# Patient Record
Sex: FEMALE | Race: WHITE | NOT HISPANIC OR LATINO | Employment: OTHER | URBAN - METROPOLITAN AREA
[De-identification: names, ages, dates, MRNs, and addresses within clinical notes are randomized per-mention and may not be internally consistent; named-entity substitution may affect disease eponyms.]

---

## 2017-01-08 ENCOUNTER — COMMUNICATION - HEALTHEAST (OUTPATIENT)
Dept: CARDIOLOGY | Facility: CLINIC | Age: 82
End: 2017-01-08

## 2017-01-08 DIAGNOSIS — I48.20 CHRONIC ATRIAL FIBRILLATION (H): ICD-10-CM

## 2017-01-25 ENCOUNTER — AMBULATORY - HEALTHEAST (OUTPATIENT)
Dept: LAB | Facility: CLINIC | Age: 82
End: 2017-01-25

## 2017-01-25 ENCOUNTER — COMMUNICATION - HEALTHEAST (OUTPATIENT)
Dept: INTERNAL MEDICINE | Facility: CLINIC | Age: 82
End: 2017-01-25

## 2017-01-25 DIAGNOSIS — I48.91 A-FIB (H): ICD-10-CM

## 2017-01-30 ENCOUNTER — COMMUNICATION - HEALTHEAST (OUTPATIENT)
Dept: CARDIOLOGY | Facility: CLINIC | Age: 82
End: 2017-01-30

## 2017-01-30 DIAGNOSIS — R60.9 EDEMA: ICD-10-CM

## 2017-03-03 ENCOUNTER — COMMUNICATION - HEALTHEAST (OUTPATIENT)
Dept: INTERNAL MEDICINE | Facility: CLINIC | Age: 82
End: 2017-03-03

## 2017-03-03 DIAGNOSIS — I48.91 ATRIAL FIBRILLATION (H): ICD-10-CM

## 2017-03-10 ENCOUNTER — AMBULATORY - HEALTHEAST (OUTPATIENT)
Dept: LAB | Facility: CLINIC | Age: 82
End: 2017-03-10

## 2017-03-10 ENCOUNTER — COMMUNICATION - HEALTHEAST (OUTPATIENT)
Dept: NURSING | Facility: CLINIC | Age: 82
End: 2017-03-10

## 2017-03-10 DIAGNOSIS — I48.91 ATRIAL FIBRILLATION (H): ICD-10-CM

## 2017-04-10 ENCOUNTER — AMBULATORY - HEALTHEAST (OUTPATIENT)
Dept: LAB | Facility: CLINIC | Age: 82
End: 2017-04-10

## 2017-04-10 ENCOUNTER — COMMUNICATION - HEALTHEAST (OUTPATIENT)
Dept: INTERNAL MEDICINE | Facility: CLINIC | Age: 82
End: 2017-04-10

## 2017-04-10 DIAGNOSIS — I48.91 ATRIAL FIBRILLATION (H): ICD-10-CM

## 2017-05-01 ENCOUNTER — AMBULATORY - HEALTHEAST (OUTPATIENT)
Dept: LAB | Facility: CLINIC | Age: 82
End: 2017-05-01

## 2017-05-01 ENCOUNTER — COMMUNICATION - HEALTHEAST (OUTPATIENT)
Dept: INTERNAL MEDICINE | Facility: CLINIC | Age: 82
End: 2017-05-01

## 2017-05-01 DIAGNOSIS — I48.91 ATRIAL FIBRILLATION (H): ICD-10-CM

## 2017-05-01 DIAGNOSIS — I48.91 A-FIB (H): ICD-10-CM

## 2017-05-03 ENCOUNTER — COMMUNICATION - HEALTHEAST (OUTPATIENT)
Dept: INTERNAL MEDICINE | Facility: CLINIC | Age: 82
End: 2017-05-03

## 2017-05-04 ENCOUNTER — COMMUNICATION - HEALTHEAST (OUTPATIENT)
Dept: INTERNAL MEDICINE | Facility: CLINIC | Age: 82
End: 2017-05-04

## 2017-05-08 ENCOUNTER — COMMUNICATION - HEALTHEAST (OUTPATIENT)
Dept: CARDIOLOGY | Facility: CLINIC | Age: 82
End: 2017-05-08

## 2017-05-09 ENCOUNTER — COMMUNICATION - HEALTHEAST (OUTPATIENT)
Dept: INTERNAL MEDICINE | Facility: CLINIC | Age: 82
End: 2017-05-09

## 2017-05-15 ENCOUNTER — AMBULATORY - HEALTHEAST (OUTPATIENT)
Dept: LAB | Facility: CLINIC | Age: 82
End: 2017-05-15

## 2017-05-15 ENCOUNTER — COMMUNICATION - HEALTHEAST (OUTPATIENT)
Dept: INTERNAL MEDICINE | Facility: CLINIC | Age: 82
End: 2017-05-15

## 2017-05-15 DIAGNOSIS — I48.91 ATRIAL FIBRILLATION (H): ICD-10-CM

## 2017-06-01 ENCOUNTER — COMMUNICATION - HEALTHEAST (OUTPATIENT)
Dept: INTERNAL MEDICINE | Facility: CLINIC | Age: 82
End: 2017-06-01

## 2017-06-01 ENCOUNTER — AMBULATORY - HEALTHEAST (OUTPATIENT)
Dept: LAB | Facility: CLINIC | Age: 82
End: 2017-06-01

## 2017-06-01 DIAGNOSIS — I48.91 ATRIAL FIBRILLATION (H): ICD-10-CM

## 2017-06-23 ENCOUNTER — COMMUNICATION - HEALTHEAST (OUTPATIENT)
Dept: INTERNAL MEDICINE | Facility: CLINIC | Age: 82
End: 2017-06-23

## 2017-06-23 ENCOUNTER — AMBULATORY - HEALTHEAST (OUTPATIENT)
Dept: LAB | Facility: CLINIC | Age: 82
End: 2017-06-23

## 2017-06-23 DIAGNOSIS — I48.91 ATRIAL FIBRILLATION (H): ICD-10-CM

## 2017-07-13 ENCOUNTER — COMMUNICATION - HEALTHEAST (OUTPATIENT)
Dept: INTERNAL MEDICINE | Facility: CLINIC | Age: 82
End: 2017-07-13

## 2017-07-13 ENCOUNTER — AMBULATORY - HEALTHEAST (OUTPATIENT)
Dept: LAB | Facility: CLINIC | Age: 82
End: 2017-07-13

## 2017-07-13 DIAGNOSIS — I48.91 ATRIAL FIBRILLATION (H): ICD-10-CM

## 2017-07-24 ENCOUNTER — COMMUNICATION - HEALTHEAST (OUTPATIENT)
Dept: INTERNAL MEDICINE | Facility: CLINIC | Age: 82
End: 2017-07-24

## 2017-07-27 ENCOUNTER — COMMUNICATION - HEALTHEAST (OUTPATIENT)
Dept: INTERNAL MEDICINE | Facility: CLINIC | Age: 82
End: 2017-07-27

## 2017-07-27 ENCOUNTER — AMBULATORY - HEALTHEAST (OUTPATIENT)
Dept: LAB | Facility: CLINIC | Age: 82
End: 2017-07-27

## 2017-07-27 DIAGNOSIS — E78.5 HYPERLIPIDEMIA, UNSPECIFIED HYPERLIPIDEMIA TYPE: ICD-10-CM

## 2017-07-27 DIAGNOSIS — I48.91 ATRIAL FIBRILLATION (H): ICD-10-CM

## 2017-08-18 ENCOUNTER — COMMUNICATION - HEALTHEAST (OUTPATIENT)
Dept: CARDIOLOGY | Facility: CLINIC | Age: 82
End: 2017-08-18

## 2017-08-18 DIAGNOSIS — I10 ESSENTIAL HYPERTENSION: ICD-10-CM

## 2017-08-23 ENCOUNTER — COMMUNICATION - HEALTHEAST (OUTPATIENT)
Dept: NURSING | Facility: CLINIC | Age: 82
End: 2017-08-23

## 2017-08-23 ENCOUNTER — AMBULATORY - HEALTHEAST (OUTPATIENT)
Dept: LAB | Facility: CLINIC | Age: 82
End: 2017-08-23

## 2017-08-23 DIAGNOSIS — I48.91 ATRIAL FIBRILLATION (H): ICD-10-CM

## 2017-09-18 ENCOUNTER — AMBULATORY - HEALTHEAST (OUTPATIENT)
Dept: LAB | Facility: CLINIC | Age: 82
End: 2017-09-18

## 2017-09-18 ENCOUNTER — COMMUNICATION - HEALTHEAST (OUTPATIENT)
Dept: INTERNAL MEDICINE | Facility: CLINIC | Age: 82
End: 2017-09-18

## 2017-09-18 DIAGNOSIS — I48.91 ATRIAL FIBRILLATION (H): ICD-10-CM

## 2017-09-27 ENCOUNTER — COMMUNICATION - HEALTHEAST (OUTPATIENT)
Dept: INTERNAL MEDICINE | Facility: CLINIC | Age: 82
End: 2017-09-27

## 2017-09-27 DIAGNOSIS — I48.91 A-FIB (H): ICD-10-CM

## 2017-09-28 ENCOUNTER — COMMUNICATION - HEALTHEAST (OUTPATIENT)
Dept: INTERNAL MEDICINE | Facility: CLINIC | Age: 82
End: 2017-09-28

## 2017-10-27 ENCOUNTER — COMMUNICATION - HEALTHEAST (OUTPATIENT)
Dept: INTERNAL MEDICINE | Facility: CLINIC | Age: 82
End: 2017-10-27

## 2017-10-27 DIAGNOSIS — E78.5 HYPERLIPIDEMIA, UNSPECIFIED HYPERLIPIDEMIA TYPE: ICD-10-CM

## 2017-10-30 ENCOUNTER — COMMUNICATION - HEALTHEAST (OUTPATIENT)
Dept: INTERNAL MEDICINE | Facility: CLINIC | Age: 82
End: 2017-10-30

## 2017-11-06 ENCOUNTER — COMMUNICATION - HEALTHEAST (OUTPATIENT)
Dept: INTERNAL MEDICINE | Facility: CLINIC | Age: 82
End: 2017-11-06

## 2017-11-09 ENCOUNTER — AMBULATORY - HEALTHEAST (OUTPATIENT)
Dept: LAB | Facility: CLINIC | Age: 82
End: 2017-11-09

## 2017-11-09 ENCOUNTER — COMMUNICATION - HEALTHEAST (OUTPATIENT)
Dept: INTERNAL MEDICINE | Facility: CLINIC | Age: 82
End: 2017-11-09

## 2017-11-09 DIAGNOSIS — I48.91 ATRIAL FIBRILLATION (H): ICD-10-CM

## 2017-11-28 ENCOUNTER — COMMUNICATION - HEALTHEAST (OUTPATIENT)
Dept: INTERNAL MEDICINE | Facility: CLINIC | Age: 82
End: 2017-11-28

## 2017-11-29 ENCOUNTER — COMMUNICATION - HEALTHEAST (OUTPATIENT)
Dept: INTERNAL MEDICINE | Facility: CLINIC | Age: 82
End: 2017-11-29

## 2017-11-29 ENCOUNTER — AMBULATORY - HEALTHEAST (OUTPATIENT)
Dept: LAB | Facility: CLINIC | Age: 82
End: 2017-11-29

## 2017-11-29 DIAGNOSIS — I48.91 ATRIAL FIBRILLATION (H): ICD-10-CM

## 2017-12-10 ENCOUNTER — COMMUNICATION - HEALTHEAST (OUTPATIENT)
Dept: INTERNAL MEDICINE | Facility: CLINIC | Age: 82
End: 2017-12-10

## 2017-12-27 ENCOUNTER — COMMUNICATION - HEALTHEAST (OUTPATIENT)
Dept: INTERNAL MEDICINE | Facility: CLINIC | Age: 82
End: 2017-12-27

## 2017-12-29 ENCOUNTER — COMMUNICATION - HEALTHEAST (OUTPATIENT)
Dept: INTERNAL MEDICINE | Facility: CLINIC | Age: 82
End: 2017-12-29

## 2017-12-29 ENCOUNTER — AMBULATORY - HEALTHEAST (OUTPATIENT)
Dept: LAB | Facility: CLINIC | Age: 82
End: 2017-12-29

## 2017-12-29 DIAGNOSIS — I48.91 ATRIAL FIBRILLATION (H): ICD-10-CM

## 2018-01-11 ENCOUNTER — COMMUNICATION - HEALTHEAST (OUTPATIENT)
Dept: INTERNAL MEDICINE | Facility: CLINIC | Age: 83
End: 2018-01-11

## 2018-01-16 ENCOUNTER — COMMUNICATION - HEALTHEAST (OUTPATIENT)
Dept: INTERNAL MEDICINE | Facility: CLINIC | Age: 83
End: 2018-01-16

## 2018-01-16 ENCOUNTER — AMBULATORY - HEALTHEAST (OUTPATIENT)
Dept: LAB | Facility: CLINIC | Age: 83
End: 2018-01-16

## 2018-01-16 DIAGNOSIS — I48.91 ATRIAL FIBRILLATION (H): ICD-10-CM

## 2018-01-16 LAB — INR PPP: 3.2 (ref 0.9–1.1)

## 2018-01-20 ENCOUNTER — COMMUNICATION - HEALTHEAST (OUTPATIENT)
Dept: CARDIOLOGY | Facility: CLINIC | Age: 83
End: 2018-01-20

## 2018-01-20 DIAGNOSIS — R60.9 EDEMA: ICD-10-CM

## 2018-01-20 DIAGNOSIS — I48.91 A-FIB (H): ICD-10-CM

## 2018-02-09 ENCOUNTER — COMMUNICATION - HEALTHEAST (OUTPATIENT)
Dept: INTERNAL MEDICINE | Facility: CLINIC | Age: 83
End: 2018-02-09

## 2018-02-09 ENCOUNTER — AMBULATORY - HEALTHEAST (OUTPATIENT)
Dept: LAB | Facility: CLINIC | Age: 83
End: 2018-02-09

## 2018-02-09 DIAGNOSIS — I48.91 ATRIAL FIBRILLATION (H): ICD-10-CM

## 2018-02-09 LAB — INR PPP: 2.2 (ref 0.9–1.1)

## 2018-02-13 ENCOUNTER — OFFICE VISIT - HEALTHEAST (OUTPATIENT)
Dept: CARDIOLOGY | Facility: CLINIC | Age: 83
End: 2018-02-13

## 2018-02-13 DIAGNOSIS — E78.5 HYPERLIPIDEMIA, UNSPECIFIED HYPERLIPIDEMIA TYPE: ICD-10-CM

## 2018-02-13 DIAGNOSIS — E78.00 PURE HYPERCHOLESTEROLEMIA: ICD-10-CM

## 2018-02-13 ASSESSMENT — MIFFLIN-ST. JEOR: SCORE: 1109.06

## 2018-02-15 ENCOUNTER — COMMUNICATION - HEALTHEAST (OUTPATIENT)
Dept: CARDIOLOGY | Facility: CLINIC | Age: 83
End: 2018-02-15

## 2018-02-15 ENCOUNTER — COMMUNICATION - HEALTHEAST (OUTPATIENT)
Dept: INTERNAL MEDICINE | Facility: CLINIC | Age: 83
End: 2018-02-15

## 2018-02-15 DIAGNOSIS — I10 ESSENTIAL HYPERTENSION: ICD-10-CM

## 2018-02-15 DIAGNOSIS — E78.5 HYPERLIPIDEMIA, UNSPECIFIED HYPERLIPIDEMIA TYPE: ICD-10-CM

## 2018-02-15 DIAGNOSIS — I48.91 ATRIAL FIBRILLATION (H): ICD-10-CM

## 2018-03-22 ENCOUNTER — AMBULATORY - HEALTHEAST (OUTPATIENT)
Dept: LAB | Facility: CLINIC | Age: 83
End: 2018-03-22

## 2018-03-22 ENCOUNTER — COMMUNICATION - HEALTHEAST (OUTPATIENT)
Dept: INTERNAL MEDICINE | Facility: CLINIC | Age: 83
End: 2018-03-22

## 2018-03-22 DIAGNOSIS — I48.91 ATRIAL FIBRILLATION (H): ICD-10-CM

## 2018-03-22 LAB — INR PPP: 2.8 (ref 0.9–1.1)

## 2018-03-23 ENCOUNTER — COMMUNICATION - HEALTHEAST (OUTPATIENT)
Dept: INTERNAL MEDICINE | Facility: CLINIC | Age: 83
End: 2018-03-23

## 2018-03-23 DIAGNOSIS — F41.1 ANXIETY STATE: ICD-10-CM

## 2018-03-26 ENCOUNTER — COMMUNICATION - HEALTHEAST (OUTPATIENT)
Dept: INTERNAL MEDICINE | Facility: CLINIC | Age: 83
End: 2018-03-26

## 2018-04-05 ENCOUNTER — COMMUNICATION - HEALTHEAST (OUTPATIENT)
Dept: INTERNAL MEDICINE | Facility: CLINIC | Age: 83
End: 2018-04-05

## 2018-04-05 DIAGNOSIS — I48.91 A-FIB (H): ICD-10-CM

## 2018-04-19 ENCOUNTER — COMMUNICATION - HEALTHEAST (OUTPATIENT)
Dept: CARDIOLOGY | Facility: CLINIC | Age: 83
End: 2018-04-19

## 2018-04-19 ENCOUNTER — COMMUNICATION - HEALTHEAST (OUTPATIENT)
Dept: INTERNAL MEDICINE | Facility: CLINIC | Age: 83
End: 2018-04-19

## 2018-04-19 DIAGNOSIS — R60.9 EDEMA: ICD-10-CM

## 2018-04-19 DIAGNOSIS — I48.91 A-FIB (H): ICD-10-CM

## 2018-05-11 ENCOUNTER — COMMUNICATION - HEALTHEAST (OUTPATIENT)
Dept: ANTICOAGULATION | Facility: CLINIC | Age: 83
End: 2018-05-11

## 2018-05-11 ENCOUNTER — AMBULATORY - HEALTHEAST (OUTPATIENT)
Dept: LAB | Facility: CLINIC | Age: 83
End: 2018-05-11

## 2018-05-11 DIAGNOSIS — I48.91 ATRIAL FIBRILLATION (H): ICD-10-CM

## 2018-05-11 DIAGNOSIS — I48.91 A-FIB (H): ICD-10-CM

## 2018-05-11 LAB — INR PPP: 3.5 (ref 0.9–1.1)

## 2018-05-17 ENCOUNTER — OFFICE VISIT - HEALTHEAST (OUTPATIENT)
Dept: INTERNAL MEDICINE | Facility: CLINIC | Age: 83
End: 2018-05-17

## 2018-05-17 DIAGNOSIS — F41.1 ANXIETY STATE: ICD-10-CM

## 2018-05-17 DIAGNOSIS — I10 ESSENTIAL HYPERTENSION: ICD-10-CM

## 2018-05-17 DIAGNOSIS — Z00.00 HEALTH CARE MAINTENANCE: ICD-10-CM

## 2018-05-17 DIAGNOSIS — M94.9 DISORDER OF BONE AND CARTILAGE: ICD-10-CM

## 2018-05-17 DIAGNOSIS — Z00.00 ROUTINE GENERAL MEDICAL EXAMINATION AT A HEALTH CARE FACILITY: ICD-10-CM

## 2018-05-17 DIAGNOSIS — M89.9 DISORDER OF BONE AND CARTILAGE: ICD-10-CM

## 2018-05-17 DIAGNOSIS — L98.9 SKIN DISORDER: ICD-10-CM

## 2018-05-17 DIAGNOSIS — E78.00 PURE HYPERCHOLESTEROLEMIA: ICD-10-CM

## 2018-05-17 DIAGNOSIS — R73.09 OTHER ABNORMAL GLUCOSE: ICD-10-CM

## 2018-05-17 DIAGNOSIS — E78.5 HYPERLIPIDEMIA, UNSPECIFIED HYPERLIPIDEMIA TYPE: ICD-10-CM

## 2018-05-17 DIAGNOSIS — I48.21 PERMANENT ATRIAL FIBRILLATION (H): ICD-10-CM

## 2018-05-17 LAB
ALBUMIN SERPL-MCNC: 3.4 G/DL (ref 3.5–5)
ALBUMIN UR-MCNC: NEGATIVE MG/DL
ALP SERPL-CCNC: 101 U/L (ref 45–120)
ALT SERPL W P-5'-P-CCNC: 10 U/L (ref 0–45)
ANION GAP SERPL CALCULATED.3IONS-SCNC: 12 MMOL/L (ref 5–18)
APPEARANCE UR: CLEAR
AST SERPL W P-5'-P-CCNC: 10 U/L (ref 0–40)
BACTERIA #/AREA URNS HPF: ABNORMAL HPF
BILIRUB SERPL-MCNC: 0.4 MG/DL (ref 0–1)
BILIRUB UR QL STRIP: NEGATIVE
BUN SERPL-MCNC: 33 MG/DL (ref 8–28)
CALCIUM SERPL-MCNC: 9.3 MG/DL (ref 8.5–10.5)
CHLORIDE BLD-SCNC: 108 MMOL/L (ref 98–107)
CO2 SERPL-SCNC: 23 MMOL/L (ref 22–31)
COLOR UR AUTO: YELLOW
CREAT SERPL-MCNC: 1.4 MG/DL (ref 0.6–1.1)
ERYTHROCYTE [DISTWIDTH] IN BLOOD BY AUTOMATED COUNT: 13.2 % (ref 11–14.5)
GFR SERPL CREATININE-BSD FRML MDRD: 36 ML/MIN/1.73M2
GLUCOSE BLD-MCNC: 108 MG/DL (ref 70–125)
GLUCOSE UR STRIP-MCNC: NEGATIVE MG/DL
HBA1C MFR BLD: 5.7 % (ref 3.5–6)
HCT VFR BLD AUTO: 39.5 % (ref 35–47)
HGB BLD-MCNC: 12.9 G/DL (ref 12–16)
HGB UR QL STRIP: NEGATIVE
KETONES UR STRIP-MCNC: NEGATIVE MG/DL
LDLC SERPL CALC-MCNC: 86 MG/DL
LEUKOCYTE ESTERASE UR QL STRIP: ABNORMAL
MCH RBC QN AUTO: 28.8 PG (ref 27–34)
MCHC RBC AUTO-ENTMCNC: 32.7 G/DL (ref 32–36)
MCV RBC AUTO: 88 FL (ref 80–100)
NITRATE UR QL: NEGATIVE
PH UR STRIP: 5 [PH] (ref 5–8)
PLATELET # BLD AUTO: 287 THOU/UL (ref 140–440)
PMV BLD AUTO: 8.5 FL (ref 7–10)
POTASSIUM BLD-SCNC: 4.3 MMOL/L (ref 3.5–5)
PROT SERPL-MCNC: 6.2 G/DL (ref 6–8)
RBC # BLD AUTO: 4.48 MILL/UL (ref 3.8–5.4)
RBC #/AREA URNS AUTO: ABNORMAL HPF
SODIUM SERPL-SCNC: 143 MMOL/L (ref 136–145)
SP GR UR STRIP: 1.01 (ref 1–1.03)
SQUAMOUS #/AREA URNS AUTO: ABNORMAL LPF
UROBILINOGEN UR STRIP-ACNC: ABNORMAL
WBC #/AREA URNS AUTO: ABNORMAL HPF
WBC: 8.5 THOU/UL (ref 4–11)

## 2018-05-17 ASSESSMENT — MIFFLIN-ST. JEOR: SCORE: 1102.71

## 2018-05-18 ENCOUNTER — COMMUNICATION - HEALTHEAST (OUTPATIENT)
Dept: INTERNAL MEDICINE | Facility: CLINIC | Age: 83
End: 2018-05-18

## 2018-05-18 LAB — 25(OH)D3 SERPL-MCNC: 40.4 NG/ML (ref 30–80)

## 2018-05-25 ENCOUNTER — AMBULATORY - HEALTHEAST (OUTPATIENT)
Dept: LAB | Facility: CLINIC | Age: 83
End: 2018-05-25

## 2018-05-25 ENCOUNTER — COMMUNICATION - HEALTHEAST (OUTPATIENT)
Dept: ANTICOAGULATION | Facility: CLINIC | Age: 83
End: 2018-05-25

## 2018-05-25 DIAGNOSIS — I48.91 ATRIAL FIBRILLATION (H): ICD-10-CM

## 2018-05-25 LAB — INR PPP: 2.1 (ref 0.9–1.1)

## 2018-06-20 ENCOUNTER — RECORDS - HEALTHEAST (OUTPATIENT)
Dept: GENERAL RADIOLOGY | Facility: CLINIC | Age: 83
End: 2018-06-20

## 2018-06-20 ENCOUNTER — OFFICE VISIT - HEALTHEAST (OUTPATIENT)
Dept: RHEUMATOLOGY | Facility: CLINIC | Age: 83
End: 2018-06-20

## 2018-06-20 ENCOUNTER — COMMUNICATION - HEALTHEAST (OUTPATIENT)
Dept: INTERNAL MEDICINE | Facility: CLINIC | Age: 83
End: 2018-06-20

## 2018-06-20 DIAGNOSIS — M89.9 DISORDER OF BONE AND CARTILAGE: ICD-10-CM

## 2018-06-20 DIAGNOSIS — M25.50 PAIN IN UNSPECIFIED JOINT: ICD-10-CM

## 2018-06-20 DIAGNOSIS — M19.90 UNSPECIFIED OSTEOARTHRITIS, UNSPECIFIED SITE: ICD-10-CM

## 2018-06-20 DIAGNOSIS — M94.9 DISORDER OF BONE AND CARTILAGE: ICD-10-CM

## 2018-06-20 DIAGNOSIS — M79.642 PAIN IN LEFT HAND: ICD-10-CM

## 2018-06-20 DIAGNOSIS — M15.9 POLYOSTEOARTHRITIS, UNSPECIFIED: ICD-10-CM

## 2018-06-20 DIAGNOSIS — I48.91 ATRIAL FIBRILLATION (H): ICD-10-CM

## 2018-06-20 DIAGNOSIS — M19.90 INFLAMMATORY ARTHRITIS: ICD-10-CM

## 2018-06-20 DIAGNOSIS — M79.641 PAIN IN BOTH HANDS: ICD-10-CM

## 2018-06-20 DIAGNOSIS — M15.9 OSTEOARTHRITIS OF MULTIPLE JOINTS, UNSPECIFIED OSTEOARTHRITIS TYPE: ICD-10-CM

## 2018-06-20 DIAGNOSIS — N28.9 RENAL INSUFFICIENCY: ICD-10-CM

## 2018-06-20 DIAGNOSIS — M25.50 MULTIPLE JOINT PAIN: ICD-10-CM

## 2018-06-20 DIAGNOSIS — M79.641 PAIN IN RIGHT HAND: ICD-10-CM

## 2018-06-20 DIAGNOSIS — M79.642 PAIN IN BOTH HANDS: ICD-10-CM

## 2018-06-20 LAB
C REACTIVE PROTEIN LHE: 0.3 MG/DL (ref 0–0.8)
ERYTHROCYTE [SEDIMENTATION RATE] IN BLOOD BY WESTERGREN METHOD: 14 MM/HR (ref 0–20)
INR PPP: 1.9 (ref 0.9–1.1)
RHEUMATOID FACT SERPL-ACNC: <15 IU/ML (ref 0–30)
URATE SERPL-MCNC: 6.6 MG/DL (ref 2–7.5)

## 2018-06-20 ASSESSMENT — MIFFLIN-ST. JEOR: SCORE: 1102.25

## 2018-06-21 ENCOUNTER — COMMUNICATION - HEALTHEAST (OUTPATIENT)
Dept: ADMINISTRATIVE | Facility: CLINIC | Age: 83
End: 2018-06-21

## 2018-06-21 ENCOUNTER — COMMUNICATION - HEALTHEAST (OUTPATIENT)
Dept: INTERNAL MEDICINE | Facility: CLINIC | Age: 83
End: 2018-06-21

## 2018-06-21 LAB
ANA SER QL: 0.7 U
CCP AB SER IA-ACNC: <0.5 U/ML
HBV SURFACE AG SERPL QL IA: NEGATIVE
HCV AB SERPL QL IA: NEGATIVE

## 2018-06-22 ENCOUNTER — COMMUNICATION - HEALTHEAST (OUTPATIENT)
Dept: INTERNAL MEDICINE | Facility: CLINIC | Age: 83
End: 2018-06-22

## 2018-06-22 LAB
QTF INTERPRETATION: NORMAL
QTF MITOGEN - NIL: 3.85 IU/ML
QTF NIL: 0.07 IU/ML
QTF RESULT: NEGATIVE
QTF TB ANTIGEN - NIL: -0.04 IU/ML

## 2018-06-25 ENCOUNTER — AMBULATORY - HEALTHEAST (OUTPATIENT)
Dept: LAB | Facility: CLINIC | Age: 83
End: 2018-06-25

## 2018-06-25 ENCOUNTER — COMMUNICATION - HEALTHEAST (OUTPATIENT)
Dept: RHEUMATOLOGY | Facility: CLINIC | Age: 83
End: 2018-06-25

## 2018-06-25 ENCOUNTER — COMMUNICATION - HEALTHEAST (OUTPATIENT)
Dept: INTERNAL MEDICINE | Facility: CLINIC | Age: 83
End: 2018-06-25

## 2018-06-25 DIAGNOSIS — I48.91 ATRIAL FIBRILLATION (H): ICD-10-CM

## 2018-06-25 LAB — INR PPP: 2.1 (ref 0.9–1.1)

## 2018-06-26 ENCOUNTER — COMMUNICATION - HEALTHEAST (OUTPATIENT)
Dept: INTERNAL MEDICINE | Facility: CLINIC | Age: 83
End: 2018-06-26

## 2018-06-26 ENCOUNTER — COMMUNICATION - HEALTHEAST (OUTPATIENT)
Dept: RHEUMATOLOGY | Facility: CLINIC | Age: 83
End: 2018-06-26

## 2018-06-29 ENCOUNTER — COMMUNICATION - HEALTHEAST (OUTPATIENT)
Dept: ADMINISTRATIVE | Facility: CLINIC | Age: 83
End: 2018-06-29

## 2018-07-02 ENCOUNTER — AMBULATORY - HEALTHEAST (OUTPATIENT)
Dept: LAB | Facility: CLINIC | Age: 83
End: 2018-07-02

## 2018-07-02 ENCOUNTER — COMMUNICATION - HEALTHEAST (OUTPATIENT)
Dept: ANTICOAGULATION | Facility: CLINIC | Age: 83
End: 2018-07-02

## 2018-07-02 DIAGNOSIS — I48.91 ATRIAL FIBRILLATION (H): ICD-10-CM

## 2018-07-02 LAB — INR PPP: 2.6 (ref 0.9–1.1)

## 2018-07-13 ENCOUNTER — AMBULATORY - HEALTHEAST (OUTPATIENT)
Dept: LAB | Facility: CLINIC | Age: 83
End: 2018-07-13

## 2018-07-13 ENCOUNTER — COMMUNICATION - HEALTHEAST (OUTPATIENT)
Dept: INTERNAL MEDICINE | Facility: CLINIC | Age: 83
End: 2018-07-13

## 2018-07-13 DIAGNOSIS — I48.91 ATRIAL FIBRILLATION (H): ICD-10-CM

## 2018-07-13 LAB — INR PPP: 2.4 (ref 0.9–1.1)

## 2018-08-10 ENCOUNTER — COMMUNICATION - HEALTHEAST (OUTPATIENT)
Dept: ANTICOAGULATION | Facility: CLINIC | Age: 83
End: 2018-08-10

## 2018-08-10 ENCOUNTER — AMBULATORY - HEALTHEAST (OUTPATIENT)
Dept: LAB | Facility: CLINIC | Age: 83
End: 2018-08-10

## 2018-08-10 DIAGNOSIS — I48.91 ATRIAL FIBRILLATION (H): ICD-10-CM

## 2018-08-10 LAB — INR PPP: 2.2 (ref 0.9–1.1)

## 2018-09-19 ENCOUNTER — AMBULATORY - HEALTHEAST (OUTPATIENT)
Dept: LAB | Facility: CLINIC | Age: 83
End: 2018-09-19

## 2018-09-19 ENCOUNTER — COMMUNICATION - HEALTHEAST (OUTPATIENT)
Dept: ANTICOAGULATION | Facility: CLINIC | Age: 83
End: 2018-09-19

## 2018-09-19 DIAGNOSIS — I48.91 ATRIAL FIBRILLATION (H): ICD-10-CM

## 2018-09-19 LAB — INR PPP: 1.9 (ref 0.9–1.1)

## 2018-09-28 ENCOUNTER — COMMUNICATION - HEALTHEAST (OUTPATIENT)
Dept: INTERNAL MEDICINE | Facility: CLINIC | Age: 83
End: 2018-09-28

## 2018-10-08 ENCOUNTER — COMMUNICATION - HEALTHEAST (OUTPATIENT)
Dept: ANTICOAGULATION | Facility: CLINIC | Age: 83
End: 2018-10-08

## 2018-10-08 ENCOUNTER — AMBULATORY - HEALTHEAST (OUTPATIENT)
Dept: LAB | Facility: CLINIC | Age: 83
End: 2018-10-08

## 2018-10-08 DIAGNOSIS — I48.91 ATRIAL FIBRILLATION (H): ICD-10-CM

## 2018-10-08 LAB — INR PPP: 1.8 (ref 0.9–1.1)

## 2018-10-16 ENCOUNTER — COMMUNICATION - HEALTHEAST (OUTPATIENT)
Dept: CARDIOLOGY | Facility: CLINIC | Age: 83
End: 2018-10-16

## 2018-10-16 DIAGNOSIS — I48.91 A-FIB (H): ICD-10-CM

## 2018-10-23 ENCOUNTER — COMMUNICATION - HEALTHEAST (OUTPATIENT)
Dept: CARDIOLOGY | Facility: CLINIC | Age: 83
End: 2018-10-23

## 2018-10-23 DIAGNOSIS — R60.9 EDEMA: ICD-10-CM

## 2018-10-31 ENCOUNTER — COMMUNICATION - HEALTHEAST (OUTPATIENT)
Dept: ANTICOAGULATION | Facility: CLINIC | Age: 83
End: 2018-10-31

## 2018-10-31 ENCOUNTER — AMBULATORY - HEALTHEAST (OUTPATIENT)
Dept: LAB | Facility: CLINIC | Age: 83
End: 2018-10-31

## 2018-10-31 DIAGNOSIS — I48.91 ATRIAL FIBRILLATION (H): ICD-10-CM

## 2018-10-31 LAB — INR PPP: 2.3 (ref 0.9–1.1)

## 2018-11-14 ENCOUNTER — COMMUNICATION - HEALTHEAST (OUTPATIENT)
Dept: ANTICOAGULATION | Facility: CLINIC | Age: 83
End: 2018-11-14

## 2018-11-14 ENCOUNTER — AMBULATORY - HEALTHEAST (OUTPATIENT)
Dept: LAB | Facility: CLINIC | Age: 83
End: 2018-11-14

## 2018-11-14 DIAGNOSIS — I48.91 ATRIAL FIBRILLATION (H): ICD-10-CM

## 2018-11-14 LAB — INR PPP: 2.9 (ref 0.9–1.1)

## 2018-11-16 ENCOUNTER — COMMUNICATION - HEALTHEAST (OUTPATIENT)
Dept: CARDIOLOGY | Facility: CLINIC | Age: 83
End: 2018-11-16

## 2018-11-16 DIAGNOSIS — I10 ESSENTIAL HYPERTENSION: ICD-10-CM

## 2018-11-26 ENCOUNTER — COMMUNICATION - HEALTHEAST (OUTPATIENT)
Dept: ADMINISTRATIVE | Facility: CLINIC | Age: 83
End: 2018-11-26

## 2018-12-14 ENCOUNTER — COMMUNICATION - HEALTHEAST (OUTPATIENT)
Dept: ANTICOAGULATION | Facility: CLINIC | Age: 83
End: 2018-12-14

## 2018-12-14 ENCOUNTER — AMBULATORY - HEALTHEAST (OUTPATIENT)
Dept: LAB | Facility: CLINIC | Age: 83
End: 2018-12-14

## 2018-12-14 DIAGNOSIS — I48.91 ATRIAL FIBRILLATION (H): ICD-10-CM

## 2018-12-14 LAB — INR PPP: 2.5 (ref 0.9–1.1)

## 2019-01-10 ENCOUNTER — COMMUNICATION - HEALTHEAST (OUTPATIENT)
Dept: ANTICOAGULATION | Facility: CLINIC | Age: 84
End: 2019-01-10

## 2019-01-10 DIAGNOSIS — I48.91 ATRIAL FIBRILLATION (H): ICD-10-CM

## 2019-01-18 ENCOUNTER — COMMUNICATION - HEALTHEAST (OUTPATIENT)
Dept: ANTICOAGULATION | Facility: CLINIC | Age: 84
End: 2019-01-18

## 2019-01-21 ENCOUNTER — AMBULATORY - HEALTHEAST (OUTPATIENT)
Dept: LAB | Facility: CLINIC | Age: 84
End: 2019-01-21

## 2019-01-21 ENCOUNTER — COMMUNICATION - HEALTHEAST (OUTPATIENT)
Dept: ANTICOAGULATION | Facility: CLINIC | Age: 84
End: 2019-01-21

## 2019-01-21 DIAGNOSIS — I48.91 ATRIAL FIBRILLATION (H): ICD-10-CM

## 2019-01-21 LAB — INR PPP: 2.2 (ref 0.9–1.1)

## 2019-02-21 ENCOUNTER — COMMUNICATION - HEALTHEAST (OUTPATIENT)
Dept: CARDIOLOGY | Facility: CLINIC | Age: 84
End: 2019-02-21

## 2019-02-21 DIAGNOSIS — R60.9 EDEMA: ICD-10-CM

## 2019-03-06 ENCOUNTER — COMMUNICATION - HEALTHEAST (OUTPATIENT)
Dept: ANTICOAGULATION | Facility: CLINIC | Age: 84
End: 2019-03-06

## 2019-03-06 ENCOUNTER — AMBULATORY - HEALTHEAST (OUTPATIENT)
Dept: LAB | Facility: CLINIC | Age: 84
End: 2019-03-06

## 2019-03-06 DIAGNOSIS — I48.91 ATRIAL FIBRILLATION (H): ICD-10-CM

## 2019-03-06 LAB — INR PPP: 2.6 (ref 0.9–1.1)

## 2019-04-10 ENCOUNTER — OFFICE VISIT - HEALTHEAST (OUTPATIENT)
Dept: CARDIOLOGY | Facility: CLINIC | Age: 84
End: 2019-04-10

## 2019-04-10 DIAGNOSIS — E78.00 PURE HYPERCHOLESTEROLEMIA: ICD-10-CM

## 2019-04-10 DIAGNOSIS — I48.21 PERMANENT ATRIAL FIBRILLATION (H): ICD-10-CM

## 2019-04-10 ASSESSMENT — MIFFLIN-ST. JEOR: SCORE: 1088.65

## 2019-04-14 ENCOUNTER — COMMUNICATION - HEALTHEAST (OUTPATIENT)
Dept: CARDIOLOGY | Facility: CLINIC | Age: 84
End: 2019-04-14

## 2019-04-14 DIAGNOSIS — I48.91 A-FIB (H): ICD-10-CM

## 2019-04-24 ENCOUNTER — COMMUNICATION - HEALTHEAST (OUTPATIENT)
Dept: ANTICOAGULATION | Facility: CLINIC | Age: 84
End: 2019-04-24

## 2019-04-26 ENCOUNTER — COMMUNICATION - HEALTHEAST (OUTPATIENT)
Dept: INTERNAL MEDICINE | Facility: CLINIC | Age: 84
End: 2019-04-26

## 2019-04-26 DIAGNOSIS — I48.91 A-FIB (H): ICD-10-CM

## 2019-04-29 ENCOUNTER — COMMUNICATION - HEALTHEAST (OUTPATIENT)
Dept: ANTICOAGULATION | Facility: CLINIC | Age: 84
End: 2019-04-29

## 2019-04-29 ENCOUNTER — AMBULATORY - HEALTHEAST (OUTPATIENT)
Dept: LAB | Facility: CLINIC | Age: 84
End: 2019-04-29

## 2019-04-29 DIAGNOSIS — I48.91 ATRIAL FIBRILLATION (H): ICD-10-CM

## 2019-04-29 LAB — INR PPP: 2.2 (ref 0.9–1.1)

## 2019-05-24 ENCOUNTER — COMMUNICATION - HEALTHEAST (OUTPATIENT)
Dept: INTERNAL MEDICINE | Facility: CLINIC | Age: 84
End: 2019-05-24

## 2019-05-24 DIAGNOSIS — F41.1 ANXIETY STATE: ICD-10-CM

## 2019-06-10 ENCOUNTER — COMMUNICATION - HEALTHEAST (OUTPATIENT)
Dept: ANTICOAGULATION | Facility: CLINIC | Age: 84
End: 2019-06-10

## 2019-06-10 ENCOUNTER — AMBULATORY - HEALTHEAST (OUTPATIENT)
Dept: LAB | Facility: CLINIC | Age: 84
End: 2019-06-10

## 2019-06-10 DIAGNOSIS — I48.91 ATRIAL FIBRILLATION (H): ICD-10-CM

## 2019-06-10 LAB — INR PPP: 2.6 (ref 0.9–1.1)

## 2019-06-26 ENCOUNTER — COMMUNICATION - HEALTHEAST (OUTPATIENT)
Dept: CARDIOLOGY | Facility: CLINIC | Age: 84
End: 2019-06-26

## 2019-06-26 DIAGNOSIS — R60.9 EDEMA: ICD-10-CM

## 2019-07-24 ENCOUNTER — COMMUNICATION - HEALTHEAST (OUTPATIENT)
Dept: ADMINISTRATIVE | Facility: CLINIC | Age: 84
End: 2019-07-24

## 2019-07-29 ENCOUNTER — COMMUNICATION - HEALTHEAST (OUTPATIENT)
Dept: CARDIOLOGY | Facility: CLINIC | Age: 84
End: 2019-07-29

## 2019-07-29 DIAGNOSIS — I10 ESSENTIAL HYPERTENSION: ICD-10-CM

## 2019-07-30 ENCOUNTER — COMMUNICATION - HEALTHEAST (OUTPATIENT)
Dept: INTERNAL MEDICINE | Facility: CLINIC | Age: 84
End: 2019-07-30

## 2019-07-30 DIAGNOSIS — I48.91 A-FIB (H): ICD-10-CM

## 2019-08-02 ENCOUNTER — COMMUNICATION - HEALTHEAST (OUTPATIENT)
Dept: INTERNAL MEDICINE | Facility: CLINIC | Age: 84
End: 2019-08-02

## 2019-08-02 ENCOUNTER — AMBULATORY - HEALTHEAST (OUTPATIENT)
Dept: LAB | Facility: CLINIC | Age: 84
End: 2019-08-02

## 2019-08-02 DIAGNOSIS — I48.91 ATRIAL FIBRILLATION (H): ICD-10-CM

## 2019-08-02 LAB — INR PPP: 3.4 (ref 0.9–1.1)

## 2019-08-19 ENCOUNTER — COMMUNICATION - HEALTHEAST (OUTPATIENT)
Dept: PEDIATRICS | Facility: CLINIC | Age: 84
End: 2019-08-19

## 2019-08-23 ENCOUNTER — COMMUNICATION - HEALTHEAST (OUTPATIENT)
Dept: ANTICOAGULATION | Facility: CLINIC | Age: 84
End: 2019-08-23

## 2019-08-23 ENCOUNTER — AMBULATORY - HEALTHEAST (OUTPATIENT)
Dept: LAB | Facility: CLINIC | Age: 84
End: 2019-08-23

## 2019-08-23 DIAGNOSIS — I48.91 ATRIAL FIBRILLATION (H): ICD-10-CM

## 2019-08-23 LAB — INR PPP: 2.3 (ref 0.9–1.1)

## 2019-09-20 ENCOUNTER — COMMUNICATION - HEALTHEAST (OUTPATIENT)
Dept: ANTICOAGULATION | Facility: CLINIC | Age: 84
End: 2019-09-20

## 2019-09-23 ENCOUNTER — COMMUNICATION - HEALTHEAST (OUTPATIENT)
Dept: CARDIOLOGY | Facility: CLINIC | Age: 84
End: 2019-09-23

## 2019-09-23 DIAGNOSIS — I48.91 A-FIB (H): ICD-10-CM

## 2019-09-25 ENCOUNTER — RECORDS - HEALTHEAST (OUTPATIENT)
Dept: ANTICOAGULATION | Facility: CLINIC | Age: 84
End: 2019-09-25

## 2019-09-25 ENCOUNTER — COMMUNICATION - HEALTHEAST (OUTPATIENT)
Dept: ANTICOAGULATION | Facility: CLINIC | Age: 84
End: 2019-09-25

## 2019-09-25 ENCOUNTER — AMBULATORY - HEALTHEAST (OUTPATIENT)
Dept: LAB | Facility: CLINIC | Age: 84
End: 2019-09-25

## 2019-09-25 DIAGNOSIS — I48.91 ATRIAL FIBRILLATION (H): ICD-10-CM

## 2019-09-25 LAB — INR PPP: 2.3 (ref 0.9–1.1)

## 2019-10-30 ENCOUNTER — OFFICE VISIT - HEALTHEAST (OUTPATIENT)
Dept: CARDIOLOGY | Facility: CLINIC | Age: 84
End: 2019-10-30

## 2019-10-30 DIAGNOSIS — I48.21 PERMANENT ATRIAL FIBRILLATION (H): ICD-10-CM

## 2019-10-30 DIAGNOSIS — E78.00 PURE HYPERCHOLESTEROLEMIA: ICD-10-CM

## 2019-10-30 LAB
CHOLEST SERPL-MCNC: 158 MG/DL
FASTING STATUS PATIENT QL REPORTED: NO
HDLC SERPL-MCNC: 67 MG/DL
LDLC SERPL CALC-MCNC: 75 MG/DL
TRIGL SERPL-MCNC: 79 MG/DL

## 2019-10-30 ASSESSMENT — MIFFLIN-ST. JEOR: SCORE: 1084.11

## 2019-10-31 ENCOUNTER — COMMUNICATION - HEALTHEAST (OUTPATIENT)
Dept: CARDIOLOGY | Facility: CLINIC | Age: 84
End: 2019-10-31

## 2019-11-01 ENCOUNTER — COMMUNICATION - HEALTHEAST (OUTPATIENT)
Dept: ANTICOAGULATION | Facility: CLINIC | Age: 84
End: 2019-11-01

## 2019-11-06 ENCOUNTER — COMMUNICATION - HEALTHEAST (OUTPATIENT)
Dept: ANTICOAGULATION | Facility: CLINIC | Age: 84
End: 2019-11-06

## 2019-11-06 ENCOUNTER — AMBULATORY - HEALTHEAST (OUTPATIENT)
Dept: LAB | Facility: CLINIC | Age: 84
End: 2019-11-06

## 2019-11-06 DIAGNOSIS — I48.91 ATRIAL FIBRILLATION (H): ICD-10-CM

## 2019-11-06 LAB — INR PPP: 2.9 (ref 0.9–1.1)

## 2019-11-29 ENCOUNTER — COMMUNICATION - HEALTHEAST (OUTPATIENT)
Dept: CARDIOLOGY | Facility: CLINIC | Age: 84
End: 2019-11-29

## 2019-11-29 DIAGNOSIS — I48.91 A-FIB (H): ICD-10-CM

## 2019-12-05 ENCOUNTER — COMMUNICATION - HEALTHEAST (OUTPATIENT)
Dept: ANTICOAGULATION | Facility: CLINIC | Age: 84
End: 2019-12-05

## 2019-12-05 ENCOUNTER — AMBULATORY - HEALTHEAST (OUTPATIENT)
Dept: LAB | Facility: CLINIC | Age: 84
End: 2019-12-05

## 2019-12-05 DIAGNOSIS — I48.91 ATRIAL FIBRILLATION (H): ICD-10-CM

## 2019-12-05 LAB — INR PPP: 3.2 (ref 0.9–1.1)

## 2019-12-20 ENCOUNTER — AMBULATORY - HEALTHEAST (OUTPATIENT)
Dept: LAB | Facility: CLINIC | Age: 84
End: 2019-12-20

## 2019-12-20 ENCOUNTER — COMMUNICATION - HEALTHEAST (OUTPATIENT)
Dept: ANTICOAGULATION | Facility: CLINIC | Age: 84
End: 2019-12-20

## 2019-12-20 DIAGNOSIS — I48.91 ATRIAL FIBRILLATION (H): ICD-10-CM

## 2019-12-20 LAB — INR PPP: 2.2 (ref 0.9–1.1)

## 2019-12-24 ENCOUNTER — COMMUNICATION - HEALTHEAST (OUTPATIENT)
Dept: INTERNAL MEDICINE | Facility: CLINIC | Age: 84
End: 2019-12-24

## 2019-12-24 DIAGNOSIS — I48.91 A-FIB (H): ICD-10-CM

## 2020-01-06 ENCOUNTER — COMMUNICATION - HEALTHEAST (OUTPATIENT)
Dept: ANTICOAGULATION | Facility: CLINIC | Age: 85
End: 2020-01-06

## 2020-01-06 DIAGNOSIS — I48.91 ATRIAL FIBRILLATION (H): ICD-10-CM

## 2020-01-17 ENCOUNTER — COMMUNICATION - HEALTHEAST (OUTPATIENT)
Dept: ANTICOAGULATION | Facility: CLINIC | Age: 85
End: 2020-01-17

## 2020-01-17 ENCOUNTER — AMBULATORY - HEALTHEAST (OUTPATIENT)
Dept: LAB | Facility: CLINIC | Age: 85
End: 2020-01-17

## 2020-01-17 DIAGNOSIS — I48.91 ATRIAL FIBRILLATION (H): ICD-10-CM

## 2020-01-17 LAB — INR PPP: 2.6 (ref 0.9–1.1)

## 2020-02-21 ENCOUNTER — COMMUNICATION - HEALTHEAST (OUTPATIENT)
Dept: ANTICOAGULATION | Facility: CLINIC | Age: 85
End: 2020-02-21

## 2020-02-21 ENCOUNTER — AMBULATORY - HEALTHEAST (OUTPATIENT)
Dept: LAB | Facility: CLINIC | Age: 85
End: 2020-02-21

## 2020-02-21 DIAGNOSIS — I48.91 ATRIAL FIBRILLATION (H): ICD-10-CM

## 2020-02-21 LAB — INR PPP: 3 (ref 0.9–1.1)

## 2020-03-26 ENCOUNTER — COMMUNICATION - HEALTHEAST (OUTPATIENT)
Dept: CARDIOLOGY | Facility: CLINIC | Age: 85
End: 2020-03-26

## 2020-03-26 DIAGNOSIS — R60.9 EDEMA: ICD-10-CM

## 2020-03-26 DIAGNOSIS — I48.91 A-FIB (H): ICD-10-CM

## 2020-04-06 ENCOUNTER — COMMUNICATION - HEALTHEAST (OUTPATIENT)
Dept: ANTICOAGULATION | Facility: CLINIC | Age: 85
End: 2020-04-06

## 2020-04-09 ENCOUNTER — COMMUNICATION - HEALTHEAST (OUTPATIENT)
Dept: ANTICOAGULATION | Facility: CLINIC | Age: 85
End: 2020-04-09

## 2020-04-09 ENCOUNTER — AMBULATORY - HEALTHEAST (OUTPATIENT)
Dept: LAB | Facility: CLINIC | Age: 85
End: 2020-04-09

## 2020-04-09 DIAGNOSIS — I48.91 ATRIAL FIBRILLATION (H): ICD-10-CM

## 2020-04-09 LAB — INR PPP: 2.7 (ref 0.9–1.1)

## 2020-05-07 ENCOUNTER — OFFICE VISIT - HEALTHEAST (OUTPATIENT)
Dept: CARDIOLOGY | Facility: CLINIC | Age: 85
End: 2020-05-07

## 2020-05-07 DIAGNOSIS — I48.21 PERMANENT ATRIAL FIBRILLATION (H): ICD-10-CM

## 2020-05-08 ENCOUNTER — COMMUNICATION - HEALTHEAST (OUTPATIENT)
Dept: CARDIOLOGY | Facility: CLINIC | Age: 85
End: 2020-05-08

## 2020-05-08 DIAGNOSIS — I10 ESSENTIAL HYPERTENSION: ICD-10-CM

## 2020-05-11 ENCOUNTER — COMMUNICATION - HEALTHEAST (OUTPATIENT)
Dept: CARDIOLOGY | Facility: CLINIC | Age: 85
End: 2020-05-11

## 2020-05-11 DIAGNOSIS — E78.00 PURE HYPERCHOLESTEROLEMIA: ICD-10-CM

## 2020-06-03 ENCOUNTER — COMMUNICATION - HEALTHEAST (OUTPATIENT)
Dept: ANTICOAGULATION | Facility: CLINIC | Age: 85
End: 2020-06-03

## 2020-06-16 ENCOUNTER — AMBULATORY - HEALTHEAST (OUTPATIENT)
Dept: LAB | Facility: CLINIC | Age: 85
End: 2020-06-16

## 2020-06-16 ENCOUNTER — COMMUNICATION - HEALTHEAST (OUTPATIENT)
Dept: ANTICOAGULATION | Facility: CLINIC | Age: 85
End: 2020-06-16

## 2020-06-16 DIAGNOSIS — I48.91 ATRIAL FIBRILLATION (H): ICD-10-CM

## 2020-06-16 LAB — INR PPP: 3.2 (ref 0.9–1.1)

## 2020-07-07 ENCOUNTER — AMBULATORY - HEALTHEAST (OUTPATIENT)
Dept: LAB | Facility: CLINIC | Age: 85
End: 2020-07-07

## 2020-07-07 ENCOUNTER — COMMUNICATION - HEALTHEAST (OUTPATIENT)
Dept: ANTICOAGULATION | Facility: CLINIC | Age: 85
End: 2020-07-07

## 2020-07-07 DIAGNOSIS — I48.91 ATRIAL FIBRILLATION (H): ICD-10-CM

## 2020-07-07 LAB — INR PPP: 2.1 (ref 0.9–1.1)

## 2020-07-14 ENCOUNTER — COMMUNICATION - HEALTHEAST (OUTPATIENT)
Dept: CARDIOLOGY | Facility: CLINIC | Age: 85
End: 2020-07-14

## 2020-07-14 ENCOUNTER — COMMUNICATION - HEALTHEAST (OUTPATIENT)
Dept: INTERNAL MEDICINE | Facility: CLINIC | Age: 85
End: 2020-07-14

## 2020-07-14 DIAGNOSIS — R60.9 EDEMA: ICD-10-CM

## 2020-07-14 DIAGNOSIS — I48.91 A-FIB (H): ICD-10-CM

## 2020-08-04 ENCOUNTER — COMMUNICATION - HEALTHEAST (OUTPATIENT)
Dept: ANTICOAGULATION | Facility: CLINIC | Age: 85
End: 2020-08-04

## 2020-08-04 ENCOUNTER — AMBULATORY - HEALTHEAST (OUTPATIENT)
Dept: LAB | Facility: CLINIC | Age: 85
End: 2020-08-04

## 2020-08-04 DIAGNOSIS — I48.91 ATRIAL FIBRILLATION (H): ICD-10-CM

## 2020-08-04 LAB — INR PPP: 2.5 (ref 0.9–1.1)

## 2020-08-25 ENCOUNTER — COMMUNICATION - HEALTHEAST (OUTPATIENT)
Dept: INTERNAL MEDICINE | Facility: CLINIC | Age: 85
End: 2020-08-25

## 2020-08-25 DIAGNOSIS — F41.1 ANXIETY STATE: ICD-10-CM

## 2020-09-03 ENCOUNTER — AMBULATORY - HEALTHEAST (OUTPATIENT)
Dept: LAB | Facility: CLINIC | Age: 85
End: 2020-09-03

## 2020-09-03 ENCOUNTER — COMMUNICATION - HEALTHEAST (OUTPATIENT)
Dept: ANTICOAGULATION | Facility: CLINIC | Age: 85
End: 2020-09-03

## 2020-09-03 DIAGNOSIS — I48.91 ATRIAL FIBRILLATION (H): ICD-10-CM

## 2020-09-03 LAB — INR PPP: 2.8 (ref 0.9–1.1)

## 2020-09-29 ENCOUNTER — OFFICE VISIT - HEALTHEAST (OUTPATIENT)
Dept: INTERNAL MEDICINE | Facility: CLINIC | Age: 85
End: 2020-09-29

## 2020-09-29 DIAGNOSIS — I10 ESSENTIAL HYPERTENSION: ICD-10-CM

## 2020-09-29 DIAGNOSIS — I48.21 PERMANENT ATRIAL FIBRILLATION (H): ICD-10-CM

## 2020-09-29 DIAGNOSIS — F41.1 ANXIETY STATE: ICD-10-CM

## 2020-09-30 ENCOUNTER — COMMUNICATION - HEALTHEAST (OUTPATIENT)
Dept: INTERNAL MEDICINE | Facility: CLINIC | Age: 85
End: 2020-09-30

## 2020-10-08 ENCOUNTER — COMMUNICATION - HEALTHEAST (OUTPATIENT)
Dept: ANTICOAGULATION | Facility: CLINIC | Age: 85
End: 2020-10-08

## 2020-10-14 ENCOUNTER — AMBULATORY - HEALTHEAST (OUTPATIENT)
Dept: LAB | Facility: CLINIC | Age: 85
End: 2020-10-14

## 2020-10-14 ENCOUNTER — COMMUNICATION - HEALTHEAST (OUTPATIENT)
Dept: ANTICOAGULATION | Facility: CLINIC | Age: 85
End: 2020-10-14

## 2020-10-14 DIAGNOSIS — I48.91 ATRIAL FIBRILLATION (H): ICD-10-CM

## 2020-10-14 LAB — INR PPP: 2.8 (ref 0.9–1.1)

## 2020-11-04 ENCOUNTER — OFFICE VISIT - HEALTHEAST (OUTPATIENT)
Dept: CARDIOLOGY | Facility: CLINIC | Age: 85
End: 2020-11-04

## 2020-11-04 DIAGNOSIS — I48.21 PERMANENT ATRIAL FIBRILLATION (H): ICD-10-CM

## 2020-11-13 ENCOUNTER — COMMUNICATION - HEALTHEAST (OUTPATIENT)
Dept: CARDIOLOGY | Facility: CLINIC | Age: 85
End: 2020-11-13

## 2020-11-13 DIAGNOSIS — I10 ESSENTIAL HYPERTENSION: ICD-10-CM

## 2020-11-27 ENCOUNTER — AMBULATORY - HEALTHEAST (OUTPATIENT)
Dept: ANTICOAGULATION | Facility: CLINIC | Age: 85
End: 2020-11-27

## 2020-11-27 DIAGNOSIS — I48.91 ATRIAL FIBRILLATION (H): ICD-10-CM

## 2020-12-01 ENCOUNTER — AMBULATORY - HEALTHEAST (OUTPATIENT)
Dept: LAB | Facility: CLINIC | Age: 85
End: 2020-12-01

## 2020-12-01 ENCOUNTER — COMMUNICATION - HEALTHEAST (OUTPATIENT)
Dept: ANTICOAGULATION | Facility: CLINIC | Age: 85
End: 2020-12-01

## 2020-12-01 DIAGNOSIS — I48.91 ATRIAL FIBRILLATION (H): ICD-10-CM

## 2020-12-01 LAB — INR PPP: 2.8 (ref 0.9–1.1)

## 2021-01-17 ENCOUNTER — COMMUNICATION - HEALTHEAST (OUTPATIENT)
Dept: INTERNAL MEDICINE | Facility: CLINIC | Age: 86
End: 2021-01-17

## 2021-01-17 DIAGNOSIS — I48.91 A-FIB (H): ICD-10-CM

## 2021-01-18 RX ORDER — WARFARIN SODIUM 2.5 MG/1
TABLET ORAL
Qty: 90 TABLET | Refills: 1 | Status: SHIPPED | OUTPATIENT
Start: 2021-01-18 | End: 2021-08-30

## 2021-01-20 ENCOUNTER — COMMUNICATION - HEALTHEAST (OUTPATIENT)
Dept: ANTICOAGULATION | Facility: CLINIC | Age: 86
End: 2021-01-20

## 2021-01-22 ENCOUNTER — COMMUNICATION - HEALTHEAST (OUTPATIENT)
Dept: ANTICOAGULATION | Facility: CLINIC | Age: 86
End: 2021-01-22

## 2021-01-22 ENCOUNTER — AMBULATORY - HEALTHEAST (OUTPATIENT)
Dept: LAB | Facility: CLINIC | Age: 86
End: 2021-01-22

## 2021-01-22 ENCOUNTER — COMMUNICATION - HEALTHEAST (OUTPATIENT)
Dept: CARDIOLOGY | Facility: CLINIC | Age: 86
End: 2021-01-22

## 2021-01-22 DIAGNOSIS — I48.91 ATRIAL FIBRILLATION (H): ICD-10-CM

## 2021-01-22 DIAGNOSIS — R60.9 EDEMA: ICD-10-CM

## 2021-01-22 LAB — INR PPP: 3.2 (ref 0.9–1.1)

## 2021-01-22 RX ORDER — FUROSEMIDE 20 MG
20 TABLET ORAL DAILY
Qty: 90 TABLET | Refills: 1 | Status: SHIPPED | OUTPATIENT
Start: 2021-01-22 | End: 2021-08-04

## 2021-01-29 ENCOUNTER — COMMUNICATION - HEALTHEAST (OUTPATIENT)
Dept: CARDIOLOGY | Facility: CLINIC | Age: 86
End: 2021-01-29

## 2021-01-29 DIAGNOSIS — E78.00 PURE HYPERCHOLESTEROLEMIA: ICD-10-CM

## 2021-02-01 ENCOUNTER — COMMUNICATION - HEALTHEAST (OUTPATIENT)
Dept: CARDIOLOGY | Facility: CLINIC | Age: 86
End: 2021-02-01

## 2021-02-01 DIAGNOSIS — I48.91 A-FIB (H): ICD-10-CM

## 2021-02-01 RX ORDER — ATORVASTATIN CALCIUM 20 MG/1
TABLET, FILM COATED ORAL
Qty: 90 TABLET | Refills: 2 | Status: SHIPPED | OUTPATIENT
Start: 2021-02-01 | End: 2021-08-25

## 2021-02-01 RX ORDER — DILTIAZEM HYDROCHLORIDE 240 MG/1
240 CAPSULE, COATED, EXTENDED RELEASE ORAL DAILY
Qty: 90 CAPSULE | Refills: 1 | Status: SHIPPED | OUTPATIENT
Start: 2021-02-01 | End: 2021-08-04

## 2021-02-08 ENCOUNTER — AMBULATORY - HEALTHEAST (OUTPATIENT)
Dept: LAB | Facility: CLINIC | Age: 86
End: 2021-02-08

## 2021-02-08 ENCOUNTER — COMMUNICATION - HEALTHEAST (OUTPATIENT)
Dept: ANTICOAGULATION | Facility: CLINIC | Age: 86
End: 2021-02-08

## 2021-02-08 DIAGNOSIS — I48.91 ATRIAL FIBRILLATION (H): ICD-10-CM

## 2021-02-08 LAB — INR PPP: 3 (ref 0.9–1.1)

## 2021-02-18 ENCOUNTER — COMMUNICATION - HEALTHEAST (OUTPATIENT)
Dept: INTERNAL MEDICINE | Facility: CLINIC | Age: 86
End: 2021-02-18

## 2021-02-18 DIAGNOSIS — F41.1 ANXIETY STATE: ICD-10-CM

## 2021-02-18 RX ORDER — SERTRALINE HYDROCHLORIDE 100 MG/1
TABLET, FILM COATED ORAL
Qty: 90 TABLET | Refills: 1 | Status: SHIPPED | OUTPATIENT
Start: 2021-02-18 | End: 2021-12-15

## 2021-03-01 ENCOUNTER — COMMUNICATION - HEALTHEAST (OUTPATIENT)
Dept: ANTICOAGULATION | Facility: CLINIC | Age: 86
End: 2021-03-01

## 2021-03-01 ENCOUNTER — AMBULATORY - HEALTHEAST (OUTPATIENT)
Dept: LAB | Facility: CLINIC | Age: 86
End: 2021-03-01

## 2021-03-01 DIAGNOSIS — I48.91 ATRIAL FIBRILLATION (H): ICD-10-CM

## 2021-03-01 LAB — INR PPP: 2.7 (ref 0.9–1.1)

## 2021-04-05 ENCOUNTER — COMMUNICATION - HEALTHEAST (OUTPATIENT)
Dept: ANTICOAGULATION | Facility: CLINIC | Age: 86
End: 2021-04-05

## 2021-04-06 ENCOUNTER — COMMUNICATION - HEALTHEAST (OUTPATIENT)
Dept: ANTICOAGULATION | Facility: CLINIC | Age: 86
End: 2021-04-06

## 2021-04-06 ENCOUNTER — AMBULATORY - HEALTHEAST (OUTPATIENT)
Dept: LAB | Facility: CLINIC | Age: 86
End: 2021-04-06

## 2021-04-06 DIAGNOSIS — I48.91 ATRIAL FIBRILLATION (H): ICD-10-CM

## 2021-04-06 LAB — INR PPP: 1.8 (ref 0.9–1.1)

## 2021-04-22 ENCOUNTER — AMBULATORY - HEALTHEAST (OUTPATIENT)
Dept: LAB | Facility: CLINIC | Age: 86
End: 2021-04-22

## 2021-04-22 ENCOUNTER — COMMUNICATION - HEALTHEAST (OUTPATIENT)
Dept: ANTICOAGULATION | Facility: CLINIC | Age: 86
End: 2021-04-22

## 2021-04-22 DIAGNOSIS — I48.91 ATRIAL FIBRILLATION (H): ICD-10-CM

## 2021-04-22 LAB — INR PPP: 2.7 (ref 0.9–1.1)

## 2021-05-20 ENCOUNTER — RECORDS - HEALTHEAST (OUTPATIENT)
Dept: ADMINISTRATIVE | Facility: OTHER | Age: 86
End: 2021-05-20

## 2021-05-20 ENCOUNTER — COMMUNICATION - HEALTHEAST (OUTPATIENT)
Dept: ANTICOAGULATION | Facility: CLINIC | Age: 86
End: 2021-05-20

## 2021-05-20 DIAGNOSIS — I10 ESSENTIAL HYPERTENSION: ICD-10-CM

## 2021-05-20 RX ORDER — LISINOPRIL 5 MG/1
TABLET ORAL
Qty: 90 TABLET | Refills: 1 | Status: SHIPPED | OUTPATIENT
Start: 2021-05-20 | End: 2021-08-17

## 2021-05-25 ENCOUNTER — AMBULATORY - HEALTHEAST (OUTPATIENT)
Dept: LAB | Facility: CLINIC | Age: 86
End: 2021-05-25

## 2021-05-25 ENCOUNTER — COMMUNICATION - HEALTHEAST (OUTPATIENT)
Dept: ANTICOAGULATION | Facility: CLINIC | Age: 86
End: 2021-05-25

## 2021-05-25 DIAGNOSIS — I48.91 ATRIAL FIBRILLATION (H): ICD-10-CM

## 2021-05-25 LAB — INR PPP: 2 (ref 0.9–1.1)

## 2021-05-27 ENCOUNTER — RECORDS - HEALTHEAST (OUTPATIENT)
Dept: ADMINISTRATIVE | Facility: CLINIC | Age: 86
End: 2021-05-27

## 2021-05-27 NOTE — PATIENT INSTRUCTIONS - HE
Marva Gianes,    It was a pleasure to see you today at the [facility].     My recommendations after this visit include:    Switch lovastatin to atorvastatin    JEN Ricci MD, FACC, ASHLI

## 2021-05-27 NOTE — PROGRESS NOTES
"Cardiology Progress Note    Assessment:  Permanent atrial fibrillation, good control of ventricular response rate, on warfarin  PVCs, asymptomatic  Hypertension, good control  Hypercholesterolemia on lovastatin  Depression/ anxiety    Plan:  Change lovastatin to atorvastatin 20 mg a day  Continue current rate control medications    I discussed with her alternatives to warfarin.  She would like to stay on warfarin for now.    Follow-up in 6 months    Subjective:   This is 83 y.o. female who comes in today for follow-up visit.  She reports no new cardiac symptoms.  She has not had syncope or near syncope.  She tolerates medication well.  Her weight is stable.  She has not had excessive bruising or bleeding.  Her pharmacist was questioning safety of lovastatin and combinations with warfarin and other medications.    Review of Systems:   General: Night Sweats  Eyes: WNL  Ears/Nose/Throat: Hearing Loss  Lungs: WNL  Heart: WNL  Stomach: WNL  Bladder: WNL  Muscle/Joints: Joint Pain, Muscle Pain  Skin: WNL  Nervous System: WNL  Mental Health: Anxiety     Blood: WNL    Objective:   /76 (Patient Site: Left Arm, Patient Position: Sitting, Cuff Size: Adult Regular)   Pulse 84   Resp 16   Ht 5' 2\" (1.575 m)   Wt 150 lb (68 kg)   BMI 27.44 kg/m    Physical Exam:  GENERAL: no distress  NECK: No JVD  LUNGS: Clear to auscultation.  CARDIAC: irregular rhythm, S1 & S2 normal.  No heaves, thrills, gallops or murmurs.  ABDOMEN: flat, negative hepatosplenomegaly, soft and non-tender.  EXTREMITIES: No evidence of cyanosis, clubbing or edema.    Current Outpatient Medications   Medication Sig Dispense Refill     diltiazem (CARDIZEM CD) 240 MG 24 hr capsule TAKE ONE CAPSULE BY MOUTH EVERY DAY 90 capsule 1     furosemide (LASIX) 20 MG tablet TAKE 1 TABLET BY MOUTH DAILY 90 tablet 0     lisinopril (PRINIVIL,ZESTRIL) 5 MG tablet TAKE 1 TABLET (5 MG TOTAL) BY MOUTH DAILY. 90 tablet 2     sertraline (ZOLOFT) 100 MG tablet Take 1 " tablet (100 mg total) by mouth at bedtime. 90 tablet 3     warfarin (COUMADIN) 2.5 MG tablet Take 1/2 tablet (1.25 mg) on Mondays and 1 tablet (2.5 mg) all other days. Adjust dose per INR results. 90 tablet 0     atorvastatin (LIPITOR) 20 MG tablet Take 1 tablet (20 mg total) by mouth at bedtime. 90 tablet 3     cholecalciferol, vitamin D3, (VITAMIN D3) 2,000 unit Tab Take 1 tablet by mouth daily.       omeprazole (PRILOSEC) 20 MG capsule Take 1 capsule (20 mg total) by mouth daily before breakfast. 30 capsule 0     predniSONE (DELTASONE) 5 MG tablet Take 3 tab p.o. qd for 7 days then 2 tab qd for 7 days then 1 tab qd for 7 days then 1/2 tab qd for 7 days to off. 46 tablet 0     No current facility-administered medications for this visit.        Cardiographics:    Holter: April 2015   Persistent atrial fibrillation with overall fairly well controlled  ventricular response. There was some tendency toward rapid ventricular response  with activity in the early afternoon hours. A moderate number of ventricular  premature beats noted. 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     Echocardiogram: 2013   Normal LV systolic function, no significant valvular abnormalities     Stress Test: 2013   Mixed anterior perfusion defect     CT coronary angio: 2013   Normal coronaries, calcium score 2        Lab Results:       Lab Results   Component Value Date    CHOL 190 08/10/2016    CHOL 204 (H) 05/06/2014    CHOL 187 06/06/2013     Lab Results   Component Value Date    HDL 67 08/10/2016    HDL 66 05/06/2014    HDL 52 06/06/2013     Lab Results   Component Value Date    LDLCALC 103 08/10/2016    LDLCALC 119 05/06/2014    LDLCALC 111 06/06/2013     Lab Results   Component Value Date    TRIG 101 08/10/2016    TRIG 92 05/06/2014    TRIG 120 06/06/2013     BNP   Date Value Ref Range Status   07/20/2013 379 (H) <138 pg/mL Final       Kai (Melvin)  MD Radhames

## 2021-05-28 NOTE — TELEPHONE ENCOUNTER
ANTICOAGULATION  MANAGEMENT PROGRAM    Marva Gaines is overdue for INR check.  Reminder call made.    Spoke with Kiah and scheduled INR appointment on 4/29/19 .    Mary Morel RN

## 2021-05-28 NOTE — TELEPHONE ENCOUNTER
Lab Results   Component Value Date    INR 2.60 (H) 03/06/2019    INR 2.20 (H) 01/21/2019    INR 2.50 (H) 12/14/2018         Next INR advised: 4/17. Appointment is currently scheduled for 4/29.     Current warfarin dose per anticoagulation flowsheet:   Outpatient Anticoagulation Plan Latest Ref Rng & Units 3/6/2019   ANTICOAG FULL INSTRUCTIONS  1.25 mg every Fri; 2.5 mg all other days       Last health maintenance OV/physical exam: 5/17/18    Patient is up to date.  Warfarin refilled x 90 days per protocol.

## 2021-05-28 NOTE — TELEPHONE ENCOUNTER
ANTICOAGULATION  MANAGEMENT    Assessment     Today's INR result of 2.2 is Therapeutic (goal INR of 2.0-3.0)        Warfarin taken as previously instructed    No new diet changes affecting INR    No new medication/supplements affecting INR    Continues to tolerate warfarin with no reported s/s of bleeding or thromboembolism     Previous INR was Therapeutic    Plan:     Left a detailed message for Marva regarding INR result and instructed:     Warfarin Dosing Instructions:  Continue current warfarin dose 1.25 mg daily on Fri; and 2.5 mg daily rest of week      Instructed patient to follow up no later than: 6 weeks    Education provided: importance of consistent vitamin K intake, target INR goal and significance of current INR result, importance of notifying clinic for changes in medications, importance of notifying clinic for diarrhea, nausea/vomiting, reduced intake and/or illness and importance of notifying clinic of upcoming surgeries and procedures 2 weeks in advance      Instructed to call the [facility] for any changes, questions or concerns. (#[phone])   ?   Mercedes Boyd RN    Subjective/Objective:      Marva Gaines, a 83 y.o. female is on warfarin.     Marva reports:     Home warfarin dose: as updated on anticoagulation calendar per template     Missed doses: No     Medication changes:  No     S/S of bleeding or thromboembolism:  No     New Injury or illness:  No     Changes in diet or alcohol consumption:  No     Upcoming surgery, procedure or cardioversion:  No    Anticoagulation Episode Summary     Current INR goal:   2.0-3.0   TTR:   70.8 % (4.4 y)   Next INR check:   6/10/2019   INR from last check:   2.20 (4/29/2019)   Weekly max warfarin dose:      Target end date:      INR check location:      Preferred lab:      Send INR reminders to:   ANTICOAGULATION POOL A (WBY,WBE,MID,RSC)    Indications    A-fib (H) (Resolved) [I48.91]           Comments:            Anticoagulation Care  Providers     Provider Role Specialty Phone number    Susanna Todd MD Referring Internal Medicine [phone]

## 2021-05-29 ENCOUNTER — RECORDS - HEALTHEAST (OUTPATIENT)
Dept: ADMINISTRATIVE | Facility: CLINIC | Age: 86
End: 2021-05-29

## 2021-05-29 NOTE — TELEPHONE ENCOUNTER
RN cannot approve Refill Request    RN can NOT refill this medication PCP messaged that patient is overdue for Office Visit.       Radha Morris, [facility] Triage/Med Refill 5/24/2019    Requested Prescriptions   Pending Prescriptions Disp Refills     sertraline (ZOLOFT) 100 MG tablet 90 tablet 3     Sig: Take 1 tablet (100 mg total) by mouth at bedtime.       SSRI Refill Protocol  Failed - 5/24/2019  1:57 PM        Failed - PCP or prescribing provider visit in last year     Last office visit with prescriber/PCP: 8/10/2016 Sabas Austin MD OR same dept: Visit date not found OR same specialty: 8/10/2016 Sabas Austin MD  Last physical: 5/17/2018 Last MTM visit: Visit date not found   Next visit within 3 mo: Visit date not found  Next physical within 3 mo: Visit date not found  Prescriber OR PCP: Sabas Austin MD  Last diagnosis associated with med order: 1. Anxiety  - sertraline (ZOLOFT) 100 MG tablet; Take 1 tablet (100 mg total) by mouth at bedtime.  Dispense: 90 tablet; Refill: 3    If protocol passes may refill for 12 months if within 3 months of last provider visit (or a total of 15 months).

## 2021-05-29 NOTE — TELEPHONE ENCOUNTER
ANTICOAGULATION  MANAGEMENT    Assessment     Today's INR result of 2.6 is Therapeutic (goal INR of 2.0-3.0)        Warfarin taken as previously instructed    No new diet changes affecting INR    No new medication/supplements affecting INR    Continues to tolerate warfarin with no reported s/s of bleeding or thromboembolism     Previous INR was Therapeutic    Plan:     Spoke with Marva regarding INR result and instructed:     Warfarin Dosing Instructions:  Continue current warfarin dose 1.25 mg daily on Fridays; and 2.5 mg daily rest of week  (0 % change)    Instructed patient to follow up no later than: 6-8 weeks.    Education provided: importance of therapeutic range, importance of following up for INR monitoring at instructed interval and importance of taking warfarin as instructed    Marva verbalizes understanding and agrees to warfarin dosing plan.    Instructed to call the [facility] for any changes, questions or concerns. (#[phone])   ?   Mary Morel RN    Subjective/Objective:      Marva Gaines, a 83 y.o. female is on warfarin.     Marva reports:     Home warfarin dose: verbally confirmed home dose with Marva and updated on anticoagulation calendar     Missed doses: No     Medication changes:  No     S/S of bleeding or thromboembolism:  No     New Injury or illness:  No     Changes in diet or alcohol consumption:  No     Upcoming surgery, procedure or cardioversion:  No    Anticoagulation Episode Summary     Current INR goal:   2.0-3.0   TTR:   71.5 % (4.5 y)   Next INR check:   8/5/2019   INR from last check:   2.60 (6/10/2019)   Weekly max warfarin dose:      Target end date:      INR check location:      Preferred lab:      Send INR reminders to:   [facility]    Indications    A-fib (H) (Resolved) [I48.91]           Comments:            Anticoagulation Care Providers     Provider Role Specialty Phone number    Susanna Todd MD Referring Internal Medicine [phone]     Sabas Austin MD [facility] Internal Medicine [phone]

## 2021-05-30 ENCOUNTER — RECORDS - HEALTHEAST (OUTPATIENT)
Dept: ADMINISTRATIVE | Facility: CLINIC | Age: 86
End: 2021-05-30

## 2021-05-31 NOTE — TELEPHONE ENCOUNTER
ANTICOAGULATION  MANAGEMENT    Assessment     Today's INR result of 2.3 is Therapeutic (goal INR of 2.0-3.0)        Warfarin taken as previously instructed    No new diet changes affecting INR    No new medication/supplements affecting INR    Continues to tolerate warfarin with no reported s/s of bleeding or thromboembolism     Previous INR was Supratherapeutic    Plan:     Spoke with Marva regarding INR result and instructed:     Warfarin Dosing Instructions:  Continue current warfarin dose 1.25 mg daily on Fridays; and 2.5 mg daily rest of week  (0 % change)    Instructed patient to follow up no later than: 2-3 weeks.    Education provided: importance of therapeutic range, importance of following up for INR monitoring at instructed interval and importance of taking warfarin as instructed    Kiah verbalizes understanding and agrees to warfarin dosing plan.    Instructed to call the [facility] for any changes, questions or concerns. (#[phone])   ?   Mary Morel RN    Subjective/Objective:      Marva Gaines, a 83 y.o. female is on warfarin.     Marva reports:     Home warfarin dose: template incorrect; verbally confirmed home dose with Kiah and updated on anticoagulation calendar     Missed doses: No     Medication changes:  No     S/S of bleeding or thromboembolism:  No     New Injury or illness:  No     Changes in diet or alcohol consumption:  No     Upcoming surgery, procedure or cardioversion:  No    Anticoagulation Episode Summary     Current INR goal:   2.0-3.0   TTR:   70.8 % (4.7 y)   Next INR check:   9/13/2019   INR from last check:   2.30 (8/23/2019)   Weekly max warfarin dose:      Target end date:      INR check location:      Preferred lab:      Send INR reminders to:   [facility]    Indications    A-fib (H) (Resolved) [I48.91]           Comments:            Anticoagulation Care Providers     Provider Role Specialty Phone number    Susanna Todd MD Referring Internal  Medicine [phone]    Sabas Austin MD [facility] Internal Medicine [phone]

## 2021-06-01 VITALS — BODY MASS INDEX: 28.16 KG/M2 | WEIGHT: 153 LBS | HEIGHT: 62 IN

## 2021-06-01 VITALS — HEIGHT: 62 IN | WEIGHT: 153.1 LBS | BODY MASS INDEX: 28.17 KG/M2

## 2021-06-01 VITALS — HEIGHT: 63 IN | WEIGHT: 151 LBS | BODY MASS INDEX: 26.75 KG/M2

## 2021-06-01 NOTE — TELEPHONE ENCOUNTER
ANTICOAGULATION  MANAGEMENT    Assessment     Today's INR result of 2.3 is Therapeutic (goal INR of 2.0-3.0)        Warfarin taken as previously instructed    No new diet changes affecting INR    No new medication/supplements affecting INR    Continues to tolerate warfarin with no reported s/s of bleeding or thromboembolism     Previous INR was Therapeutic    Plan:     Spoke with Marva regarding INR result and instructed:     Warfarin Dosing Instructions:  Continue current warfarin dose 1.25 mg daily on Fridays; and 2.5 mg daily rest of week  (0 % change)    Instructed patient to follow up no later than: one month.    Education provided: importance of therapeutic range, importance of following up for INR monitoring at instructed interval and importance of taking warfarin as instructed    Kiah verbalizes understanding and agrees to warfarin dosing plan.    Instructed to call the [facility] for any changes, questions or concerns. (#[phone])   ?   Mary Morel RN    Subjective/Objective:      Marva Gaines, a 84 y.o. female is on warfarin.     Marva reports:     Home warfarin dose: verbally confirmed home dose with Kiah and updated on anticoagulation calendar     Missed doses: No     Medication changes:  No     S/S of bleeding or thromboembolism:  No     New Injury or illness:  No     Changes in diet or alcohol consumption:  No     Upcoming surgery, procedure or cardioversion:  No    Anticoagulation Episode Summary     Current INR goal:   2.0-3.0   TTR:   84.5 %   Next INR check:   10/23/2019   INR from last check:   2.30 (9/25/2019)   Weekly max warfarin dose:      Target end date:      INR check location:      Preferred lab:      Send INR reminders to:   [facility]    Indications    A-fib (H) (Resolved) [I48.91]           Comments:            Anticoagulation Care Providers     Provider Role Specialty Phone number    Susanna Todd MD Referring Internal Medicine [phone]     Sabas Austin MD [facility] Internal Medicine [phone]

## 2021-06-01 NOTE — TELEPHONE ENCOUNTER
ANTICOAGULATION  MANAGEMENT PROGRAM    Marva Gaines is overdue for INR check.  Reminder call made.    Spoke with Kiah and scheduled INR appointment on 9/25/19 .    Mary Morel RN

## 2021-06-02 NOTE — PATIENT INSTRUCTIONS - HE
Marva Gaines,    It was a pleasure to see you today at the [facility].     My recommendations after this visit include:    TAKE LISINOPRIL AT NIGHT   BLOOD WORK    JEN Ricci MD, FACC, ASHLI

## 2021-06-02 NOTE — PROGRESS NOTES
"Cardiology Progress Note    Assessment:    Permanent atrial fibrillation, good control of ventricular response rate, on warfarin  PVCs, asymptomatic  Hypertension, good control  Hypercholesterolemia on atorvastatin, uncertain control  Depression/ anxiety    Plan:  Lipid profile today to recent change from lovastatin to atorvastatin    I advised her to take lisinopril at night and continue with diltiazem in the morning.  That should smooth out blood pressure throughout 24 hours.    Follow-up in 6 months    Subjective:   This is 84 y.o. female who comes in today for follow-up visit.  She has done well.  She denies chest pain or shortness of breath.  She has not had heart palpitations or syncope.  She tolerates atorvastatin well.  She has not had excessive bleeding or bruising.  She has read about morning spikes of the blood pressure.  She is wondering if we need to make hypertensive medication adjustments.    Review of Systems:   General: WNL  Eyes: WNL  Ears/Nose/Throat: WNL  Lungs: WNL  Heart: WNL  Stomach: WNL  Bladder: WNL  Muscle/Joints: WNL  Skin: WNL  Nervous System: WNL  Mental Health: WNL     Blood: WNL    Objective:   /60 (Patient Site: Right Arm, Patient Position: Sitting, Cuff Size: Adult Regular)   Pulse 80   Resp 16   Ht 5' 2\" (1.575 m)   Wt 149 lb (67.6 kg)   BMI 27.25 kg/m    Physical Exam:  GENERAL: no distress  NECK: No JVD  LUNGS: Clear to auscultation.  CARDIAC: irregular rhythm, S1 & S2 normal.  No heaves, thrills, gallops or murmurs.  ABDOMEN: flat, negative hepatosplenomegaly, soft and non-tender.  EXTREMITIES: No evidence of cyanosis, clubbing or edema.    Current Outpatient Medications   Medication Sig Dispense Refill     atorvastatin (LIPITOR) 20 MG tablet Take 1 tablet (20 mg total) by mouth at bedtime. 90 tablet 3     diltiazem (CARDIZEM CD) 240 MG 24 hr capsule TAKE ONE CAPSULE BY MOUTH EVERY DAY 90 capsule 0     furosemide (LASIX) 20 MG tablet TAKE 1 TABLET BY MOUTH DAILY 90 " tablet 2     lisinopril (PRINIVIL,ZESTRIL) 5 MG tablet TAKE 1 TABLET (5 MG TOTAL) BY MOUTH DAILY. 90 tablet 2     sertraline (ZOLOFT) 100 MG tablet Take 1 tablet (100 mg total) by mouth at bedtime. 90 tablet 3     warfarin (COUMADIN/JANTOVEN) 2.5 MG tablet Take 1.25 to 2.5mg (1/2 or 1 tabs) by mouth daily as directed.  Adjust dose based on INR. 90 tablet 0     cholecalciferol, vitamin D3, (VITAMIN D3) 2,000 unit Tab Take 1 tablet by mouth daily.       omeprazole (PRILOSEC) 20 MG capsule Take 1 capsule (20 mg total) by mouth daily before breakfast. 30 capsule 0     predniSONE (DELTASONE) 5 MG tablet Take 3 tab p.o. qd for 7 days then 2 tab qd for 7 days then 1 tab qd for 7 days then 1/2 tab qd for 7 days to off. 46 tablet 0     No current facility-administered medications for this visit.        Cardiographics:    Holter: April 2015   Persistent atrial fibrillation with overall fairly well controlled  ventricular response. There was some tendency toward rapid ventricular response  with activity in the early afternoon hours. A moderate number of ventricular  premature beats noted. 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     Echocardiogram: 2013   Normal LV systolic function, no significant valvular abnormalities     Stress Test: 2013   Mixed anterior perfusion defect     CT coronary angio: 2013   Normal coronaries, calcium score 2    Lab Results:       Lab Results   Component Value Date    CHOL 190 08/10/2016    CHOL 204 (H) 05/06/2014    CHOL 187 06/06/2013     Lab Results   Component Value Date    HDL 67 08/10/2016    HDL 66 05/06/2014    HDL 52 06/06/2013     Lab Results   Component Value Date    LDLCALC 103 08/10/2016    LDLCALC 119 05/06/2014    LDLCALC 111 06/06/2013     Lab Results   Component Value Date    TRIG 101 08/10/2016    TRIG 92 05/06/2014    TRIG 120 06/06/2013     BNP   Date Value Ref Range Status   07/20/2013 379 (H) <138 pg/mL Final       Kai (Isamar Ricci MD

## 2021-06-02 NOTE — TELEPHONE ENCOUNTER
ANTICOAGULATION  MANAGEMENT PROGRAM    Marva Samsedwin is overdue for INR check.  Reminder call made.    Left message for Marva. If returning call, please schedule INR check as soon as possible.    Mary Morel RN

## 2021-06-03 VITALS — BODY MASS INDEX: 27.6 KG/M2 | HEIGHT: 62 IN | WEIGHT: 150 LBS

## 2021-06-03 VITALS
BODY MASS INDEX: 27.42 KG/M2 | WEIGHT: 149 LBS | DIASTOLIC BLOOD PRESSURE: 60 MMHG | SYSTOLIC BLOOD PRESSURE: 116 MMHG | HEART RATE: 80 BPM | RESPIRATION RATE: 16 BRPM | HEIGHT: 62 IN

## 2021-06-03 NOTE — TELEPHONE ENCOUNTER
ANTICOAGULATION  MANAGEMENT    Assessment     Today's INR result of 2.9 is Therapeutic (goal INR of 2.0-3.0)        Warfarin taken as previously instructed    No new diet changes affecting INR    No new medication/supplements affecting INR    Continues to tolerate warfarin with no reported s/s of bleeding or thromboembolism     Previous INR was Therapeutic    Plan:     Spoke with Marva regarding INR result and instructed:     Warfarin Dosing Instructions:  Continue current warfarin dose 1.25 mg daily on Fridays; and 2.5 mg daily rest of week  (0 % change)    Instructed patient to follow up no later than: one month.    Education provided: importance of therapeutic range, importance of following up for INR monitoring at instructed interval and importance of taking warfarin as instructed    Kiah verbalizes understanding and agrees to warfarin dosing plan.    Instructed to call the [facility] for any changes, questions or concerns. (#[phone])   ?   Mary Morel RN    Subjective/Objective:      Marva Gaines, a 84 y.o. female is on warfarin.     Marva reports:     Home warfarin dose: verbally confirmed home dose with Kiah and updated on anticoagulation calendar     Missed doses: No     Medication changes:  No     S/S of bleeding or thromboembolism:  No     New Injury or illness:  No     Changes in diet or alcohol consumption:  No     Upcoming surgery, procedure or cardioversion:  No    Anticoagulation Episode Summary     Current INR goal:   2.0-3.0   TTR:   90.7 %   Next INR check:   12/4/2019   INR from last check:   2.90 (11/6/2019)   Weekly max warfarin dose:      Target end date:      INR check location:      Preferred lab:      Send INR reminders to:   [facility]    Indications    A-fib (H) (Resolved) [I48.91]           Comments:            Anticoagulation Care Providers     Provider Role Specialty Phone number    Susanna Todd MD Referring Internal Medicine [phone]    Norman  MD Sabas [facility] Internal Medicine [phone]

## 2021-06-04 VITALS
HEART RATE: 80 BPM | DIASTOLIC BLOOD PRESSURE: 76 MMHG | SYSTOLIC BLOOD PRESSURE: 143 MMHG | WEIGHT: 148 LBS | BODY MASS INDEX: 27.07 KG/M2

## 2021-06-04 NOTE — TELEPHONE ENCOUNTER
RN cannot approve Refill Request    RN can NOT refill this medication Protocol failed and NO refill given.       Radha Morris, [facility] Triage/Med Refill 12/25/2019    Requested Prescriptions   Pending Prescriptions Disp Refills     warfarin ANTICOAGULANT (COUMADIN/JANTOVEN) 2.5 MG tablet [Pharmacy Med Name: WARFARIN SODIUM 2.5 MG TABLET] 90 tablet 0     Sig: TAKE 1.25 TO 2.5MG (1/2 OR 1 TABS) BY MOUTH DAILY AS DIRECTED. ADJUST DOSE BASED ON INR.       Warfarin Refill Protocol  Failed - 12/24/2019  2:19 AM        Failed - Provider visit in last year     Last office visit with prescriber/PCP: 8/10/2016 Sabas Austin MD OR same dept: Visit date not found OR same specialty: 8/10/2016 Sabas Austin MD  Last physical: 5/17/2018 Last MTM visit: Visit date not found    Next appt within 3 mo: Visit date not found Next physical within 3 mo: Visit date not found  Prescriber OR PCP: Sabas Austin MD  Last diagnosis associated with med order: 1. A-fib (H)  - warfarin ANTICOAGULANT (COUMADIN/JANTOVEN) 2.5 MG tablet [Pharmacy Med Name: WARFARIN SODIUM 2.5 MG TABLET]; Take 1.25 to 2.5mg (1/2 or 1 tabs) by mouth daily as directed.  Adjust dose based on INR.  Dispense: 90 tablet; Refill: 0    If protocol passes may refill for 6 months if within 3 months of last provider visit (or a total of 9 months).          Failed -  Route to appropriate pool/provider     Last Anticoagulation Summary:   Anticoagulation Episode Summary     Current INR goal:   2.0-3.0   TTR:   84.3 % (1 y)   Next INR check:   1/10/2020   INR from last check:   2.20 (12/20/2019)   Weekly max warfarin dose:      Target end date:      INR check location:      Preferred lab:      Send INR reminders to:   [facility]    Indications    A-fib (H) (Resolved) [I48.91]           Comments:            Anticoagulation Care Providers     Provider Role Specialty Phone number    Susanna Todd MD Referring Internal Medicine [phone]    Norman  MD Sabas [facility] Internal Medicine [phone]

## 2021-06-04 NOTE — TELEPHONE ENCOUNTER
ANTICOAGULATION  MANAGEMENT    Assessment     Today's INR result of 3.2 is Supratherapeutic (goal INR of 2.0-3.0)        Warfarin taken as previously instructed    No new diet changes affecting INR    No new medication/supplements affecting INR    Continues to tolerate warfarin with no reported s/s of bleeding or thromboembolism     Previous INR was Therapeutic    Plan:     Left a detailed message for Marva regarding INR result and instructed:     Warfarin Dosing Instructions:  Continue current warfarin dose 1.25 mg daily on Fridays; and 2.5 mg daily rest of week  (0 % change)    Instructed patient to follow up no later than: 1-2 weeks.    Education provided:     Instructed to call the [facility] for any changes, questions or concerns. (#[phone])   ?   Mary Morel RN    Subjective/Objective:      Marva Gaines, a 84 y.o. female is on warfarin.     Marva reports:     Home warfarin dose: as updated on anticoagulation calendar per template     Missed doses: No     Medication changes:  No     S/S of bleeding or thromboembolism:  No     New Injury or illness:  No     Changes in diet or alcohol consumption:  No     Upcoming surgery, procedure or cardioversion:  No    Anticoagulation Episode Summary     Current INR goal:   2.0-3.0   TTR:   85.4 % (1 y)   Next INR check:   12/19/2019   INR from last check:   3.20! (12/5/2019)   Weekly max warfarin dose:      Target end date:      INR check location:      Preferred lab:      Send INR reminders to:   LUI BEYER    Indications    A-fib (H) (Resolved) [I48.91]           Comments:            Anticoagulation Care Providers     Provider Role Specialty Phone number    Susanna Todd MD Referring Internal Medicine [phone]    Sabas Austin MD Responsible Internal Medicine [phone]

## 2021-06-04 NOTE — TELEPHONE ENCOUNTER
ANTICOAGULATION  MANAGEMENT    Assessment     Today's INR result of 2.2 is Therapeutic (goal INR of 2.0-3.0)        Warfarin taken as previously instructed    No new diet changes affecting INR    No new medication/supplements affecting INR    Continues to tolerate warfarin with no reported s/s of bleeding or thromboembolism     Previous INR was Supratherapeutic    Plan:     Spoke with Marva regarding INR result and instructed:     Warfarin Dosing Instructions:  Continue current warfarin dose 1.25 mg daily on Fridays; and 2.5 mg daily rest of week  (0 % change)    Instructed patient to follow up no later than: 2-3 weeks.    Education provided: importance of therapeutic range, importance of following up for INR monitoring at instructed interval and importance of taking warfarin as instructed    Kiah verbalizes understanding and agrees to warfarin dosing plan.    Instructed to call the [facility] for any changes, questions or concerns. (#[phone])   ?   Mary Morel RN    Subjective/Objective:      Marva Gaines, a 84 y.o. female is on warfarin.     Marva reports:     Home warfarin dose: verbally confirmed home dose with Kiah and updated on anticoagulation calendar     Missed doses: No     Medication changes:  No     S/S of bleeding or thromboembolism:  No     New Injury or illness:  No     Changes in diet or alcohol consumption:  No     Upcoming surgery, procedure or cardioversion:  No    Anticoagulation Episode Summary     Current INR goal:   2.0-3.0   TTR:   84.6 % (1 y)   Next INR check:   1/10/2020   INR from last check:   2.20 (12/20/2019)   Weekly max warfarin dose:      Target end date:      INR check location:      Preferred lab:      Send INR reminders to:   LUI BEYER    Indications    A-fib (H) (Resolved) [I48.91]           Comments:            Anticoagulation Care Providers     Provider Role Specialty Phone number    Susanna Todd MD Referring Internal Medicine [phone]     Sabas Austin MD [facility] Internal Medicine [phone]

## 2021-06-05 NOTE — TELEPHONE ENCOUNTER
"Anticoagulation Annual Referral Renewal Review    Marva Gaines's chart reviewed for annual renewal of referral to anticoagulation monitoring.        Criteria for anticoagulation nurse and/or pharmacist renewal met   Warfarin indication: Atrial Fibrillation Yes, per indication   Current with INR monitoring/compliant Yes Yes   Date of last office visit 5/17/19 No, last office visit more than a year ago   Time in Therapeutic Range (TTR) 84.6 % Yes, TTR > 60%       Marva Gaines did NOT meet all criteria for anticoagulation management program initiated renewal and requires provider review. Using dot phrase, \".acmrenewalprovider\", please advise if Marva's anticoagulation management referral should be renewed or if patient should be seen in office to review anticoagulation therapy      Mary Morel RN  1:05 PM      "

## 2021-06-05 NOTE — TELEPHONE ENCOUNTER
ANTICOAGULATION  MANAGEMENT    Assessment     Today's INR result of 2.6 is Therapeutic (goal INR of 2.0-3.0)        Warfarin taken as previously instructed    No new diet changes affecting INR    No new medication/supplements affecting INR    Continues to tolerate warfarin with no reported s/s of bleeding or thromboembolism     Previous INR was Therapeutic    Plan:     Left a detailed message for Marva regarding INR result and instructed:     Warfarin Dosing Instructions:  Continue current warfarin dose 1.25 mg daily on Fridays; and 2.5 mg daily rest of week  (0 % change)    Instructed patient to follow up no later than: one month.    Education provided: importance of therapeutic range, importance of following up for INR monitoring at instructed interval and importance of taking warfarin as instructed    Kiah verbalizes understanding and agrees to warfarin dosing plan.    Instructed to call the [facility] for any changes, questions or concerns. (#[phone])   ?   Mary Morel RN    Subjective/Objective:      Marva Gaines, a 84 y.o. female is on warfarin.     Marva reports:     Home warfarin dose: verbally confirmed home dose with Kiah and updated on anticoagulation calendar     Missed doses: No     Medication changes:  No     S/S of bleeding or thromboembolism:  No     New Injury or illness:  No     Changes in diet or alcohol consumption:  No     Upcoming surgery, procedure or cardioversion:  No    Anticoagulation Episode Summary     Current INR goal:   2.0-3.0   TTR:   84.6 % (1 y)   Next INR check:   2/14/2020   INR from last check:   2.60 (1/17/2020)   Weekly max warfarin dose:      Target end date:      INR check location:      Preferred lab:      Send INR reminders to:   LUI BEYER    Indications    A-fib (H) (Resolved) [I48.91]           Comments:            Anticoagulation Care Providers     Provider Role Specialty Phone number    Susanna Todd MD Referring Internal Medicine  [phone]    Sabas Austin MD [facility] Internal Medicine [phone]

## 2021-06-05 NOTE — TELEPHONE ENCOUNTER
Provider Review: Anticoagulation Annual Referral Renewal    ACM Renewal Decision:  Renew ACM warfarin management      INR Range:   Continue management at current INR goal   Anticipated Duration of Therapy (from today):  Long-term anticoagulation      Sabas Austin MD  12:15 PM

## 2021-06-06 NOTE — TELEPHONE ENCOUNTER
ANTICOAGULATION  MANAGEMENT    Assessment     Today's INR result of 3.0 is Therapeutic (goal INR of 2.0-3.0)        Warfarin taken as previously instructed    No new diet changes affecting INR    No new medication/supplements affecting INR    Continues to tolerate warfarin with no reported s/s of bleeding or thromboembolism     Previous INR was Therapeutic    Plan:     Left a detailed message for Marva regarding INR result and instructed:     Warfarin Dosing Instructions:  Continue current warfarin dose 1.25 mg daily on Fridays; and 2.5 mg daily rest of week  (0 % change)    Instructed patient to follow up no later than: one month.    Education provided:     Instructed to call the [facility] for any changes, questions or concerns. (#[phone])   ?   Mary Morel RN    Subjective/Objective:      Marva Gaines, a 84 y.o. female is on warfarin.     Marva reports:     Home warfarin dose: as updated on anticoagulation calendar per template     Missed doses: No     Medication changes:  No     S/S of bleeding or thromboembolism:  No     New Injury or illness:  No     Changes in diet or alcohol consumption:  No     Upcoming surgery, procedure or cardioversion:  No    Anticoagulation Episode Summary     Current INR goal:   2.0-3.0   TTR:   84.6 % (1 y)   Next INR check:   3/20/2020   INR from last check:   3.00 (2/21/2020)   Weekly max warfarin dose:      Target end date:      INR check location:      Preferred lab:      Send INR reminders to:   LUI BEYER    Indications    A-fib (H) (Resolved) [I48.91]           Comments:            Anticoagulation Care Providers     Provider Role Specialty Phone number    Sabas Austin MD [facility] Internal Medicine [phone]

## 2021-06-07 NOTE — TELEPHONE ENCOUNTER
ANTICOAGULATION  MANAGEMENT PROGRAM    Marva Gaines is overdue for INR check.     Spoke with Kiah who declined to schedule INR at this time. If calling back please schedule as soon as possible.      Mary Morel RN

## 2021-06-07 NOTE — TELEPHONE ENCOUNTER
ANTICOAGULATION  MANAGEMENT    Assessment     Today's INR result of 2.7 is Therapeutic (goal INR of 2.0-3.0)        Warfarin taken as previously instructed    No new diet changes affecting INR    No new medication/supplements affecting INR    Continues to tolerate warfarin with no reported s/s of bleeding or thromboembolism     Previous INR was Therapeutic    Plan:     Spoke with Marva regarding INR result and instructed:     Warfarin Dosing Instructions:  Continue current warfarin dose 1.25 mg daily on Fridays; and 2.5 mg daily rest of week  (0 % change)    Instructed patient to follow up no later than: 6 weeks    Education provided: importance of therapeutic range, importance of following up for INR monitoring at instructed interval and importance of taking warfarin as instructed    Marva verbalizes understanding and agrees to warfarin dosing plan.    Instructed to call the [facility] for any changes, questions or concerns. (#[phone])   ?   Mary Morel RN    Subjective/Objective:      Marva Gaines, a 84 y.o. female is on warfarin.     Marva reports:     Home warfarin dose: verbally confirmed home dose with Marva and updated on anticoagulation calendar     Missed doses: No     Medication changes:  No     S/S of bleeding or thromboembolism:  No     New Injury or illness:  No     Changes in diet or alcohol consumption:  No     Upcoming surgery, procedure or cardioversion:  No    Anticoagulation Episode Summary     Current INR goal:   2.0-3.0   TTR:   84.6 % (1 y)   Next INR check:   5/21/2020   INR from last check:   2.70 (4/9/2020)   Weekly max warfarin dose:      Target end date:      INR check location:      Preferred lab:      Send INR reminders to:   LUI BEYER    Indications    A-fib (H) (Resolved) [I48.91]           Comments:            Anticoagulation Care Providers     Provider Role Specialty Phone number    Sabas Austin MD [facility] Internal Medicine [phone]

## 2021-06-08 NOTE — TELEPHONE ENCOUNTER
ANTICOAGULATION  MANAGEMENT    Assessment     Today's INR result of 3.2 is Supratherapeutic (goal INR of 2.0-3.0)        Warfarin taken as previously instructed    No new diet changes affecting INR    No new medication/supplements affecting INR    Continues to tolerate warfarin with no reported s/s of bleeding or thromboembolism     Previous INR was Therapeutic    Plan:     Spoke with Marva regarding INR result and instructed:     Warfarin Dosing Instructions:  Take 1.25 mg today then continue current warfarin dose 1.25 mg daily on Fridays; and 2.5 mg daily rest of week  (0 % change) She preferred to take a little less today. She cant eat any more greens.    Instructed patient to follow up no later than: two weeks.     Education provided: importance of therapeutic range, importance of following up for INR monitoring at instructed interval and importance of taking warfarin as instructed    Kiah verbalizes understanding and agrees to warfarin dosing plan.    Instructed to call the [facility] for any changes, questions or concerns. (#[phone])   ?   Mary Morel RN    Subjective/Objective:      Marva KHALIL Eder, a 84 y.o. female is on warfarin.     Marva reports:     Home warfarin dose: verbally confirmed home dose with Kiah and updated on anticoagulation calendar     Missed doses: No     Medication changes:  No     S/S of bleeding or thromboembolism:  No     New Injury or illness:  No     Changes in diet or alcohol consumption:  No     Upcoming surgery, procedure or cardioversion:  No    Anticoagulation Episode Summary     Current INR goal:   2.0-3.0   TTR:   77.1 % (1 y)   Next INR check:   6/30/2020   INR from last check:   3.20! (6/16/2020)   Weekly max warfarin dose:      Target end date:      INR check location:      Preferred lab:      Send INR reminders to:   LUI BEYER    Indications    A-fib (H) (Resolved) [I48.91]           Comments:            Anticoagulation Care Providers      Provider Role Specialty Phone number    Sabas Austin MD [facility] Internal Medicine [phone]

## 2021-06-08 NOTE — TELEPHONE ENCOUNTER
ANTICOAGULATION  MANAGEMENT PROGRAM    Marva REMI Gaines is overdue for INR check.     Spoke with Kiah and scheduled INR appointment on 6/9.      Mary Morel RN

## 2021-06-08 NOTE — TELEPHONE ENCOUNTER
ANTICOAGULATION  MANAGEMENT PROGRAM    Marva Gaines is overdue for INR check.     Left message to call and schedule INR appointment as soon as possible.      Mary Morel RN

## 2021-06-08 NOTE — PATIENT INSTRUCTIONS - HE
Marva Gaines,    It was a pleasure to see you today at the [facility].     My recommendations after this visit include:    Continue current cardiac medications    JEN Ricci MD, FACC, ASHLI

## 2021-06-09 NOTE — TELEPHONE ENCOUNTER
ANTICOAGULATION  MANAGEMENT    Assessment     Today's INR result of 2.1 is Therapeutic (goal INR of 2.0-3.0)        Warfarin taken as previously instructed    No new diet changes affecting INR    No new medication/supplements affecting INR    Continues to tolerate warfarin with no reported s/s of bleeding or thromboembolism     Previous INR was Supratherapeutic    Plan:     Spoke with Marva regarding INR result and instructed:     Warfarin Dosing Instructions:  Continue current warfarin dose 1.25 mg daily on Fridays; and 2.5 mg daily rest of week  (0 % change)    Instructed patient to follow up no later than: 2-3 weeks. She said she will come back in one month.    Education provided: importance of therapeutic range, importance of following up for INR monitoring at instructed interval and importance of taking warfarin as instructed    Kiah verbalizes understanding and agrees to warfarin dosing plan.    Instructed to call the [facility] for any changes, questions or concerns. (#[phone])   ?   Mary Morel RN    Subjective/Objective:      Marva Gaines, a 84 y.o. female is on warfarin.     Marva reports:     Home warfarin dose: verbally confirmed home dose with Kiah and updated on anticoagulation calendar     Missed doses: No     Medication changes:  No     S/S of bleeding or thromboembolism:  No     New Injury or illness:  No     Changes in diet or alcohol consumption:  No     Upcoming surgery, procedure or cardioversion:  No    Anticoagulation Episode Summary     Current INR goal:   2.0-3.0   TTR:   76.3 % (1 y)   Next INR check:   7/28/2020   INR from last check:   2.10 (7/7/2020)   Weekly max warfarin dose:      Target end date:      INR check location:      Preferred lab:      Send INR reminders to:   LUI BEYER    Indications    A-fib (H) (Resolved) [I48.91]           Comments:            Anticoagulation Care Providers     Provider Role Specialty Phone number    Sabas Austin MD  [facility] Internal Medicine [phone]

## 2021-06-09 NOTE — TELEPHONE ENCOUNTER
RN cannot approve Refill Request    RN can NOT refill this medication PCP messaged that patient is overdue for Office Visit. Last office visit: 8/10/2016 Sabas Austin MD Last Physical: 5/17/2018 Last MTM visit: Visit date not found Last visit same specialty: 8/10/2016 Sabas Austin MD.  Next visit within 3 mo: Visit date not found  Next physical within 3 mo: Visit date not found      Camryn Rebolledo, [facility] Triage/Med Refill 7/14/2020    Requested Prescriptions   Pending Prescriptions Disp Refills     warfarin ANTICOAGULANT (COUMADIN/JANTOVEN) 2.5 MG tablet [Pharmacy Med Name: WARFARIN SODIUM 2.5 MG TABLET] 90 tablet 1     Sig: TAKE 1/2-1 TABLET (1.25-2.5 MG) BY MOUTH DAILY AS DIRECTED. ADJUST DOSE BASED ON INR.       Warfarin Refill Protocol  Failed - 7/14/2020 12:36 PM        Failed - Provider visit in last year     Last office visit with prescriber/PCP: 8/10/2016 Sabas Austin MD OR same dept: Visit date not found OR same specialty: 8/10/2016 Sabas Austin MD  Last physical: 5/17/2018 Last MTM visit: Visit date not found    Next appt within 3 mo: Visit date not found Next physical within 3 mo: Visit date not found  Prescriber OR PCP: Sabas Austin MD  Last diagnosis associated with med order: 1. A-fib (H)  - warfarin ANTICOAGULANT (COUMADIN/JANTOVEN) 2.5 MG tablet [Pharmacy Med Name: WARFARIN SODIUM 2.5 MG TABLET]; Take 1/2-1 tablet (1.25-2.5 mg) by mouth daily as directed.  Adjust dose based on INR.  Dispense: 90 tablet; Refill: 1    If protocol passes may refill for 6 months if within 3 months of last provider visit (or a total of 9 months).          Failed -  Route to appropriate pool/provider     Last Anticoagulation Summary:   Anticoagulation Episode Summary     Current INR goal:   2.0-3.0   TTR:   77.8 % (11.9 mo)   Next INR check:   7/28/2020   INR from last check:   2.10 (7/7/2020)   Weekly max warfarin dose:      Target end date:      INR check location:      Preferred lab:       Send INR reminders to:   LUI BEYER    Indications    A-fib (H) (Resolved) [I48.91]           Comments:            Anticoagulation Care Providers     Provider Role Specialty Phone number    Sabas Austin MD [facility] Internal Medicine [phone]

## 2021-06-10 NOTE — TELEPHONE ENCOUNTER
RN cannot approve Refill Request    RN can NOT refill this medication Protocol failed and NO refill given. Last office visit: 8/10/2016 Sabas Austin MD Last Physical: 5/17/2018 Last MTM visit: Visit date not found Last visit same specialty: 8/10/2016 Sabas Austin MD.  Next visit within 3 mo: Visit date not found  Next physical within 3 mo: Visit date not found      Radha Morris, [facility] Triage/Med Refill 8/28/2020    Requested Prescriptions   Pending Prescriptions Disp Refills     sertraline (ZOLOFT) 100 MG tablet [Pharmacy Med Name: SERTRALINE  MG TABLET] 90 tablet 3     Sig: TAKE 1 TABLET BY MOUTH EVERYDAY AT BEDTIME       [facility] Refill Protocol  Failed - 8/25/2020  1:59 PM        Failed - PCP or prescribing provider visit in last year     Last office visit with prescriber/PCP: 8/10/2016 Sabas Austin MD OR same dept: Visit date not found OR same specialty: 8/10/2016 Sabas Austin MD  Last physical: 5/17/2018 Last MTM visit: Visit date not found   Next visit within 3 mo: Visit date not found  Next physical within 3 mo: Visit date not found  Prescriber OR PCP: Sabas Austin MD  Last diagnosis associated with med order: 1. Anxiety  - sertraline (ZOLOFT) 100 MG tablet [Pharmacy Med Name: SERTRALINE  MG TABLET]; TAKE 1 TABLET BY MOUTH EVERYDAY AT BEDTIME  Dispense: 90 tablet; Refill: 3    If protocol passes may refill for 12 months if within 3 months of last provider visit (or a total of 15 months).

## 2021-06-10 NOTE — TELEPHONE ENCOUNTER
ANTICOAGULATION  MANAGEMENT    Assessment     Today's INR result of 2.5 is Therapeutic (goal INR of 2.0-3.0)        Warfarin taken as previously instructed    No new diet changes affecting INR    No new medication/supplements affecting INR    Continues to tolerate warfarin with no reported s/s of bleeding or thromboembolism     Previous INR was Therapeutic    Plan:     Spoke with Marva regarding INR result and instructed:     Warfarin Dosing Instructions:  Continue current warfarin dose 1.25 mg daily on Fridays; and 2.5 mg daily rest of week  (0 % change)    Instructed patient to follow up no later than: one month    Education provided: importance of therapeutic range, importance of following up for INR monitoring at instructed interval and importance of taking warfarin as instructed    Kiah verbalizes understanding and agrees to warfarin dosing plan.    Instructed to call the [facility] for any changes, questions or concerns. (#[phone])   ?   Mary Morel RN    Subjective/Objective:      Marva Gaines, a 84 y.o. female is on warfarin.     Marva reports:     Home warfarin dose: verbally confirmed home dose with Kiah and updated on anticoagulation calendar     Missed doses: No     Medication changes:  No     S/S of bleeding or thromboembolism:  No     New Injury or illness:  No     Changes in diet or alcohol consumption:  No     Upcoming surgery, procedure or cardioversion:  No    Anticoagulation Episode Summary     Current INR goal:   2.0-3.0   TTR:   84.0 % (1 y)   Next INR check:   9/1/2020   INR from last check:   2.50 (8/4/2020)   Weekly max warfarin dose:      Target end date:      INR check location:      Preferred lab:      Send INR reminders to:   LUI BEYER    Indications    A-fib (H) (Resolved) [I48.91]           Comments:            Anticoagulation Care Providers     Provider Role Specialty Phone number    Sabas Austin MD [facility] Internal Medicine [phone]

## 2021-06-11 NOTE — TELEPHONE ENCOUNTER
Left voicemail for patient to return call to clinic. When patient returns call, please give them below message.    Please help schedule.  Mary Ann Lane CMA ............... 1:37 PM, 08/31/20

## 2021-06-11 NOTE — TELEPHONE ENCOUNTER
ANTICOAGULATION  MANAGEMENT    Assessment     Today's INR result of 2.8 is Therapeutic (goal INR of 2.0-3.0)        Warfarin taken as previously instructed    No new diet changes affecting INR    No new medication/supplements affecting INR    Continues to tolerate warfarin with no reported s/s of bleeding or thromboembolism     Previous INR was Therapeutic    Plan:     Spoke on phone with Marva regarding INR result and instructed:     Warfarin Dosing Instructions:  Continue current warfarin dose 1.25 mg daily on Fridays; and 2.5 mg daily rest of week  (0 % change)    Instructed patient to follow up no later than: 4 weeks    Education provided: importance of therapeutic range, importance of following up for INR monitoring at instructed interval and importance of taking warfarin as instructed    Kiah verbalizes understanding and agrees to warfarin dosing plan.    Instructed to call the [facility] for any changes, questions or concerns. (#[phone])   ?   Mary Morel RN    Subjective/Objective:      Marva Gaines, a 85 y.o. female is on warfarin.     Marva reports:     Home warfarin dose: verbally confirmed home dose with Kiah and updated on anticoagulation calendar     Missed doses: No     Medication changes:  No     S/S of bleeding or thromboembolism:  No     New Injury or illness:  No     Changes in diet or alcohol consumption:  No     Upcoming surgery, procedure or cardioversion:  No    Anticoagulation Episode Summary     Current INR goal:   2.0-3.0   TTR:   85.4 % (1 y)   Next INR check:   10/1/2020   INR from last check:   2.80 (9/3/2020)   Weekly max warfarin dose:      Target end date:      INR check location:      Preferred lab:      Send INR reminders to:   LUI BEYER    Indications    A-fib (H) (Resolved) [I48.91]           Comments:            Anticoagulation Care Providers     Provider Role Specialty Phone number    Sabas Austin MD [facility] Internal Medicine [phone]

## 2021-06-11 NOTE — PROGRESS NOTES
"Marva Gaines is a 85 y.o. female who is being evaluated via a billable telephone visit.      The patient has been notified of following:     \"This telephone visit will be conducted via a call between you and your physician/provider. We have found that certain health care needs can be provided without the need for a physical exam.  This service lets us provide the care you need with a short phone conversation.  If a prescription is necessary we can send it directly to your pharmacy.  If lab work is needed we can place an order for that and you can then stop by our lab to have the test done at a later time.    Telephone visits are billed at different rates depending on your insurance coverage. During this emergency period, for some insurers they may be billed the same as an in-person visit.  Please reach out to your insurance provider with any questions.    If during the course of the call the physician/provider feels a telephone visit is not appropriate, you will not be charged for this service.\"    Patient has given verbal consent to a Telephone visit? Yes    What phone number would you like to be contacted at? home    Patient would like to receive their AVS by AVS Preference: Mail a copy.        Assessment/Plan:  1. Anxiety  She stopped sertraline 2 years ago. Feeling more sad and crying a lot. Not suicidal ideas.  - sertraline (ZOLOFT) 100 MG tablet; Take 1 tablet (100 mg total) by mouth at bedtime.  Dispense: 90 tablet; Refill: 1    2. Permanent atrial fibrillation (H)  Had virtual visit in May with Dr Ricci:  Permanent atrial fibrillation, good control of ventricular response rate, on warfarin  PVCs, asymptomatic  Hypertension, good control  Hypercholesterolemia on atorvastatin, good control  Depression/ anxiety     Plan:  I reassured her that her blood pressures adequately controlled.  She does not need to be very anxious about it.  I will make no medication changes today        Follow Up Plan: Follow up " in 6 months   I have reviewed the note as documented.  This accurately captures the substance of my conversation with the patient.    3. Hypertension  Stable in diltiazem, lisinopril, furosemide.     128/67 today at home.    4. Hyperlipidemia  On statin.    F/u in 6 months.    Phone call duration:  12 minutes    Maria Luisa Elder MA

## 2021-06-11 NOTE — TELEPHONE ENCOUNTER
"Patient is just terrified to come in to clinic and leave her house. She says \"Dr. Austin knows me, Im scared of everything\". I did get patient scheduled for a telephone visit the next available 9/29/2020. Patient would like a short term refill if approved. 30 day supply pended.  Mary Ann Lane CMA ............... 3:25 PM, 09/03/20        "

## 2021-06-11 NOTE — TELEPHONE ENCOUNTER
Question following Office Visit  When did you see your provider: yesterday  What is your question: I do not remember how I was told to take the sertraline.  It was different for the first 2 weeks but I do not remember how.   Okay to leave a detailed message: Yes

## 2021-06-12 NOTE — TELEPHONE ENCOUNTER
ANTICOAGULATION  MANAGEMENT    Assessment     Today's INR result of 2.8 is Therapeutic (goal INR of 2.0-3.0)        Warfarin taken as previously instructed    No new diet changes affecting INR    No new medication/supplements affecting INR    Continues to tolerate warfarin with no reported s/s of bleeding or thromboembolism     Previous INR was Therapeutic    Plan:     Spoke on phone with Marva regarding INR result and instructed:     Warfarin Dosing Instructions:  Continue current warfarin dose 1.25 mg daily on Fridays; and 2.5 mg daily rest of week  (0 % change)    Instructed patient to follow up no later than: 6 weeks    Education provided: importance of therapeutic range, importance of following up for INR monitoring at instructed interval and importance of taking warfarin as instructed    Kiah verbalizes understanding and agrees to warfarin dosing plan.    Instructed to call the [facility] for any changes, questions or concerns. (#[phone])   ?   Mary Morel RN    Subjective/Objective:      Marva Gaines, a 85 y.o. female is on warfarin.     Marva reports:     Home warfarin dose: verbally confirmed home dose with Kiah and updated on anticoagulation calendar     Missed doses: No     Medication changes:  No     S/S of bleeding or thromboembolism:  No     New Injury or illness:  No     Changes in diet or alcohol consumption:  No     Upcoming surgery, procedure or cardioversion:  No    Anticoagulation Episode Summary     Current INR goal:  2.0-3.0   TTR:  85.4 % (1 y)   Next INR check:  11/25/2020   INR from last check:  2.80 (10/14/2020)   Weekly max warfarin dose:     Target end date:     INR check location:     Preferred lab:     Send INR reminders to:  LUI BEYER    Indications    A-fib (H) (Resolved) [I48.91]           Comments:           Anticoagulation Care Providers     Provider Role Specialty Phone number    Sabas Austin MD [facility] Internal Medicine [phone]

## 2021-06-12 NOTE — TELEPHONE ENCOUNTER
ANTICOAGULATION  MANAGEMENT PROGRAM    Marva REMI Gaines is overdue for INR check.     Spoke with Kiah and scheduled INR appointment on 10/12.      Mary Morel RN

## 2021-06-12 NOTE — PROGRESS NOTES
Vitals - Patient Reported  Systolic (Patient Reported): 129  Diastolic (Patient Reported): 71  Weight (Patient Reported): 148 lb (67.1 kg)  Pulse (Patient Reported): 85    Review of Systems - History obtained from the patient  Psychological ROS: positive for - anxiety   Musculoskeletal ROS: positive for - joint pain and muscle pain    Ruchi Davila CMA

## 2021-06-13 NOTE — TELEPHONE ENCOUNTER
ANTICOAGULATION  MANAGEMENT    Assessment     Today's INR result of 2.8 is Therapeutic (goal INR of 2.0-3.0)        Warfarin taken as previously instructed    No new diet changes affecting INR    No new medication/supplements affecting INR    Continues to tolerate warfarin with no reported s/s of bleeding or thromboembolism     Previous INR was Therapeutic    Plan:     Spoke on phone with Marva regarding INR result and instructed:     Warfarin Dosing Instructions:  Continue current warfarin dose 1.25 mg daily on Fridays; and 2.5 mg daily rest of week  (0 % change)    Instructed patient to follow up no later than: 6 weeks.    Education provided: importance of therapeutic range, importance of following up for INR monitoring at instructed interval and importance of taking warfarin as instructed    Marva verbalizes understanding and agrees to warfarin dosing plan.    Instructed to call the [facility] for any changes, questions or concerns. (#[phone])   ?   Mary Morel RN    Subjective/Objective:      Marva Gaines, a 85 y.o. female is on warfarin.     Marva reports:     Home warfarin dose: verbally confirmed home dose with Marva and updated on anticoagulation calendar     Missed doses: No     Medication changes:  No     S/S of bleeding or thromboembolism:  No     New Injury or illness:  No     Changes in diet or alcohol consumption:  No     Upcoming surgery, procedure or cardioversion:  No    Anticoagulation Episode Summary     Current INR goal:  2.0-3.0   TTR:  89.7 % (1 y)   Next INR check:  1/12/2021   INR from last check:  2.80 (12/1/2020)   Weekly max warfarin dose:     Target end date:     INR check location:     Preferred lab:     Send INR reminders to:  LUI BEYER    Indications    A-fib (H) (Resolved) [I48.91]           Comments:           Anticoagulation Care Providers     Provider Role Specialty Phone number    Sabas Austin MD [facility] Internal Medicine [phone]

## 2021-06-13 NOTE — PROGRESS NOTES
Anticoagulation Annual Referral Renewal Review    Marva REMI Gaines's chart reviewed for annual renewal of referral to anticoagulation monitoring.        Criteria for anticoagulation nurse and/or pharmacist renewal met   Warfarin indication: Atrial Fibrillation Yes, per indication   Current with INR monitoring/compliant Yes Yes   Date of last office visit 9/29/20 Yes, had office visit within last year   Time in Therapeutic Range (TTR) 85.4 % Yes, TTR > 60%       Marva Gaines met all criteria for anticoagulation management program initiated renewal.  New INR standing orders and anticoagulation referral renewal placed.      Mary Morel RN  3:42 PM

## 2021-06-14 NOTE — TELEPHONE ENCOUNTER
Anticoagulation Management    Unable to reach Marva today.    Left message for patient to return call to [facility] and schedule INR appointment.    ACN to follow up    Liz Oh RN

## 2021-06-14 NOTE — TELEPHONE ENCOUNTER
RN cannot approve Refill Request    RN can NOT refill this medication med is not covered by policy/route to provider. Last office visit: Visit date not found Last Physical: 5/17/2018 Last MTM visit: Visit date not found Last visit same specialty: Visit date not found.  Next visit within 3 mo: Visit date not found  Next physical within 3 mo: Visit date not found      Minerva Shanks, [facility] Triage/Med Refill 1/17/2021    Requested Prescriptions   Pending Prescriptions Disp Refills     warfarin ANTICOAGULANT (COUMADIN/JANTOVEN) 2.5 MG tablet [Pharmacy Med Name: WARFARIN SODIUM 2.5 MG TABLET] 90 tablet 1     Sig: TAKE 1/2-1 TABLET (1.25-2.5 MG) BY MOUTH DAILY AS DIRECTED. ADJUST DOSE BASED ON INR.       Warfarin Refill Protocol  Failed - 1/17/2021 11:07 AM        Failed -  Route to appropriate pool/provider     Last Anticoagulation Summary:   Anticoagulation Episode Summary     Current INR goal:  2.0-3.0   TTR:  90.3 % (10.6 mo)   Next INR check:  1/12/2021   INR from last check:  2.80 (12/1/2020)   Weekly max warfarin dose:     Target end date:     INR check location:     Preferred lab:     Send INR reminders to:  LUI BEYER    Indications    A-fib (H) (Resolved) [I48.91]           Comments:           Anticoagulation Care Providers     Provider Role Specialty Phone number    Sabas Austin MD [facility] Internal Medicine [phone]                Passed - Provider visit in last year     Last office visit with prescriber/PCP: Visit date not found OR same dept: Visit date not found OR same specialty: Visit date not found  Last physical: 5/17/2018 Last MTM visit: Visit date not found    Next appt within 3 mo: Visit date not found Next physical within 3 mo: Visit date not found  Prescriber OR PCP: Sabas Austin MD  Last diagnosis associated with med order: 1. A-fib (H)  - warfarin ANTICOAGULANT (COUMADIN/JANTOVEN) 2.5 MG tablet [Pharmacy Med Name: WARFARIN SODIUM 2.5 MG TABLET]; TAKE 1/2-1 TABLET  (1.25-2.5 MG) BY MOUTH DAILY AS DIRECTED. ADJUST DOSE BASED ON INR.  Dispense: 90 tablet; Refill: 1    If protocol passes may refill for 6 months if within 3 months of last provider visit (or a total of 9 months).

## 2021-06-14 NOTE — TELEPHONE ENCOUNTER
Patient is scheduled for INR check on 1/22/2021 at 130 pm.  Unable to update reminder list since it is postponed.    Liz Ro, RN, BSN  Anticoagulation Clinic

## 2021-06-15 NOTE — TELEPHONE ENCOUNTER
ANTICOAGULATION  MANAGEMENT    Assessment     Today's INR result of 3.0 is Therapeutic (goal INR of 2.0-3.0)        Warfarin taken as previously instructed    No new diet changes affecting INR    No new medication/supplements affecting INR    Continues to tolerate warfarin with no reported s/s of bleeding or thromboembolism     Previous INR was Supratherapeutic    Plan:     Spoke on phone with Kiah regarding INR result and instructed:      Warfarin Dosing Instructions:  Continue current warfarin dose 1.25 mg daily on Fridays; and 2.5 mg daily rest of week  (0 % change)    Instructed patient to follow up no later than: 2-3 weeks    Education provided: importance of therapeutic range, importance of following up for INR monitoring at instructed interval and importance of taking warfarin as instructed    Kiah verbalizes understanding and agrees to warfarin dosing plan.    Instructed to call the [facility] for any changes, questions or concerns. (#[phone])   ?   Mary Morel RN    Subjective/Objective:      Marva Gaines, a 85 y.o. female is on warfarin. Kiah      Kiah reports:     Home warfarin dose: verbally confirmed home dose with Kiah and updated on anticoagulation calendar     Missed doses: No     Medication changes:  No     S/S of bleeding or thromboembolism:  No     New Injury or illness:  No     Changes in diet or alcohol consumption:  No     Upcoming surgery, procedure or cardioversion:  No    Anticoagulation Episode Summary     Current INR goal:  2.0-3.0   TTR:  79.8 % (1 y)   Next INR check:  3/1/2021   INR from last check:  3.00 (2/8/2021)   Weekly max warfarin dose:     Target end date:     INR check location:     Preferred lab:     Send INR reminders to:  LUI BEYER    Indications    A-fib (H) (Resolved) [I48.91]           Comments:           Anticoagulation Care Providers     Provider Role Specialty Phone number    Sabas Austin MD [facility] Internal Medicine [phone]

## 2021-06-15 NOTE — TELEPHONE ENCOUNTER
Refill Approved    Rx renewed per Medication Renewal Policy. Medication was last renewed on 9/29/20, last OV 9/29/20.    Minerva Shanks, [facility] Triage/Med Refill 2/18/2021     Requested Prescriptions   Pending Prescriptions Disp Refills     sertraline (ZOLOFT) 100 MG tablet [Pharmacy Med Name: SERTRALINE  MG TABLET] 90 tablet 1     Sig: TAKE 1 TABLET BY MOUTH EVERYDAY AT BEDTIME       SS[facility] Refill Protocol  Passed - 2/18/2021 12:05 PM        Passed - PCP or prescribing provider visit in last year     Last office visit with prescriber/PCP: Visit date not found OR same dept: Visit date not found OR same specialty: Visit date not found  Last physical: 5/17/2018 Last MTM visit: Visit date not found   Next visit within 3 mo: Visit date not found  Next physical within 3 mo: Visit date not found  Prescriber OR PCP: Sabas Austin MD  Last diagnosis associated with med order: 1. Anxiety  - sertraline (ZOLOFT) 100 MG tablet [Pharmacy Med Name: SERTRALINE  MG TABLET]; TAKE 1 TABLET BY MOUTH EVERYDAY AT BEDTIME  Dispense: 90 tablet; Refill: 1    If protocol passes may refill for 12 months if within 3 months of last provider visit (or a total of 15 months).

## 2021-06-15 NOTE — TELEPHONE ENCOUNTER
ANTICOAGULATION  MANAGEMENT    Assessment     Today's INR result of 2.7 is Therapeutic (goal INR of 2.0-3.0)        Warfarin taken as previously instructed    No new diet changes affecting INR    No new medication/supplements affecting INR    Continues to tolerate warfarin with no reported s/s of bleeding or thromboembolism     Previous INR was Therapeutic    Plan:     Spoke on phone with Kiah regarding INR result and instructed:      Warfarin Dosing Instructions:  Continue current warfarin dose 1.25 mg daily on Fridays; and 2.5 mg daily rest of week  (0 % change)    Instructed patient to follow up no later than: 4 weeks.    Education provided: importance of therapeutic range, importance of following up for INR monitoring at instructed interval and importance of taking warfarin as instructed    Kiah verbalizes understanding and agrees to warfarin dosing plan.    Instructed to call the [facility] for any changes, questions or concerns. (#[phone])   ?   Mary Morel RN    Subjective/Objective:      Marva Gaines, a 85 y.o. female is on warfarin. Kiah Dupont reports:     Home warfarin dose: verbally confirmed home dose with Kiah and updated on anticoagulation calendar     Missed doses: No     Medication changes:  No     S/S of bleeding or thromboembolism:  No     New Injury or illness:  No     Changes in diet or alcohol consumption:  No     Upcoming surgery, procedure or cardioversion:  No    Anticoagulation Episode Summary     Current INR goal:  2.0-3.0   TTR:  79.8 % (1 y)   Next INR check:  3/29/2021   INR from last check:  2.70 (3/1/2021)   Weekly max warfarin dose:     Target end date:     INR check location:     Preferred lab:     Send INR reminders to:  LUI BEYER    Indications    A-fib (H) (Resolved) [I48.91]           Comments:           Anticoagulation Care Providers     Provider Role Specialty Phone number    Sabas Austin MD [facility] Internal Medicine [phone]

## 2021-06-16 NOTE — PROGRESS NOTES
"Cardiology Progress Note    Assessment:  Permanent atrial fibrillation, good control of ventricular response rate, on warfarin  PVCs, asymptomatic  Hypertension, good control  Hypercholesterolemia on statin  Depression/ anxiety      Plan:  She appears to be well compensated from cardiac standpoint.  I do not think we need to change her medications.  Follow-up in 1 year    Subjective:   This is 82 y.o. female who comes in today for follow-up visit.  She reports no heart palpitations or syncope.  She is mildly short of breath when she exerts herself.  Her weight has been stable.  She has no PND or  orthopnea.  She has not had any chest pains.    Review of Systems:   General: WNL  Eyes: WNL  Ears/Nose/Throat: WNL  Lungs: WNL  Heart: WNL  Stomach: WNL  Bladder: WNL  Muscle/Joints: WNL  Skin: WNL  Nervous System: WNL  Mental Health: WNL     Blood: WNL    Objective:   /66 (Patient Site: Right Arm, Patient Position: Sitting, Cuff Size: Adult Regular)  Pulse 72  Resp 16  Ht 5' 3\" (1.6 m)  Wt 151 lb (68.5 kg)  BMI 26.75 kg/m2  Physical Exam:  GENERAL: no distress  NECK: No JVD  LUNGS: Clear to auscultation.  CARDIAC: irregular rhythm, S1 & S2 normal.  No heaves, thrills, gallops or murmurs.  ABDOMEN: flat, negative hepatosplenomegaly, soft and non-tender.  EXTREMITIES: No evidence of cyanosis, clubbing or edema.    Current Outpatient Prescriptions   Medication Sig Dispense Refill     cholecalciferol, vitamin D3, (VITAMIN D3) 2,000 unit Tab Take 1 tablet by mouth daily.       DILT- mg 24 hr capsule daily       DILT- mg 24 hr capsule TAKE 1 CAPSULE BY MOUTH EVERY DAY 90 capsule 2     furosemide (LASIX) 20 MG tablet TAKE 1 TABLET BY MOUTH DAILY 90 tablet 0     lisinopril (PRINIVIL,ZESTRIL) 5 MG tablet Take 1 tablet (5 mg total) by mouth daily. 90 tablet 2     lovastatin (MEVACOR) 20 MG tablet TAKE 1 TABLET BY MOUTH ONCE DAILY 90 tablet 3     sertraline (ZOLOFT) 100 MG tablet Take 1 tablet (100 mg total) " by mouth at bedtime. 90 tablet 0     warfarin (COUMADIN) 2.5 MG tablet Take 1/2-1 tablet (1.25-2.5 mg) by mouth daily as directed.  Adjust dose per INR results. 90 tablet 1     diltiazem (CARDIZEM CD) 240 MG 24 hr capsule TAKE ONE CAPSULE BY MOUTH EVERY DAY 90 capsule 0     fluticasone (FLONASE) 50 mcg/actuation nasal spray 1 spray into each nostril daily. 16 g 12     No current facility-administered medications for this visit.        Cardiographics:    Holter: April 2015   Persistent atrial fibrillation with overall fairly well controlled  ventricular response. There was some tendency toward rapid ventricular response  with activity in the early afternoon hours. A moderate number of ventricular  premature beats noted. 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     Echocardiogram: 2013   Normal LV systolic function, no significant valvular abnormalities    Stress Test: 2013   Mixed anterior perfusion defect    CT coronary angio: 2013   Normal coronaries, calcium score 2    Lab Results:       Lab Results   Component Value Date    CHOL 190 08/10/2016    CHOL 204 (H) 05/06/2014    CHOL 187 06/06/2013     Lab Results   Component Value Date    HDL 67 08/10/2016    HDL 66 05/06/2014    HDL 52 06/06/2013     Lab Results   Component Value Date    LDLCALC 103 08/10/2016    LDLCALC 119 05/06/2014    LDLCALC 111 06/06/2013     Lab Results   Component Value Date    TRIG 101 08/10/2016    TRIG 92 05/06/2014    TRIG 120 06/06/2013     No components found for: CHOLHDL  BNP   Date Value Ref Range Status   07/20/2013 379 (H) <138 pg/mL Final       Kai (Melvin)  MD Radhames

## 2021-06-16 NOTE — TELEPHONE ENCOUNTER
ANTICOAGULATION  MANAGEMENT PROGRAM    Marva Gaines is overdue for INR check.     Spoke with Kiah and scheduled INR appointment on 4/6.      Mary Morel RN

## 2021-06-16 NOTE — TELEPHONE ENCOUNTER
ANTICOAGULATION  MANAGEMENT    Assessment     Today's INR result of 2.7 is Therapeutic (goal INR of 2.0-3.0)        Warfarin taken as previously instructed    No new diet changes affecting INR    No new medication/supplements affecting INR    Continues to tolerate warfarin with no reported s/s of bleeding or thromboembolism     Previous INR was Subtherapeutic    Plan:     Spoke on phone with Kiah regarding INR result and instructed:      Warfarin Dosing Instructions:  Continue current warfarin dose 1.25 mg daily on Fridays; and 2.5 mg daily rest of week  (0 % change)    Instructed patient to follow up no later than: 2-3 weeks.    Education provided: importance of therapeutic range and target INR goal and significance of current INR result    Kiah verbalizes understanding and agrees to warfarin dosing plan.    Instructed to call the [facility] for any changes, questions or concerns. (#[phone])   ?   Mary Morel RN    Subjective/Objective:      Marva Gaines, a 85 y.o. female is on warfarin. Kiah Wongy reports:     Home warfarin dose: template incorrect; verbally confirmed home dose with Kiah and updated on anticoagulation calendar     Missed doses: No     Medication changes:  No     S/S of bleeding or thromboembolism:  No     New Injury or illness:  No     Changes in diet or alcohol consumption:  No     Upcoming surgery, procedure or cardioversion:  No    Anticoagulation Episode Summary     Current INR goal:  2.0-3.0   TTR:  76.6 % (1 y)   Next INR check:  5/13/2021   INR from last check:  2.70 (4/22/2021)   Weekly max warfarin dose:     Target end date:     INR check location:     Preferred lab:     Send INR reminders to:  LUI BEYER    Indications    A-fib (H) (Resolved) [I48.91]           Comments:           Anticoagulation Care Providers     Provider Role Specialty Phone number    Sabas Austin MD [facility] Internal Medicine [phone]

## 2021-06-17 NOTE — TELEPHONE ENCOUNTER
Telephone Encounter by Mary Morel RN at 1/22/2021  2:26 PM     Author: Mary Morel RN Service: -- Author Type: Registered Nurse    Filed: 1/22/2021  2:55 PM Encounter Date: 1/22/2021 Status: Signed    : Mary Morel RN (Registered Nurse)       ANTICOAGULATION  MANAGEMENT    Assessment     Today's INR result of 3.2 is Supratherapeutic (goal INR of 2.0-3.0)        Warfarin taken as previously instructed    No new diet changes affecting INR    No new medication/supplements affecting INR    Continues to tolerate warfarin with no reported s/s of bleeding or thromboembolism     Previous INR was Therapeutic    Plan:     Spoke on phone with Marva regarding INR result and instructed:     Warfarin Dosing Instructions:  She already took her dose today. Take 1.25 mg tomorrow then continue current warfarin dose 1.25 mg daily on Fridays; and 2.5 mg daily rest of week  (0 % change)    Instructed patient to follow up no later than: two weeks.    Education provided: importance of therapeutic range, importance of following up for INR monitoring at instructed interval and importance of taking warfarin as instructed    Kiah verbalizes understanding and agrees to warfarin dosing plan.    Instructed to call the [facility] for any changes, questions or concerns. (#[phone])   ?   Mary Morel RN    Subjective/Objective:      Marva Gaines, a 85 y.o. female is on warfarin.     Marva reports:     Home warfarin dose: verbally confirmed home dose with Kiah and updated on anticoagulation calendar     Missed doses: No     Medication changes:  No-she did get the covid vaccine last week.    S/S of bleeding or thromboembolism:  No     New Injury or illness:  No     Changes in diet or alcohol consumption:  No     Upcoming surgery, procedure or cardioversion:  No    Anticoagulation Episode Summary     Current INR goal:  2.0-3.0   TTR:  84.4 % (1 y)   Next INR check:  2/5/2021   INR from last  check:  3.20 (1/22/2021)   Weekly max warfarin dose:     Target end date:     INR check location:     Preferred lab:     Send INR reminders to:  LUI BEYER    Indications    A-fib (H) (Resolved) [I48.91]           Comments:           Anticoagulation Care Providers     Provider Role Specialty Phone number    Sabas Austin MD [facility] [phone]

## 2021-06-17 NOTE — TELEPHONE ENCOUNTER
ANTICOAGULATION  MANAGEMENT PROGRAM    Marva REMI Samsedwin is overdue for INR check.     Spoke with Kiah and scheduled INR appointment on 5/26/21.      Mary Morel RN

## 2021-06-17 NOTE — TELEPHONE ENCOUNTER
Telephone Encounter by Mary Morel RN at 4/6/2021  3:04 PM     Author: Mary Morel RN Service: -- Author Type: Registered Nurse    Filed: 4/6/2021  3:49 PM Encounter Date: 4/6/2021 Status: Signed    : Mary Morel RN (Registered Nurse)       ANTICOAGULATION  MANAGEMENT    Assessment     Today's INR result of 1.8 is Subtherapeutic (goal INR of 2.0-3.0)        Warfarin taken as previously instructed    Increased greens/vitamin K intake may be affecting INR    No new medication/supplements affecting INR    Continues to tolerate warfarin with no reported s/s of bleeding or thromboembolism     Previous INR was Therapeutic    Plan:     Spoke on phone with Kiah regarding INR result and instructed:      Warfarin Dosing Instructions:  Take 3.75 mg today only then continue current warfarin dose 1.25 mg daily on Fridays; and 2.5 mg daily rest of week  (0 % change)    Instructed patient to follow up no later than: two weeks.    Education provided: importance of consistent vitamin K intake, impact of vitamin K foods on INR, importance of therapeutic range, target INR goal and significance of current INR result and importance of following up for INR monitoring at instructed interval    Kiah verbalizes understanding and agrees to warfarin dosing plan.    Instructed to call the [facility] for any changes, questions or concerns. (#[phone])   ?   Mary Morel RN    Subjective/Objective:      Marva Gaines, a 85 y.o. female is on warfarin. Kiah Dupont reports:     Home warfarin dose: verbally confirmed home dose with Kiah and updated on anticoagulation calendar     Missed doses: No     Medication changes:  No     S/S of bleeding or thromboembolism:  No     New Injury or illness:  No     Changes in diet or alcohol consumption:  Yes: she ate more greens.      Upcoming surgery, procedure or cardioversion:  No    Anticoagulation Episode Summary     Current INR goal:  2.0-3.0    TTR:  77.6 % (1 y)   Next INR check:  4/20/2021   INR from last check:  1.80 (4/6/2021)   Weekly max warfarin dose:     Target end date:     INR check location:     Preferred lab:     Send INR reminders to:  LUI BEYER    Indications    A-fib (H) (Resolved) [I48.91]           Comments:           Anticoagulation Care Providers     Provider Role Specialty Phone number    Sabas Austin MD [facility] [phone]

## 2021-06-18 NOTE — PROGRESS NOTES
Marva Gaines who presents today with a chief complaint of consult, joint pains    Joint Pains:  Yes  Location: hands, shoulder b/l, knees  Onset: chronic (> 5 yrs)  Intensity: 9 /10  AM Stiffness: not much  Alleviating/Aggravating Factors: tylenol 500 mg takes the edge.  Tolerating Meds: yes  Other:      ROS:  Patient has some dry eyes, no: dry mouth, oral ulcers, alopecia, rashes, photosensitivity, no: history of psoriasis (+ fhx, daghter), no: chest pain, shortness of breath, cough, dysuria, history of kidney stones, abdominal pain, +some diarrhea (diet related), no: hematochezia, dysphagia, history of peptic ulcer disease, history of HIV, tuberculosis, hepatitis B or C, Lyme disease, seizure history, raynaud's, fevers, recent infections, some difficulty sleeping due to pain, no: involuntary weight loss, No;  fatigue, depression, +anxiety, no: loss of appetite,  temporal headaches,  double vision, loss of vision or painful vision.      Problem List:  Patient Active Problem List   Diagnosis     Osteopenia     Hyperglycemia     Hypertension     Anxiety     Atrial Fibrillation     Excessive Sweating     Hypercholesterolemia        PMH:   No past medical history on file.    Surgical History:  No past surgical history on file.    Family History:  No family history on file.    Social History:   reports that she has quit smoking. She has never used smokeless tobacco.    Allergies:  Allergies   Allergen Reactions     Hydrocodone-Acetaminophen         Current Medications:  Current Outpatient Prescriptions   Medication Sig Dispense Refill     cholecalciferol, vitamin D3, (VITAMIN D3) 2,000 unit Tab Take 1 tablet by mouth daily.       diltiazem (CARDIZEM CD) 240 MG 24 hr capsule TAKE ONE CAPSULE BY MOUTH EVERY DAY 90 capsule 1     furosemide (LASIX) 20 MG tablet TAKE 1 TABLET BY MOUTH DAILY 90 tablet 1     lisinopril (PRINIVIL,ZESTRIL) 5 MG tablet Take 1 tablet (5 mg total) by mouth daily. 90 tablet 2     lovastatin  "(MEVACOR) 20 MG tablet TAKE 1 TABLET BY MOUTH ONCE DAILY 90 tablet 3     sertraline (ZOLOFT) 100 MG tablet Take 1 tablet (100 mg total) by mouth at bedtime. 90 tablet 3     warfarin (COUMADIN) 2.5 MG tablet Take 1/2 tablet (1.25 mg) on Mondays and 1 tablet (2.5 mg) all other days. Adjust dose per INR results. 90 tablet 0     No current facility-administered medications for this visit.            Physical Exam:  /80  Pulse 88  Resp 8  Ht 5' 2\" (1.575 m)  Wt 153 lb (69.4 kg)  SpO2 98%  Breastfeeding? No  BMI 27.98 kg/m2  General: A & O x 3 in NAD  HEENT: EOMI, Non injected/non icteric sclera, no oral lesions noted  Neck: Supple, no cervical LAD or thyromegaly noted  Derm: No malar rash, psoriatic lesions or nail pitting appreciated  CV: s1s2 with irregular rhythm, regular rate, no rubs appreciated   Lungs: CTA B/L, no wheezing , rales or rhonci appreciated  GI: Soft, NT/ND, no rebound, no guarding noted, no hepatomegally appreciated  MS: Hypertrophic changes noted involving hand joints with some signs of [place] nodes.  Has some fullness with tenderness involving bilateral second MCP joints.  Some discomfort involving left shoulder on passive external rotation with some limitation noted.  Some fullness with warmth noted involving right knee which was tender to passive flexion/extension, some limitation noted.  Passive flexion/extension of left knee did not reproduce any pains.  Otherwise patient demonstrated good passive/active ROM over other joints with no warmth, erythema, tenderness or synovitis noted over these joints.  Back: negative straight leg raising  Neuro: 5/5 strength in upper and lower extremities b/l, good sensation b/l,  2+ bicep and patella reflexes b/l          Summary/Assessment:    82-year-old female presents with complaints of multiple joint pains particular involving her hands (second digits).    Other joints involved include shoulders, left greater than right and knees, right " greater than left.     has been expensing joint pains for greater than 5 years, worsening lately.   typically does not like seeing physicians and her daughter who is with her today convinced her to make the appointment.    Patient has some signs of degenerative joint disease which is likely contributing to some of her arthralgias however also noted to have some fullness involving bilateral second MCP joints consistent with having an inflammatory arthritis.  Given MCP involvement along with symmetry may be related to RA.    Takes Tylenol 1000 mg 3 times a day which provides some benefit.    Is aware to avoid NSAIDs given history of renal insufficiency, which has been stable.    Patient is on Coumadin for A. Fib.    Please see below for management plan.      Pertinent rheumatology/past medical history (please refer to above for more detailed history):      Inflammatory arthritis    Multiple joint pains (hands, shoulders l>r, knees r>l)    Osteoarthritis    Osteopenia    Renal insufficiency    A. Fib    Anxiety    Family history of psoriasis(daughter)      Rheumatology medications provided/suggested:    Prednisone taper  Prilosec while on prednisone  Plaquenil      Pertinent medication from other providers or from otc (please refer to above for more detailed med list):      Tylenol  Zoloft  Vitamin D  Coumadin  Lovastatin      Pertinent medications already tried:           Pertinent lab history:    Elevated creatinine      Pertinent imaging/test history:          Other:    Denies having any history of macular degeneration or glaucoma.    Denies regular alcohol beverage intake or tobacco use.    Lives alone in an assisted living facility.  Has family in the area.    Plan:      For inflammatory arthritis, will provide prednisone taper starting at 15 mg daily decreasing to off over the course of 1 month.    We will add Plaquenil 200 mg twice a day.  Made aware that while on Plaquenil she see ophthalmology  every 6 months to 1 year.    Given Coumadin use while on prednisone will have her take Prilosec for GI prophylaxis.      Can continue taking Tylenol 1000 mg 3 times daily as needed.    We will x-ray her hands, shoulders and knees.    Made aware that if symptoms persist a consideration is injecting affected joints with cortisone (however patient has had some pain with prior shoulder injection and insufficient relief with the injections) other option for knee injections are viscous fluid supplements which we discussed as well.    Follow-up in 2 months.      Procedure note:       Spent 60 minutes with greater than half of this time spent with the patient going over differential diagnosis, prognosis, treatment plan, medication side effects and  answering questions.    Major side effect profile of medications provided/suggested were discussed with the patient.    Patient accompanied by her daughter to this visit.      This note was transcribed using Dragon voice recognition software as a result unintentional grammatical errors or word substitutions may have occurred. Please contact our Rheumatology department if you need any clarification or if you have any related inquiries.    Thank you for referring this patient to our clinic.      Jaxson Bates ....................  6/20/2018   1:23 PM

## 2021-06-18 NOTE — PROGRESS NOTES
Assessment and Plan:       1. Health care maintenance    - HM2(CBC w/o Differential)  - Urinalysis  - LDL Cholesterol, Direct  - Vitamin D, Total (25-Hydroxy)  - Comprehensive Metabolic Panel  - Glycosylated Hemoglobin A1c    2. Hypertension, well managed    - HM2(CBC w/o Differential)  - Urinalysis  - LDL Cholesterol, Direct  - Vitamin D, Total (25-Hydroxy)  - Comprehensive Metabolic Panel  - Glycosylated Hemoglobin A1c    3. Hypercholesterolemia, on lovastatin    - HM2(CBC w/o Differential)  - Urinalysis  - LDL Cholesterol, Direct  - Vitamin D, Total (25-Hydroxy)  - Comprehensive Metabolic Panel  - Glycosylated Hemoglobin A1c    4. Hyperglycemia    - HM2(CBC w/o Differential)  - Urinalysis  - LDL Cholesterol, Direct  - Vitamin D, Total (25-Hydroxy)  - Comprehensive Metabolic Panel  - Glycosylated Hemoglobin A1c    5. Anxiety, on sertraline, NALDO=8    - HM2(CBC w/o Differential)  - Urinalysis  - LDL Cholesterol, Direct  - Vitamin D, Total (25-Hydroxy)  - Comprehensive Metabolic Panel  - Glycosylated Hemoglobin A1c  - sertraline (ZOLOFT) 100 MG tablet; Take 1 tablet (100 mg total) by mouth at bedtime.  Dispense: 90 tablet; Refill: 3    6. Permanent atrial fibrillation, seeing Cardiologist yearly, on diltiazem and warfarin.    - HM2(CBC w/o Differential)  - Urinalysis  - LDL Cholesterol, Direct  - Vitamin D, Total (25-Hydroxy)  - Comprehensive Metabolic Panel  - Glycosylated Hemoglobin A1c    7. Osteopenia, refusing DXA scans.    - HM2(CBC w/o Differential)  - Urinalysis  - LDL Cholesterol, Direct  - Vitamin D, Total (25-Hydroxy)  - Comprehensive Metabolic Panel  - Glycosylated Hemoglobin A1c    8. Skin disorder  She has multiple moles and I recommended to see dermatologist.  - Ambulatory referral to Dermatology  - 2(CBC w/o Differential)  - Urinalysis  - LDL Cholesterol, Direct  - Vitamin D, Total (25-Hydroxy)  - Comprehensive Metabolic Panel  - Glycosylated Hemoglobin A1c    9. Routine general medical  examination at a health care facility  She refused to take off her clothes today and full exam, she refused any preventive exams in the future like a mammogram, DEXA and any immunization.    10. Hyperlipidemia, unspecified hyperlipidemia type    - lovastatin (MEVACOR) 20 MG tablet; TAKE 1 TABLET BY MOUTH ONCE DAILY  Dispense: 90 tablet; Refill: 3     The patient's current medical problems were reviewed.    I have had an Advance Directives discussion with the patient.  The following health maintenance schedule was reviewed with the patient and provided in printed form in the after visit summary:   Health Maintenance   Topic Date Due     ZOSTER VACCINE  08/31/1997     DXA SCAN  08/31/2002     PNEUMOCOCCAL CONJUGATE VACCINE FOR ADULTS (PCV13 OR PREVNAR)  08/31/2002     FALL RISK ASSESSMENT  08/31/2002     TD 18+ HE  03/18/2018     ADVANCE DIRECTIVES DISCUSSED WITH PATIENT  06/06/2018     INFLUENZA VACCINE RULE BASED (Season Ended) 08/01/2018     PNEUMOCOCCAL POLYSACCHARIDE VACCINE AGE 65 AND OVER  Completed        Subjective:   Chief Complaint: Marva Gaines is an 82 y.o. female here for an Annual Wellness visit.   HPI:  Chronic medical problems reviewed and discussed. No acute issues.    Review of Systems:    Please see above.  The rest of the review of systems are negative for all systems.    Patient Care Team:  Sabas Austin MD as PCP - General (Internal Medicine)     Patient Active Problem List   Diagnosis     Osteopenia     Hyperglycemia     Hypertension     Anxiety     Atrial Fibrillation     Excessive Sweating     Hypercholesterolemia     No past medical history on file.   No past surgical history on file.   No family history on file.   Social History     Social History     Marital status: Single     Spouse name: N/A     Number of children: N/A     Years of education: N/A     Occupational History     Not on file.     Social History Main Topics     Smoking status: Former Smoker     Smokeless tobacco: Never  "Used     Alcohol use Not on file     Drug use: Not on file     Sexual activity: Not on file     Other Topics Concern     Not on file     Social History Narrative      Current Outpatient Prescriptions   Medication Sig Dispense Refill     diltiazem (CARDIZEM CD) 240 MG 24 hr capsule TAKE ONE CAPSULE BY MOUTH EVERY DAY 90 capsule 1     furosemide (LASIX) 20 MG tablet TAKE 1 TABLET BY MOUTH DAILY 90 tablet 1     lisinopril (PRINIVIL,ZESTRIL) 5 MG tablet Take 1 tablet (5 mg total) by mouth daily. 90 tablet 2     lovastatin (MEVACOR) 20 MG tablet TAKE 1 TABLET BY MOUTH ONCE DAILY 90 tablet 3     sertraline (ZOLOFT) 100 MG tablet TAKE 1 TABLET BY MOUTH AT BEDTIME 90 tablet 0     warfarin (COUMADIN) 2.5 MG tablet Take 1/2 tablet (1.25 mg) on Mondays and 1 tablet (2.5 mg) all other days. Adjust dose per INR results. 90 tablet 0     cholecalciferol, vitamin D3, (VITAMIN D3) 2,000 unit Tab Take 1 tablet by mouth daily.       No current facility-administered medications for this visit.       Objective:   Vital Signs:   Visit Vitals     /64     Pulse (!) 56     Ht 5' 2\" (1.575 m)     Wt 153 lb 1.6 oz (69.4 kg)     BMI 28 kg/m2        VisionScreening:  No exam data present     PHYSICAL EXAM  General Appearance: Alert, cooperative, no distress, appears stated age.  Head: Normocephalic, without obvious abnormality, atraumatic  Eyes: PERRL, conjunctiva/corneas clear, EOM's intact  Ears: Normal TM's and external ear canals, both ears  Nose: Nares normal, septum midline,mucosa normal, no drainage  Throat: Lips, mucosa, and tongue normal; teeth and gums normal  Neck: Supple, symmetrical, trachea midline, no adenopathy;  thyroid: not enlarged, symmetric, no tenderness/mass/nodules; no carotid bruit or JVD  Back: Symmetric, no curvature, ROM normal, no CVA tenderness.  Lungs: Clear to auscultation bilaterally, respirations unlabored.  Breasts: declined.  Heart: Regular rate and rhythm, S1 and S2 normal, no murmur, rub, or " gallop.  Abdomen: Soft, non-tender, bowel sounds active all four quadrants,  no masses, no organomegaly.  Pelvic:declined  Extremities: Extremities normal, atraumatic, no cyanosis or edema.  Skin: Skin color, texture, turgor normal, no rashes or lesions.  Lymph nodes: Cervical, supraclavicular, and axillary nodes normal.  Neurologic: No focal neurological findings.      Assessment Results 5/17/2018   Activities of Daily Living No help needed   Instrumental Activities of Daily Living No help needed   Get Up and Go Score Less than 12 seconds   Mini Cog Total Score 5   Some recent data might be hidden     A Mini-Cog score of 0-2 suggests the possibility of dementia, score of 3-5 suggests no dementia    Identified Health Risks:     She is at risk for lack of exercise and has been provided with information to increase physical activity for the benefit of her well-being.  The patient was counseled and encouraged to consider modifying their diet and eating habits. She was provided with information on recommended healthy diet options.  The patient was provided with written information regarding signs of hearing loss.  Patient's advanced directive was discussed and I am comfortable with the patient's wishes.

## 2021-06-19 NOTE — LETTER
Letter by Sabas Austin MD at      Author: Sabas Austin MD Service: -- Author Type: --    Filed:  Encounter Date: 8/19/2019 Status: (Other)         Marva Gaines  [address] [place] [address] [address]  [facility] [address]             August 19, 2019         Dear Ms. Gaines,    According to our records, it is the time of year for your annual exam.  Pleas use my chart or call the clinic at [phone] and schedule an appointment with your provider.    Please call with questions or contact us using [employer].    Sincerely,        Electronically signed by Sabas Austin MD

## 2021-06-19 NOTE — LETTER
Letter by Sunitha Sutton RN at      Author: Sunitha Sutton RN Service: -- Author Type: --    Filed:  Encounter Date: 10/31/2019 Status: Signed         Marva Gaines  [address]  [facility] [address]             October 31, 2019         Dear Ms. Gaines,    Below are the results from your recent visit:    Resulted Orders   Lipid Profile   Result Value Ref Range    Triglycerides 79 <=149 mg/dL    Cholesterol 158 <=199 mg/dL    LDL Calculated 75 <=129 mg/dL    HDL Cholesterol 67 >=50 mg/dL    Patient Fasting > 8hrs? No      From: Kai Ricci MD (Ted)  Sent: 10/31/2019   8:04 AM CDT  Good results    Please call with questions or contact us using [employer].    Sincerely,        Electronically signed by Sunitha ADAMS RN

## 2021-06-19 NOTE — LETTER
Letter by Kai Ricci MD (Ted) at      Author: Kai Ricci MD (Ted) Service: -- Author Type: --    Filed:  Encounter Date: 7/24/2019 Status: (Other)         Marva REMI Eder  [address] [place] [address] [address]  [facility] [address]      July 24, 2019      Dear Marva,    This letter is to remind you that you will be due for your follow up appointment with Dr. Melvin Ricci . To help ensure you are in the best health possible, a regular follow-up with your cardiologist is essential.     Please call our Patient Scheduling Line at [phone] to schedule your appointment at your earliest convenience.  If you have recently scheduled an appointment, please disregard this letter.    We look forward to seeing you again. As always, we are available at the number  above for any questions or concerns you may have.      Sincerely,     The Physicians and Staff of [facility]

## 2021-06-23 NOTE — TELEPHONE ENCOUNTER
ANTICOAGULATION  MANAGEMENT PROGRAM    Marva REMI Eder is overdue for INR check.  Reminder call made.    Spoke with Marva and scheduled INR appointment on 1/21/19 .    Mary Morel RN

## 2021-06-23 NOTE — TELEPHONE ENCOUNTER
Anticoagulation Annual Referral Renewal Review    Marva REMI Gaines's chart reviewed for annual renewal of referral to anticoagulation monitoring.        Criteria for anticoagulation nurse and/or pharmacist renewal met   Warfarin indication: Atrial Fibrillation Yes, per indication   Current with INR monitoring/compliant Yes Yes   Date of last office visit 5/17/18 Yes, had office visit within last year   Time in Therapeutic Range (TTR) 74.87 % Yes, TTR > 60%       Marva Gaines met all criteria for anticoagulation management program initiated renewal.  New INR standing orders and anticoagulation referral renewal placed.      Mary Morel RN  1:14 PM

## 2021-06-23 NOTE — TELEPHONE ENCOUNTER
ANTICOAGULATION  MANAGEMENT    Assessment     Today's INR result of 2.2 is Therapeutic (goal INR of 2.0-3.0)        Warfarin taken as previously instructed    No new diet changes affecting INR    No new medication/supplements affecting INR    Continues to tolerate warfarin with no reported s/s of bleeding or thromboembolism     Previous INR was Therapeutic    Plan:     Left a detailed message for Marva regarding INR result and instructed:     Warfarin Dosing Instructions:  Continue current warfarin dose 1.25 mg daily on Fridays; and 2.5 mg daily rest of week  (0 % change)    Instructed patient to follow up no later than: 4-6 weeks.    Education provided:   Instructed to call the [facility] for any changes, questions or concerns. (#[phone])   ?   Mary Morel RN    Subjective/Objective:      Marva Gaines, a 83 y.o. female is on warfarin.     Marva reports:     Home warfarin dose: as updated on anticoagulation calendar per template     Missed doses: No     Medication changes:  No     S/S of bleeding or thromboembolism:  No     New Injury or illness:  No     Changes in diet or alcohol consumption:  No     Upcoming surgery, procedure or cardioversion:  No    Anticoagulation Episode Summary     Current INR goal:   2.0-3.0   TTR:   68.9 % (4.1 y)   Next INR check:   3/4/2019   INR from last check:   2.20 (1/21/2019)   Weekly max warfarin dose:      Target end date:      INR check location:      Preferred lab:      Send INR reminders to:   ANTICOAGULATION POOL A (WBY,WBE,MID,RSC)    Indications    A-fib (H) (Resolved) [I48.91]           Comments:            Anticoagulation Care Providers     Provider Role Specialty Phone number    Susanna Todd MD Referring Internal Medicine [phone]

## 2021-06-24 NOTE — TELEPHONE ENCOUNTER
ANTICOAGULATION  MANAGEMENT    Assessment     Today's INR result of 2.6 is Therapeutic (goal INR of 2.0-3.0)        Warfarin taken as previously instructed    No new diet changes affecting INR    No new medication/supplements affecting INR    Continues to tolerate warfarin with no reported s/s of bleeding or thromboembolism     Previous INR was Therapeutic    Plan:     Spoke with Marva regarding INR result and instructed:     Warfarin Dosing Instructions:  Continue current warfarin dose 1.25 mg daily on Fridays; and 2.5 mg daily rest of week  (0 % change)    Instructed patient to follow up no later than: 4-6 weeks.    Education provided:     Instructed to call the [facility] for any changes, questions or concerns. (#[phone])   ?   Mary Morel RN    Subjective/Objective:      Marva Gaines, a 83 y.o. female is on warfarin.     Marva reports:     Home warfarin dose: as updated on anticoagulation calendar per template     Missed doses: No     Medication changes:  No     S/S of bleeding or thromboembolism:  No     New Injury or illness:  No     Changes in diet or alcohol consumption:  No     Upcoming surgery, procedure or cardioversion:  No    Anticoagulation Episode Summary     Current INR goal:   2.0-3.0   TTR:   69.8 % (4.2 y)   Next INR check:   4/17/2019   INR from last check:   2.60 (3/6/2019)   Weekly max warfarin dose:      Target end date:      INR check location:      Preferred lab:      Send INR reminders to:   ANTICOAGULATION POOL A (WBY,WBE,MID,RSC)    Indications    A-fib (H) (Resolved) [I48.91]           Comments:            Anticoagulation Care Providers     Provider Role Specialty Phone number    Susanna Todd MD Referring Internal Medicine [phone]

## 2021-06-25 NOTE — TELEPHONE ENCOUNTER
ANTICOAGULATION  MANAGEMENT    Assessment     Today's INR result of 2.0 is Therapeutic (goal INR of 2.0-3.0)        Warfarin taken as previously instructed    Increased greens/vitamin K intake may be affecting INR    No new medication/supplements affecting INR    Continues to tolerate warfarin with no reported s/s of bleeding or thromboembolism     Previous INR was Therapeutic    Plan:     Spoke on phone with Kiah regarding INR result and instructed:      Warfarin Dosing Instructions:  Continue current warfarin dose 1.25 mg daily on Fridays; and 2.5 mg daily rest of week  (0 % change)    Instructed patient to follow up no later than: 4 weeks.    Education provided: importance of therapeutic range and target INR goal and significance of current INR result    Kiah verbalizes understanding and agrees to warfarin dosing plan.    Instructed to call the [facility] for any changes, questions or concerns. (#[phone])   ?   Mary Morel RN    Subjective/Objective:      Marva Gaines, a 85 y.o. female is on warfarin. Kiah Dupont reports:     Home warfarin dose: verbally confirmed home dose with Kiah and updated on anticoagulation calendar     Missed doses: No     Medication changes:  No     S/S of bleeding or thromboembolism:  No     New Injury or illness:  No     Changes in diet or alcohol consumption:  Yes-she did eat more greens. Will not eat more then she has.      Upcoming surgery, procedure or cardioversion:  No    Anticoagulation Episode Summary     Current INR goal:  2.0-3.0   TTR:  77.9 % (1 y)   Next INR check:  6/22/2021   INR from last check:  2.00 (5/25/2021)   Weekly max warfarin dose:     Target end date:     INR check location:     Preferred lab:     Send INR reminders to:  [facility]    Indications    A-fib (H) (Resolved) [I48.91]           Comments:           Anticoagulation Care Providers     Provider Role Specialty Phone number    Sabas Austin MD Responsible Internal  Medicine [phone]

## 2021-06-28 ENCOUNTER — AMBULATORY - HEALTHEAST (OUTPATIENT)
Dept: LAB | Facility: CLINIC | Age: 86
End: 2021-06-28

## 2021-06-28 ENCOUNTER — COMMUNICATION - HEALTHEAST (OUTPATIENT)
Dept: ANTICOAGULATION | Facility: CLINIC | Age: 86
End: 2021-06-28

## 2021-06-28 DIAGNOSIS — I48.91 ATRIAL FIBRILLATION (H): ICD-10-CM

## 2021-06-28 LAB — INR PPP: 1.9 (ref 0.9–1.1)

## 2021-06-29 NOTE — PROGRESS NOTES
"Progress Notes by Kai Ricci MD (Ted) at 11/4/2020  4:30 PM     Author: Kai Ricci MD (Ted) Service: -- Author Type: Physician    Filed: 11/4/2020  4:40 PM Encounter Date: 11/4/2020 Status: Signed    : Kai Ricci MD (Ted) (Physician)           The patient has been notified of following:     \"This telephone visit will be conducted via a call between you and your physician/provider. We have found that certain health care needs can be provided without the need for a physical exam.  This service lets us provide the care you need with a phone conversation.  If a prescription is necessary we can send it directly to your pharmacy.  If lab work is needed we can place an order for that and you can then stop by our lab to have the test done at a later time. If during the course of the call the physician/provider feels a telephone visit is not appropriate, you will not be charged for this service.\" Verbal consent has been obtained for this service by care team member:         HEART CARE PHONE ENCOUNTER        The patient has chosen to have the visit conducted as a telephone visit, to reduce risk of exposure given the current status of Coronavirus in our community. This telephone visit is being conducted via a call between the patient and physician/provider. Health care needs are being provided without a physical exam.     Assessment/Recommendations   Assessment:    Permanent atrial fibrillation, good control of ventricular response rate, on warfarin  PVCs, asymptomatic  Hypertension, good control  Hypercholesterolemia on atorvastatin, good control  Depression/ anxiety    Plan:  She appears to remain well compensated from the cardiac standpoint.  We will continue current cardiac medications without any changes      Follow Up Plan: Follow up in 6 months  I have reviewed the note as documented.  This accurately captures the substance of my conversation with the " patient.    Total time of call between patient and provider was 13 minutes   Start Time: 4:20 PM  Stop Time: 4:33 PM       History of Present Illness/Subjective    Marva Gaines is a 85 y.o. female who is being evaluated via a billable telephone visit.    She reports no new episodes of chest pain or shortness of breath.  She checks blood pressure at home.  Systolic blood pressures less than 140.  Heart rate stays in 70s and 80s.  She has not had syncope.    I have reviewed and updated the patient's Past Medical History, Social History, Family History and Medication List.     Physical Examination not performed given phone encounter Review of Systems                                                Medical History  Surgical History Family History Social History   A. Fib  Hypertension  No sudden cardiac death Social History     Socioeconomic History   ? Marital status: Single     Spouse name: Not on file   ? Number of children: Not on file   ? Years of education: Not on file   ? Highest education level: Not on file   Occupational History   ? Not on file   Social Needs   ? Financial resource strain: Not on file   ? Food insecurity     Worry: Not on file     Inability: Not on file   ? Transportation needs     Medical: Not on file     Non-medical: Not on file   Tobacco Use   ? Smoking status: Former Smoker   ? Smokeless tobacco: Never Used   Substance and Sexual Activity   ? Alcohol use: Not on file   ? Drug use: Not on file   ? Sexual activity: Not on file   Lifestyle   ? Physical activity     Days per week: Not on file     Minutes per session: Not on file   ? Stress: Not on file   Relationships   ? Social connections     Talks on phone: Not on file     Gets together: Not on file     Attends Yazdanism service: Not on file     Active member of club or organization: Not on file     Attends meetings of clubs or organizations: Not on file     Relationship status: Not on file   ? Intimate partner violence     Fear of current  or ex partner: Not on file     Emotionally abused: Not on file     Physically abused: Not on file     Forced sexual activity: Not on file   Other Topics Concern   ? Not on file   Social History Narrative   ? Not on file          Medications  Allergies   Current Outpatient Medications   Medication Sig Dispense Refill   ? atorvastatin (LIPITOR) 20 MG tablet Take 1 tablet (20 mg total) by mouth at bedtime. 90 tablet 2   ? cholecalciferol, vitamin D3, (VITAMIN D3) 2,000 unit Tab Take 1 tablet by mouth daily.     ? diltiazem (CARDIZEM CD) 240 MG 24 hr capsule TAKE 1 CAPSULE BY MOUTH EVERY DAY 90 capsule 1   ? furosemide (LASIX) 20 MG tablet TAKE 1 TABLET BY MOUTH EVERY DAY 90 tablet 1   ? lisinopriL (PRINIVIL,ZESTRIL) 5 MG tablet TAKE 1 TABLET (5 MG TOTAL) BY MOUTH DAILY. 90 tablet 1   ? omeprazole (PRILOSEC) 20 MG capsule Take 1 capsule (20 mg total) by mouth daily before breakfast. 30 capsule 0   ? sertraline (ZOLOFT) 100 MG tablet Take 1 tablet (100 mg total) by mouth at bedtime. 90 tablet 1   ? warfarin ANTICOAGULANT (COUMADIN/JANTOVEN) 2.5 MG tablet TAKE 1/2-1 TABLET (1.25-2.5 MG) BY MOUTH DAILY AS DIRECTED. ADJUST DOSE BASED ON INR. 90 tablet 1     No current facility-administered medications for this visit.     Allergies   Allergen Reactions   ? Hydrocodone-Acetaminophen          Lab Results    Chemistry/lipid CBC Cardiac Enzymes/BNP/TSH/INR   Lab Results   Component Value Date    CHOL 158 10/30/2019    HDL 67 10/30/2019    LDLCALC 75 10/30/2019    TRIG 79 10/30/2019    CREATININE 1.40 (H) 05/17/2018    BUN 33 (H) 05/17/2018    K 4.3 05/17/2018     05/17/2018     (H) 05/17/2018    CO2 23 05/17/2018    Lab Results   Component Value Date    WBC 8.5 05/17/2018    HGB 12.9 05/17/2018    HCT 39.5 05/17/2018    MCV 88 05/17/2018     05/17/2018    Lab Results   Component Value Date    TROPONINI 0.04 07/21/2013     (H) 07/20/2013    TSH 2.88 08/10/2016    INR 2.80 (H) 10/14/2020        Kai  Ricci (Ted)

## 2021-06-29 NOTE — PROGRESS NOTES
"Progress Notes by Kai Ricci MD (Ted) at 5/7/2020  2:10 PM     Author: Kai Ricci MD (Ted) Service: -- Author Type: Physician    Filed: 5/7/2020  2:26 PM Encounter Date: 5/7/2020 Status: Signed    : Kai Ricci MD (Ted) (Physician)           The patient has been notified of following:     \"This telephone visit will be conducted via a call between you and your physician/provider. We have found that certain health care needs can be provided without the need for a physical exam.  This service lets us provide the care you need with a phone conversation.  If a prescription is necessary we can send it directly to your pharmacy.  If lab work is needed we can place an order for that and you can then stop by our lab to have the test done at a later time. If during the course of the call the physician/provider feels a telephone visit is not appropriate, you will not be charged for this service.\" Verbal consent has been obtained for this service by care team member:         HEART CARE PHONE ENCOUNTER        The patient has chosen to have the visit conducted as a telephone visit, to reduce risk of exposure given the current status of Coronavirus in our community. This telephone visit is being conducted via a call between the patient and physician/provider. Health care needs are being provided without a physical exam.     Assessment/Recommendations   Assessment:    Permanent atrial fibrillation, good control of ventricular response rate, on warfarin  PVCs, asymptomatic  Hypertension, good control  Hypercholesterolemia on atorvastatin, good control  Depression/ anxiety    Plan:  I reassured her that her blood pressures adequately controlled.  She does not need to be very anxious about it.  I will make no medication changes today      Follow Up Plan: Follow up in 6 months   I have reviewed the note as documented.  This accurately captures the substance of my conversation with " the patient.    Total time of call between patient and provider was 15 minutes   Start Time: 2:05 PM  Stop Time: 2:20 PM       History of Present Illness/Subjective    Marva Gaines is a 84 y.o. female who is being evaluated via a billable telephone visit.    She reports no new chest symptoms.  Specifically she has not had heart palpitations or syncope.  She denies chest pain or shortness of breath.  She is quite inactive because of coronavirus quarantine.  She is also quite anxious about disease.  She tends to worry about her blood pressure.  She reports no higher readings than 140 systolic.    I have reviewed and updated the patient's Past Medical History, Social History, Family History and Medication List.     Physical Examination not performed given phone encounter Review of Systems                                                Medical History  Surgical History Family History Social History   Hypertension  Atrial fibrillation  No scd Social History     Socioeconomic History   ? Marital status: Single     Spouse name: Not on file   ? Number of children: Not on file   ? Years of education: Not on file   ? Highest education level: Not on file   Occupational History   ? Not on file   Social Needs   ? Financial resource strain: Not on file   ? Food insecurity     Worry: Not on file     Inability: Not on file   ? Transportation needs     Medical: Not on file     Non-medical: Not on file   Tobacco Use   ? Smoking status: Former Smoker   ? Smokeless tobacco: Never Used   Substance and Sexual Activity   ? Alcohol use: Not on file   ? Drug use: Not on file   ? Sexual activity: Not on file   Lifestyle   ? Physical activity     Days per week: Not on file     Minutes per session: Not on file   ? Stress: Not on file   Relationships   ? Social connections     Talks on phone: Not on file     Gets together: Not on file     Attends Yazidism service: Not on file     Active member of club or organization: Not on file      Attends meetings of clubs or organizations: Not on file     Relationship status: Not on file   ? Intimate partner violence     Fear of current or ex partner: Not on file     Emotionally abused: Not on file     Physically abused: Not on file     Forced sexual activity: Not on file   Other Topics Concern   ? Not on file   Social History Narrative   ? Not on file          Medications  Allergies   Current Outpatient Medications   Medication Sig Dispense Refill   ? atorvastatin (LIPITOR) 20 MG tablet Take 1 tablet (20 mg total) by mouth at bedtime. 90 tablet 3   ? cholecalciferol, vitamin D3, (VITAMIN D3) 2,000 unit Tab Take 1 tablet by mouth daily.     ? diltiazem (CARDIZEM CD) 240 MG 24 hr capsule TAKE ONE CAPSULE BY MOUTH EVERY DAY 90 capsule 0   ? furosemide (LASIX) 20 MG tablet TAKE 1 TABLET BY MOUTH DAILY 90 tablet 0   ? lisinopril (PRINIVIL,ZESTRIL) 5 MG tablet TAKE 1 TABLET (5 MG TOTAL) BY MOUTH DAILY. 90 tablet 2   ? omeprazole (PRILOSEC) 20 MG capsule Take 1 capsule (20 mg total) by mouth daily before breakfast. 30 capsule 0   ? predniSONE (DELTASONE) 5 MG tablet Take 3 tab p.o. qd for 7 days then 2 tab qd for 7 days then 1 tab qd for 7 days then 1/2 tab qd for 7 days to off. 46 tablet 0   ? sertraline (ZOLOFT) 100 MG tablet Take 1 tablet (100 mg total) by mouth at bedtime. 90 tablet 3   ? warfarin ANTICOAGULANT (COUMADIN/JANTOVEN) 2.5 MG tablet Take 1/2-1 tablet (1.25-2.5 mg) by mouth daily as directed.  Adjust dose based on INR. 90 tablet 1     No current facility-administered medications for this visit.     Allergies   Allergen Reactions   ? Hydrocodone-Acetaminophen          Lab Results    Chemistry/lipid CBC Cardiac Enzymes/BNP/TSH/INR   Lab Results   Component Value Date    CHOL 158 10/30/2019    HDL 67 10/30/2019    LDLCALC 75 10/30/2019    TRIG 79 10/30/2019    CREATININE 1.40 (H) 05/17/2018    BUN 33 (H) 05/17/2018    K 4.3 05/17/2018     05/17/2018     (H) 05/17/2018    CO2 23 05/17/2018     Lab Results   Component Value Date    WBC 8.5 05/17/2018    HGB 12.9 05/17/2018    HCT 39.5 05/17/2018    MCV 88 05/17/2018     05/17/2018    Lab Results   Component Value Date    TROPONINI 0.04 07/21/2013     (H) 07/20/2013    TSH 2.88 08/10/2016    INR 2.70 (H) 04/09/2020        Kai Ricci (Ted)

## 2021-07-07 NOTE — TELEPHONE ENCOUNTER
Telephone Encounter by Mary Morel, RN at 6/28/2021  4:54 PM     Author: Mary Morel RN Service: -- Author Type: Registered Nurse    Filed: 6/28/2021  5:00 PM Encounter Date: 6/28/2021 Status: Signed    : Mary Morel RN (Registered Nurse)       ANTICOAGULATION MANAGEMENT     Marva Gaines 85 y.o., female is on warfarin with Subtherapeutic INR result (goal range 2.0-3.0)    Recent labs: (last 7 days)     06/28/21  1342   INR 1.90*       ASSESSMENT     Source: Patient/Caregiver Call      Warfarin dosing taken: Warfarin taken as instructed    Diet: No new diet changes affecting INR    Illness, Injury or hospitalization: No    Medication changes: None    Signs or symptoms of bleeding or clotting: No    Previous INR: therapeutic last 2(+) visits    Additional findings: None     PLAN     Recommended plan for no diet, medication or health factor changes affecting INR:     Dosing instructions: Increase your warfarin dose to 2.5 mg daily (7.7% change)    Follow up no later than: 2 weeks     Telephone call with Kiah who verbalizes understanding and agrees to plan    Patient offered & declined to schedule next visit    Education provided: importance of consistent vitamin K intake, impact of vitamin K foods on INR, importance of therapeutic range and target INR goal and significance of current INR result    Plan made per [facility] anticoagulation protocol    Mary Morel  Anticoagulation Clinic   [phone]    Anticoagulation Episode Summary     Current INR goal:  2.0-3.0   TTR:  75.8 % (1 y)   Next INR check:  7/12/2021   INR from last check:  1.90 (6/28/2021)   Weekly max warfarin dose:     Target end date:     INR check location:     Preferred lab:     Send INR reminders to:  [facility]    Indications    A-fib (H) (Resolved) [I48.91]           Comments:           Anticoagulation Care Providers     Provider Role Specialty Phone number    Sabas Austin MD Responsible  Internal Medicine [phone]

## 2021-07-13 ENCOUNTER — RECORDS - HEALTHEAST (OUTPATIENT)
Dept: ADMINISTRATIVE | Facility: CLINIC | Age: 86
End: 2021-07-13

## 2021-07-15 DIAGNOSIS — I48.91 ATRIAL FIBRILLATION (H): Primary | ICD-10-CM

## 2021-07-19 ENCOUNTER — LAB (OUTPATIENT)
Dept: LAB | Facility: CLINIC | Age: 86
End: 2021-07-19
Payer: COMMERCIAL

## 2021-07-19 ENCOUNTER — ANTICOAGULATION THERAPY VISIT (OUTPATIENT)
Dept: ANTICOAGULATION | Facility: CLINIC | Age: 86
End: 2021-07-19

## 2021-07-19 DIAGNOSIS — I48.91 ATRIAL FIBRILLATION (H): ICD-10-CM

## 2021-07-19 LAB — INR BLD: 2.5 (ref 0.9–1.1)

## 2021-07-19 PROCEDURE — 85610 PROTHROMBIN TIME: CPT

## 2021-07-19 PROCEDURE — 36415 COLL VENOUS BLD VENIPUNCTURE: CPT

## 2021-07-19 NOTE — PROGRESS NOTES
ANTICOAGULATION MANAGEMENT     Marva Gaines 85 year old female is on warfarin with therapeutic INR result. (Goal INR 2.0-3.0)    Recent labs: (last 7 days)     07/19/21  1421   INR 2.5*       ASSESSMENT     Source(s): Patient/Caregiver Call and Template       Warfarin doses taken: Warfarin taken as instructed    Diet: No new diet changes identified    New illness, injury, or hospitalization: No    Medication/supplement changes: None noted    Signs or symptoms of bleeding or clotting: No    Previous INR: Subtherapeutic    Additional findings: None     PLAN     Recommended plan for no diet, medication or health factor changes affecting INR     Dosing Instructions: Continue your current warfarin dose with next INR in 4 weeks       Summary  As of 7/19/2021    Full warfarin instructions:  2.5 mg every day   Next INR check:  8/16/21             Telephone call with Marva who verbalizes understanding and agrees to plan    Patient offered & declined to schedule next visit    Education provided: Target INR goal and significance of current INR result    Plan made per [facility] anticoagulation protocol    Cornelia Sullivan RN  Anticoagulation Clinic  7/19/2021    _______________________________________________________________________     Anticoagulation Episode Summary     Current INR goal:  2.0-3.0   TTR:  74.8 % (1 y)   Target end date:     Send INR reminders to:  [facility]       Comments:           Anticoagulation Care Providers     Provider Role Specialty Phone number    Sabas Austin MD [facility] Internal Medicine [phone]

## 2021-07-21 ENCOUNTER — RECORDS - HEALTHEAST (OUTPATIENT)
Dept: ADMINISTRATIVE | Facility: CLINIC | Age: 86
End: 2021-07-21

## 2021-08-04 DIAGNOSIS — R60.9 EDEMA: ICD-10-CM

## 2021-08-04 DIAGNOSIS — I48.91 A-FIB (H): ICD-10-CM

## 2021-08-04 RX ORDER — DILTIAZEM HYDROCHLORIDE 240 MG/1
240 CAPSULE, COATED, EXTENDED RELEASE ORAL DAILY
Qty: 90 CAPSULE | Refills: 0 | Status: SHIPPED | OUTPATIENT
Start: 2021-08-04 | End: 2021-10-27

## 2021-08-04 RX ORDER — FUROSEMIDE 20 MG
20 TABLET ORAL DAILY
Qty: 90 TABLET | Refills: 0 | Status: SHIPPED | OUTPATIENT
Start: 2021-08-04 | End: 2021-10-27

## 2021-08-17 ENCOUNTER — OFFICE VISIT (OUTPATIENT)
Dept: CARDIOLOGY | Facility: CLINIC | Age: 86
End: 2021-08-17
Payer: COMMERCIAL

## 2021-08-17 VITALS
OXYGEN SATURATION: 99 % | SYSTOLIC BLOOD PRESSURE: 142 MMHG | BODY MASS INDEX: 26.94 KG/M2 | WEIGHT: 147.3 LBS | HEART RATE: 136 BPM | DIASTOLIC BLOOD PRESSURE: 68 MMHG

## 2021-08-17 DIAGNOSIS — I10 ESSENTIAL HYPERTENSION: Primary | ICD-10-CM

## 2021-08-17 DIAGNOSIS — I48.21 PERMANENT ATRIAL FIBRILLATION (H): ICD-10-CM

## 2021-08-17 LAB
ANION GAP SERPL CALCULATED.3IONS-SCNC: 13 MMOL/L (ref 5–18)
BUN SERPL-MCNC: 23 MG/DL (ref 8–28)
CALCIUM SERPL-MCNC: 9.8 MG/DL (ref 8.5–10.5)
CHLORIDE BLD-SCNC: 106 MMOL/L (ref 98–107)
CO2 SERPL-SCNC: 22 MMOL/L (ref 22–31)
CREAT SERPL-MCNC: 1.15 MG/DL (ref 0.6–1.1)
ERYTHROCYTE [DISTWIDTH] IN BLOOD BY AUTOMATED COUNT: 14.6 % (ref 10–15)
GFR SERPL CREATININE-BSD FRML MDRD: 44 ML/MIN/1.73M2
GLUCOSE BLD-MCNC: 142 MG/DL (ref 70–125)
HCT VFR BLD AUTO: 39.7 % (ref 35–47)
HGB BLD-MCNC: 12.2 G/DL (ref 11.7–15.7)
MCH RBC QN AUTO: 27.6 PG (ref 26.5–33)
MCHC RBC AUTO-ENTMCNC: 30.7 G/DL (ref 31.5–36.5)
MCV RBC AUTO: 90 FL (ref 78–100)
PLATELET # BLD AUTO: 262 10E3/UL (ref 150–450)
POTASSIUM BLD-SCNC: 4 MMOL/L (ref 3.5–5)
RBC # BLD AUTO: 4.42 10E6/UL (ref 3.8–5.2)
SODIUM SERPL-SCNC: 141 MMOL/L (ref 136–145)
WBC # BLD AUTO: 7.3 10E3/UL (ref 4–11)

## 2021-08-17 PROCEDURE — 80048 BASIC METABOLIC PNL TOTAL CA: CPT | Performed by: INTERNAL MEDICINE

## 2021-08-17 PROCEDURE — 99214 OFFICE O/P EST MOD 30 MIN: CPT | Performed by: INTERNAL MEDICINE

## 2021-08-17 PROCEDURE — 85027 COMPLETE CBC AUTOMATED: CPT | Performed by: INTERNAL MEDICINE

## 2021-08-17 PROCEDURE — 36415 COLL VENOUS BLD VENIPUNCTURE: CPT | Performed by: INTERNAL MEDICINE

## 2021-08-17 RX ORDER — LOSARTAN POTASSIUM 25 MG/1
25 TABLET ORAL DAILY
Qty: 30 TABLET | Refills: 11 | Status: SHIPPED | OUTPATIENT
Start: 2021-08-17 | End: 2021-09-13

## 2021-08-17 NOTE — LETTER
8/17/2021    Sabas Austin MD  [address]  [facility] [address]    RE: Marva REMI Gaines       Dear Colleague,    I had the pleasure of seeing Marva Gaines in the [facility].        Cardiology Progress Note     Assessment:  Permanent atrial fibrillation, probably good control of ventricular response rate/mildly elevated today but she appears to be very upset and emotional, on warfarin  PVCs, asymptomatic  Hypertension, good control  Hypercholesterolemia on atorvastatin, good control  Depression/ anxiety  Cough suspect postnasal drip, rule out ACE inhibitor induced    Plan:  Change lisinopril to losartan 25 mg a day to potentially help with ACE inhibitor induced cough    She reports that her night has been variable.  I think she would benefit from more predictable anticoagulation with Eliquis.  We will check CBC and basic metabolic panel today and then send her prescription for appropriate dose.  If Eliquis is affordable she will stop taking warfarin and start taking Eliquis 3 days later    Routine follow-up in 1 year    Subjective:   This is 85 year old female who comes in today for follow-up visit.  She is very emotional today because of stress related to Covid .  She is sad because of her grandson is moving out to college .  She denies chest pains or increasing shortness of breath.  She complains of cough and runny nose.  She is wondering whether cough is related to ACE inhibitor    Review of Systems:   Negative other than history of present illness    Objective:   BP (!) 142/68 (BP Location: Left arm, Patient Position: Sitting, Cuff Size: Adult Large)   Pulse (!) 136   Wt 66.8 kg (147 lb 4.8 oz)   SpO2 99%   BMI 26.94 kg/m    Physical Exam:  GENERAL: no distress  NECK: No JVD  LUNGS: Clear to auscultation.  CARDIAC:irregular rhythm, S1 & S2 normal.  No heaves, thrills, gallops or murmurs.  ABDOMEN: flat, negative hepatosplenomegaly, soft and  non-tender.  EXTREMITIES: No evidence of cyanosis, clubbing or edema.    Current Outpatient Medications   Medication Sig Dispense Refill     atorvastatin (LIPITOR) 20 MG tablet [ATORVASTATIN (LIPITOR) 20 MG TABLET] TAKE 1 TABLET BY MOUTH EVERYDAY AT BEDTIME 90 tablet 2     cholecalciferol, vitamin D3, (VITAMIN D3) 2,000 unit Tab [CHOLECALCIFEROL, VITAMIN D3, (VITAMIN D3) 2,000 UNIT TAB] Take 1 tablet by mouth daily.       diltiazem ER COATED BEADS (CARDIZEM CD/CARTIA XT) 240 MG 24 hr capsule Take 1 capsule (240 mg) by mouth daily 90 capsule 0     furosemide (LASIX) 20 MG tablet Take 1 tablet (20 mg) by mouth daily 90 tablet 0     losartan (COZAAR) 25 MG tablet Take 1 tablet (25 mg) by mouth daily 30 tablet 11     omeprazole (PRILOSEC) 20 MG capsule [OMEPRAZOLE (PRILOSEC) 20 MG CAPSULE] Take 1 capsule (20 mg total) by mouth daily before breakfast. 30 capsule 0     sertraline (ZOLOFT) 100 MG tablet [SERTRALINE (ZOLOFT) 100 MG TABLET] TAKE 1 TABLET BY MOUTH EVERYDAY AT BEDTIME 90 tablet 1     warfarin ANTICOAGULANT (COUMADIN/JANTOVEN) 2.5 MG tablet [WARFARIN ANTICOAGULANT (COUMADIN/JANTOVEN) 2.5 MG TABLET] Take 1/2-1 tablet (1.25-2.5 mg) by mouth daily as directed.  Adjust dose based on INR. 90 tablet 1       Cardiographics:      Holter: April 2015   Persistent atrial fibrillation with overall fairly well controlled  ventricular response. There was some tendency toward rapid ventricular response  with activity in the early afternoon hours. A moderate number of ventricular  premature beats noted. 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     Echocardiogram: 2013   Normal LV systolic function, no significant valvular abnormalities     Stress Test: 2013   Mixed anterior perfusion defect     CT coronary angio: 2013   Normal coronaries, calcium score 2     Lab Results    Chemistry/lipid CBC Cardiac Enzymes/BNP/TSH/INR   Recent Labs   Lab Test 10/30/19  1632   CHOL 158   HDL 67   LDL 75   TRIG 79     Recent Labs   Lab Test 10/30/19  1632  05/17/18  1550 08/10/16  1147   LDL 75 86 103     Recent Labs   Lab Test 05/17/18  1550      POTASSIUM 4.3   CHLORIDE 108*   CO2 23      BUN 33*   CR 1.40*   GFRESTIMATED 36*   FAROOQ 9.3     Recent Labs   Lab Test 05/17/18  1550   CR 1.40*     Recent Labs   Lab Test 05/17/18  1550 05/06/14  1135 08/08/13  1348   A1C 5.7 5.7 5.9          Recent Labs   Lab Test 05/17/18  1550   WBC 8.5   HGB 12.9   HCT 39.5   MCV 88        Recent Labs   Lab Test 05/17/18  1550   HGB 12.9    No results for input(s): TROPONINI in the last 94860 hours.  No results for input(s): BNP, NTBNPI, NTBNP in the last 94070 hours.  No results for input(s): TSH in the last 02623 hours.  Recent Labs   Lab Test 07/19/21  1421 06/28/21  1342 05/25/21  1340   INR 2.5* 1.90* 2.00*                        Thank you for allowing me to participate in the care of your patient.      Sincerely,     Melvin Ricci MD     [facility]  cc:   Sabas Austin MD  [address] [place] [address]

## 2021-08-17 NOTE — PROGRESS NOTES
Cardiology Progress Note     Assessment:  Permanent atrial fibrillation, probably good control of ventricular response rate/mildly elevated today but she appears to be very upset and emotional, on warfarin  PVCs, asymptomatic  Hypertension, good control  Hypercholesterolemia on atorvastatin, good control  Depression/ anxiety  Cough suspect postnasal drip, rule out ACE inhibitor induced    Plan:  Change lisinopril to losartan 25 mg a day to potentially help with ACE inhibitor induced cough    She reports that her night has been variable.  I think she would benefit from more predictable anticoagulation with Eliquis.  We will check CBC and basic metabolic panel today and then send her prescription for appropriate dose.  If Eliquis is affordable she will stop taking warfarin and start taking Eliquis 3 days later    Routine follow-up in 1 year    Subjective:   This is 85 year old female who comes in today for follow-up visit.  She is very emotional today because of stress related to Covid .  She is sad because of her grandson is moving out to college .  She denies chest pains or increasing shortness of breath.  She complains of cough and runny nose.  She is wondering whether cough is related to ACE inhibitor    Review of Systems:   Negative other than history of present illness    Objective:   BP (!) 142/68 (BP Location: Left arm, Patient Position: Sitting, Cuff Size: Adult Large)   Pulse (!) 136   Wt 66.8 kg (147 lb 4.8 oz)   SpO2 99%   BMI 26.94 kg/m    Physical Exam:  GENERAL: no distress  NECK: No JVD  LUNGS: Clear to auscultation.  CARDIAC:irregular rhythm, S1 & S2 normal.  No heaves, thrills, gallops or murmurs.  ABDOMEN: flat, negative hepatosplenomegaly, soft and non-tender.  EXTREMITIES: No evidence of cyanosis, clubbing or edema.    Current Outpatient Medications   Medication Sig Dispense Refill     atorvastatin (LIPITOR) 20 MG tablet [ATORVASTATIN (LIPITOR) 20 MG TABLET] TAKE 1 TABLET BY MOUTH EVERYDAY  AT BEDTIME 90 tablet 2     cholecalciferol, vitamin D3, (VITAMIN D3) 2,000 unit Tab [CHOLECALCIFEROL, VITAMIN D3, (VITAMIN D3) 2,000 UNIT TAB] Take 1 tablet by mouth daily.       diltiazem ER COATED BEADS (CARDIZEM CD/CARTIA XT) 240 MG 24 hr capsule Take 1 capsule (240 mg) by mouth daily 90 capsule 0     furosemide (LASIX) 20 MG tablet Take 1 tablet (20 mg) by mouth daily 90 tablet 0     losartan (COZAAR) 25 MG tablet Take 1 tablet (25 mg) by mouth daily 30 tablet 11     omeprazole (PRILOSEC) 20 MG capsule [OMEPRAZOLE (PRILOSEC) 20 MG CAPSULE] Take 1 capsule (20 mg total) by mouth daily before breakfast. 30 capsule 0     sertraline (ZOLOFT) 100 MG tablet [SERTRALINE (ZOLOFT) 100 MG TABLET] TAKE 1 TABLET BY MOUTH EVERYDAY AT BEDTIME 90 tablet 1     warfarin ANTICOAGULANT (COUMADIN/JANTOVEN) 2.5 MG tablet [WARFARIN ANTICOAGULANT (COUMADIN/JANTOVEN) 2.5 MG TABLET] Take 1/2-1 tablet (1.25-2.5 mg) by mouth daily as directed.  Adjust dose based on INR. 90 tablet 1       Cardiographics:      Holter: April 2015   Persistent atrial fibrillation with overall fairly well controlled  ventricular response. There was some tendency toward rapid ventricular response  with activity in the early afternoon hours. A moderate number of ventricular  premature beats noted. 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     Echocardiogram: 2013   Normal LV systolic function, no significant valvular abnormalities     Stress Test: 2013   Mixed anterior perfusion defect     CT coronary angio: 2013   Normal coronaries, calcium score 2     Lab Results    Chemistry/lipid CBC Cardiac Enzymes/BNP/TSH/INR   Recent Labs   Lab Test 10/30/19  1632   CHOL 158   HDL 67   LDL 75   TRIG 79     Recent Labs   Lab Test 10/30/19  1632 05/17/18  1550 08/10/16  1147   LDL 75 86 103     Recent Labs   Lab Test 05/17/18  1550      POTASSIUM 4.3   CHLORIDE 108*   CO2 23      BUN 33*   CR 1.40*   GFRESTIMATED 36*   FAROOQ 9.3     Recent Labs   Lab Test 05/17/18  1550   CR  1.40*     Recent Labs   Lab Test 05/17/18  1550 05/06/14  1135 08/08/13  1348   A1C 5.7 5.7 5.9          Recent Labs   Lab Test 05/17/18  1550   WBC 8.5   HGB 12.9   HCT 39.5   MCV 88        Recent Labs   Lab Test 05/17/18  1550   HGB 12.9    No results for input(s): TROPONINI in the last 94515 hours.  No results for input(s): BNP, NTBNPI, NTBNP in the last 23380 hours.  No results for input(s): TSH in the last 54407 hours.  Recent Labs   Lab Test 07/19/21  1421 06/28/21  1342 05/25/21  1340   INR 2.5* 1.90* 2.00*

## 2021-08-23 ENCOUNTER — TELEPHONE (OUTPATIENT)
Dept: CARDIOLOGY | Facility: CLINIC | Age: 86
End: 2021-08-23

## 2021-08-23 DIAGNOSIS — E78.00 PURE HYPERCHOLESTEROLEMIA: ICD-10-CM

## 2021-08-23 DIAGNOSIS — I48.21 PERMANENT ATRIAL FIBRILLATION (H): Primary | ICD-10-CM

## 2021-08-23 NOTE — TELEPHONE ENCOUNTER
----- Message from Melvin Ricci MD sent at 8/18/2021  9:16 AM CDT -----  Stable  She can try eliquis 5 mg bid in place of warfarin. Should start taking eliquis 3 days after stopping warfarin        Spoke with patient. She is agreeable to trying out Eliquis if it is affordable. Will submit and check with her pharmacy to determine cost. Rx was submitted and then removed from list- cost is 335 dollars for 30 day supply. LM with patient to further discuss. -[facility]

## 2021-08-23 NOTE — TELEPHONE ENCOUNTER
----- Message from Piper Torres sent at 8/23/2021 11:25 AM CDT -----  Regarding: SEB pt  General phone call:    Caller: Kiah  Primary cardiologist: EB  Detailed reason for call: Patient is calling with questions regarding her dosage for her Losartan    Best phone number: [phone]  Best time to contact: anytime  Ok to leave a detailed message? yes  Device? no    Additional Info:       LM for patient to call back to discuss this and test results. -[facility]       Received a call back from patient. She apologizes for not answering previous calls- she assumed I was a spam call. She wanted to see if she should take Losartan at night or in the morning. Reassured her that it had no diuretic effect and she could take it at the same time she was taking lisinopril. She will do so. -[facility]

## 2021-08-25 ENCOUNTER — LAB REQUISITION (OUTPATIENT)
Dept: LAB | Facility: CLINIC | Age: 86
End: 2021-08-25
Payer: COMMERCIAL

## 2021-08-25 DIAGNOSIS — Z20.822 CONTACT WITH AND (SUSPECTED) EXPOSURE TO COVID-19: ICD-10-CM

## 2021-08-25 RX ORDER — ATORVASTATIN CALCIUM 20 MG/1
TABLET, FILM COATED ORAL
Qty: 90 TABLET | Refills: 2 | Status: SHIPPED | OUTPATIENT
Start: 2021-08-25 | End: 2022-07-15

## 2021-08-25 NOTE — TELEPHONE ENCOUNTER
=View-only below this line===  ----- Message -----  From: Pauline Orosco  Sent: 8/25/2021  12:34 PM CDT  To: BEATRIZ Spears there,   It looks like the patient is meeting a deductible but the claim doesn't tell me where she's at or what the amount is, but it does look like her cost will be $35 once her amount is met.   Hope this helps  Thanks   Pauline  ----- Message -----  From: Nimisha Lopez RN  Sent: 8/25/2021  11:39 AM CDT  To: Pauline Orosco    ----- Message from Nimisha Lopez RN sent at 8/25/2021 11:39 AM CDT -----  EVAN Carpenter! Dr. Ricci wanted to see if Eliquis would be affordable for her. I ran a test claim and sent to her pharmacy and it said it was around 335 dollars for 30 day supply. I was wondering if you could check to see if this is accurate/ if she needs to meet some degree of out of pocket deductible. If this is not info you have, that is fine! I appreciate any help! Thanks,   Nimisha RN with heart care      Called patient and updated on above information. She verbalized understanding and she is going to call her insurance in the next couple of days to determine what her out of pocket deductible is for her medications and then she will call back to let writer know if she wants to make the switch. -[facility]

## 2021-08-26 PROCEDURE — U0003 INFECTIOUS AGENT DETECTION BY NUCLEIC ACID (DNA OR RNA); SEVERE ACUTE RESPIRATORY SYNDROME CORONAVIRUS 2 (SARS-COV-2) (CORONAVIRUS DISEASE [COVID-19]), AMPLIFIED PROBE TECHNIQUE, MAKING USE OF HIGH THROUGHPUT TECHNOLOGIES AS DESCRIBED BY CMS-2020-01-R: HCPCS | Mod: ORL | Performed by: NURSE PRACTITIONER

## 2021-08-27 ENCOUNTER — TELEPHONE (OUTPATIENT)
Dept: INTERNAL MEDICINE | Facility: CLINIC | Age: 86
End: 2021-08-27

## 2021-08-27 LAB — SARS-COV-2 RNA RESP QL NAA+PROBE: NEGATIVE

## 2021-08-27 NOTE — TELEPHONE ENCOUNTER
ANTICOAGULATION     Marva Samsedwin is overdue for INR check.      Left message for patient to call and schedule lab appointment as soon as possible. If returning call, please schedule.     Liz Oh RN

## 2021-08-30 DIAGNOSIS — I48.21 PERMANENT ATRIAL FIBRILLATION (H): Primary | ICD-10-CM

## 2021-08-30 RX ORDER — APIXABAN 5 MG/1
5 TABLET, FILM COATED ORAL 2 TIMES DAILY
Qty: 180 TABLET | Refills: 1 | Status: SHIPPED | OUTPATIENT
Start: 2021-08-30 | End: 2021-12-13

## 2021-08-30 RX ORDER — APIXABAN 5 MG/1
TABLET, FILM COATED ORAL
COMMUNITY
Start: 2021-08-27 | End: 2021-08-30

## 2021-09-01 ENCOUNTER — LAB (OUTPATIENT)
Dept: LAB | Facility: CLINIC | Age: 86
End: 2021-09-01
Payer: COMMERCIAL

## 2021-09-01 ENCOUNTER — ANTICOAGULATION THERAPY VISIT (OUTPATIENT)
Dept: ANTICOAGULATION | Facility: CLINIC | Age: 86
End: 2021-09-01

## 2021-09-01 DIAGNOSIS — I48.91 ATRIAL FIBRILLATION (H): ICD-10-CM

## 2021-09-01 LAB — INR BLD: 1.9 (ref 0.9–1.1)

## 2021-09-01 PROCEDURE — 36415 COLL VENOUS BLD VENIPUNCTURE: CPT

## 2021-09-01 PROCEDURE — 85610 PROTHROMBIN TIME: CPT

## 2021-09-01 NOTE — PROGRESS NOTES
ANTICOAGULATION  MANAGEMENT    Marva Gaines is being discharged from the [facility] ([facility]).    Reason for discharge: warfarin replaced by alternate therapy, Eliquis    Anticoagulation episode resolved, ACC referral closed and INR Standing order discontinued    If patient needs warfarin management in the future, please send a new referral    Mary Morel RN

## 2021-09-01 NOTE — PROGRESS NOTES
ANTICOAGULATION MANAGEMENT     Marva Gaines 86 year old female is on warfarin with subtherapeutic INR result. (Goal INR 2.0-3.0)    Recent labs: (last 7 days)     09/01/21  1537   INR 1.9*       ASSESSMENT     Source(s): Chart Review, Patient/Caregiver Call and Template       Warfarin doses taken: Warfarin taken as instructed and she has stopped the past 2 days to start Eliquis    Diet: No new diet changes identified    New illness, injury, or hospitalization: No    Medication/supplement changes: Eliquis started on tonight stopping warfarin    Signs or symptoms of bleeding or clotting: No    Previous INR: Therapeutic last 2(+) visits    Additional findings: None     PLAN     Recommended plan for no diet, medication or health factor changes affecting INR     Dosing Instructions: stop taking warfarin. with next INR in no longer needs INR checks     Summary  As of 9/1/2021    Next INR check:               Telephone call with Marva who verbalizes understanding and agrees to plan    no need for INR checks    Education provided: Discussed starting Eliquis tongiht.    Plan made with [facility] Pharmacist Alisa Morel, RN  Anticoagulation Clinic  9/1/2021    _______________________________________________________________________     Anticoagulation Episode Summary     Current INR goal:  2.0-3.0   TTR:  72.8 % (1 y)   Target end date:     Send INR reminders to:  LUI BEYER       Comments:           Anticoagulation Care Providers     Provider Role Specialty Phone number    Sabas Austin MD [facility] Internal [facility] [phone]

## 2021-09-01 NOTE — TELEPHONE ENCOUNTER
Received a call from Kiah. She is wanting to move forward with Eliquis. She tried to obtain from [facility] but was told that they were all out at her location. When we spoke previously, she was going to investigate her benefits to see how close she is to meeting her deductible, not that she was going to just make the clean switch. She was told last week and again today, that she needs to discuss this with PMD team and anticoag team, whom manages her warfarin. Will route to them to further address. She was told to call and schedule INR as this is overdue.     Addendum: spoke with [facility] and they state that Eliquis should be ready in 1 hour. Again, instructions for switch over to come from anticoag team.       Hi,  Pt would like to transition to Eliquis. She was told last week to touch base and also that she needs to continue warfarin. She decided on her own to stop warfarin and has Eliquis waiting for her at [facility]. I had her schedule at INR today and she made the appt for 3:40pm as she doing some birthday celebrations this afternoon. She wants to know if she will have a call with ok to start eliquis or not tonight. Thanks for any help you can provide!   Thanks,  Nimisha

## 2021-09-01 NOTE — TELEPHONE ENCOUNTER
Spoke with Kiah. She picked up the Eliquis and her INR today is 1.9.  She can start the Eliquis. Discussed that it is twice daily so can start does tonight. She understands and agrees with plan. She also understands she will no longer need INR checks. Patient has now been discharged from [facility] program.

## 2021-09-01 NOTE — TELEPHONE ENCOUNTER
Spoke with Kiah and she is picking up the Eliquis today. She has not taken any warfarin for past 2 days since she is out of this medication. She has INR scheduled today. Discussed we will see what INR results are and call with further instructions about starting the Eliquis. She understands and agrees with this plan.

## 2021-09-13 DIAGNOSIS — I10 ESSENTIAL HYPERTENSION: ICD-10-CM

## 2021-09-13 RX ORDER — LOSARTAN POTASSIUM 25 MG/1
25 TABLET ORAL DAILY
Qty: 90 TABLET | Refills: 1 | Status: SHIPPED | OUTPATIENT
Start: 2021-09-13 | End: 2022-04-05

## 2021-09-17 ENCOUNTER — TELEPHONE (OUTPATIENT)
Dept: CARDIOLOGY | Facility: CLINIC | Age: 86
End: 2021-09-17

## 2021-09-17 DIAGNOSIS — I48.21 PERMANENT ATRIAL FIBRILLATION (H): ICD-10-CM

## 2021-09-17 NOTE — TELEPHONE ENCOUNTER
----- Message from Susan Mitchell sent at 9/17/2021  1:20 PM CDT -----  Regarding: TZ PT  Patient has already contacted their pharmacy. The medication or refill issue is below:    Ordering Cardiologist: NYDIA  Medication: Losartan - she needs a 90 day supply   Issue / Concern:  she needs a refill  Preferred Pharmacy/City: [facility] on [address] in [facility] Phone Number for Patient: [phone]    Additional Info:        Called patient and LVM that refill looks to have been sent on 9/13- 90 day supply with a refill. Instructed her to call if she has any difficulties obtaining her medication. -[facility]

## 2021-10-11 ENCOUNTER — LAB REQUISITION (OUTPATIENT)
Dept: LAB | Facility: CLINIC | Age: 86
End: 2021-10-11
Payer: COMMERCIAL

## 2021-10-11 DIAGNOSIS — Z20.822 CONTACT WITH AND (SUSPECTED) EXPOSURE TO COVID-19: ICD-10-CM

## 2021-10-12 PROCEDURE — U0003 INFECTIOUS AGENT DETECTION BY NUCLEIC ACID (DNA OR RNA); SEVERE ACUTE RESPIRATORY SYNDROME CORONAVIRUS 2 (SARS-COV-2) (CORONAVIRUS DISEASE [COVID-19]), AMPLIFIED PROBE TECHNIQUE, MAKING USE OF HIGH THROUGHPUT TECHNOLOGIES AS DESCRIBED BY CMS-2020-01-R: HCPCS | Mod: ORL | Performed by: NURSE PRACTITIONER

## 2021-10-15 ENCOUNTER — LAB REQUISITION (OUTPATIENT)
Dept: LAB | Facility: CLINIC | Age: 86
End: 2021-10-15
Payer: COMMERCIAL

## 2021-10-15 DIAGNOSIS — Z20.822 CONTACT WITH AND (SUSPECTED) EXPOSURE TO COVID-19: ICD-10-CM

## 2021-10-15 LAB — SARS-COV-2 RNA RESP QL NAA+PROBE: NOT DETECTED

## 2021-10-19 PROCEDURE — U0003 INFECTIOUS AGENT DETECTION BY NUCLEIC ACID (DNA OR RNA); SEVERE ACUTE RESPIRATORY SYNDROME CORONAVIRUS 2 (SARS-COV-2) (CORONAVIRUS DISEASE [COVID-19]), AMPLIFIED PROBE TECHNIQUE, MAKING USE OF HIGH THROUGHPUT TECHNOLOGIES AS DESCRIBED BY CMS-2020-01-R: HCPCS | Mod: ORL | Performed by: NURSE PRACTITIONER

## 2021-10-21 ENCOUNTER — LAB REQUISITION (OUTPATIENT)
Dept: LAB | Facility: CLINIC | Age: 86
End: 2021-10-21
Payer: COMMERCIAL

## 2021-10-21 DIAGNOSIS — Z20.822 CONTACT WITH AND (SUSPECTED) EXPOSURE TO COVID-19: ICD-10-CM

## 2021-10-21 LAB — SARS-COV-2 RNA RESP QL NAA+PROBE: NOT DETECTED

## 2021-10-22 PROCEDURE — U0003 INFECTIOUS AGENT DETECTION BY NUCLEIC ACID (DNA OR RNA); SEVERE ACUTE RESPIRATORY SYNDROME CORONAVIRUS 2 (SARS-COV-2) (CORONAVIRUS DISEASE [COVID-19]), AMPLIFIED PROBE TECHNIQUE, MAKING USE OF HIGH THROUGHPUT TECHNOLOGIES AS DESCRIBED BY CMS-2020-01-R: HCPCS | Mod: ORL | Performed by: NURSE PRACTITIONER

## 2021-10-25 ENCOUNTER — LAB REQUISITION (OUTPATIENT)
Dept: LAB | Facility: CLINIC | Age: 86
End: 2021-10-25
Payer: COMMERCIAL

## 2021-10-25 DIAGNOSIS — Z20.822 CONTACT WITH AND (SUSPECTED) EXPOSURE TO COVID-19: ICD-10-CM

## 2021-10-26 LAB — SARS-COV-2 RNA RESP QL NAA+PROBE: NOT DETECTED

## 2021-10-27 DIAGNOSIS — I48.91 A-FIB (H): ICD-10-CM

## 2021-10-27 DIAGNOSIS — R60.9 EDEMA: ICD-10-CM

## 2021-10-27 RX ORDER — DILTIAZEM HYDROCHLORIDE 240 MG/1
240 CAPSULE, COATED, EXTENDED RELEASE ORAL DAILY
Qty: 90 CAPSULE | Refills: 2 | Status: SHIPPED | OUTPATIENT
Start: 2021-10-27 | End: 2022-05-09

## 2021-10-27 RX ORDER — FUROSEMIDE 20 MG
20 TABLET ORAL DAILY
Qty: 90 TABLET | Refills: 2 | Status: SHIPPED | OUTPATIENT
Start: 2021-10-27 | End: 2022-05-09

## 2021-12-13 DIAGNOSIS — I48.21 PERMANENT ATRIAL FIBRILLATION (H): ICD-10-CM

## 2021-12-13 DIAGNOSIS — F41.1 ANXIETY STATE: ICD-10-CM

## 2021-12-13 RX ORDER — APIXABAN 5 MG/1
5 TABLET, FILM COATED ORAL 2 TIMES DAILY
Qty: 180 TABLET | Refills: 1 | Status: SHIPPED | OUTPATIENT
Start: 2021-12-13 | End: 2022-09-08

## 2021-12-15 RX ORDER — SERTRALINE HYDROCHLORIDE 100 MG/1
TABLET, FILM COATED ORAL
Qty: 90 TABLET | Refills: 1 | Status: SHIPPED | OUTPATIENT
Start: 2021-12-15 | End: 2022-05-12

## 2021-12-15 NOTE — TELEPHONE ENCOUNTER
"Routing refill request to provider for review/approval because:  A break in medication  Patient needs to be seen because it has been more than 1 year since last office visit.    Last Written Prescription Date:  2/18/21  Last Fill Quantity: 90,  # refills: 1   Last office visit provider:  9/29/2020     Requested Prescriptions   Pending Prescriptions Disp Refills     sertraline (ZOLOFT) 100 MG tablet [Pharmacy Med Name: SERTRALINE  MG TABLET] 90 tablet 1     Sig: TAKE 1 TABLET BY MOUTH EVERYDAY AT BEDTIME       SSRIs Protocol Failed - 12/13/2021  1:44 PM        Failed - Recent (12 mo) or future (30 days) visit within the authorizing provider's specialty     Patient has had an office visit with the authorizing provider or a provider within the authorizing providers department within the previous 12 mos or has a future within next 30 days. See \"Patient Info\" tab in inbasket, or \"Choose Columns\" in Meds & Orders section of the refill encounter.              Passed - Medication is active on med list        Passed - Patient is age 18 or older        Passed - No active pregnancy on record        Passed - No positive pregnancy test in last 12 months             Jannie Deluca RN 12/15/21 2:22 PM  "

## 2021-12-27 ENCOUNTER — LAB REQUISITION (OUTPATIENT)
Dept: LAB | Facility: CLINIC | Age: 86
End: 2021-12-27
Payer: COMMERCIAL

## 2021-12-27 DIAGNOSIS — Z20.822 CONTACT WITH AND (SUSPECTED) EXPOSURE TO COVID-19: ICD-10-CM

## 2021-12-28 PROCEDURE — U0003 INFECTIOUS AGENT DETECTION BY NUCLEIC ACID (DNA OR RNA); SEVERE ACUTE RESPIRATORY SYNDROME CORONAVIRUS 2 (SARS-COV-2) (CORONAVIRUS DISEASE [COVID-19]), AMPLIFIED PROBE TECHNIQUE, MAKING USE OF HIGH THROUGHPUT TECHNOLOGIES AS DESCRIBED BY CMS-2020-01-R: HCPCS | Mod: ORL | Performed by: NURSE PRACTITIONER

## 2021-12-30 LAB — SARS-COV-2 RNA RESP QL NAA+PROBE: NEGATIVE

## 2021-12-31 ENCOUNTER — LAB REQUISITION (OUTPATIENT)
Dept: LAB | Facility: CLINIC | Age: 86
End: 2021-12-31
Payer: COMMERCIAL

## 2021-12-31 DIAGNOSIS — Z20.822 CONTACT WITH AND (SUSPECTED) EXPOSURE TO COVID-19: ICD-10-CM

## 2022-01-03 PROCEDURE — U0003 INFECTIOUS AGENT DETECTION BY NUCLEIC ACID (DNA OR RNA); SEVERE ACUTE RESPIRATORY SYNDROME CORONAVIRUS 2 (SARS-COV-2) (CORONAVIRUS DISEASE [COVID-19]), AMPLIFIED PROBE TECHNIQUE, MAKING USE OF HIGH THROUGHPUT TECHNOLOGIES AS DESCRIBED BY CMS-2020-01-R: HCPCS | Mod: ORL | Performed by: NURSE PRACTITIONER

## 2022-01-04 LAB — SARS-COV-2 RNA RESP QL NAA+PROBE: NEGATIVE

## 2022-01-05 ENCOUNTER — LAB REQUISITION (OUTPATIENT)
Dept: LAB | Facility: CLINIC | Age: 87
End: 2022-01-05
Payer: COMMERCIAL

## 2022-01-05 DIAGNOSIS — Z20.822 CONTACT WITH AND (SUSPECTED) EXPOSURE TO COVID-19: ICD-10-CM

## 2022-01-06 PROCEDURE — U0003 INFECTIOUS AGENT DETECTION BY NUCLEIC ACID (DNA OR RNA); SEVERE ACUTE RESPIRATORY SYNDROME CORONAVIRUS 2 (SARS-COV-2) (CORONAVIRUS DISEASE [COVID-19]), AMPLIFIED PROBE TECHNIQUE, MAKING USE OF HIGH THROUGHPUT TECHNOLOGIES AS DESCRIBED BY CMS-2020-01-R: HCPCS | Mod: ORL | Performed by: NURSE PRACTITIONER

## 2022-01-07 LAB — SARS-COV-2 RNA RESP QL NAA+PROBE: POSITIVE

## 2022-01-10 ENCOUNTER — LAB REQUISITION (OUTPATIENT)
Dept: LAB | Facility: CLINIC | Age: 87
End: 2022-01-10
Payer: COMMERCIAL

## 2022-01-10 DIAGNOSIS — Z20.822 CONTACT WITH AND (SUSPECTED) EXPOSURE TO COVID-19: ICD-10-CM

## 2022-01-13 ENCOUNTER — LAB REQUISITION (OUTPATIENT)
Dept: LAB | Facility: CLINIC | Age: 87
End: 2022-01-13
Payer: COMMERCIAL

## 2022-01-13 DIAGNOSIS — Z20.822 CONTACT WITH AND (SUSPECTED) EXPOSURE TO COVID-19: ICD-10-CM

## 2022-01-17 ENCOUNTER — LAB REQUISITION (OUTPATIENT)
Dept: LAB | Facility: CLINIC | Age: 87
End: 2022-01-17

## 2022-01-17 DIAGNOSIS — Z20.822 CONTACT WITH AND (SUSPECTED) EXPOSURE TO COVID-19: ICD-10-CM

## 2022-01-20 ENCOUNTER — LAB REQUISITION (OUTPATIENT)
Dept: LAB | Facility: CLINIC | Age: 87
End: 2022-01-20

## 2022-01-20 DIAGNOSIS — Z20.822 CONTACT WITH AND (SUSPECTED) EXPOSURE TO COVID-19: ICD-10-CM

## 2022-01-24 ENCOUNTER — LAB REQUISITION (OUTPATIENT)
Dept: LAB | Facility: CLINIC | Age: 87
End: 2022-01-24
Payer: MEDICARE

## 2022-01-24 DIAGNOSIS — Z20.822 CONTACT WITH AND (SUSPECTED) EXPOSURE TO COVID-19: ICD-10-CM

## 2022-01-26 ENCOUNTER — LAB REQUISITION (OUTPATIENT)
Dept: LAB | Facility: CLINIC | Age: 87
End: 2022-01-26
Payer: MEDICARE

## 2022-01-26 DIAGNOSIS — Z20.822 CONTACT WITH AND (SUSPECTED) EXPOSURE TO COVID-19: ICD-10-CM

## 2022-01-31 ENCOUNTER — LAB REQUISITION (OUTPATIENT)
Dept: LAB | Facility: CLINIC | Age: 87
End: 2022-01-31
Payer: MEDICARE

## 2022-01-31 DIAGNOSIS — Z20.822 CONTACT WITH AND (SUSPECTED) EXPOSURE TO COVID-19: ICD-10-CM

## 2022-02-03 ENCOUNTER — LAB REQUISITION (OUTPATIENT)
Dept: LAB | Facility: CLINIC | Age: 87
End: 2022-02-03
Payer: MEDICARE

## 2022-02-03 DIAGNOSIS — Z20.822 CONTACT WITH AND (SUSPECTED) EXPOSURE TO COVID-19: ICD-10-CM

## 2022-02-07 ENCOUNTER — LAB REQUISITION (OUTPATIENT)
Dept: LAB | Facility: CLINIC | Age: 87
End: 2022-02-07
Payer: COMMERCIAL

## 2022-02-07 DIAGNOSIS — Z20.822 CONTACT WITH AND (SUSPECTED) EXPOSURE TO COVID-19: ICD-10-CM

## 2022-02-14 ENCOUNTER — LAB REQUISITION (OUTPATIENT)
Dept: LAB | Facility: CLINIC | Age: 87
End: 2022-02-14
Payer: COMMERCIAL

## 2022-02-14 DIAGNOSIS — Z20.822 CONTACT WITH AND (SUSPECTED) EXPOSURE TO COVID-19: ICD-10-CM

## 2022-02-17 ENCOUNTER — LAB REQUISITION (OUTPATIENT)
Dept: LAB | Facility: CLINIC | Age: 87
End: 2022-02-17
Payer: COMMERCIAL

## 2022-02-17 DIAGNOSIS — Z20.822 CONTACT WITH AND (SUSPECTED) EXPOSURE TO COVID-19: ICD-10-CM

## 2022-02-21 ENCOUNTER — LAB REQUISITION (OUTPATIENT)
Dept: LAB | Facility: CLINIC | Age: 87
End: 2022-02-21
Payer: COMMERCIAL

## 2022-02-21 DIAGNOSIS — Z20.822 CONTACT WITH AND (SUSPECTED) EXPOSURE TO COVID-19: ICD-10-CM

## 2022-02-24 ENCOUNTER — LAB REQUISITION (OUTPATIENT)
Dept: LAB | Facility: CLINIC | Age: 87
End: 2022-02-24
Payer: COMMERCIAL

## 2022-02-24 DIAGNOSIS — Z20.822 CONTACT WITH AND (SUSPECTED) EXPOSURE TO COVID-19: ICD-10-CM

## 2022-02-28 ENCOUNTER — LAB REQUISITION (OUTPATIENT)
Dept: LAB | Facility: CLINIC | Age: 87
End: 2022-02-28
Payer: COMMERCIAL

## 2022-02-28 DIAGNOSIS — Z20.822 CONTACT WITH AND (SUSPECTED) EXPOSURE TO COVID-19: ICD-10-CM

## 2022-03-02 ENCOUNTER — LAB REQUISITION (OUTPATIENT)
Dept: LAB | Facility: CLINIC | Age: 87
End: 2022-03-02
Payer: COMMERCIAL

## 2022-03-02 DIAGNOSIS — Z20.822 CONTACT WITH AND (SUSPECTED) EXPOSURE TO COVID-19: ICD-10-CM

## 2022-05-09 DIAGNOSIS — F41.1 ANXIETY STATE: ICD-10-CM

## 2022-05-11 NOTE — TELEPHONE ENCOUNTER
"Routing refill request to provider for review/approval because:  Patient needs to be seen because:  PROTOCOL REQUIRES previous visit in 12 months or 30 days from now.     Last Written Prescription Date:  12/15/2021  Last Fill Quantity: 90,  # refills: 1   Last office visit provider:  9/29/2020     Requested Prescriptions   Pending Prescriptions Disp Refills     sertraline (ZOLOFT) 100 MG tablet [Pharmacy Med Name: SERTRALINE  MG TABLET] 90 tablet 1     Sig: TAKE 1 TABLET BY MOUTH EVERYDAY AT BEDTIME       SSRIs Protocol Failed - 5/9/2022 11:32 AM        Failed - Recent (12 mo) or future (30 days) visit within the authorizing provider's specialty     Patient has had an office visit with the authorizing provider or a provider within the authorizing providers department within the previous 12 mos or has a future within next 30 days. See \"Patient Info\" tab in inbasket, or \"Choose Columns\" in Meds & Orders section of the refill encounter.              Passed - Medication is active on med list        Passed - Patient is age 18 or older        Passed - No active pregnancy on record        Passed - No positive pregnancy test in last 12 months             Marsha Puente RN 05/11/22 2:07 PM    "

## 2022-05-12 RX ORDER — SERTRALINE HYDROCHLORIDE 100 MG/1
TABLET, FILM COATED ORAL
Qty: 90 TABLET | Refills: 1 | Status: SHIPPED | OUTPATIENT
Start: 2022-05-12 | End: 2022-12-23

## 2022-07-15 DIAGNOSIS — E78.00 PURE HYPERCHOLESTEROLEMIA: ICD-10-CM

## 2022-07-15 RX ORDER — ATORVASTATIN CALCIUM 20 MG/1
TABLET, FILM COATED ORAL
Qty: 90 TABLET | Refills: 2 | Status: SHIPPED | OUTPATIENT
Start: 2022-07-15 | End: 2023-03-21

## 2022-09-08 DIAGNOSIS — I48.21 PERMANENT ATRIAL FIBRILLATION (H): ICD-10-CM

## 2022-09-08 RX ORDER — APIXABAN 5 MG/1
TABLET, FILM COATED ORAL
Qty: 180 TABLET | Refills: 1 | Status: ON HOLD | OUTPATIENT
Start: 2022-09-08 | End: 2023-01-02

## 2022-11-21 ENCOUNTER — NURSE TRIAGE (OUTPATIENT)
Dept: CARDIOLOGY | Facility: CLINIC | Age: 87
End: 2022-11-21

## 2022-11-21 NOTE — TELEPHONE ENCOUNTER
"Patient called with concern of SOB occurring now for two weeks. She says it hurts cinda to breathe with walking, climbing stairs. She hasn't seen Dr. Ricci since 2021 as she was fearful of coming in. She has problems with A-Fib. Taking Eliquis. She does not know her BP and HR as she had not taken them for months. Will route to the nurses with Dr. Ricci to follow-up with patient.     1. RESPIRATORY STATUS: \"Describe your breathing?\" (e.g., wheezing, shortness of breath, unable to speak, severe coughing) Patient says it hurts to breathe with walking, difficulty climbing stairs.  2. ONSET: \"When did this breathing problem begin?\" She says she hasn't told anyone about it for two weeks.  3. PATTERN \"Does the difficult breathing come and go, or has it been constant since it started?\" She describes with walking, climbing stairs.  4. SEVERITY: \"How bad is your breathing?\" (e.g., mild, moderate, severe) Mild.  - MILD: No SOB at rest, mild SOB with walking, speaks normally in sentences, can lie down, no retractions, pulse < 100.   - MODERATE: SOB at rest, SOB with minimal exertion and prefers to sit, cannot lie down flat, speaks in phrases, mild retractions, audible wheezing, pulse 100-120.   - SEVERE: Very SOB at rest, speaks in single words, struggling to breathe, sitting hunched forward, retractions, pulse > 120   5. RECURRENT SYMPTOM: \"Have you had difficulty breathing before?\" If Yes, ask: \"When was the last time?\" and \"What happened that time?\" For two weeks.   6. CARDIAC HISTORY: \"Do you have any history of heart disease?\" (e.g., heart attack, angina, bypass surgery, angioplasty) Permanent A-Fib, HTN.  7. LUNG HISTORY: \"Do you have any history of lung disease?\" (e.g., pulmonary embolus, asthma, emphysema) None.  8. CAUSE: \"What do you think is causing the breathing problem?\" Cardiac-related.  9. OTHER SYMPTOMS: \"Do you have any other symptoms? (e.g., dizziness, runny nose, cough, chest pain, fever) She says she " "has permanent A-Fib, urinating 2-3x/night.  10. O2 SATURATION MONITOR: \"Do you use an oxygen saturation monitor (pulse oximeter) at home?\" If Yes, \"What is your reading (oxygen level) today?\" \"What is your usual oxygen saturation reading?\" (e.g., 95%) Not provided.    "

## 2022-11-21 NOTE — TELEPHONE ENCOUNTER
Called Kiah back to address her concerns. She admits that she has not been seen in follow up due to the pandemic. She had a telephone appt last time.     She notes that she has had more issues with lying flat lately and more shortness of breath with exertion. She is very fearful of her health and COVID. She does not check her vitals or daily weights. Her daughter has convinced her that she needs to come in and be seen finally.     She denies needing a sooner RAC slot. She would prefer to only see Dr. Ricci. She denies chest pain really. She thinks sometimes that it feels tight with stairs. She has had poor appetite for many months and actually thinks she has been losing weight not gain. She denies LE swelling or abdominal bloating. She does not do much. She stays home mainly. She was advised on when to seek out medical attention in the interim of see him and she said she would go to the ER if her symptoms worsened. She was transferred to [facility] to get RAC with teresa. -[facility] [place]

## 2022-12-02 ENCOUNTER — OFFICE VISIT (OUTPATIENT)
Dept: CARDIOLOGY | Facility: CLINIC | Age: 87
End: 2022-12-02
Payer: COMMERCIAL

## 2022-12-02 ENCOUNTER — HOSPITAL ENCOUNTER (OUTPATIENT)
Dept: RADIOLOGY | Facility: CLINIC | Age: 87
Discharge: HOME | End: 2022-12-02
Attending: INTERNAL MEDICINE | Admitting: INTERNAL MEDICINE
Payer: COMMERCIAL

## 2022-12-02 VITALS
SYSTOLIC BLOOD PRESSURE: 126 MMHG | BODY MASS INDEX: 23.92 KG/M2 | RESPIRATION RATE: 20 BRPM | WEIGHT: 135 LBS | DIASTOLIC BLOOD PRESSURE: 40 MMHG | HEART RATE: 92 BPM | HEIGHT: 63 IN

## 2022-12-02 DIAGNOSIS — R06.09 DYSPNEA ON EXERTION: Primary | ICD-10-CM

## 2022-12-02 DIAGNOSIS — R06.09 DYSPNEA ON EXERTION: ICD-10-CM

## 2022-12-02 LAB
ANION GAP SERPL CALCULATED.3IONS-SCNC: 9 MMOL/L (ref 5–18)
BUN SERPL-MCNC: 21 MG/DL (ref 8–28)
CALCIUM SERPL-MCNC: 9.1 MG/DL (ref 8.5–10.5)
CHLORIDE BLD-SCNC: 110 MMOL/L (ref 98–107)
CO2 SERPL-SCNC: 24 MMOL/L (ref 22–31)
CREAT SERPL-MCNC: 1.1 MG/DL (ref 0.6–1.1)
GFR SERPL CREATININE-BSD FRML MDRD: 49 ML/MIN/1.73M2
GLUCOSE BLD-MCNC: 132 MG/DL (ref 70–125)
NT-PROBNP SERPL-MCNC: 2360 PG/ML (ref 0–1800)
POTASSIUM BLD-SCNC: 3.6 MMOL/L (ref 3.5–5)
SODIUM SERPL-SCNC: 143 MMOL/L (ref 136–145)

## 2022-12-02 PROCEDURE — 99214 OFFICE O/P EST MOD 30 MIN: CPT | Performed by: INTERNAL MEDICINE

## 2022-12-02 PROCEDURE — 36415 COLL VENOUS BLD VENIPUNCTURE: CPT | Performed by: INTERNAL MEDICINE

## 2022-12-02 PROCEDURE — 80048 BASIC METABOLIC PNL TOTAL CA: CPT | Performed by: INTERNAL MEDICINE

## 2022-12-02 PROCEDURE — 83880 ASSAY OF NATRIURETIC PEPTIDE: CPT | Performed by: INTERNAL MEDICINE

## 2022-12-02 PROCEDURE — 71046 X-RAY EXAM CHEST 2 VIEWS: CPT

## 2022-12-02 NOTE — LETTER
"12/2/2022    Sabas Austin MD  [address] [facility] [address]    RE: Marva Gaines       Dear Colleague,     I had the pleasure of seeing Marva Gaines in the [facility].      Cardiology Progress Note     Assessment:  Dyspnea, Unclear etiology, no overt fluid overload on exam today  Permanent atrial fibrillation with controlled ventricular response, on warfarin  PVCs, asymptomatic  Hypertension, good control  Hypercholesterolemia on atorvastatin, good control  Depression/ anxiety    Plan:  Basic metabolic panel and BNP today  Chest x-ray  Echo    Medications adjustments when results available    Subjective:   This is 87 year old female who comes in today for follow-up visit.  She reports progressive shortness of breath over the last several weeks.  She denies any weight gain.  Zeb function lost some weight.  She has no peripheral edema.  She urinates more than normal.  She cannot sleep well at night.  She sits in a recliner chair.  She denies exertional chest pain.  She has not had heart palpitations or syncope.  She has been compliant with cardiac medications    Review of Systems:   Negative other than history of present illness    Objective:   /40 (BP Location: Right arm, Patient Position: Sitting, Cuff Size: Adult Regular)   Pulse 92   Resp 20   Ht 1.6 m (5' 3\")   Wt 61.2 kg (135 lb)   BMI 23.91 kg/m    Physical Exam:  GENERAL: no distress  NECK: No JVD  LUNGS: Clear to auscultation.  CARDIAC: irregular rhythm, S1 & S2 normal.  No heaves, thrills, gallops or murmurs.  ABDOMEN: flat, negative hepatosplenomegaly, soft and non-tender.  EXTREMITIES: No evidence of cyanosis, clubbing or edema.    Current Outpatient Medications   Medication Sig Dispense Refill     atorvastatin (LIPITOR) 20 MG tablet [ATORVASTATIN (LIPITOR) 20 MG TABLET] TAKE 1 TABLET BY MOUTH EVERYDAY AT BEDTIME 90 tablet 2     cholecalciferol, vitamin D3, (VITAMIN D3) 2,000 unit Tab [CHOLECALCIFEROL, " VITAMIN D3, (VITAMIN D3) 2,000 UNIT TAB] Take 1 tablet by mouth daily.       diltiazem ER COATED BEADS (CARDIZEM CD/CARTIA XT) 240 MG 24 hr capsule TAKE 1 CAPSULE BY MOUTH EVERY DAY 90 capsule 1     ELIQUIS ANTICOAGULANT 5 MG tablet TAKE 1 TABLET BY MOUTH TWICE A  tablet 1     furosemide (LASIX) 20 MG tablet TAKE 1 TABLET BY MOUTH EVERY DAY 90 tablet 1     losartan (COZAAR) 25 MG tablet TAKE 1 TABLET BY MOUTH EVERY DAY 90 tablet 0     omeprazole (PRILOSEC) 20 MG capsule [OMEPRAZOLE (PRILOSEC) 20 MG CAPSULE] Take 1 capsule (20 mg total) by mouth daily before breakfast. 30 capsule 0     sertraline (ZOLOFT) 100 MG tablet TAKE 1 TABLET BY MOUTH EVERYDAY AT BEDTIME 90 tablet 1       Cardiographics:    Holter: April 2015   Persistent atrial fibrillation with overall fairly well controlled  ventricular response. There was some tendency toward rapid ventricular response  with activity in the early afternoon hours. A moderate number of ventricular  premature beats noted. 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     Echocardiogram: 2013   Normal LV systolic function, no significant valvular abnormalities     Stress Test: 2013   Mixed anterior perfusion defect     CT coronary angio: 2013   Normal coronaries, calcium score 2     Lab Results    Chemistry/lipid CBC Cardiac Enzymes/BNP/TSH/INR   Recent Labs   Lab Test 10/30/19  1632   CHOL 158   HDL 67   LDL 75   TRIG 79     Recent Labs   Lab Test 10/30/19  1632 05/17/18  1550 08/10/16  1147   LDL 75 86 103     Recent Labs   Lab Test 08/17/21  1629      POTASSIUM 4.0   CHLORIDE 106   CO2 22   *   BUN 23   CR 1.15*   GFRESTIMATED 44*   FAROOQ 9.8     Recent Labs   Lab Test 08/17/21  1629 05/17/18  1550   CR 1.15* 1.40*     Recent Labs   Lab Test 05/17/18  1550   A1C 5.7          Recent Labs   Lab Test 08/17/21  1629   WBC 7.3   HGB 12.2   HCT 39.7   MCV 90        Recent Labs   Lab Test 08/17/21  1629 05/17/18  1550   HGB 12.2 12.9    No results for input(s): TROPONINI  in the last 35363 hours.  No results for input(s): BNP, NTBNPI, NTBNP in the last 54706 hours.  No results for input(s): TSH in the last 66215 hours.  Recent Labs   Lab Test 09/01/21  1537 07/19/21  1421 06/28/21  1342   INR 1.9* 2.5* 1.90*                    Thank you for allowing me to participate in the care of your patient.      Sincerely,     Melvin Ricci MD     [facility]  cc:   No referring provider defined for this encounter.

## 2022-12-02 NOTE — PROGRESS NOTES
"    Cardiology Progress Note     Assessment:  Dyspnea, Unclear etiology, no overt fluid overload on exam today  Permanent atrial fibrillation with controlled ventricular response, on warfarin  PVCs, asymptomatic  Hypertension, good control  Hypercholesterolemia on atorvastatin, good control  Depression/ anxiety    Plan:  Basic metabolic panel and BNP today  Chest x-ray  Echo    Medications adjustments when results available    Subjective:   This is 87 year old female who comes in today for follow-up visit.  She reports progressive shortness of breath over the last several weeks.  She denies any weight gain.  Zeb function lost some weight.  She has no peripheral edema.  She urinates more than normal.  She cannot sleep well at night.  She sits in a recliner chair.  She denies exertional chest pain.  She has not had heart palpitations or syncope.  She has been compliant with cardiac medications    Review of Systems:   Negative other than history of present illness    Objective:   /40 (BP Location: Right arm, Patient Position: Sitting, Cuff Size: Adult Regular)   Pulse 92   Resp 20   Ht 1.6 m (5' 3\")   Wt 61.2 kg (135 lb)   BMI 23.91 kg/m    Physical Exam:  GENERAL: no distress  NECK: No JVD  LUNGS: Clear to auscultation.  CARDIAC: irregular rhythm, S1 & S2 normal.  No heaves, thrills, gallops or murmurs.  ABDOMEN: flat, negative hepatosplenomegaly, soft and non-tender.  EXTREMITIES: No evidence of cyanosis, clubbing or edema.    Current Outpatient Medications   Medication Sig Dispense Refill     atorvastatin (LIPITOR) 20 MG tablet [ATORVASTATIN (LIPITOR) 20 MG TABLET] TAKE 1 TABLET BY MOUTH EVERYDAY AT BEDTIME 90 tablet 2     cholecalciferol, vitamin D3, (VITAMIN D3) 2,000 unit Tab [CHOLECALCIFEROL, VITAMIN D3, (VITAMIN D3) 2,000 UNIT TAB] Take 1 tablet by mouth daily.       diltiazem ER COATED BEADS (CARDIZEM CD/CARTIA XT) 240 MG 24 hr capsule TAKE 1 CAPSULE BY MOUTH EVERY DAY 90 capsule 1     ELIQUIS " ANTICOAGULANT 5 MG tablet TAKE 1 TABLET BY MOUTH TWICE A  tablet 1     furosemide (LASIX) 20 MG tablet TAKE 1 TABLET BY MOUTH EVERY DAY 90 tablet 1     losartan (COZAAR) 25 MG tablet TAKE 1 TABLET BY MOUTH EVERY DAY 90 tablet 0     omeprazole (PRILOSEC) 20 MG capsule [OMEPRAZOLE (PRILOSEC) 20 MG CAPSULE] Take 1 capsule (20 mg total) by mouth daily before breakfast. 30 capsule 0     sertraline (ZOLOFT) 100 MG tablet TAKE 1 TABLET BY MOUTH EVERYDAY AT BEDTIME 90 tablet 1       Cardiographics:    Holter: April 2015   Persistent atrial fibrillation with overall fairly well controlled  ventricular response. There was some tendency toward rapid ventricular response  with activity in the early afternoon hours. A moderate number of ventricular  premature beats noted. 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     Echocardiogram: 2013   Normal LV systolic function, no significant valvular abnormalities     Stress Test: 2013   Mixed anterior perfusion defect     CT coronary angio: 2013   Normal coronaries, calcium score 2     Lab Results    Chemistry/lipid CBC Cardiac Enzymes/BNP/TSH/INR   Recent Labs   Lab Test 10/30/19  1632   CHOL 158   HDL 67   LDL 75   TRIG 79     Recent Labs   Lab Test 10/30/19  1632 05/17/18  1550 08/10/16  1147   LDL 75 86 103     Recent Labs   Lab Test 08/17/21  1629      POTASSIUM 4.0   CHLORIDE 106   CO2 22   *   BUN 23   CR 1.15*   GFRESTIMATED 44*   FAROOQ 9.8     Recent Labs   Lab Test 08/17/21  1629 05/17/18  1550   CR 1.15* 1.40*     Recent Labs   Lab Test 05/17/18  1550   A1C 5.7          Recent Labs   Lab Test 08/17/21  1629   WBC 7.3   HGB 12.2   HCT 39.7   MCV 90        Recent Labs   Lab Test 08/17/21  1629 05/17/18  1550   HGB 12.2 12.9    No results for input(s): TROPONINI in the last 71988 hours.  No results for input(s): BNP, NTBNPI, NTBNP in the last 74917 hours.  No results for input(s): TSH in the last 93438 hours.  Recent Labs   Lab Test 09/01/21  1537 07/19/21  1421  06/28/21  1342   INR 1.9* 2.5* 1.90*

## 2022-12-02 NOTE — PATIENT INSTRUCTIONS
Marva Gaines,    It was a pleasure to see you today at the [facility].     My recommendations after this visit include:    Blood work  Chest x ray  echo    JEN Ricci MD, FACC, ASHLI

## 2022-12-05 ENCOUNTER — TELEPHONE (OUTPATIENT)
Dept: CARDIOLOGY | Facility: CLINIC | Age: 87
End: 2022-12-05

## 2022-12-05 DIAGNOSIS — R60.9 EDEMA: ICD-10-CM

## 2022-12-05 RX ORDER — FUROSEMIDE 20 MG
40 TABLET ORAL DAILY
Qty: 180 TABLET | Refills: 3 | Status: SHIPPED | OUTPATIENT
Start: 2022-12-05 | End: 2023-10-25

## 2022-12-05 NOTE — TELEPHONE ENCOUNTER
----- Message from Melvin Ricci MD sent at 12/2/2022 12:49 PM CST -----  Start furosemide 40 mg a day check basic metabolic panel in 3 days  Melvin Ricci MD   12/2/2022 11:51 AM CST       No pneumonia but small pleural effusions noted suggestive of heart failure we will wait for blood work before making decisions about diuretics           ==called patient and updated on results and recommendation from [facility]. She will go up to furosemide 40 mg daily from 20 mg daily. BMP ordered. Msg to schedulers to arrange BMP on Thursday. She has an echo at [facility] to try to do before or after this. [place]

## 2022-12-08 ENCOUNTER — LAB (OUTPATIENT)
Dept: CARDIOLOGY | Facility: CLINIC | Age: 87
End: 2022-12-08
Payer: COMMERCIAL

## 2022-12-08 ENCOUNTER — HOSPITAL ENCOUNTER (OUTPATIENT)
Dept: CARDIOLOGY | Facility: CLINIC | Age: 87
Discharge: HOME | End: 2022-12-08
Attending: INTERNAL MEDICINE | Admitting: INTERNAL MEDICINE
Payer: COMMERCIAL

## 2022-12-08 DIAGNOSIS — R06.09 DYSPNEA ON EXERTION: ICD-10-CM

## 2022-12-08 DIAGNOSIS — R60.9 EDEMA: ICD-10-CM

## 2022-12-08 LAB
ANION GAP SERPL CALCULATED.3IONS-SCNC: 10 MMOL/L (ref 5–18)
BUN SERPL-MCNC: 21 MG/DL (ref 8–28)
CALCIUM SERPL-MCNC: 9.3 MG/DL (ref 8.5–10.5)
CHLORIDE BLD-SCNC: 108 MMOL/L (ref 98–107)
CO2 SERPL-SCNC: 22 MMOL/L (ref 22–31)
CREAT SERPL-MCNC: 1.11 MG/DL (ref 0.6–1.1)
GFR SERPL CREATININE-BSD FRML MDRD: 48 ML/MIN/1.73M2
GLUCOSE BLD-MCNC: 115 MG/DL (ref 70–125)
LVEF ECHO: NORMAL
POTASSIUM BLD-SCNC: 3.7 MMOL/L (ref 3.5–5)
SODIUM SERPL-SCNC: 140 MMOL/L (ref 136–145)

## 2022-12-08 PROCEDURE — 93306 TTE W/DOPPLER COMPLETE: CPT

## 2022-12-08 PROCEDURE — 93306 TTE W/DOPPLER COMPLETE: CPT | Mod: 26 | Performed by: INTERNAL MEDICINE

## 2022-12-08 PROCEDURE — 80048 BASIC METABOLIC PNL TOTAL CA: CPT

## 2022-12-08 PROCEDURE — 36415 COLL VENOUS BLD VENIPUNCTURE: CPT

## 2022-12-09 DIAGNOSIS — R06.09 DYSPNEA ON EXERTION: Primary | ICD-10-CM

## 2022-12-09 NOTE — PROGRESS NOTES
HF NP appt placed[place]   Additional Notes: VS PERIORAL DERM (OR POSSIBLE OVERLAP). RECOMMENDED SHE RESTART CLINDAMYCIN LOTION DAILY. Detail Level: Simple Render Risk Assessment In Note?: no

## 2022-12-13 ENCOUNTER — TELEPHONE (OUTPATIENT)
Dept: CARDIOLOGY | Facility: CLINIC | Age: 87
End: 2022-12-13

## 2022-12-13 NOTE — TELEPHONE ENCOUNTER
[facility] Call Center    Phone Message    May a detailed message be left on voicemail: yes     Reason for Call: Other: Pt needs  a call back from Dr Ricci nurse as she was talking to her and being told acout the Echo stress. Please call the Pt to discuss     Action Taken: Other: Cardio    Travel Screening: Not Applicable     Thank you!  [facility]

## 2022-12-14 NOTE — TELEPHONE ENCOUNTER
Called Ikah and updated again on echocardiogram results. She verbalized and we went over everything again. She was previously told the results Monday. We discussed HF at length including activity, low sodium diet, and diuretics. She understands the need for HF NP follow up now and was transferred to schedulers to have this arranged. -[facility]

## 2022-12-14 NOTE — TELEPHONE ENCOUNTER
Health Call Center    Phone Message    May a detailed message be left on voicemail: yes     Reason for Call: Other: pt really wants to speak w/either Nimihsa or another nurse regarding the Echo.  Pt is not sure what the results were and wants to know them before she makes her next appt w/Dr. Ricci.  She stated she sure would appreciate a call back today 12.14.22.     Action Taken: Message routed to:  [facility] cardio    Travel Screening: Not Applicable     Thank you!  [facility]

## 2022-12-21 DIAGNOSIS — I48.91 A-FIB (H): ICD-10-CM

## 2022-12-22 DIAGNOSIS — F41.1 ANXIETY STATE: ICD-10-CM

## 2022-12-22 RX ORDER — DILTIAZEM HYDROCHLORIDE 240 MG/1
CAPSULE, COATED, EXTENDED RELEASE ORAL
Qty: 90 CAPSULE | Refills: 1 | Status: SHIPPED | OUTPATIENT
Start: 2022-12-22 | End: 2023-07-03

## 2022-12-23 RX ORDER — SERTRALINE HYDROCHLORIDE 100 MG/1
100 TABLET, FILM COATED ORAL AT BEDTIME
Qty: 90 TABLET | Refills: 0 | Status: SHIPPED | OUTPATIENT
Start: 2022-12-23 | End: 2023-01-12

## 2022-12-23 NOTE — TELEPHONE ENCOUNTER
"Routing refill request to provider for review/approval because:  Patient needs to be seen because it has been more than 2 years since last office visit.    Last Written Prescription Date:  5/12/22  Last Fill Quantity: 90,  # refills: 1   Last office visit provider:  9/29/20     Requested Prescriptions   Pending Prescriptions Disp Refills     sertraline (ZOLOFT) 100 MG tablet [Pharmacy Med Name: SERTRALINE  MG TABLET] 90 tablet 1     Sig: TAKE 1 TABLET BY MOUTH EVERYDAY AT BEDTIME       SSRIs Protocol Failed - 12/23/2022  2:09 PM        Failed - Recent (12 mo) or future (30 days) visit within the authorizing provider's specialty     Patient has had an office visit with the authorizing provider or a provider within the authorizing providers department within the previous 12 mos or has a future within next 30 days. See \"Patient Info\" tab in inbasket, or \"Choose Columns\" in Meds & Orders section of the refill encounter.              Passed - Medication is active on med list        Passed - Patient is age 18 or older        Passed - No active pregnancy on record        Passed - No positive pregnancy test in last 12 months             Quoc Mcfarlane RN 12/23/22 2:11 PM  "

## 2022-12-27 PROBLEM — I50.31 ACUTE HEART FAILURE WITH PRESERVED EJECTION FRACTION (HFPEF) (H): Status: ACTIVE | Noted: 2022-12-27

## 2022-12-28 ENCOUNTER — HOSPITAL ENCOUNTER (INPATIENT)
Facility: CLINIC | Age: 87
LOS: 5 days | Discharge: HOME | DRG: 377 | End: 2023-01-02
Attending: EMERGENCY MEDICINE | Admitting: STUDENT IN AN ORGANIZED HEALTH CARE EDUCATION/TRAINING PROGRAM
Payer: COMMERCIAL

## 2022-12-28 ENCOUNTER — APPOINTMENT (OUTPATIENT)
Dept: RADIOLOGY | Facility: CLINIC | Age: 87
DRG: 377 | End: 2022-12-28
Payer: COMMERCIAL

## 2022-12-28 ENCOUNTER — OFFICE VISIT (OUTPATIENT)
Dept: CARDIOLOGY | Facility: CLINIC | Age: 87
End: 2022-12-28
Attending: INTERNAL MEDICINE
Payer: COMMERCIAL

## 2022-12-28 VITALS
WEIGHT: 134 LBS | OXYGEN SATURATION: 96 % | BODY MASS INDEX: 23.74 KG/M2 | DIASTOLIC BLOOD PRESSURE: 48 MMHG | RESPIRATION RATE: 16 BRPM | SYSTOLIC BLOOD PRESSURE: 144 MMHG | HEART RATE: 89 BPM

## 2022-12-28 DIAGNOSIS — D64.9 LOW HEMOGLOBIN: ICD-10-CM

## 2022-12-28 DIAGNOSIS — I48.21 PERMANENT ATRIAL FIBRILLATION (H): ICD-10-CM

## 2022-12-28 DIAGNOSIS — R09.02 HYPOXIA: ICD-10-CM

## 2022-12-28 DIAGNOSIS — I50.31 ACUTE HEART FAILURE WITH PRESERVED EJECTION FRACTION (HFPEF) (H): ICD-10-CM

## 2022-12-28 DIAGNOSIS — R53.83 OTHER FATIGUE: ICD-10-CM

## 2022-12-28 DIAGNOSIS — R06.09 DYSPNEA ON EXERTION: Primary | ICD-10-CM

## 2022-12-28 DIAGNOSIS — K55.20 COLON ARTERIOVENOUS MALFORMATION: Primary | ICD-10-CM

## 2022-12-28 DIAGNOSIS — I50.32 CHRONIC HEART FAILURE WITH PRESERVED EJECTION FRACTION (HFPEF) (H): ICD-10-CM

## 2022-12-28 DIAGNOSIS — K31.819 GASTRIC AVM: ICD-10-CM

## 2022-12-28 DIAGNOSIS — K92.1 HEMATOCHEZIA: ICD-10-CM

## 2022-12-28 DIAGNOSIS — I10 ESSENTIAL HYPERTENSION: ICD-10-CM

## 2022-12-28 DIAGNOSIS — I25.10 CORONARY ARTERY DISEASE INVOLVING NATIVE CORONARY ARTERY OF NATIVE HEART WITHOUT ANGINA PECTORIS: ICD-10-CM

## 2022-12-28 DIAGNOSIS — G47.34 NOCTURNAL HYPOXIA: ICD-10-CM

## 2022-12-28 DIAGNOSIS — D64.9 ANEMIA, UNSPECIFIED TYPE: ICD-10-CM

## 2022-12-28 DIAGNOSIS — R06.09 DYSPNEA ON EXERTION: ICD-10-CM

## 2022-12-28 LAB
ABO/RH(D): ABNORMAL
ANION GAP SERPL CALCULATED.3IONS-SCNC: 10 MMOL/L (ref 5–18)
ANION GAP SERPL CALCULATED.3IONS-SCNC: 11 MMOL/L (ref 5–18)
ANTIBODY ID: NORMAL
ANTIBODY SCREEN: POSITIVE
BLD PROD TYP BPU: NORMAL
BLOOD COMPONENT TYPE: NORMAL
BNP SERPL-MCNC: 576 PG/ML (ref 0–167)
BUN SERPL-MCNC: 27 MG/DL (ref 8–28)
BUN SERPL-MCNC: 29 MG/DL (ref 8–28)
CALCIUM SERPL-MCNC: 9 MG/DL (ref 8.5–10.5)
CALCIUM SERPL-MCNC: 9.3 MG/DL (ref 8.5–10.5)
CHLORIDE BLD-SCNC: 108 MMOL/L (ref 98–107)
CHLORIDE BLD-SCNC: 109 MMOL/L (ref 98–107)
CO2 SERPL-SCNC: 21 MMOL/L (ref 22–31)
CO2 SERPL-SCNC: 22 MMOL/L (ref 22–31)
CODING SYSTEM: NORMAL
CREAT SERPL-MCNC: 1.17 MG/DL (ref 0.6–1.1)
CREAT SERPL-MCNC: 1.2 MG/DL (ref 0.6–1.1)
CROSSMATCH: NORMAL
FLUAV RNA SPEC QL NAA+PROBE: NEGATIVE
FLUBV RNA RESP QL NAA+PROBE: NEGATIVE
GFR SERPL CREATININE-BSD FRML MDRD: 44 ML/MIN/1.73M2
GFR SERPL CREATININE-BSD FRML MDRD: 46 ML/MIN/1.73M2
GLUCOSE BLD-MCNC: 105 MG/DL (ref 70–125)
GLUCOSE BLD-MCNC: 126 MG/DL (ref 70–125)
HGB BLD-MCNC: 6.5 G/DL (ref 11.7–15.7)
HOLD SPECIMEN: NORMAL
ISSUE DATE AND TIME: NORMAL
K AG RBC QL: NEGATIVE
MAGNESIUM SERPL-MCNC: 1.8 MG/DL (ref 1.8–2.6)
MAGNESIUM SERPL-MCNC: 1.9 MG/DL (ref 1.8–2.6)
NT-PROBNP SERPL-MCNC: 3702 PG/ML (ref 0–1800)
POTASSIUM BLD-SCNC: 3.3 MMOL/L (ref 3.5–5)
POTASSIUM BLD-SCNC: 3.9 MMOL/L (ref 3.5–5)
RSV RNA SPEC NAA+PROBE: NEGATIVE
SARS-COV-2 RNA RESP QL NAA+PROBE: NEGATIVE
SODIUM SERPL-SCNC: 140 MMOL/L (ref 136–145)
SODIUM SERPL-SCNC: 141 MMOL/L (ref 136–145)
SPECIMEN EXPIRATION DATE: ABNORMAL
SPECIMEN EXPIRATION DATE: NORMAL
SPECIMEN EXPIRATION DATE: NORMAL
UNIT ABO/RH: NORMAL
UNIT NUMBER: NORMAL
UNIT STATUS: NORMAL
UNIT TYPE ISBT: 5100

## 2022-12-28 PROCEDURE — 80048 BASIC METABOLIC PNL TOTAL CA: CPT | Performed by: EMERGENCY MEDICINE

## 2022-12-28 PROCEDURE — 99215 OFFICE O/P EST HI 40 MIN: CPT | Performed by: NURSE PRACTITIONER

## 2022-12-28 PROCEDURE — C9803 HOPD COVID-19 SPEC COLLECT: HCPCS

## 2022-12-28 PROCEDURE — 80048 BASIC METABOLIC PNL TOTAL CA: CPT | Performed by: NURSE PRACTITIONER

## 2022-12-28 PROCEDURE — 85018 HEMOGLOBIN: CPT | Performed by: STUDENT IN AN ORGANIZED HEALTH CARE EDUCATION/TRAINING PROGRAM

## 2022-12-28 PROCEDURE — 86901 BLOOD TYPING SEROLOGIC RH(D): CPT

## 2022-12-28 PROCEDURE — 83735 ASSAY OF MAGNESIUM: CPT

## 2022-12-28 PROCEDURE — 86905 BLOOD TYPING RBC ANTIGENS: CPT

## 2022-12-28 PROCEDURE — 83880 ASSAY OF NATRIURETIC PEPTIDE: CPT

## 2022-12-28 PROCEDURE — 36415 COLL VENOUS BLD VENIPUNCTURE: CPT

## 2022-12-28 PROCEDURE — 99285 EMERGENCY DEPT VISIT HI MDM: CPT | Mod: 25

## 2022-12-28 PROCEDURE — 86870 RBC ANTIBODY IDENTIFICATION: CPT

## 2022-12-28 PROCEDURE — 83735 ASSAY OF MAGNESIUM: CPT | Performed by: NURSE PRACTITIONER

## 2022-12-28 PROCEDURE — 250N000013 HC RX MED GY IP 250 OP 250 PS 637

## 2022-12-28 PROCEDURE — 93005 ELECTROCARDIOGRAM TRACING: CPT | Performed by: HOSPITALIST

## 2022-12-28 PROCEDURE — 83880 ASSAY OF NATRIURETIC PEPTIDE: CPT | Performed by: NURSE PRACTITIONER

## 2022-12-28 PROCEDURE — 85025 COMPLETE CBC W/AUTO DIFF WBC: CPT | Performed by: NURSE PRACTITIONER

## 2022-12-28 PROCEDURE — 86922 COMPATIBILITY TEST ANTIGLOB: CPT

## 2022-12-28 PROCEDURE — 87637 SARSCOV2&INF A&B&RSV AMP PRB: CPT

## 2022-12-28 PROCEDURE — 36415 COLL VENOUS BLD VENIPUNCTURE: CPT | Performed by: NURSE PRACTITIONER

## 2022-12-28 PROCEDURE — 71046 X-RAY EXAM CHEST 2 VIEWS: CPT

## 2022-12-28 PROCEDURE — 36415 COLL VENOUS BLD VENIPUNCTURE: CPT | Performed by: EMERGENCY MEDICINE

## 2022-12-28 PROCEDURE — 120N000001 HC R&B MED SURG/OB

## 2022-12-28 RX ORDER — SERTRALINE HYDROCHLORIDE 25 MG/1
100 TABLET, FILM COATED ORAL ONCE
Status: COMPLETED | OUTPATIENT
Start: 2022-12-28 | End: 2022-12-28

## 2022-12-28 RX ORDER — POTASSIUM CHLORIDE 1500 MG/1
40 TABLET, EXTENDED RELEASE ORAL ONCE
Status: COMPLETED | OUTPATIENT
Start: 2022-12-28 | End: 2022-12-28

## 2022-12-28 RX ORDER — ACETAMINOPHEN 325 MG/1
650 TABLET ORAL ONCE
Status: COMPLETED | OUTPATIENT
Start: 2022-12-28 | End: 2022-12-28

## 2022-12-28 RX ADMIN — SERTRALINE HYDROCHLORIDE 100 MG: 25 TABLET ORAL at 22:25

## 2022-12-28 RX ADMIN — ACETAMINOPHEN 650 MG: 325 TABLET ORAL at 22:23

## 2022-12-28 RX ADMIN — POTASSIUM CHLORIDE 40 MEQ: 1500 TABLET, EXTENDED RELEASE ORAL at 22:22

## 2022-12-28 ASSESSMENT — ENCOUNTER SYMPTOMS
FEVER: 0
LIGHT-HEADEDNESS: 0
COUGH: 0
WHEEZING: 0
ABDOMINAL DISTENTION: 0
PALPITATIONS: 1
CHEST TIGHTNESS: 0
DIARRHEA: 1
NAUSEA: 0
SORE THROAT: 0
HEADACHES: 0
DIAPHORESIS: 0
HEMATURIA: 0
BLOOD IN STOOL: 1
FATIGUE: 1
RECTAL PAIN: 0
VOMITING: 0
DYSURIA: 0
ABDOMINAL PAIN: 0
CONSTIPATION: 0
SHORTNESS OF BREATH: 0
RHINORRHEA: 0

## 2022-12-28 ASSESSMENT — ACTIVITIES OF DAILY LIVING (ADL)
ADLS_ACUITY_SCORE: 35

## 2022-12-28 NOTE — LETTER
12/28/2022    Sabas Austin MD  [address] [facility] [address]    RE: Marva Gaines       Dear Colleague,     I had the pleasure of seeing Marva Gaines in the [facility].          Assessment/Recommendations   Assessment:    1.  Acute heart failure with preserved ejection fraction: Patient saw Dr. Ricci on 12/2/2022.  She was noted to have issue with dyspnea on exertion.  NT proBNP was found elevated at 2360 on 12/2/2022.  BMP stable with potassium of 3.7 and creatinine 1.11 on 12/8/2022.    She was started on furosemide 40 mg daily on 12/2/2022.    Chest x-ray showed small pleural effusion consistent with mild congestive heart failure.    Echocardiogram showed preserved LVEF 60 to 65% with no regional wall motion abnormity noted except moderate mitral regurgitation noted.    We discussed and reviewed about heart failure, medication management, and lifestyle management including low sodium diet <2 g/day, daily weight, and staying physically active as tolerated.  Provided heart failure education given her few education material for    Patient does not weigh herself at home.  Her clinic weight is unchanged from her last visit on 12/2/2022.  She did feel improvement in her shortness of breath for about 1 and half to 2 weeks.  Over the last 1 week or so, she has been experiencing more shortness of breath, fatigue and some heart palpitation more so during the night.  She had trouble sleeping last night which she thinks is due to some heart palpitation, chest tightness and shortness of breath.      Her oxygen saturation is 96% on room air.  On assessment, she is well compensated with no evidence of fluid retention on exam.    She does not follow a low-sodium diet.  She eats a lot of frozen food which sometimes are high sodium content.  She reports drinking more than 64 ounces of fluid per day.    We discussed about enrolling into open arm heart failure diet program she declined.    2.   Hypercholesteremia: On atorvastatin 20 mg daily.    3.  Hypertension: Her blood pressure is mildly elevated at 144/48. Currently on losartan 25 mg daily and diltiazem 240 mg daily.    4.  Permanent atrial fibrillation/PVCs:Rate controlled on diltiazem.  She reports some heart palpitation last night with some chest tightness which resolved without intervention.  On Eliquis 5 mg twice a day for stroke prophylaxis.  She does not report bleeding complications however patient and daughter Anabel requested for hemoglobin check today.    Plan/Recommendation:  -I initially recommended her to take extra 20 mg furosemide.  However her [facility] lab called critical lab result of hemoglobin of 6.5.  -Patient was instructed not to take extra extra dose of furosemide.  She was recommended to visit ED for further evaluation and management.  -If her symptoms does not improve after correcting her low hemoglobin, I will discuss with Dr. Ricci if we need to do further cardiac evaluation  -I encouraged her to cut back on her fluid intake to 48 to 55 ounces per day and will continue to monitor her electrolytes closely  -Highly encouraged to keep sodium intake less than 2 g/day and daily weight monitoring.    Follow up with Dr. Ricci in 3 months.       History of Present Illness/Subjective    Ms. Marva Gaines is a 87 year old female with a past medical history of permanent atrial fibrillation, hypertension, hypercholesteremia, anxiety and depression, dyspnea on exertion secondary to acute heart failure with preserved ejection fraction who is seen at [facility] for post hospitalization follow up/ per recommendation from Dr. Ricci.    Patient saw Dr. Ricci on 12/2/2022 and was noted to have some dyspnea with unclear etiology.  Chest x-ray showed mild pleural effusion suspected for mild heart failure.  NT proBNP was found elevated in 2000's.  Echocardiogram showed preserved LVEF with no wall  motion abnormalities noted except moderate mitral regurgitation noted.    Today, Kiah is here accompanied by her daughter Anabel.  She felt improvement in her shortness of breath for the first 1 and half to 2 weeks after she was started on furosemide 40 mg daily.  Her kidney functions been stable.  Over the last 1 week or so she been experiencing more fatigue, shortness of breath with minimal exertion, and occasional PND and orthopnea with some heart palpitation which was worse last night to the point she almost thought about going to she denies lightheadedness, shortness of breath, palpitations, chest pain and abdominal fullness/bloating.  Daughter reported that patient has chronic diarrhea and suspected some mild abduction.  She has been sleeping in recliner however she was able to sleep in her bed when she was initially started on furosemide.  She reports poor appetite.  She does not report any bleeding complications.  However daughter requested hemoglobin check because of her fatigue and ongoing shortness of breath.    ECHO-Reviewed:   Interpretation Summary     Normal right ventricle size and systolic function.  The left ventricle is normal in size.  There is mild to moderate concentric left ventricular hypertrophy.  The visual ejection fraction is 60-65%.  No regional wall motion abnormalities noted.  There is moderate (2+) mitral regurgitation.  The right ventricular systolic pressure is approximated at 38mmHg plus the  right atrial pressure.  Mild aortic valve calcification is present.  The aortic valve is trileaflet.  IVC diameter <2.1 cm collapsing >50% with sniff suggests a normal RA pressure  of 3 mmHg.  The rhythm was atrial fibrillation.  There is no comparison study available.  ______________________________________________________________________________     Physical Examination Review of Systems   BP (!) 144/48 (BP Location: Left arm, Patient Position: Sitting, Cuff Size: Adult Regular)   Pulse 89    Resp 16   Wt 60.8 kg (134 lb)   SpO2 96%   BMI 23.74 kg/m    Body mass index is 23.74 kg/m .  Wt Readings from Last 3 Encounters:   12/28/22 60.8 kg (134 lb)   12/02/22 61.2 kg (135 lb)   08/17/21 66.8 kg (147 lb 4.8 oz)     General Appearance:   no distress, normal body habitus   ENT/Mouth: membranes moist, no oral lesions or bleeding gums.      EYES:  no scleral icterus, normal conjunctivae   Neck: no carotid bruits or thyromegaly   Chest/Lungs:   lungs are clear to auscultation, no rales or wheezing, equal chest wall expansion    Cardiovascular:   Irregularly irregular. Normal first and second heart sounds with no murmurs, rubs, or gallops; the carotid, radial and posterior tibial pulses are intact, JVP is difficult to assess due to the patient's obesity and body habitus   , no  edema bilaterally    Abdomen:  no organomegaly, masses, bruits, or tenderness; bowel sounds are present   Extremities   no cyanosis or clubbing   Radial pulses and Pedal pulses intact and symmetrical.  CMS intact.   Skin: no xanthelasma, warm.    Neurologic: normal  bilateral, no tremors     Psychiatric: alert and oriented x3, calm                                                        Negative unless noted in HPI     Medical History  Surgical History Family History Social History   No past medical history on file. No past surgical history on file. No family history on file. Social History     Socioeconomic History     Marital status: Single     Spouse name: Not on file     Number of children: Not on file     Years of education: Not on file     Highest education level: Not on file   Occupational History     Not on file   Tobacco Use     Smoking status: Former     Smokeless tobacco: Never   Vaping Use     Vaping Use: Never used   Substance and Sexual Activity     Alcohol use: Not on file     Drug use: Not on file     Sexual activity: Not on file   Other Topics Concern     Not on file   Social History Narrative     Not on file      Social Determinants of Health     Financial Resource Strain: Not on file   Food Insecurity: Not on file   Transportation Needs: Not on file   Physical Activity: Not on file   Stress: Not on file   Social Connections: Not on file   Intimate Partner Violence: Not on file   Housing Stability: Not on file          Medications  Allergies   Current Outpatient Medications   Medication Sig Dispense Refill     atorvastatin (LIPITOR) 20 MG tablet [ATORVASTATIN (LIPITOR) 20 MG TABLET] TAKE 1 TABLET BY MOUTH EVERYDAY AT BEDTIME 90 tablet 2     cholecalciferol, vitamin D3, (VITAMIN D3) 2,000 unit Tab [CHOLECALCIFEROL, VITAMIN D3, (VITAMIN D3) 2,000 UNIT TAB] Take 1 tablet by mouth daily.       diltiazem ER COATED BEADS (CARDIZEM CD/CARTIA XT) 240 MG 24 hr capsule TAKE 1 CAPSULE BY MOUTH EVERY DAY 90 capsule 1     ELIQUIS ANTICOAGULANT 5 MG tablet TAKE 1 TABLET BY MOUTH TWICE A  tablet 1     furosemide (LASIX) 20 MG tablet Take 2 tablets (40 mg) by mouth daily 180 tablet 3     losartan (COZAAR) 25 MG tablet TAKE 1 TABLET BY MOUTH EVERY DAY 90 tablet 3     omeprazole (PRILOSEC) 20 MG capsule [OMEPRAZOLE (PRILOSEC) 20 MG CAPSULE] Take 1 capsule (20 mg total) by mouth daily before breakfast. 30 capsule 0     sertraline (ZOLOFT) 100 MG tablet Take 1 tablet (100 mg) by mouth At Bedtime 90 tablet 0    Allergies   Allergen Reactions     Hydrocodone-Acetaminophen Unknown         Lab Results    Chemistry/lipid CBC Cardiac Enzymes/BNP/TSH/INR   Lab Results   Component Value Date    CHOL 158 10/30/2019    HDL 67 10/30/2019    TRIG 79 10/30/2019    BUN 29 (H) 12/28/2022     12/28/2022    CO2 22 12/28/2022    Lab Results   Component Value Date    WBC 7.3 08/17/2021    HGB 6.5 (LL) 12/28/2022    HCT 39.7 08/17/2021    MCV 90 08/17/2021     08/17/2021    Lab Results   Component Value Date    INR 1.9 (H) 09/01/2021        44 minutes spent on the date of encounter doing chart review, review of test results, interpretation  with above tests, patient visit, documentation and discussion with family.        This note has been dictated using voice recognition software. Any grammatical, typographical, or context distortions are unintentional and inherent to the software          Thank you for allowing me to participate in the care of your patient.      Sincerely,     DEBORAH Madrigal [facility]  cc:   Melvin Ricci MD  [address] [address]

## 2022-12-28 NOTE — PATIENT INSTRUCTIONS
Marva Gaines,    It was a pleasure to see you today at the [facility].     My recommendations after this visit include:    - Take extra 20 mg of Furosemide when you get home    - Please seek immediate medical attention if you develop chest pain, shortness of breath, lightheaded, dizziness or fainting    - We will follow up with you once your lab result is back    -Follow low-sodium diet less than 2000 mg/day, daily weight monitoring, and stay active as tolerated    - Follow up with Raiza in 4 weeks    - Follow up with Dr. Ricci in 2 months    - Please call BEATRIZ Retana on [phone]  if you have any questions or concerns    Raiza Rice CNP       What Is Heart Failure?  The heart is a muscle. It pumps oxygen-rich blood to all parts of the body. When you have heart failure, the heart can t pump as well as it should. Blood and fluid may back up into the lungs, and some parts of the body don t get enough oxygen-rich blood to work normally. These problems lead to the symptoms you feel.    When You Have Heart Failure  Because of heart failure, not enough blood leaves the heart with each beat. There are two types of heart failure. Both affect the ventricles  ability to pump blood. You may have one or both types.     Systolic heart failure: The heart muscle becomes weak and enlarged. It can t pump enough blood forward when the ventricles contract. Ejection fraction is lower than normal.   Diastolic heart failure: The heart muscle becomes stiff. It doesn t relax normally between contractions, which keeps the ventricles from filling with blood. Ejection fraction is often in the normal range.     How Heart Failure Affects Your Body  When the heart doesn t pump enough blood, hormones (body chemicals) are sent to increase the amount of work the heart does. Some hormones make the heart grow larger. Others tell the heart to pump faster. As a result, the heart may pump more blood at first, but it  can t keep up with the ongoing demands. So, the heart muscle becomes more damaged. Over time, even less blood is pumped through the heart. This leads to problems throughout the body.    What Is Ejection Fraction?  Ejection fraction (EF) measures how much blood the heart pumps out (ejects). This is measured to help diagnose heart failure. A healthy heart pumps at least half of the blood from the ventricles with each beat. This means a normal ejection fraction is around 50% or more.       7584-4154 [employer]. [address] [place] [id number]. All rights reserved. This information is not intended as a substitute for professional medical care. Always follow your healthcare professional's instructions.

## 2022-12-28 NOTE — PROGRESS NOTES
Assessment/Recommendations   Assessment:    1.  Acute heart failure with preserved ejection fraction: Patient saw Dr. Ricci on 12/2/2022.  She was noted to have issue with dyspnea on exertion.  NT proBNP was found elevated at 2360 on 12/2/2022.  BMP stable with potassium of 3.7 and creatinine 1.11 on 12/8/2022.    She was started on furosemide 40 mg daily on 12/2/2022.    Chest x-ray showed small pleural effusion consistent with mild congestive heart failure.    Echocardiogram showed preserved LVEF 60 to 65% with no regional wall motion abnormity noted except moderate mitral regurgitation noted.    We discussed and reviewed about heart failure, medication management, and lifestyle management including low sodium diet <2 g/day, daily weight, and staying physically active as tolerated.  Provided heart failure education given her few education material for    Patient does not weigh herself at home.  Her clinic weight is unchanged from her last visit on 12/2/2022.  She did feel improvement in her shortness of breath for about 1 and half to 2 weeks.  Over the last 1 week or so, she has been experiencing more shortness of breath, fatigue and some heart palpitation more so during the night.  She had trouble sleeping last night which she thinks is due to some heart palpitation, chest tightness and shortness of breath.      Her oxygen saturation is 96% on room air.  On assessment, she is well compensated with no evidence of fluid retention on exam.    She does not follow a low-sodium diet.  She eats a lot of frozen food which sometimes are high sodium content.  She reports drinking more than 64 ounces of fluid per day.    We discussed about enrolling into open arm heart failure diet program she declined.    2.  Hypercholesteremia: On atorvastatin 20 mg daily.    3.  Hypertension: Her blood pressure is mildly elevated at 144/48. Currently on losartan 25 mg daily and diltiazem 240 mg daily.    4.  Permanent atrial  fibrillation/PVCs:Rate controlled on diltiazem.  She reports some heart palpitation last night with some chest tightness which resolved without intervention.  On Eliquis 5 mg twice a day for stroke prophylaxis.  She does not report bleeding complications however patient and daughter Anabel requested for hemoglobin check today.    Plan/Recommendation:  -I initially recommended her to take extra 20 mg furosemide.  However her [facility] lab called critical lab result of hemoglobin of 6.5.  -Patient was instructed not to take extra extra dose of furosemide.  She was recommended to visit ED for further evaluation and management.  -If her symptoms does not improve after correcting her low hemoglobin, I will discuss with Dr. Ricci if we need to do further cardiac evaluation  -I encouraged her to cut back on her fluid intake to 48 to 55 ounces per day and will continue to monitor her electrolytes closely  -Highly encouraged to keep sodium intake less than 2 g/day and daily weight monitoring.    Follow up with Dr. Ricci in 3 months.       History of Present Illness/Subjective    Ms. Marva Gaines is a 87 year old female with a past medical history of permanent atrial fibrillation, hypertension, hypercholesteremia, anxiety and depression, dyspnea on exertion secondary to acute heart failure with preserved ejection fraction who is seen at [facility] for post hospitalization follow up/ per recommendation from Dr. Ricci.    Patient saw Dr. Ricci on 12/2/2022 and was noted to have some dyspnea with unclear etiology.  Chest x-ray showed mild pleural effusion suspected for mild heart failure.  NT proBNP was found elevated in 2000's.  Echocardiogram showed preserved LVEF with no wall motion abnormalities noted except moderate mitral regurgitation noted.    Today, Kiah is here accompanied by her daughter Anabel.  She felt improvement in her shortness of breath for the first 1 and half to  2 weeks after she was started on furosemide 40 mg daily.  Her kidney functions been stable.  Over the last 1 week or so she been experiencing more fatigue, shortness of breath with minimal exertion, and occasional PND and orthopnea with some heart palpitation which was worse last night to the point she almost thought about going to she denies lightheadedness, shortness of breath, palpitations, chest pain and abdominal fullness/bloating.  Daughter reported that patient has chronic diarrhea and suspected some mild abduction.  She has been sleeping in recliner however she was able to sleep in her bed when she was initially started on furosemide.  She reports poor appetite.  She does not report any bleeding complications.  However daughter requested hemoglobin check because of her fatigue and ongoing shortness of breath.    ECHO-Reviewed:   Interpretation Summary     Normal right ventricle size and systolic function.  The left ventricle is normal in size.  There is mild to moderate concentric left ventricular hypertrophy.  The visual ejection fraction is 60-65%.  No regional wall motion abnormalities noted.  There is moderate (2+) mitral regurgitation.  The right ventricular systolic pressure is approximated at 38mmHg plus the  right atrial pressure.  Mild aortic valve calcification is present.  The aortic valve is trileaflet.  IVC diameter <2.1 cm collapsing >50% with sniff suggests a normal RA pressure  of 3 mmHg.  The rhythm was atrial fibrillation.  There is no comparison study available.  ______________________________________________________________________________     Physical Examination Review of Systems   BP (!) 144/48 (BP Location: Left arm, Patient Position: Sitting, Cuff Size: Adult Regular)   Pulse 89   Resp 16   Wt 60.8 kg (134 lb)   SpO2 96%   BMI 23.74 kg/m    Body mass index is 23.74 kg/m .  Wt Readings from Last 3 Encounters:   12/28/22 60.8 kg (134 lb)   12/02/22 61.2 kg (135 lb)   08/17/21 66.8  kg (147 lb 4.8 oz)     General Appearance:   no distress, normal body habitus   ENT/Mouth: membranes moist, no oral lesions or bleeding gums.      EYES:  no scleral icterus, normal conjunctivae   Neck: no carotid bruits or thyromegaly   Chest/Lungs:   lungs are clear to auscultation, no rales or wheezing, equal chest wall expansion    Cardiovascular:   Irregularly irregular. Normal first and second heart sounds with no murmurs, rubs, or gallops; the carotid, radial and posterior tibial pulses are intact, JVP is difficult to assess due to the patient's obesity and body habitus   , no  edema bilaterally    Abdomen:  no organomegaly, masses, bruits, or tenderness; bowel sounds are present   Extremities   no cyanosis or clubbing   Radial pulses and Pedal pulses intact and symmetrical.  CMS intact.   Skin: no xanthelasma, warm.    Neurologic: normal  bilateral, no tremors     Psychiatric: alert and oriented x3, calm                                                        Negative unless noted in HPI     Medical History  Surgical History Family History Social History   No past medical history on file. No past surgical history on file. No family history on file. Social History     Socioeconomic History     Marital status: Single     Spouse name: Not on file     Number of children: Not on file     Years of education: Not on file     Highest education level: Not on file   Occupational History     Not on file   Tobacco Use     Smoking status: Former     Smokeless tobacco: Never   Vaping Use     Vaping Use: Never used   Substance and Sexual Activity     Alcohol use: Not on file     Drug use: Not on file     Sexual activity: Not on file   Other Topics Concern     Not on file   Social History Narrative     Not on file     Social Determinants of Health     Financial Resource Strain: Not on file   Food Insecurity: Not on file   Transportation Needs: Not on file   Physical Activity: Not on file   Stress: Not on file   Social  Connections: Not on file   Intimate Partner Violence: Not on file   Housing Stability: Not on file          Medications  Allergies   Current Outpatient Medications   Medication Sig Dispense Refill     atorvastatin (LIPITOR) 20 MG tablet [ATORVASTATIN (LIPITOR) 20 MG TABLET] TAKE 1 TABLET BY MOUTH EVERYDAY AT BEDTIME 90 tablet 2     cholecalciferol, vitamin D3, (VITAMIN D3) 2,000 unit Tab [CHOLECALCIFEROL, VITAMIN D3, (VITAMIN D3) 2,000 UNIT TAB] Take 1 tablet by mouth daily.       diltiazem ER COATED BEADS (CARDIZEM CD/CARTIA XT) 240 MG 24 hr capsule TAKE 1 CAPSULE BY MOUTH EVERY DAY 90 capsule 1     ELIQUIS ANTICOAGULANT 5 MG tablet TAKE 1 TABLET BY MOUTH TWICE A  tablet 1     furosemide (LASIX) 20 MG tablet Take 2 tablets (40 mg) by mouth daily 180 tablet 3     losartan (COZAAR) 25 MG tablet TAKE 1 TABLET BY MOUTH EVERY DAY 90 tablet 3     omeprazole (PRILOSEC) 20 MG capsule [OMEPRAZOLE (PRILOSEC) 20 MG CAPSULE] Take 1 capsule (20 mg total) by mouth daily before breakfast. 30 capsule 0     sertraline (ZOLOFT) 100 MG tablet Take 1 tablet (100 mg) by mouth At Bedtime 90 tablet 0    Allergies   Allergen Reactions     Hydrocodone-Acetaminophen Unknown         Lab Results    Chemistry/lipid CBC Cardiac Enzymes/BNP/TSH/INR   Lab Results   Component Value Date    CHOL 158 10/30/2019    HDL 67 10/30/2019    TRIG 79 10/30/2019    BUN 29 (H) 12/28/2022     12/28/2022    CO2 22 12/28/2022    Lab Results   Component Value Date    WBC 7.3 08/17/2021    HGB 6.5 (LL) 12/28/2022    HCT 39.7 08/17/2021    MCV 90 08/17/2021     08/17/2021    Lab Results   Component Value Date    INR 1.9 (H) 09/01/2021        44 minutes spent on the date of encounter doing chart review, review of test results, interpretation with above tests, patient visit, documentation and discussion with family.        This note has been dictated using voice recognition software. Any grammatical, typographical, or context distortions are  unintentional and inherent to the software

## 2022-12-29 LAB
ALBUMIN SERPL-MCNC: 3.5 G/DL (ref 3.5–5)
ALP SERPL-CCNC: 74 U/L (ref 45–120)
ALT SERPL W P-5'-P-CCNC: <9 U/L (ref 0–45)
ANION GAP SERPL CALCULATED.3IONS-SCNC: 10 MMOL/L (ref 5–18)
AST SERPL W P-5'-P-CCNC: 11 U/L (ref 0–40)
ATRIAL RATE - MUSE: 76 BPM
BASOPHILS # BLD AUTO: 0 10E3/UL (ref 0–0.2)
BASOPHILS # BLD AUTO: 0.1 10E3/UL (ref 0–0.2)
BASOPHILS NFR BLD AUTO: 0 %
BASOPHILS NFR BLD AUTO: 1 %
BILIRUB SERPL-MCNC: 0.9 MG/DL (ref 0–1)
BLD PROD TYP BPU: NORMAL
BLOOD COMPONENT TYPE: NORMAL
BUN SERPL-MCNC: 24 MG/DL (ref 8–28)
CALCIUM SERPL-MCNC: 9.2 MG/DL (ref 8.5–10.5)
CHLORIDE BLD-SCNC: 111 MMOL/L (ref 98–107)
CO2 SERPL-SCNC: 17 MMOL/L (ref 22–31)
CODING SYSTEM: NORMAL
CREAT SERPL-MCNC: 1.2 MG/DL (ref 0.6–1.1)
CROSSMATCH: NORMAL
DIASTOLIC BLOOD PRESSURE - MUSE: NORMAL MMHG
EOSINOPHIL # BLD AUTO: 0 10E3/UL (ref 0–0.7)
EOSINOPHIL # BLD AUTO: 0 10E3/UL (ref 0–0.7)
EOSINOPHIL NFR BLD AUTO: 0 %
EOSINOPHIL NFR BLD AUTO: 0 %
ERYTHROCYTE [DISTWIDTH] IN BLOOD BY AUTOMATED COUNT: 17.8 % (ref 10–15)
ERYTHROCYTE [DISTWIDTH] IN BLOOD BY AUTOMATED COUNT: 20.1 % (ref 10–15)
FERRITIN SERPL-MCNC: 9 NG/ML (ref 11–328)
GFR SERPL CREATININE-BSD FRML MDRD: 44 ML/MIN/1.73M2
GLUCOSE BLD-MCNC: 148 MG/DL (ref 70–125)
HCT VFR BLD AUTO: 26.1 % (ref 35–47)
HCT VFR BLD AUTO: 29.1 % (ref 35–47)
HEMOCCULT STL QL: NEGATIVE
HGB BLD-MCNC: 6.9 G/DL (ref 11.7–15.7)
HGB BLD-MCNC: 7.9 G/DL (ref 11.7–15.7)
HGB BLD-MCNC: 8.1 G/DL (ref 11.7–15.7)
HOLD SPECIMEN: NORMAL
HOLD SPECIMEN: NORMAL
IMM GRANULOCYTES # BLD: 0 10E3/UL
IMM GRANULOCYTES # BLD: 0.1 10E3/UL
IMM GRANULOCYTES NFR BLD: 0 %
IMM GRANULOCYTES NFR BLD: 1 %
INTERPRETATION ECG - MUSE: NORMAL
IRON BINDING CAPACITY (ROCHE): 408 UG/DL (ref 240–430)
IRON SATN MFR SERPL: 4 % (ref 15–46)
IRON SERPL-MCNC: 16 UG/DL (ref 37–145)
ISSUE DATE AND TIME: NORMAL
LYMPHOCYTES # BLD AUTO: 0.5 10E3/UL (ref 0.8–5.3)
LYMPHOCYTES # BLD AUTO: 0.8 10E3/UL (ref 0.8–5.3)
LYMPHOCYTES NFR BLD AUTO: 10 %
LYMPHOCYTES NFR BLD AUTO: 4 %
MCH RBC QN AUTO: 17.3 PG (ref 26.5–33)
MCH RBC QN AUTO: 18.8 PG (ref 26.5–33)
MCHC RBC AUTO-ENTMCNC: 26.4 G/DL (ref 31.5–36.5)
MCHC RBC AUTO-ENTMCNC: 27.8 G/DL (ref 31.5–36.5)
MCV RBC AUTO: 65 FL (ref 78–100)
MCV RBC AUTO: 68 FL (ref 78–100)
MONOCYTES # BLD AUTO: 0.8 10E3/UL (ref 0–1.3)
MONOCYTES # BLD AUTO: 1 10E3/UL (ref 0–1.3)
MONOCYTES NFR BLD AUTO: 13 %
MONOCYTES NFR BLD AUTO: 7 %
NEUTROPHILS # BLD AUTO: 10 10E3/UL (ref 1.6–8.3)
NEUTROPHILS # BLD AUTO: 6 10E3/UL (ref 1.6–8.3)
NEUTROPHILS NFR BLD AUTO: 76 %
NEUTROPHILS NFR BLD AUTO: 88 %
NRBC # BLD AUTO: 0 10E3/UL
NRBC # BLD AUTO: 0 10E3/UL
NRBC BLD AUTO-RTO: 0 /100
NRBC BLD AUTO-RTO: 0 /100
P AXIS - MUSE: NORMAL DEGREES
PLATELET # BLD AUTO: 304 10E3/UL (ref 150–450)
PLATELET # BLD AUTO: 322 10E3/UL (ref 150–450)
POTASSIUM BLD-SCNC: 4 MMOL/L (ref 3.5–5)
PR INTERVAL - MUSE: NORMAL MS
PROT SERPL-MCNC: 6.2 G/DL (ref 6–8)
QRS DURATION - MUSE: 82 MS
QT - MUSE: 394 MS
QTC - MUSE: 443 MS
R AXIS - MUSE: 61 DEGREES
RBC # BLD AUTO: 4 10E6/UL (ref 3.8–5.2)
RBC # BLD AUTO: 4.31 10E6/UL (ref 3.8–5.2)
RETICS # AUTO: 0.08 10E6/UL (ref 0.01–0.11)
RETICS/RBC NFR AUTO: 1.8 % (ref 0.8–2.7)
SODIUM SERPL-SCNC: 138 MMOL/L (ref 136–145)
SYSTOLIC BLOOD PRESSURE - MUSE: NORMAL MMHG
T AXIS - MUSE: 100 DEGREES
UNIT ABO/RH: NORMAL
UNIT NUMBER: NORMAL
UNIT STATUS: NORMAL
UNIT TYPE ISBT: 5100
VENTRICULAR RATE- MUSE: 76 BPM
WBC # BLD AUTO: 11.4 10E3/UL (ref 4–11)
WBC # BLD AUTO: 7.9 10E3/UL (ref 4–11)

## 2022-12-29 PROCEDURE — 80053 COMPREHEN METABOLIC PANEL: CPT | Performed by: STUDENT IN AN ORGANIZED HEALTH CARE EDUCATION/TRAINING PROGRAM

## 2022-12-29 PROCEDURE — 250N000013 HC RX MED GY IP 250 OP 250 PS 637: Performed by: INTERNAL MEDICINE

## 2022-12-29 PROCEDURE — 36415 COLL VENOUS BLD VENIPUNCTURE: CPT | Performed by: HOSPITALIST

## 2022-12-29 PROCEDURE — 250N000013 HC RX MED GY IP 250 OP 250 PS 637: Performed by: STUDENT IN AN ORGANIZED HEALTH CARE EDUCATION/TRAINING PROGRAM

## 2022-12-29 PROCEDURE — 36415 COLL VENOUS BLD VENIPUNCTURE: CPT | Performed by: STUDENT IN AN ORGANIZED HEALTH CARE EDUCATION/TRAINING PROGRAM

## 2022-12-29 PROCEDURE — 120N000001 HC R&B MED SURG/OB

## 2022-12-29 PROCEDURE — 99223 1ST HOSP IP/OBS HIGH 75: CPT | Mod: AI | Performed by: STUDENT IN AN ORGANIZED HEALTH CARE EDUCATION/TRAINING PROGRAM

## 2022-12-29 PROCEDURE — 85045 AUTOMATED RETICULOCYTE COUNT: CPT | Performed by: STUDENT IN AN ORGANIZED HEALTH CARE EDUCATION/TRAINING PROGRAM

## 2022-12-29 PROCEDURE — 250N000013 HC RX MED GY IP 250 OP 250 PS 637: Performed by: HOSPITALIST

## 2022-12-29 PROCEDURE — 99207 PR APP CREDIT; MD BILLING SHARED VISIT: CPT | Performed by: HOSPITALIST

## 2022-12-29 PROCEDURE — 83550 IRON BINDING TEST: CPT | Performed by: STUDENT IN AN ORGANIZED HEALTH CARE EDUCATION/TRAINING PROGRAM

## 2022-12-29 PROCEDURE — G0378 HOSPITAL OBSERVATION PER HR: HCPCS

## 2022-12-29 PROCEDURE — 85025 COMPLETE CBC W/AUTO DIFF WBC: CPT | Performed by: STUDENT IN AN ORGANIZED HEALTH CARE EDUCATION/TRAINING PROGRAM

## 2022-12-29 PROCEDURE — 82272 OCCULT BLD FECES 1-3 TESTS: CPT | Performed by: STUDENT IN AN ORGANIZED HEALTH CARE EDUCATION/TRAINING PROGRAM

## 2022-12-29 PROCEDURE — P9016 RBC LEUKOCYTES REDUCED: HCPCS

## 2022-12-29 PROCEDURE — 85018 HEMOGLOBIN: CPT | Performed by: HOSPITALIST

## 2022-12-29 PROCEDURE — 82728 ASSAY OF FERRITIN: CPT | Performed by: STUDENT IN AN ORGANIZED HEALTH CARE EDUCATION/TRAINING PROGRAM

## 2022-12-29 PROCEDURE — 250N000011 HC RX IP 250 OP 636: Performed by: HOSPITALIST

## 2022-12-29 PROCEDURE — 250N000011 HC RX IP 250 OP 636: Performed by: PHYSICIAN ASSISTANT

## 2022-12-29 PROCEDURE — C9113 INJ PANTOPRAZOLE SODIUM, VIA: HCPCS | Performed by: PHYSICIAN ASSISTANT

## 2022-12-29 RX ORDER — ACETAMINOPHEN 325 MG/1
650 TABLET ORAL EVERY 4 HOURS PRN
Status: DISCONTINUED | OUTPATIENT
Start: 2022-12-29 | End: 2023-01-02 | Stop reason: HOSPADM

## 2022-12-29 RX ORDER — BISACODYL 5 MG
10 TABLET, DELAYED RELEASE (ENTERIC COATED) ORAL ONCE
Status: COMPLETED | OUTPATIENT
Start: 2022-12-29 | End: 2022-12-29

## 2022-12-29 RX ORDER — DILTIAZEM HYDROCHLORIDE 120 MG/1
240 CAPSULE, COATED, EXTENDED RELEASE ORAL DAILY
Status: DISCONTINUED | OUTPATIENT
Start: 2022-12-29 | End: 2023-01-02 | Stop reason: HOSPADM

## 2022-12-29 RX ORDER — ACETAMINOPHEN 325 MG/1
650 TABLET ORAL EVERY 6 HOURS PRN
Status: DISCONTINUED | OUTPATIENT
Start: 2022-12-29 | End: 2023-01-02 | Stop reason: HOSPADM

## 2022-12-29 RX ORDER — LIDOCAINE 40 MG/G
CREAM TOPICAL
Status: DISCONTINUED | OUTPATIENT
Start: 2022-12-29 | End: 2023-01-02 | Stop reason: HOSPADM

## 2022-12-29 RX ORDER — SERTRALINE HYDROCHLORIDE 25 MG/1
100 TABLET, FILM COATED ORAL AT BEDTIME
Status: DISCONTINUED | OUTPATIENT
Start: 2022-12-29 | End: 2022-12-29

## 2022-12-29 RX ORDER — POLYETHYLENE GLYCOL 3350 17 G/17G
238 POWDER, FOR SOLUTION ORAL ONCE
Status: COMPLETED | OUTPATIENT
Start: 2022-12-29 | End: 2022-12-29

## 2022-12-29 RX ORDER — SERTRALINE HYDROCHLORIDE 100 MG/1
100 TABLET, FILM COATED ORAL AT BEDTIME
Status: DISCONTINUED | OUTPATIENT
Start: 2022-12-29 | End: 2023-01-02 | Stop reason: HOSPADM

## 2022-12-29 RX ORDER — FUROSEMIDE 10 MG/ML
40 INJECTION INTRAMUSCULAR; INTRAVENOUS ONCE
Status: COMPLETED | OUTPATIENT
Start: 2022-12-29 | End: 2022-12-29

## 2022-12-29 RX ORDER — FUROSEMIDE 40 MG
40 TABLET ORAL DAILY
Status: DISCONTINUED | OUTPATIENT
Start: 2022-12-29 | End: 2023-01-02 | Stop reason: HOSPADM

## 2022-12-29 RX ORDER — ATORVASTATIN CALCIUM 10 MG/1
20 TABLET, FILM COATED ORAL DAILY
Status: DISCONTINUED | OUTPATIENT
Start: 2022-12-29 | End: 2023-01-02 | Stop reason: HOSPADM

## 2022-12-29 RX ORDER — LOSARTAN POTASSIUM 25 MG/1
25 TABLET ORAL DAILY
Status: DISCONTINUED | OUTPATIENT
Start: 2022-12-29 | End: 2023-01-02 | Stop reason: HOSPADM

## 2022-12-29 RX ADMIN — POLYETHYLENE GLYCOL 3350 238 G: 17 POWDER, FOR SOLUTION ORAL at 16:48

## 2022-12-29 RX ADMIN — PANTOPRAZOLE SODIUM 40 MG: 40 INJECTION, POWDER, FOR SOLUTION INTRAVENOUS at 14:02

## 2022-12-29 RX ADMIN — DILTIAZEM HYDROCHLORIDE 240 MG: 120 CAPSULE, COATED, EXTENDED RELEASE ORAL at 11:16

## 2022-12-29 RX ADMIN — LOSARTAN POTASSIUM 25 MG: 25 TABLET, FILM COATED ORAL at 12:28

## 2022-12-29 RX ADMIN — ATORVASTATIN CALCIUM 20 MG: 10 TABLET, FILM COATED ORAL at 21:18

## 2022-12-29 RX ADMIN — FUROSEMIDE 40 MG: 10 INJECTION, SOLUTION INTRAMUSCULAR; INTRAVENOUS at 10:50

## 2022-12-29 RX ADMIN — ACETAMINOPHEN 650 MG: 325 TABLET ORAL at 16:01

## 2022-12-29 RX ADMIN — BISACODYL 10 MG: 5 TABLET, COATED ORAL at 13:30

## 2022-12-29 RX ADMIN — PANTOPRAZOLE SODIUM 40 MG: 40 INJECTION, POWDER, FOR SOLUTION INTRAVENOUS at 21:19

## 2022-12-29 RX ADMIN — SERTRALINE HYDROCHLORIDE 100 MG: 100 TABLET, FILM COATED ORAL at 21:18

## 2022-12-29 ASSESSMENT — ACTIVITIES OF DAILY LIVING (ADL)
ADLS_ACUITY_SCORE: 36
USE_OF_HEARING_ASSISTIVE_DEVICES: BILATERAL HEARING AIDS
ADLS_ACUITY_SCORE: 35
ADLS_ACUITY_SCORE: 36
DESCRIBE_HEARING_LOSS: BILATERAL HEARING LOSS
ADLS_ACUITY_SCORE: 35
FALL_HISTORY_WITHIN_LAST_SIX_MONTHS: NO
HEARING_DIFFICULTY_OR_DEAF: YES
ADLS_ACUITY_SCORE: 35
ADLS_ACUITY_SCORE: 35
ADLS_ACUITY_SCORE: 36
ADLS_ACUITY_SCORE: 35
ADLS_ACUITY_SCORE: 35
CHANGE_IN_FUNCTIONAL_STATUS_SINCE_ONSET_OF_CURRENT_ILLNESS/INJURY: NO
ADLS_ACUITY_SCORE: 35

## 2022-12-29 NOTE — PHARMACY-ADMISSION MEDICATION HISTORY
Pharmacy Note - Admission Medication History    Pertinent Provider Information:      ______________________________________________________________________    Prior To Admission (PTA) med list completed and updated in EMR.       PTA Med List   Medication Sig Last Dose     atorvastatin (LIPITOR) 20 MG tablet [ATORVASTATIN (LIPITOR) 20 MG TABLET] TAKE 1 TABLET BY MOUTH EVERYDAY AT BEDTIME 12/27/2022 at PM     cholecalciferol, vitamin D3, (VITAMIN D3) 2,000 unit Tab [CHOLECALCIFEROL, VITAMIN D3, (VITAMIN D3) 2,000 UNIT TAB] Take 1 tablet by mouth daily. 12/28/2022 at AM     diltiazem ER COATED BEADS (CARDIZEM CD/CARTIA XT) 240 MG 24 hr capsule TAKE 1 CAPSULE BY MOUTH EVERY DAY 12/28/2022 at AM     ELIQUIS ANTICOAGULANT 5 MG tablet TAKE 1 TABLET BY MOUTH TWICE A DAY 12/28/2022 at AM     furosemide (LASIX) 20 MG tablet Take 2 tablets (40 mg) by mouth daily 12/28/2022 at AM     losartan (COZAAR) 25 MG tablet TAKE 1 TABLET BY MOUTH EVERY DAY 12/27/2022 at Takes in PM     sertraline (ZOLOFT) 100 MG tablet Take 1 tablet (100 mg) by mouth At Bedtime 12/27/2022 at PM       Information source(s): Patient and CareEverywhere/[facility]  Method of interview communication: in-person    Summary of Changes to PTA Med List  New: none  Discontinued: omeprazole  Changed: none    Patient was asked about OTC/herbal products specifically.  PTA med list reflects this.    In the past week, patient estimated taking medication this percent of the time:  greater than 90%.    Allergies were reviewed, assessed, and updated with the patient.      Patient does not use any multi-dose medications prior to admission.    The information provided in this note is only as accurate as the sources available at the time of the update(s).    Thank you for the opportunity to participate in the care of this patient.    Petty Todd RPH  12/28/2022 7:22 PM

## 2022-12-29 NOTE — ED PROVIDER NOTES
Emergency Department Midlevel Supervisory Note     I personally saw the patient and performed a substantive portion of the visit including all aspects of the medical decision making.    ED Course:  6:30 PM  Lucia Oh MD, [facility] Resident staffed patient with me. I agree with their assessment and plan of management, and I will see the patient.  9:30 PM I met with the patient to introduce myself, gather additional history, perform my initial exam, and discuss the plan.     Brief HPI:     Marva Gaines is a 87 year old female who presents for evaluation of abnormal labs.    The patient was seen by her cardiologist today, diagnosed with HFpEF. On laboratory workup she was found to have a hemoglobin of 6.5 and was directed to the ED for evaluation.    The patient reports feeling chilled and several weeks of fatigue. She has not had any bloody or black stool, but has had some light blood with wiping for awhile now. She denies chest pain and lightheadedness.    Brief Physical Exam: /58   Pulse 74   Temp 98.1  F (36.7  C) (Oral)   Resp 27   Wt 60.8 kg (134 lb)   SpO2 100%   BMI 23.74 kg/m    Constitutional:  Alert, in no acute distress  EYES: Conjunctivae clear  HENT:  Atraumatic, normocephalic  Respiratory:  Respirations even, unlabored, in no acute respiratory distress  Cardiovascular:  Regular rate and rhythm, good peripheral perfusion  GI: Soft, nondistended, nontender, no palpable masses, no rebound, no guarding   Musculoskeletal:  No edema. No cyanosis. Range of motion major extremities intact.    Integument: Warm, Dry, No erythema, No rash.   Neurologic:  Alert & oriented, no focal deficits noted  Psych: Normal mood and affect     MDM:  ED Course as of 12/28/22 2319   Wed Dec 28, 2022   1846 LILI supervisory note: 87-year-old who presents with fatigue, exertional dyspnea, diagnosed with heart failure in clinic by cardiology, straining labs show anemia at 6.5.  On arrival she is  hypoxic at 86% on arrival with normal blood pressure, heart rate.  She is on 2 L nasal cannula oxygen and now at 90%.  Plan to get type and screen and transfuse.  Hypoxia etiology unclear at this time, CHF, PE, pneumonia, anemia on the differential.  Anemia is likely due to GI losses, she has had intermittent long-term bloody stools.   1945 XR Chest 2 Views  No change. Heart size mildly enlarged. Lungs are clear. Some subtle blunting of the costophrenic angles could be due to very tiny effusions or some atelectasis. Stable since prior study.   Patient is a bit hypoxic here, she has been on room air, unclear if initial reading was from exertion or poor waveform.  Awaiting transfusion given her multiple antibodies found on her type and screen.  Admitted for anemia with complex anticoagulation status, possible need for EGD/colonoscopy.    1. Anemia, unspecified type    2. Hematochezia    3. Hypoxia        Labs and Imaging:  Results for orders placed or performed during the hospital encounter of 12/28/22   XR Chest 2 Views    Impression    IMPRESSION: No change. Heart size mildly enlarged. Lungs are clear. Some subtle blunting of the costophrenic angles could be due to very tiny effusions or some atelectasis. Stable since prior study.   B-Type Natriuretic Peptide ([facility] Only)   Result Value Ref Range     (H) 0 - 167 pg/mL   Symptomatic Influenza A/B & SARS-CoV2 (COVID-19) Virus PCR Multiplex Nasopharyngeal    Specimen: Nasopharyngeal; Swab   Result Value Ref Range    Influenza A PCR Negative Negative    Influenza B PCR Negative Negative    RSV PCR Negative Negative    SARS CoV2 PCR Negative Negative   Extra Blood Bank Purple Top Tube   Result Value Ref Range    Hold Specimen [facility]    Basic metabolic panel   Result Value Ref Range    Sodium 141 136 - 145 mmol/L    Potassium 3.3 (L) 3.5 - 5.0 mmol/L    Chloride 109 (H) 98 - 107 mmol/L    Carbon Dioxide (CO2) 21 (L) 22 - 31 mmol/L    Anion Gap 11 5 - 18 mmol/L     Urea Nitrogen 27 8 - 28 mg/dL    Creatinine 1.20 (H) 0.60 - 1.10 mg/dL    Calcium 9.0 8.5 - 10.5 mg/dL    Glucose 105 70 - 125 mg/dL    GFR Estimate 44 (L) >60 mL/min/1.73m2   Result Value Ref Range    Magnesium 1.9 1.8 - 2.6 mg/dL   Adult Type and Screen   Result Value Ref Range    ABO/RH(D) O POS     Antibody Screen Positive (A) Negative    SPECIMEN EXPIRATION DATE 20221231235900    Antibody identification   Result Value Ref Range    Antibody Identification Anti-[place]     SPECIMEN EXPIRATION DATE [id number]    Red Cell Antigen Typing Non ABO:   Result Value Ref Range    K Antigen Type Negative     SPECIMEN EXPIRATION DATE [id number]      I have reviewed the relevant laboratory and radiology studies    Procedures:  I was present for the key portions of this procedure: none      Critical Care     30 minutes of critical care for blood loss anemia with hemoglobin <7 requiring transfusion, admission. critical care time was exclusive of separately billable procedures and treating other patients.      I, Marcin Loza, am serving as a scribe to document services personally performed by Lucas Viera MD based on my observations and the provider's statements to me.  I, Lucas Viera MD, attest that Marcin Loza is acting in a scribe capacity, has observed my performance of the services and has documented them in accordance with my direction.      Lucas Viera MD  [facility] EMERGENCY ROOM  [address] [address]  [phone]     Dangelo Viera MD  12/28/22 [id number]

## 2022-12-29 NOTE — H&P
[facility] MEDICINE ADMISSION HISTORY AND PHYSICAL     Assessment & Plan       Marva Gaines is a 87 year old woman with past medical history significant for anxiety/depression, hypercholesteremia, hypertension, permanent atrial fibrillation and recently diagnosed HFpEF who was referred to the hospital due to low hemoglobin around 6.5.      #Anemia  -Baseline hemoglobin around a year ago was 12  -Hemoglobin on presentation 6.5  -Patient reported noticing small amount of blood on paper towel after defecation.  -The patient blood pressure is normal (no tachycardic nevertheless she is on diltiazem)--> therefore I suspect that there is no acute brisk blood loss and this is more a slow drop in hemoglobin.  -Reported weight loss.       Plan:  [] Give 1 unit of PRBC (ordered by emergency department)  [] Hemoccult (refused rectal exam)  [] Iron studies  [] Continue to monitor hemoglobin.  [] Peripheral smear.  [] Check reticulocyte count.  [] Consult GI if Hemoccult is positive.      #CKD  -Creatinine around baseline.    Plan:  [] Continue to monitor.      #HFpEF  -Appears close to euvolemia on physical examination.  -Was on Lasix 40 mg daily.    Plan:  [] Hold diuresis for tonight.  [] We will most likely be able to resume diuresis tomorrow. (As long as there is no active source of bleeding and blood pressure is stable)      #Permanent atrial fibrillation  -On Eliquis at home.  -Diltiazem 250 mg daily    Plan:  [] Hold Eliquis until further evaluation of the cause of anemia.  [] Resume home diltiazem.    #Hypertension  -Was on losartan 25 mg daily.  -Blood pressures currently around 160/70    Plan:  [] Resume losartan         DVTP: Mechanical Prophylaxis/ Sequential Compression Devices  Code Status: No Order  Disposition: Inpatient   Expected LOS: 1 day  Goals for the hospitalization: Blood transfusion, possible work-up of anemia  Disposition Plan  Living facility     Expected Discharge Date:  12/30/2022                The patient's care was discussed with the Patient and Patient's Family.  Chief Complaint  dyspnea on exertion, fatigue     HISTORY     Marva Gaines is a 87 year old woman with past medical history significant for anxiety/depression, hypercholesteremia, hypertension, permanent atrial fibrillation and recently diagnosed HFpEF who was referred to the hospital due to low hemoglobin around 6.5.    Patient has been experiencing dyspnea of unclear etiology and generalized fatigue.  She was evaluated by cardiology on 12/2/2022 and was diagnosed with HFpEF and moderate mitral regurgitation.  She was started on Lasix 40 mg daily.    On 12/28/2022 she was following up with her cardiologist, during last clinic visit, her lab work was notable for low hemoglobin at 6.5.  Therefore she was instructed to go to the emergency department for further evaluation.     Patient reported chronic history of diarrhea for more than 20 years.  She has noticed some blood on the paper towel after defecation for the last year and a half.  She has never had a colonoscopy.  Reported some weight loss over the last year and a half, decreased appetite.  Over the last few months she has been experiencing dyspnea on exertion, fatigue, intermittent palpitation, cold intolerance.    Vitals on presentation: Blood pressure around 140/80, heart rate around 80, SPO2 99% on room air.  Labs significant for BNP around 570, creatinine 1.2, hemoglobin 6.5    Past Medical History     No past medical history on file.     Surgical History   No past surgical history on file.  Family History    Reviewed, and No family history on file.     Social History      Social History     Tobacco Use     Smoking status: Former     Smokeless tobacco: Never   Vaping Use     Vaping Use: Never used        Allergies     Allergies   Allergen Reactions     Hydrocodone-Acetaminophen Unknown     Prior to Admission Medications      Prior to Admission Medications    Prescriptions Last Dose Informant Patient Reported? Taking?   ELIQUIS ANTICOAGULANT 5 MG tablet 12/28/2022 at AM  No Yes   Sig: TAKE 1 TABLET BY MOUTH TWICE A DAY   atorvastatin (LIPITOR) 20 MG tablet 12/27/2022 at PM  No Yes   Sig: [ATORVASTATIN (LIPITOR) 20 MG TABLET] TAKE 1 TABLET BY MOUTH EVERYDAY AT BEDTIME   cholecalciferol, vitamin D3, (VITAMIN D3) 2,000 unit Tab 12/28/2022 at AM  Yes Yes   Sig: [CHOLECALCIFEROL, VITAMIN D3, (VITAMIN D3) 2,000 UNIT TAB] Take 1 tablet by mouth daily.   diltiazem ER COATED BEADS (CARDIZEM CD/CARTIA XT) 240 MG 24 hr capsule 12/28/2022 at AM  No Yes   Sig: TAKE 1 CAPSULE BY MOUTH EVERY DAY   furosemide (LASIX) 20 MG tablet 12/28/2022 at AM  No Yes   Sig: Take 2 tablets (40 mg) by mouth daily   losartan (COZAAR) 25 MG tablet 12/27/2022 at Takes in PM  No Yes   Sig: TAKE 1 TABLET BY MOUTH EVERY DAY   sertraline (ZOLOFT) 100 MG tablet 12/27/2022 at PM  No Yes   Sig: Take 1 tablet (100 mg) by mouth At Bedtime      Facility-Administered Medications: None      Review of Systems     A 12 point comprehensive review of systems was negative except as noted above in HPI.    PHYSICAL EXAMINATION       Vitals      Temp:  [98.1  F (36.7  C)] 98.1  F (36.7  C)  Pulse:  [66-92] 68  Resp:  [16-38] 25  BP: (134-172)/(48-86) 162/70  SpO2:  [86 %-100 %] 99 %    Examination     Physical Exam  Constitutional:       General: She is in acute distress.      Appearance: Normal appearance. She is toxic-appearing. She is not ill-appearing.   HENT:      Mouth/Throat:      Mouth: Mucous membranes are moist.   Cardiovascular:      Rate and Rhythm: Normal rate.      Heart sounds: No murmur heard.  Pulmonary:      Effort: Pulmonary effort is normal. No respiratory distress.      Breath sounds: Normal breath sounds. No wheezing.   Abdominal:      General: Abdomen is flat. There is no distension.      Palpations: Abdomen is soft.      Tenderness: There is no abdominal tenderness.   Musculoskeletal:          General: No swelling.   Skin:     General: Skin is warm and dry.   Neurological:      General: No focal deficit present.      Mental Status: She is alert.   Psychiatric:         Mood and Affect: Mood normal.         Behavior: Behavior normal.         Pertinent Lab     Most Recent 3 CBC's:Recent Labs   Lab Test 12/28/22 2057 12/28/22  1554 08/17/21  1629 05/17/18  1550   WBC 7.9  --  7.3 8.5   HGB 6.9* 6.5* 12.2 12.9   MCV 65*  --  90 88     --  262 287     Most Recent 3 BMP's:Recent Labs   Lab Test 12/28/22 2124 12/28/22  1554 12/08/22  1551    140 140   POTASSIUM 3.3* 3.9 3.7   CHLORIDE 109* 108* 108*   CO2 21* 22 22   BUN 27 29* 21   CR 1.20* 1.17* 1.11*   ANIONGAP 11 10 10   FAROOQ 9.0 9.3 9.3    126* 115     Most Recent 2 LFT's:Recent Labs   Lab Test 05/17/18  1550   AST 10   ALT 10   ALKPHOS 101   BILITOTAL 0.4         Pertinent Radiology     Radiology Results:   Recent Results (from the past 24 hour(s))   XR Chest 2 Views    Narrative    EXAM: XR CHEST 2 VIEWS  LOCATION: [facility]  DATE/TIME: 12/28/2022 7:25 PM    INDICATION: Hypoxia  COMPARISON: 12/02/2022      Impression    IMPRESSION: No change. Heart size mildly enlarged. Lungs are clear. Some subtle blunting of the costophrenic angles could be due to very tiny effusions or some atelectasis. Stable since prior study.         Advance Care Planning        MAKI WOODARD MD  Hospitalist  [facility]  [facility]   Phone: #[phone]

## 2022-12-29 NOTE — UTILIZATION REVIEW
"Admission Status; Secondary Review Determination     Under the authority of the Utilization Management Committee, the utilization review process indicated a secondary review on Marva Gaines.  The review outcome is based on review of the medical records, discussions with staff, and applying clinical experience noted on the date of the review.     (x) Observation Status Appropriate - This patient does not meet hospital inpatient criteria and is placed in observation status. If this patient's primary payer is Medicare and was admitted as an inpatient, Condition Code 44 should be used and patient status changed to \"observation\".     RATIONALE FOR DETERMINATION   Marva Gaines is an 87 yr old female with anxiety/depression, HLD, HTN and permanent Afib with recent HFpEF who was sent to ED with hgb 6.5.  Baseline 12 one year ago.  Hemodynamically stable and no obvious reports/findings of acute bleeding and suspected chronic.  Transfused 2 unit pRBC.  Awaiting GI consultation but anticipating discharge soon.    The severity of illness, intensity of service provided, expected LOS and risk for adverse outcome make the care appropriate for further observation; however, doesn't meet criteria for hospital inpatient admission. Dr Torres notified of this determination and agrees with downgrade of status.      The information on this document is developed by the utilization review team in order for the business office to ensure compliance.  This only denotes the appropriateness of proper admission status and does not reflect the quality of care rendered.         The definitions of Inpatient Status and Observation Status used in making the determination above are those provided in the CMS Coverage Manual, Chapter 1 and Chapter 6, section 70.4.      Sincerely,  Rose Queen MD  Utilization Review  Physician Advisor  [facility]    "

## 2022-12-29 NOTE — CONSULTS
"GI CONSULT NOTE      Name: Marva Gaines  Medical Record #: [id number]  YOB: 1935  Date of Admission: 12/28/2022  Date/Time: 12/29/2022/10:53 AM     CHIEF COMPLAINT: Anemia    HISTORY OF PRESENT ILLNESS: We were asked to see Marva Gaines by Dr. Torres for anemia, on Eliquis. History obtained from patient, daughter Veronica (at bedside) and chart review.      Marva Gaines is a 87 year old year old female with history of heart failure with preserved EF, HTN, atrial fibrillation, PVCs, on Eliquis, anxiety, depression. She was seen by her cardiology CNP yesterday, with complaints of dyspnea on exertion. Hemoglobin was 6.5, and she was referred to the ED. Repeat hemoglobin 6.9, MCV 65, normal WBC, BUN, LFTs. Last known hemoglobin was 12.2 8/2021. FOBT negative. Iron studies pending. She received 1 unit PRBCs. Repeat hemoglobin pending.     She reports she has had small amounts of bright red blood on the tissue paper after wiping for over 1 year. She has had chronic diarrhea for over 20 years, reporting 3-5 loose stools a day, worse in the AM. Her daughter reports since her cholecystectomy in the 1990s. She has never had a colonoscopy. Denies black stools. She denies abdominal pain, but has \"hunger pangs\" after eating occasionally. She denies NSAID use or significant alcohol use.      REVIEW OF SYSTEMS (ROS): Complete review of systems negative other than listed in HPI.    PAST MEDICAL HISTORY:  No past medical history on file.     FAMILY HISTORY:  No family history on file.    SOCIAL HISTORY:  Social History     Socioeconomic History     Marital status: Single     Spouse name: Not on file     Number of children: Not on file     Years of education: Not on file     Highest education level: Not on file   Occupational History     Not on file   Tobacco Use     Smoking status: Former     Smokeless tobacco: Never   Vaping Use     Vaping Use: Never used   Substance and Sexual Activity     Alcohol use: Not on " "file     Drug use: Not on file     Sexual activity: Not on file   Other Topics Concern     Not on file   Social History Narrative     Not on file     Social Determinants of Health     Financial Resource Strain: Not on file   Food Insecurity: Not on file   Transportation Needs: Not on file   Physical Activity: Not on file   Stress: Not on file   Social Connections: Not on file   Intimate Partner Violence: Not on file   Housing Stability: Not on file     MEDICATIONS PRIOR TO ADMISSION:   Medications Prior to Admission   Medication Sig Dispense Refill Last Dose     atorvastatin (LIPITOR) 20 MG tablet [ATORVASTATIN (LIPITOR) 20 MG TABLET] TAKE 1 TABLET BY MOUTH EVERYDAY AT BEDTIME 90 tablet 2 12/27/2022 at PM     cholecalciferol, vitamin D3, (VITAMIN D3) 2,000 unit Tab [CHOLECALCIFEROL, VITAMIN D3, (VITAMIN D3) 2,000 UNIT TAB] Take 1 tablet by mouth daily.   12/28/2022 at AM     diltiazem ER COATED BEADS (CARDIZEM CD/CARTIA XT) 240 MG 24 hr capsule TAKE 1 CAPSULE BY MOUTH EVERY DAY 90 capsule 1 12/28/2022 at AM     ELIQUIS ANTICOAGULANT 5 MG tablet TAKE 1 TABLET BY MOUTH TWICE A  tablet 1 12/28/2022 at AM     furosemide (LASIX) 20 MG tablet Take 2 tablets (40 mg) by mouth daily 180 tablet 3 12/28/2022 at AM     losartan (COZAAR) 25 MG tablet TAKE 1 TABLET BY MOUTH EVERY DAY 90 tablet 3 12/27/2022 at Takes in PM     sertraline (ZOLOFT) 100 MG tablet Take 1 tablet (100 mg) by mouth At Bedtime 90 tablet 0 12/27/2022 at PM        ALLERGIES: Hydrocodone-acetaminophen    PHYSICAL EXAM:    BP (!) 183/89 (BP Location: Left arm)   Pulse 96   Temp 97.9  F (36.6  C) (Oral)   Resp 18   Ht 1.6 m (5' 2.99\")   Wt 60.8 kg (134 lb 0.6 oz)   SpO2 93%   BMI 23.75 kg/m      GENERAL: Pleasant, no obvious distress. Frail, elderly female. Daughter Veronica at bedside.   NECK: Supple without adenopathy  EYES: No scleral icterus  LUNGS: Clear to auscultation bilaterally  HEART: Regular rate and rhythm, S1 and S2 present, no lower " extremity edema  ABDOMEN: Non-distended. Positive bowel sounds. Soft, non-tender, no guarding/rebound/mass, no obvious organomegaly  MUSKULOSKELETAL:  Warm and well perfused, moves all extremities well  SKIN: No jaundice  NEUROLOGIC: Alert and oriented  PSYCHIATRIC: Normal affect    LAB DATA:  CMP Results:   Recent Labs   Lab Test 12/29/22  0919 12/28/22  2124 12/28/22  1554 08/17/21  1629 05/17/18  1550    141 140   < > 143   POTASSIUM 4.0 3.3* 3.9   < > 4.3   CHLORIDE 111* 109* 108*   < > 108*   CO2 17* 21* 22   < > 23   ANIONGAP 10 11 10   < > 12   * 105 126*   < > 108   BUN 24 27 29*   < > 33*   CR 1.20* 1.20* 1.17*   < > 1.40*   BILITOTAL 0.9  --   --   --  0.4   ALKPHOS 74  --   --   --  101   ALT <9  --   --   --  10   AST 11  --   --   --  10    < > = values in this interval not displayed.      CBC  Recent Labs   Lab 12/28/22 2057 12/28/22  1554   WBC 7.9  --    RBC 4.00  --    HGB 6.9* 6.5*   HCT 26.1*  --    MCV 65*  --    MCH 17.3*  --    MCHC 26.4*  --    RDW 17.8*  --      --      IMAGING:  Chest xray reviewed.     ASSESSMENT:  1. Microcytic anemia  87 year old female with history of atrial fibrillation on Eliquis, HFpEF, HTN, anxiety, depression who presenting with hemoglobin of 6.5, now s/p 1 unit PRBCs, recheck pending. She has had mild amounts of BRBPR after wiping for over 1 year. BUN/creatinine points against taveras upper GI bleed. Suspect slow GI blood loss in the setting of anticoagulation, differential includes AVMs, erosive esophagitis or gastritis, PUD, malignancy. Recommend EGD and colonoscopy for further evaluation. After some discussion, patient is agreeable.     PLAN:  1. Clear liquid diet today  2. NPO midnight for colon and EGD tomorrow, timing TBD but likely early afternoon.  3. Colon prep this afternoon  4. Trend hemoglobin, transfuse per primary team  5. Hold Eliquis.   6. BID PPI     Discussed with Dr. Driscoll who will also visit with the patient.     TIME SPENT:  55 min including chart review, patient interview and care coordination.     The patient was seen and evaluated in conjunction with Felisha Palomo PAC.   86 yo woman with afib on eliquis presenting with SOB and found to have iron deficiency anemia. No overt GI blood loss, but GI evaluation for anemia as first step seems appropriate. Will plan for EGD and Colonoscopy tomorrow. Prep today.  Thank you    Steven Driscoll MD  12/29/20223:18 PM  [facility]                                                   Felisha Palomo PA-C  Thank you for the opportunity to participate in the care of this patient.   Please feel free to call me with any questions or concerns.  Phone number [phone].

## 2022-12-29 NOTE — ED PROVIDER NOTES
EMERGENCY DEPARTMENT ENCOUNTER      NAME: Marva Gaines  AGE: 87 year old female  YOB: 1935  MRN: [id number]  EVALUATION DATE & TIME: 12/28/2022  6:03 PM    PCP: Sabas Austin    ED PROVIDER: Lucia Oh MD      Chief Complaint   Patient presents with     Abnormal Labs         FINAL IMPRESSION:  1. Anemia, unspecified type    2. Hematochezia    3. Hypoxia          ED COURSE & MEDICAL DECISION MAKING:    Pertinent Labs & Imaging studies reviewed. (See chart for details)    Marva Ganies is a 87 year old female with a pertinent history of A fib on apixiban, new diagnosis of HFpEF who presents to this ED for evaluation of hemoglobin of 6.5.      Patient was being seen by her cardiologist, new diagnosis of HFpEF, and on laboratory work-up was found to have a hemoglobin of 6.5.  She was directed to go to the ED for evaluation.  Patient reports feeling fatigued, cold for many weeks.  Patient reports years of diarrhea with intermittent bright red blood per rectum, or blood noticed last few weeks when wiping, no clots.  She has not had colonoscopy or work-up for the symptoms previously.    Upon presentation to the ED she was initially hypoxic at 86% on room air, otherwise vitally stable, /58. Initially was on 2 L O2 satting at mid 90s% and then weaned to RA and continues to oxygenate well, 100% on RA.     Anemia likely secondary to known ongoing bleeding from rectum.  Differential includes hemorrhoids, malignancy, peptic ulcer.  Unclear etiology of hypoxia, though resolved quickly which is reassuring.  Patient is otherwise asymptomatic. EKG showing A. fib.  With PVC, no significant changes from previous.  Chest x-ray with no acute changes. Negative for influenza A/B COVID-19, RSV. BMP significant for K of 3.3, Cr 1.2. Baseline Cr approx 1.1. Magnesium pending on sign out. .  Type and screen ordered.  Consent for blood transfusion done and patient pending 1 unit of PRBC. Oral potassium 40  mEq given. Patient requesting home dose of sertraline 100mg and given. Patient to be admitted. Signed out to accepting physician, Dr. Pemberton.     At the conclusion of the encounter I discussed the results of all of the tests and the disposition. The questions were answered. The patient or family acknowledged understanding and was agreeable with the care plan.       MEDICATIONS GIVEN IN THE EMERGENCY:  Medications   acetaminophen (TYLENOL) tablet 650 mg (has no administration in time range)   potassium chloride ER (KLOR-CON M) CR tablet 40 mEq (has no administration in time range)   sertraline (ZOLOFT) tablet 100 mg (has no administration in time range)       NEW PRESCRIPTIONS STARTED AT TODAY'S ER VISIT  New Prescriptions    No medications on file          =================================================================    HPI    Patient information was obtained from: Patient    Use of : Aminata        Marva Gaines is a 87 year old female with a pertinent history of A fib on apixiban, new diagnosis of HFpEF who presents to this ED for evaluation of hemoglobin of 6.5.    Patient was being seen by her cardiologist, new diagnosis of HFpEF.  On laboratory work-up was found to have a hemoglobin of 6.5 and was directed to go to the ED for evaluation.  Patient reports feeling fatigued, cold for many weeks.  Patient reports years of diarrhea with intermittent bright red blood per rectum, or blood noticed last few weeks when wiping, no clots.  Reports no chest pain.  No shortness of breath.  No cough.  Has had increased palpitations.  No lightheadedness.  No syncopal events, no recent falls or other trauma.  No hematuria, no dysuria, no nausea/vomiting.  She does not frequently present for medical care, has not had a colonoscopy, no previous work-up for diarrhea or hematochezia.  No history of constipation/straining.      REVIEW OF SYSTEMS   Review of Systems   Constitutional: Positive for fatigue. Negative for  diaphoresis and fever.   HENT: Negative for congestion, rhinorrhea and sore throat.    Respiratory: Negative for cough, chest tightness, shortness of breath and wheezing.    Cardiovascular: Positive for palpitations. Negative for chest pain and leg swelling.   Gastrointestinal: Positive for blood in stool and diarrhea. Negative for abdominal distention, abdominal pain, constipation, nausea, rectal pain and vomiting.   Genitourinary: Negative for dysuria and hematuria.   Neurological: Negative for light-headedness and headaches.        PAST MEDICAL HISTORY:  No past medical history on file.    PAST SURGICAL HISTORY:  No past surgical history on file.        CURRENT MEDICATIONS:    atorvastatin (LIPITOR) 20 MG tablet  cholecalciferol, vitamin D3, (VITAMIN D3) 2,000 unit Tab  diltiazem ER COATED BEADS (CARDIZEM CD/CARTIA XT) 240 MG 24 hr capsule  ELIQUIS ANTICOAGULANT 5 MG tablet  furosemide (LASIX) 20 MG tablet  losartan (COZAAR) 25 MG tablet  sertraline (ZOLOFT) 100 MG tablet        ALLERGIES:  Allergies   Allergen Reactions     Hydrocodone-Acetaminophen Unknown       FAMILY HISTORY:  No family history on file.    SOCIAL HISTORY:   Social History     Socioeconomic History     Marital status: Single   Tobacco Use     Smoking status: Former     Smokeless tobacco: Never   Vaping Use     Vaping Use: Never used       VITALS:  /58   Pulse 74   Temp 98.1  F (36.7  C) (Oral)   Resp 27   Wt 60.8 kg (134 lb)   SpO2 100%   BMI 23.74 kg/m      PHYSICAL EXAM    Constitutional: Well developed, Well nourished, NAD, comfortable sitting up in bed  HENT: Normocephalic, Atraumatic, Bilateral external ears normal, Oropharynx normal, mucous membranes moist, Nose normal. Neck- Normal range of motion, No tenderness, Supple, No stridor.   Eyes: PERRL, EOMI, Conjunctiva normal, No discharge.   Respiratory: Normal breath sounds, No respiratory distress, No wheezing, Speaks full sentences easily. No cough.  Cardiovascular:  Irregularly irregular rhythm, No murmurs, No rubs, No gallops.   GI: No excessive obesity. Bowel sounds normal, Soft, No tenderness, No masses  Musculoskeletal: 2+ DP pulses. No edema. Good range of motion in all major joints. No tenderness to palpation or major deformities noted.   Integument: Warm, Dry, No erythema, No rash. No petechiae.  Neurologic: Alert & oriented x 3, Normal motor function, Normal sensory function, No focal deficits noted.   Psychiatric: Affect normal, Judgment normal, Mood normal. Cooperative.      LAB:  All pertinent labs reviewed and interpreted.  Results for orders placed or performed during the hospital encounter of 12/28/22   XR Chest 2 Views    Impression    IMPRESSION: No change. Heart size mildly enlarged. Lungs are clear. Some subtle blunting of the costophrenic angles could be due to very tiny effusions or some atelectasis. Stable since prior study.   B-Type Natriuretic Peptide ([facility] Only)   Result Value Ref Range     (H) 0 - 167 pg/mL   Symptomatic Influenza A/B & SARS-CoV2 (COVID-19) Virus PCR Multiplex Nasopharyngeal    Specimen: Nasopharyngeal; Swab   Result Value Ref Range    Influenza A PCR Negative Negative    Influenza B PCR Negative Negative    RSV PCR Negative Negative    SARS CoV2 PCR Negative Negative   Extra Blood Bank Purple Top Tube   Result Value Ref Range    Hold Specimen [facility]    Basic metabolic panel   Result Value Ref Range    Sodium 141 136 - 145 mmol/L    Potassium 3.3 (L) 3.5 - 5.0 mmol/L    Chloride 109 (H) 98 - 107 mmol/L    Carbon Dioxide (CO2) 21 (L) 22 - 31 mmol/L    Anion Gap 11 5 - 18 mmol/L    Urea Nitrogen 27 8 - 28 mg/dL    Creatinine 1.20 (H) 0.60 - 1.10 mg/dL    Calcium 9.0 8.5 - 10.5 mg/dL    Glucose 105 70 - 125 mg/dL    GFR Estimate 44 (L) >60 mL/min/1.73m2   Adult Type and Screen   Result Value Ref Range    ABO/RH(D) O POS     Antibody Screen Positive (A) Negative    SPECIMEN EXPIRATION DATE [id number]    Antibody identification    Result Value Ref Range    Antibody Identification Anti-Clarisse     SPECIMEN EXPIRATION DATE [id number]    Red Cell Antigen Typing Non ABO:   Result Value Ref Range    K Antigen Type Negative     SPECIMEN EXPIRATION DATE [id number]        RADIOLOGY:  Reviewed all pertinent imaging. Please see official radiology report.  XR Chest 2 Views   Final Result   IMPRESSION: No change. Heart size mildly enlarged. Lungs are clear. Some subtle blunting of the costophrenic angles could be due to very tiny effusions or some atelectasis. Stable since prior study.          EKG:    Performed at: 12/28/22  18:13:43    Impression: Atrial fibrillation with PVCs    Rate: 76  Rhythm: Atrial fibrillation  QRS Interval: 82  QTc Interval: 394/443  ST Changes: Nonspecific ST and T wave abnormalities   Comparison: No significant changes compared to 7/21/2013    I have independently reviewed and interpreted the EKG(s) documented above.      Lucia Oh MD  [facility] EMERGENCY ROOM  [address] [id number] [phone]     Lucia Oh MD  Resident  12/28/22 5835

## 2022-12-29 NOTE — PROGRESS NOTES
"PRIMARY DIAGNOSIS: \"GENERIC\" NURSING  OUTPATIENT/OBSERVATION GOALS TO BE MET BEFORE DISCHARGE:  1. ADLs back to baseline: Yes    2. Activity and level of assistance: Up with standby assistance.    3. Pain status: Pain free.    4. Return to near baseline physical activity: Yes     Discharge Planner Nurse   Safe discharge environment identified: Yes  Barriers to discharge: No       Entered by: Phu Tamayo RN 12/29/2022 6:34 AM     Please review provider order for any additional goals.   Nurse to notify provider when observation goals have been met and patient is ready for discharge.    1 unit PRBC administered for low Hgb. Stool sample sent to lab. Denies pain. SBA. Vitals q8hr. On room air. Potential discharge today.  "

## 2022-12-29 NOTE — PLAN OF CARE
Problem: Plan of Care - These are the overarching goals to be used throughout the patient stay.    Goal: Optimal Comfort and Wellbeing  Outcome: Progressing     Problem: Risk for Delirium  Goal: Improved Behavioral Control  Outcome: Progressing  Goal: Improved Sleep  Outcome: Progressing   Goal Outcome Evaluation: Patient A/O x4. PIV in right arm saline locked. Having episodes of nausea, but not vomiting. Patient has been c/o pain and discomfort. MD assigned to patient has been paged 3x with no response to either page. Hospitalist lead paged. Hemoglobin was 8.1 today. Patient scheduled to be moved into a private room. Will begin bowel prep, once moved

## 2022-12-29 NOTE — PROGRESS NOTES
Hospitalist Progress Note    Assessment/Plan    Principal Problem:    Hypoxia  Active Problems:    Hematochezia    Anemia, unspecified type  87-year-old with past medical history of anxiety hypercholesteremia A. fib, heart failure with preserved ejection fraction who was sent to the hospital for hemoglobin of 6.5    Anemia  -Hemoglobin of 6.5 when checked as an outpatient, in the ER to 6.9, patient takes Eliquis for A. Fib,  -Received 1 unit of blood transfusion pending repeat hemoglobin  -Iron studies has been sent  Patient daughter states that patient has been having blood mixed with the stool for long time, she declined colonoscopy in the past  -We will consult to gastroenterology for further recommendations,    Acute on chronic heart failure with preserved ejection fraction  Previously seen by cardiology this month she had dyspnea on exertion proBNP was elevated, she takes Lasix 40 at home,  She gets short of breath with minimum activity,  = We will give IV Lasix for now,    Acute respiratory failure with hypoxia  Required 1 L of oxygen on admission chest x-ray showed evidence of tiny effusion  Currently on room air  A. Fib  Patient is on Cardizem and Eliquis,      Hypertension  On losartan and Cardizem        Barriers to Discharge: Hemoglobin monitoring, pending consult with GI, pending PT OT eval    Anticipated discharge date/Disposition: TBD    Subjective  Patient was seen this morning after she received blood transfusion, daughter in the room concerned about her low hemoglobin, she states her mom has been having blood mixed with the stool for many years but the mother does not want to admit that because she does not want to have colonoscopy, mother appears to be anxious but also has shortness of breath with minimal activity    Objective    Vital signs in last 24 hours  Temp:  [97.8  F (36.6  C)-98.1  F (36.7  C)] 97.9  F (36.6  C)  Pulse:  [66-96] 96  Resp:  [16-38] 18  BP: (134-183)/(48-89) 183/89  SpO2:   [86 %-100 %] 93 % [unfilled] O2 Device: None (Room air)    Weight:   [unfilled] Weight change:     Intake/Output last 3 shifts  I/O last 3 completed shifts:  In: 354   Out: -   Body mass index is 23.75 kg/m .    Physical Exam:  General Appearance: Alert, oriented x3, does not appear in distress.  HEENT: Normocephalic. No scleral icterus, . Mucous membranes moist.  Heart: :Regular rate and rhythm, normal S1 ,S2, No murmurs, no JVD, no pedal edema   Lungs: Clear to auscultation bilaterally. No wheezing or crackles  Abdomen: Soft, non tender, no rebound or rigidity, non distended, bowel sounds present.      Pertinent Labs   Lab Results: personally reviewed.   Recent Labs   Lab 12/29/22  0919 12/28/22 2124 12/28/22  1554    141 140   CO2 17* 21* 22   BUN 24 27 29*   ALBUMIN 3.5  --   --    ALKPHOS 74  --   --    ALT <9  --   --    AST 11  --   --      Recent Labs   Lab 12/28/22 2057 12/28/22  1554   WBC 7.9  --    HGB 6.9* 6.5*   HCT 26.1*  --      --      No results for input(s): CKTOTAL, TROPONINI in the last 168 hours.    Invalid input(s): TROPONINT, CKMBINDEX  Invalid input(s): POCGLUFGR        Advanced Care Planning:  Discharge Planning discussed with patient and her daughter at bedside  Total time with this patient is 35 min with 50% of time spent in examining the patient, reviewing records, discussing plan of care and counseling, 50% of time spent in coordination of care.  Care discussed and coordinated with BEATRIZ.      Trent Torres MD  Internal Medicine Hospitalist  12/29/2022

## 2022-12-30 ENCOUNTER — ANESTHESIA EVENT (OUTPATIENT)
Dept: SURGERY | Facility: CLINIC | Age: 87
DRG: 377 | End: 2022-12-30
Payer: COMMERCIAL

## 2022-12-30 ENCOUNTER — ANESTHESIA (OUTPATIENT)
Dept: SURGERY | Facility: CLINIC | Age: 87
DRG: 377 | End: 2022-12-30
Payer: COMMERCIAL

## 2022-12-30 LAB
ANION GAP SERPL CALCULATED.3IONS-SCNC: 10 MMOL/L (ref 5–18)
BUN SERPL-MCNC: 20 MG/DL (ref 8–28)
CALCIUM SERPL-MCNC: 9 MG/DL (ref 8.5–10.5)
CHLORIDE BLD-SCNC: 108 MMOL/L (ref 98–107)
CO2 SERPL-SCNC: 20 MMOL/L (ref 22–31)
COLONOSCOPY: NORMAL
CREAT SERPL-MCNC: 1.07 MG/DL (ref 0.6–1.1)
GFR SERPL CREATININE-BSD FRML MDRD: 51 ML/MIN/1.73M2
GLUCOSE BLD-MCNC: 97 MG/DL (ref 70–125)
HGB BLD-MCNC: 7 G/DL (ref 11.7–15.7)
HGB BLD-MCNC: 7.3 G/DL (ref 11.7–15.7)
HGB BLD-MCNC: 7.4 G/DL (ref 11.7–15.7)
PATH REPORT.COMMENTS IMP SPEC: NORMAL
PATH REPORT.COMMENTS IMP SPEC: NORMAL
PATH REPORT.FINAL DX SPEC: NORMAL
PATH REPORT.MICROSCOPIC SPEC OTHER STN: NORMAL
PATH REPORT.RELEVANT HX SPEC: NORMAL
POTASSIUM BLD-SCNC: 3.2 MMOL/L (ref 3.5–5)
POTASSIUM BLD-SCNC: 4.5 MMOL/L (ref 3.5–5)
SODIUM SERPL-SCNC: 138 MMOL/L (ref 136–145)
UPPER GI ENDOSCOPY: NORMAL

## 2022-12-30 PROCEDURE — 272N000001 HC OR GENERAL SUPPLY STERILE: Performed by: INTERNAL MEDICINE

## 2022-12-30 PROCEDURE — 82310 ASSAY OF CALCIUM: CPT | Performed by: STUDENT IN AN ORGANIZED HEALTH CARE EDUCATION/TRAINING PROGRAM

## 2022-12-30 PROCEDURE — 120N000001 HC R&B MED SURG/OB

## 2022-12-30 PROCEDURE — 258N000003 HC RX IP 258 OP 636: Performed by: NURSE ANESTHETIST, CERTIFIED REGISTERED

## 2022-12-30 PROCEDURE — 99233 SBSQ HOSP IP/OBS HIGH 50: CPT | Performed by: HOSPITALIST

## 2022-12-30 PROCEDURE — 360N000075 HC SURGERY LEVEL 2, PER MIN: Performed by: INTERNAL MEDICINE

## 2022-12-30 PROCEDURE — C9113 INJ PANTOPRAZOLE SODIUM, VIA: HCPCS | Performed by: PHYSICIAN ASSISTANT

## 2022-12-30 PROCEDURE — 84132 ASSAY OF SERUM POTASSIUM: CPT | Performed by: HOSPITALIST

## 2022-12-30 PROCEDURE — 250N000011 HC RX IP 250 OP 636: Performed by: NURSE ANESTHETIST, CERTIFIED REGISTERED

## 2022-12-30 PROCEDURE — 999N000141 HC STATISTIC PRE-PROCEDURE NURSING ASSESSMENT: Performed by: INTERNAL MEDICINE

## 2022-12-30 PROCEDURE — 0W3P8ZZ CONTROL BLEEDING IN GASTROINTESTINAL TRACT, VIA NATURAL OR ARTIFICIAL OPENING ENDOSCOPIC: ICD-10-PCS | Performed by: INTERNAL MEDICINE

## 2022-12-30 PROCEDURE — 85060 BLOOD SMEAR INTERPRETATION: CPT | Performed by: PATHOLOGY

## 2022-12-30 PROCEDURE — 370N000017 HC ANESTHESIA TECHNICAL FEE, PER MIN: Performed by: INTERNAL MEDICINE

## 2022-12-30 PROCEDURE — 36415 COLL VENOUS BLD VENIPUNCTURE: CPT | Performed by: HOSPITALIST

## 2022-12-30 PROCEDURE — 85018 HEMOGLOBIN: CPT | Performed by: HOSPITALIST

## 2022-12-30 PROCEDURE — 250N000011 HC RX IP 250 OP 636: Performed by: PHYSICIAN ASSISTANT

## 2022-12-30 PROCEDURE — 0DB98ZZ EXCISION OF DUODENUM, VIA NATURAL OR ARTIFICIAL OPENING ENDOSCOPIC: ICD-10-PCS | Performed by: INTERNAL MEDICINE

## 2022-12-30 PROCEDURE — 250N000013 HC RX MED GY IP 250 OP 250 PS 637: Performed by: STUDENT IN AN ORGANIZED HEALTH CARE EDUCATION/TRAINING PROGRAM

## 2022-12-30 PROCEDURE — 250N000013 HC RX MED GY IP 250 OP 250 PS 637: Performed by: HOSPITALIST

## 2022-12-30 PROCEDURE — 258N000003 HC RX IP 258 OP 636: Performed by: ANESTHESIOLOGY

## 2022-12-30 RX ORDER — HYDROMORPHONE HCL IN WATER/PF 6 MG/30 ML
0.4 PATIENT CONTROLLED ANALGESIA SYRINGE INTRAVENOUS EVERY 5 MIN PRN
Status: CANCELLED | OUTPATIENT
Start: 2022-12-30

## 2022-12-30 RX ORDER — SODIUM CHLORIDE, SODIUM LACTATE, POTASSIUM CHLORIDE, CALCIUM CHLORIDE 600; 310; 30; 20 MG/100ML; MG/100ML; MG/100ML; MG/100ML
INJECTION, SOLUTION INTRAVENOUS CONTINUOUS
Status: DISCONTINUED | OUTPATIENT
Start: 2022-12-30 | End: 2022-12-30 | Stop reason: HOSPADM

## 2022-12-30 RX ORDER — FENTANYL CITRATE 50 UG/ML
50 INJECTION, SOLUTION INTRAMUSCULAR; INTRAVENOUS EVERY 5 MIN PRN
Status: CANCELLED | OUTPATIENT
Start: 2022-12-30

## 2022-12-30 RX ORDER — LIDOCAINE 40 MG/G
CREAM TOPICAL
Status: DISCONTINUED | OUTPATIENT
Start: 2022-12-30 | End: 2022-12-30 | Stop reason: HOSPADM

## 2022-12-30 RX ORDER — PROPOFOL 10 MG/ML
INJECTION, EMULSION INTRAVENOUS CONTINUOUS PRN
Status: DISCONTINUED | OUTPATIENT
Start: 2022-12-30 | End: 2022-12-30

## 2022-12-30 RX ORDER — NALOXONE HYDROCHLORIDE 0.4 MG/ML
0.2 INJECTION, SOLUTION INTRAMUSCULAR; INTRAVENOUS; SUBCUTANEOUS
Status: CANCELLED | OUTPATIENT
Start: 2022-12-30

## 2022-12-30 RX ORDER — FENTANYL CITRATE 50 UG/ML
25 INJECTION, SOLUTION INTRAMUSCULAR; INTRAVENOUS EVERY 5 MIN PRN
Status: CANCELLED | OUTPATIENT
Start: 2022-12-30

## 2022-12-30 RX ORDER — ONDANSETRON 4 MG/1
4 TABLET, ORALLY DISINTEGRATING ORAL EVERY 30 MIN PRN
Status: CANCELLED | OUTPATIENT
Start: 2022-12-30

## 2022-12-30 RX ORDER — POTASSIUM CHLORIDE 1500 MG/1
40 TABLET, EXTENDED RELEASE ORAL ONCE
Status: COMPLETED | OUTPATIENT
Start: 2022-12-30 | End: 2022-12-30

## 2022-12-30 RX ORDER — ONDANSETRON 2 MG/ML
INJECTION INTRAMUSCULAR; INTRAVENOUS PRN
Status: DISCONTINUED | OUTPATIENT
Start: 2022-12-30 | End: 2022-12-30

## 2022-12-30 RX ORDER — ONDANSETRON 2 MG/ML
4 INJECTION INTRAMUSCULAR; INTRAVENOUS EVERY 30 MIN PRN
Status: CANCELLED | OUTPATIENT
Start: 2022-12-30

## 2022-12-30 RX ORDER — NALOXONE HYDROCHLORIDE 0.4 MG/ML
0.4 INJECTION, SOLUTION INTRAMUSCULAR; INTRAVENOUS; SUBCUTANEOUS
Status: CANCELLED | OUTPATIENT
Start: 2022-12-30

## 2022-12-30 RX ORDER — PROPOFOL 10 MG/ML
INJECTION, EMULSION INTRAVENOUS PRN
Status: DISCONTINUED | OUTPATIENT
Start: 2022-12-30 | End: 2022-12-30

## 2022-12-30 RX ORDER — FLUMAZENIL 0.1 MG/ML
0.2 INJECTION, SOLUTION INTRAVENOUS
Status: CANCELLED | OUTPATIENT
Start: 2022-12-30 | End: 2022-12-31

## 2022-12-30 RX ORDER — ONDANSETRON 2 MG/ML
4 INJECTION INTRAMUSCULAR; INTRAVENOUS
Status: DISCONTINUED | OUTPATIENT
Start: 2022-12-30 | End: 2022-12-30 | Stop reason: HOSPADM

## 2022-12-30 RX ORDER — HYDROMORPHONE HCL IN WATER/PF 6 MG/30 ML
0.2 PATIENT CONTROLLED ANALGESIA SYRINGE INTRAVENOUS EVERY 5 MIN PRN
Status: CANCELLED | OUTPATIENT
Start: 2022-12-30

## 2022-12-30 RX ORDER — SODIUM CHLORIDE, SODIUM LACTATE, POTASSIUM CHLORIDE, CALCIUM CHLORIDE 600; 310; 30; 20 MG/100ML; MG/100ML; MG/100ML; MG/100ML
INJECTION, SOLUTION INTRAVENOUS CONTINUOUS
Status: CANCELLED | OUTPATIENT
Start: 2022-12-30

## 2022-12-30 RX ADMIN — PANTOPRAZOLE SODIUM 40 MG: 40 INJECTION, POWDER, FOR SOLUTION INTRAVENOUS at 10:14

## 2022-12-30 RX ADMIN — PROPOFOL 50 MG: 10 INJECTION, EMULSION INTRAVENOUS at 11:20

## 2022-12-30 RX ADMIN — DILTIAZEM HYDROCHLORIDE 240 MG: 120 CAPSULE, COATED, EXTENDED RELEASE ORAL at 10:09

## 2022-12-30 RX ADMIN — PHENYLEPHRINE HYDROCHLORIDE 100 MCG: 10 INJECTION INTRAVENOUS at 12:04

## 2022-12-30 RX ADMIN — PHENYLEPHRINE HYDROCHLORIDE 100 MCG: 10 INJECTION INTRAVENOUS at 11:58

## 2022-12-30 RX ADMIN — PANTOPRAZOLE SODIUM 40 MG: 40 INJECTION, POWDER, FOR SOLUTION INTRAVENOUS at 21:55

## 2022-12-30 RX ADMIN — ONDANSETRON 4 MG: 2 INJECTION INTRAMUSCULAR; INTRAVENOUS at 11:30

## 2022-12-30 RX ADMIN — LOSARTAN POTASSIUM 25 MG: 25 TABLET, FILM COATED ORAL at 10:10

## 2022-12-30 RX ADMIN — POTASSIUM CHLORIDE 40 MEQ: 1500 TABLET, EXTENDED RELEASE ORAL at 10:14

## 2022-12-30 RX ADMIN — ATORVASTATIN CALCIUM 20 MG: 10 TABLET, FILM COATED ORAL at 21:54

## 2022-12-30 RX ADMIN — SERTRALINE HYDROCHLORIDE 100 MG: 100 TABLET, FILM COATED ORAL at 21:54

## 2022-12-30 RX ADMIN — SODIUM CHLORIDE, POTASSIUM CHLORIDE, SODIUM LACTATE AND CALCIUM CHLORIDE 1000 ML: 600; 310; 30; 20 INJECTION, SOLUTION INTRAVENOUS at 11:11

## 2022-12-30 RX ADMIN — PROPOFOL 100 MCG/KG/MIN: 10 INJECTION, EMULSION INTRAVENOUS at 11:20

## 2022-12-30 ASSESSMENT — ACTIVITIES OF DAILY LIVING (ADL)
ADLS_ACUITY_SCORE: 36
DEPENDENT_IADLS:: SHOPPING;TRANSPORTATION

## 2022-12-30 ASSESSMENT — ENCOUNTER SYMPTOMS: DYSRHYTHMIAS: 1

## 2022-12-30 NOTE — ANESTHESIA CARE TRANSFER NOTE
Patient: Marva Gaines    Procedure: Procedure(s):  ESOPHAGOGASTRODUODENOSCOPY (EGD) with argon plasma coagulation  COLONOSCOPY with argon plasma coagulation       Diagnosis: Anemia, unspecified type [D64.9]  Diagnosis Additional Information: No value filed.    Anesthesia Type:   MAC     Note:    Oropharynx: oropharynx clear of all foreign objects  Level of Consciousness: awake  Oxygen Supplementation: face mask  Level of Supplemental Oxygen (L/min / FiO2): 6  Independent Airway: airway patency satisfactory and stable  Dentition: dentition unchanged  Vital Signs Stable: post-procedure vital signs reviewed and stable  Report to RN Given: handoff report given  Patient transferred to: Medical/Surgical Unit    Handoff Report: Identifed the Patient, Identified the Reponsible Provider, Reviewed the pertinent medical history, Discussed the surgical course, Reviewed Intra-OP anesthesia mangement and issues during anesthesia, Set expectations for post-procedure period and Allowed opportunity for questions and acknowledgement of understanding      Vitals:  Vitals Value Taken Time   /56 12/30/22 1224   Temp     Pulse     Resp 20 12/30/22 1224   SpO2 97 % 12/30/22 1224       Electronically Signed By: DEBORAH Rodríguez CRNA  December 30, 2022  12:25 PM

## 2022-12-30 NOTE — UTILIZATION REVIEW
Admission Status; Secondary Review Determination   Under the authority of the Utilization Management Committee, the utilization review process indicated a secondary review on Marva Gaines. The review outcome is based on review of the medical records, discussions with staff, and applying clinical experience noted on the date of the review.   (x) Inpatient Status Appropriate - This patient's medical care is consistent with medical management for inpatient care and reasonable inpatient medical practice.     RATIONALE FOR DETERMINATION   Marva Gaines is a 87 yr old female with Afib on anticoagulation, HFpEF, anxiety and HLD who presented with anemia from clinic.  HGB 6.9 and transfused.  Hemodynamically stable and observation status.  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  Continuing to trend hgb while diet advances.  GI anticipated her HGB drop again after transfusion related to slow GI loss through AVMs.  Not stable for discharge while trending with advancing diet.     At the time of admission with the information available to the attending physician more than 2 nights Hospital complex care was anticipated, based on patient risk of adverse outcome if treated as outpatient and complex care required. Inpatient admission is appropriate based on the Medicare guidelines.   The information on this document is developed by the utilization review team in order for the business office to ensure compliance. This only denotes the appropriateness of proper admission status and does not reflect the quality of care rendered.   The definitions of Inpatient Status and Observation Status used in making the determination above are those provided in the CMS Coverage Manual, Chapter 1 and Chapter 6, section 70.4.   Sincerely,   Rose Queen MD  Utilization Review  Physician Advisor  [facility]

## 2022-12-30 NOTE — PLAN OF CARE
Problem: Plan of Care - These are the overarching goals to be used throughout the patient stay.    Goal: Absence of Hospital-Acquired Illness or Injury  Intervention: Identify and Manage Fall Risk  Recent Flowsheet Documentation  Taken 12/29/2022 1700 by Chetna Pierre RN  Safety Promotion/Fall Prevention:    safety round/check completed    activity supervised    Kiah is very steady on her feet.  She is getting up to BSC independently.       Problem: Plan of Care - These are the overarching goals to be used throughout the patient stay.    Goal: Optimal Comfort and Wellbeing  Outcome: Progressing  Kiah does not complain of pain or discomfort   Goal Outcome Evaluation:       Kiah is doing a bowel prep for a colonoscopy tomorrow and also an EGD.    She has 2 daughters that are involved in her care.  Possible discharge on Saturday.

## 2022-12-30 NOTE — ANESTHESIA PREPROCEDURE EVALUATION
Anesthesia Pre-Procedure Evaluation    Patient: Marva Gaines   MRN: [id number] : 1935        Procedure : Procedure(s):  ESOPHAGOGASTRODUODENOSCOPY (EGD)  COLONOSCOPY          No past medical history on file.   No past surgical history on file.   Allergies   Allergen Reactions     Hydrocodone-Acetaminophen Unknown      Social History     Tobacco Use     Smoking status: Former     Smokeless tobacco: Never   Substance Use Topics     Alcohol use: Not on file      Wt Readings from Last 1 Encounters:   22 58.7 kg (129 lb 6.6 oz)        Anesthesia Evaluation   Pt has had prior anesthetic.         ROS/MED HX  ENT/Pulmonary:  - neg pulmonary ROS     Neurologic:  - neg neurologic ROS     Cardiovascular:     (+) hypertension--CAD ---dysrhythmias, a-fib,     METS/Exercise Tolerance:     Hematologic:       Musculoskeletal:       GI/Hepatic: Comment: hematochezia      Renal/Genitourinary:  - neg Renal ROS     Endo:  - neg endo ROS     Psychiatric/Substance Use:       Infectious Disease:       Malignancy:       Other:            Physical Exam    Airway        Mallampati: II   TM distance: > 3 FB      Respiratory Devices and Support         Dental       (+) partials      Cardiovascular   cardiovascular exam normal          Pulmonary   pulmonary exam normal                OUTSIDE LABS:  CBC:   Lab Results   Component Value Date    WBC 11.4 (H) 2022    WBC 7.9 2022    HGB 7.0 (L) 2022    HGB 7.9 (L) 2022    HCT 29.1 (L) 2022    HCT 26.1 (L) 2022     2022     2022     BMP:   Lab Results   Component Value Date     2022     2022    POTASSIUM 3.2 (L) 2022    POTASSIUM 4.0 2022    CHLORIDE 108 (H) 2022    CHLORIDE 111 (H) 2022    CO2 20 (L) 2022    CO2 17 (L) 2022    BUN 20 2022    BUN 24 2022    CR 1.07 2022    CR 1.20 (H) 2022    GLC 97 2022     (H) 2022      COAGS:   Lab Results   Component Value Date    INR 1.9 (H) 09/01/2021     POC: No results found for: BGM, HCG, HCGS  HEPATIC:   Lab Results   Component Value Date    ALBUMIN 3.5 12/29/2022    PROTTOTAL 6.2 12/29/2022    ALT <9 12/29/2022    AST 11 12/29/2022    ALKPHOS 74 12/29/2022    BILITOTAL 0.9 12/29/2022     OTHER:   Lab Results   Component Value Date    A1C 5.7 05/17/2018    FAROOQ 9.0 12/30/2022    MAG 1.9 12/28/2022    CRP 0.3 06/20/2018    SED 14 06/20/2018       Anesthesia Plan    ASA Status:  3   NPO Status:  NPO Appropriate    Anesthesia Type: MAC.              Consents    Anesthesia Plan(s) and associated risks, benefits, and realistic alternatives discussed. Questions answered and patient/representative(s) expressed understanding.     - Discussed: Risks, Benefits and Alternatives for the PROCEDURE were discussed     - Discussed with:  Patient      - Patient is DNR/DNI Status: Yes             Suspend during perioperative period? Yes.         Postoperative Care            Comments:                Rohan Todd MD

## 2022-12-30 NOTE — PROGRESS NOTES
[facility] Brief Note    EGD revealed AVMs in the duodenum, one of which was bleeding. APC was applied, all AVMs destroyed, no bleeding at the end of procedure.    Colonoscopy revealed several AMVs in the cecum. These were not bleeding, but were sources of possible bleeding so were destroyed with APC.    Recommend clear liquid diet today.  If no significant abdominal pain and hemodynamically stable tomorrow a.m. then allow to eat regular diet.    Continue to hold eliquis x 5 days. Does not necessarily need to remain in hospital to restart if hemodynamically stable.    Pantoprazole 40 mgs daily x 1 month.    HGB every 12 hours is fine for today/tomorrow.    I ANTICIPATE HER NEXT HGB WILL BE LOWER As SHE WAS LIKELY HAVING SLOW BLOOD LOSS THROUGH THE DAY UNTIL EGD. MAY NEED ONE MORE UNIT OF BLOOD.      Thank you  ANNIKA

## 2022-12-30 NOTE — PLAN OF CARE
"Alert and oriented x4. Able to make needs known via call light. Finished bowel prep approx 0100. Last BM seen yellow, clear with 3 \"pellets\" of BM. Pt currently on RA. Steady on feet. BP high, asymptomatic. Had been getting up d/t diarrhea multiple times prior to shift and at beginning of shift. VSS otherwise, SBA in 160's - 170s.  NPO after bowel prep completed. EGD at 2pm 12/30/22.     Problem: Plan of Care - These are the overarching goals to be used throughout the patient stay.    Goal: Optimal Comfort and Wellbeing  Outcome: Progressing    "

## 2022-12-30 NOTE — PROGRESS NOTES
Alert and oriented x4. Able to make needs known via call light. No complaints of pain. 1 Liter N/C overnight at times. While sleeping 86-88% on RA. Steady on feet. Bedside commode to urgency/ diarrhea. Bps around 140's SBA.

## 2022-12-30 NOTE — PROGRESS NOTES
Care Management Initial Consult    General Information  Assessment completed with: Children,  (Anabel)       Primary Care Provider verified and updated as needed: No   Readmission within the last 30 days:        Reason for Consult: discharge planning  Advance Care Planning:            Communication Assessment  Patient's communication style: spoken language (English or Bilingual)    Hearing Difficulty or Deaf: yes        Cognitive  Cognitive/Neuro/Behavioral: WDL                      Living Environment:   People in home: alone     Current living Arrangements: apartment, independent living facility      Able to return to prior arrangements: yes       Family/Social Support:  Care provided by: self, child(tj)  Provides care for: pet(s)  Marital Status: Single  Children          Description of Support System: Supportive, Involved         Current Resources:   Patient receiving home care services: No     Community Resources: None  Equipment currently used at home: cane, straight, walker, rolling  Supplies currently used at home: None    Employment/Financial:  Employment Status: retired        Financial Concerns: No concerns identified   Referral to Financial Worker: No       Lifestyle & Psychosocial Needs:  Social Determinants of Health     Tobacco Use: Medium Risk     Smoking Tobacco Use: Former     Smokeless Tobacco Use: Never     Passive Exposure: Not on file   Alcohol Use: Not on file   Financial Resource Strain: Not on file   Food Insecurity: Not on file   Transportation Needs: Not on file   Physical Activity: Not on file   Stress: Not on file   Social Connections: Not on file   Intimate Partner Violence: Not on file   Depression: Not at risk     PHQ-2 Score: 2   Housing Stability: Not on file       Functional Status:  Prior to admission patient needed assistance:   Dependent ADLs:: Independent  Dependent IADLs:: Shopping, Transportation       Mental Health Status:  Mental Health Status: Current Concern  Mental Health  Management: Medication    Chemical Dependency Status:  Chemical Dependency Status: No Current Concerns      Values/Beliefs:  Spiritual, Cultural Beliefs, Sabianism Practices, Values that affect care: no             Additional Information:  SW tried to meet with Pt this morning and was weepy and anxious and needed to use bathroom.  Pt than had a procedure.  [facility] called and spoke with Pt's daughter Anabel to complete assessment and reviewed ERNANDEZ.  Pt lives at [facility].  Pt stopped driving about 2 months ago and started using a walker.  Pt is independent at baseline.  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  Anabel states that Pt has been fearful of doctors her entire life.  Pt does not have a PCP and uses her Cardiologist for general needs.  Anabel states that Pt is an anxious person and does not think that her medications is correct as Cardiologist is managing anxiety meds.  No CM needs anticipated.      JENNY Reddy

## 2022-12-30 NOTE — PROGRESS NOTES
Hospitalist Progress Note    Assessment/Plan    Principal Problem:    Hypoxia  Active Problems:    Hematochezia    Anemia, unspecified type    87-year-old with past medical history of anxiety hypercholesteremia A. fib, heart failure with preserved ejection fraction who was sent to the hospital for hemoglobin of 6.5     Anemia due to GI bleeding  -Hemoglobin of 6.5 when checked as an outpatient, in the ER to 6.9, patient takes Eliquis for A. 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 Lasix today       Acute respiratory failure with hypoxia  Required 1 L of oxygen on admission chest x-ray showed evidence of tiny effusion  Currently on room air    A. Fib  Patient is on Cardizem and Eliquis, GI recommended to continue to hold Eliquis        Hypertension  On losartan and Cardizem    Barriers to Discharge: Monitor hemoglobin, advancing diet slowly    Anticipated discharge date/Disposition: TBD but likely home in 1 to 2 days    Subjective  Patient was seen today in the morning she was waiting to have her procedure done, she understands that her hemoglobin is trending down and she may need a blood transfusion if  it is below 7, she was having bowel preparation on night    Objective    Vital signs in last 24 hours  Temp:  [97.3  F (36.3  C)-98.5  F (36.9  C)] 97.3  F (36.3  C)  Pulse:  [74-89] 84  Resp:  [17-20] 20  BP: (120-171)/(56-72) 120/56  SpO2:  [92 %-97 %] 97 % [unfilled] O2 Device: Oxymask    Weight:   [unfilled] Weight change: -2.082 kg (-4 lb 9.4 oz)    Intake/Output last 3 shifts  I/O last 3 completed shifts:  In: 1708 [P.O.:1708]  Out: -   Body mass index is 22.93 kg/m .    Physical Exam:  General Appearance: Alert, oriented x3, does not appear in distress.  HEENT: Normocephalic. No scleral icterus, . Mucous membranes moist.  Heart: :Regular rate and rhythm, normal S1 ,S2, No murmurs, no JVD, no pedal edema   Lungs: Clear to auscultation bilaterally. No wheezing or crackles  Abdomen: Soft, non tender, no rebound or rigidity, non distended, bowel sounds present.      Pertinent Labs   Lab Results: personally reviewed.   Recent Labs   Lab 12/30/22  0554 12/29/22  0919 12/28/22  2124    138 141   CO2 20* 17* 21*   BUN 20 24 27   ALBUMIN  --  3.5  --    ALKPHOS  --  74  --    ALT  --  <9  --    AST  --  11  --      Recent Labs   Lab 12/30/22  0554 12/29/22  1807 12/29/22  1222 12/28/22  2057   WBC  --   --  11.4* 7.9   HGB 7.0* 7.9* 8.1* 6.9*   HCT  --   --  29.1* 26.1*   PLT  --   --  322 304     No results for input(s): CKTOTAL, TROPONINI in the last 168 hours.    Invalid input(s): TROPONINT, CKMBINDEX  Invalid input(s): POCGLUFGR        Advanced Care Planning:  Discharge Planning discussed with patient  Total time with this patient is 35 min with 50% of time spent in examining the patient, reviewing records, discussing plan of care and counseling, 50% of time spent in coordination of care.  Care discussed and coordinated with RN, care manager.      Trent Torres MD  Internal Medicine Hospitalist  12/30/2022

## 2022-12-30 NOTE — H&P
"  GASTROENTEROLOGY PRE-PROCEDURAL HISTORY AND PHYSICAL     INDICATION FOR PROCEDURE   Iron deficiency anemia     HISTORY    Reviewed and no change.     PHYSICAL EXAMINATION     Vitals Blood pressure (!) 142/65, pulse 84, temperature 97.3  F (36.3  C), temperature source Oral, resp. rate 18, height 1.6 m (5' 2.99\"), weight 58.7 kg (129 lb 6.6 oz), SpO2 93 %.          Physical Exam   General: awake, alert, oriented times three    Cardiovascular: RRR, no edema    Airway: Normal    Chest: lungs are clear to auscultation bilaterally    Abdomen: soft, non-tender        PREVIOUS REACTION TO SEDATION/ANESTHESIA    None     SEDATION PLAN BASED ON ASSESSMENT   Per Anesthesia     ASA CLASSIFICATION   ASA Classification: 2     MALLAMPATI SCORE     Class II      IMPRESSION   Patient deemed adequate candidate for anesthesia.     PLANNED PROCEDURE   EGD and colonoscopy     Steven Driscoll MD [facility]  I appreciate the opportunity to participate in the care of this patient.   Please feel free to call me with any questions or concerns.     "

## 2022-12-30 NOTE — ANESTHESIA POSTPROCEDURE EVALUATION
Patient: Marva Gaines    Procedure: Procedure(s):  ESOPHAGOGASTRODUODENOSCOPY (EGD) with argon plasma coagulation  COLONOSCOPY with argon plasma coagulation       Anesthesia Type:  MAC    Note:     Postop Pain Control: Uneventful            Sign Out: Well controlled pain   PONV: No   Neuro/Psych: Uneventful            Sign Out: Acceptable/Baseline neuro status   Airway/Respiratory: Uneventful            Sign Out: Acceptable/Baseline resp. status   CV/Hemodynamics: Uneventful            Sign Out: Acceptable CV status; No obvious hypovolemia; No obvious fluid overload   Other NRE: NONE   DID A NON-ROUTINE EVENT OCCUR? No           Last vitals:  Vitals:    12/30/22 0309 12/30/22 0805 12/30/22 1224   BP: (!) 148/71 (!) 142/65 120/56   Pulse: 81 84    Resp: 18 18 20   Temp: 36.9  C (98.5  F) 36.3  C (97.3  F)    SpO2: 92% 93% 97%       Electronically Signed By: Rohan Todd MD  December 30, 2022  12:55 PM

## 2022-12-30 NOTE — PROGRESS NOTES
PRIMARY DIAGNOSIS: GI BLEED    OUTPATIENT/OBSERVATION GOALS TO BE MET BEFORE DISCHARGE  1. Orthostatic performed: No    2. Stable Hgb Yes.   Recent Labs   Lab Test 12/30/22  1323 12/30/22  0554 12/29/22  1807   HGB 7.3* 7.0* 7.9*       3. Resolved or declined bleeding episodes: Yes Last episode: 12/30/22     4. Appropriate testing complete: Yes    5. Cleared for discharge by consultants (if involved): No    6. Safe discharge environment identified: Yes    Discharge Planner Nurse   Safe discharge environment identified: Yes  Barriers to discharge: No       Entered by: Jese Uriostegui RN 12/30/2022 2:37 PM     Please review provider order for any additional goals.   Nurse to notify provider when observation goals have been met and patient is ready for discharge.

## 2022-12-31 LAB
ABO/RH(D): ABNORMAL
ANTIBODY SCREEN: POSITIVE
ANTIBODY, PREVIOUSLY IDENTIFIED: NORMAL
HGB BLD-MCNC: 6.8 G/DL (ref 11.7–15.7)
HGB BLD-MCNC: 7.1 G/DL (ref 11.7–15.7)
HGB BLD-MCNC: 7.2 G/DL (ref 11.7–15.7)
POTASSIUM BLD-SCNC: 4.5 MMOL/L (ref 3.5–5)
SPECIMEN EXPIRATION DATE: ABNORMAL
SPECIMEN EXPIRATION DATE: NORMAL

## 2022-12-31 PROCEDURE — 99232 SBSQ HOSP IP/OBS MODERATE 35: CPT | Performed by: HOSPITALIST

## 2022-12-31 PROCEDURE — 250N000011 HC RX IP 250 OP 636: Performed by: PHYSICIAN ASSISTANT

## 2022-12-31 PROCEDURE — 36415 COLL VENOUS BLD VENIPUNCTURE: CPT | Performed by: HOSPITALIST

## 2022-12-31 PROCEDURE — P9016 RBC LEUKOCYTES REDUCED: HCPCS

## 2022-12-31 PROCEDURE — 250N000013 HC RX MED GY IP 250 OP 250 PS 637: Performed by: STUDENT IN AN ORGANIZED HEALTH CARE EDUCATION/TRAINING PROGRAM

## 2022-12-31 PROCEDURE — 250N000013 HC RX MED GY IP 250 OP 250 PS 637: Performed by: HOSPITALIST

## 2022-12-31 PROCEDURE — 120N000001 HC R&B MED SURG/OB

## 2022-12-31 PROCEDURE — 85018 HEMOGLOBIN: CPT | Performed by: HOSPITALIST

## 2022-12-31 PROCEDURE — 86850 RBC ANTIBODY SCREEN: CPT | Performed by: HOSPITALIST

## 2022-12-31 PROCEDURE — C9113 INJ PANTOPRAZOLE SODIUM, VIA: HCPCS | Performed by: PHYSICIAN ASSISTANT

## 2022-12-31 PROCEDURE — 84132 ASSAY OF SERUM POTASSIUM: CPT | Performed by: HOSPITALIST

## 2022-12-31 RX ADMIN — PANTOPRAZOLE SODIUM 40 MG: 40 INJECTION, POWDER, FOR SOLUTION INTRAVENOUS at 21:33

## 2022-12-31 RX ADMIN — LOSARTAN POTASSIUM 25 MG: 25 TABLET, FILM COATED ORAL at 08:51

## 2022-12-31 RX ADMIN — PANTOPRAZOLE SODIUM 40 MG: 40 INJECTION, POWDER, FOR SOLUTION INTRAVENOUS at 08:51

## 2022-12-31 RX ADMIN — FUROSEMIDE 40 MG: 40 TABLET ORAL at 08:51

## 2022-12-31 RX ADMIN — SERTRALINE HYDROCHLORIDE 100 MG: 100 TABLET, FILM COATED ORAL at 21:33

## 2022-12-31 RX ADMIN — ATORVASTATIN CALCIUM 20 MG: 10 TABLET, FILM COATED ORAL at 21:33

## 2022-12-31 RX ADMIN — DILTIAZEM HYDROCHLORIDE 240 MG: 120 CAPSULE, COATED, EXTENDED RELEASE ORAL at 08:51

## 2022-12-31 ASSESSMENT — ACTIVITIES OF DAILY LIVING (ADL)
ADLS_ACUITY_SCORE: 36
FALL_HISTORY_WITHIN_LAST_SIX_MONTHS: NO
ADLS_ACUITY_SCORE: 22
VISION_MANAGEMENT: GLASSES
ADLS_ACUITY_SCORE: 22
CHANGE_IN_FUNCTIONAL_STATUS_SINCE_ONSET_OF_CURRENT_ILLNESS/INJURY: NO
ADLS_ACUITY_SCORE: 36
PATIENT'S_PREFERRED_MEANS_OF_COMMUNICATION: ENGLISH SPEAKER WITH HEARING LOSS, NO SPEECH PROBLEMS.
THE_FOLLOWING_AIDS_WERE_PROVIDED;: PATIENT DECLINED OFFER OF AUXILIARY AIDS
ADLS_ACUITY_SCORE: 36
CONCENTRATING,_REMEMBERING_OR_MAKING_DECISIONS_DIFFICULTY: NO
HEARING_DIFFICULTY_OR_DEAF: YES
DRESSING/BATHING_DIFFICULTY: NO
DIFFICULTY_COMMUNICATING: NO
WERE_AUXILIARY_AIDS_OFFERED?: YES
DIFFICULTY_EATING/SWALLOWING: NO
TOILETING_ISSUES: NO
DOING_ERRANDS_INDEPENDENTLY_DIFFICULTY: NO
ADLS_ACUITY_SCORE: 36
WALKING_OR_CLIMBING_STAIRS_DIFFICULTY: NO
DESCRIBE_HEARING_LOSS: BILATERAL HEARING LOSS
USE_OF_HEARING_ASSISTIVE_DEVICES: BILATERAL HEARING AIDS
ADLS_ACUITY_SCORE: 36
ADLS_ACUITY_SCORE: 22
ADLS_ACUITY_SCORE: 22
ADLS_ACUITY_SCORE: 36
EQUIPMENT_CURRENTLY_USED_AT_HOME: CANE, STRAIGHT
ADLS_ACUITY_SCORE: 36
WEAR_GLASSES_OR_BLIND: YES
ADLS_ACUITY_SCORE: 36

## 2022-12-31 NOTE — PROGRESS NOTES
"  GASTROENTEROLOGY PROGRESS NOTE     SUBJECTIVE   Patient is hungry. Denies abdominal pain. No bleeding overnight.      OBJECTIVE     Vitals Blood pressure (!) 144/63, pulse 72, temperature 98.9  F (37.2  C), temperature source Oral, resp. rate 18, height 1.6 m (5' 2.99\"), weight 58.7 kg (129 lb 6.6 oz), SpO2 99 %.      Physical exam:    A&O, NAD  Abd soft, NT/ND      LABORATORY    ELECTROLYTE PANEL   Recent Labs   Lab 12/31/22  0617 12/30/22  1323 12/30/22  0554 12/29/22  0919 12/28/22  2124   NA  --   --  138 138 141   POTASSIUM 4.5 4.5 3.2* 4.0 3.3*   CHLORIDE  --   --  108* 111* 109*   CO2  --   --  20* 17* 21*   GLC  --   --  97 148* 105   CR  --   --  1.07 1.20* 1.20*   BUN  --   --  20 24 27      HEMATOLOGY PANEL   Recent Labs   Lab 12/31/22  0617 12/30/22  2217 12/30/22  1323 12/29/22  1807 12/29/22  1222 12/28/22  2057   HGB 7.1* 7.4* 7.3*   < > 8.1* 6.9*   MCV  --   --   --   --  68* 65*   WBC  --   --   --   --  11.4* 7.9   PLT  --   --   --   --  322 304    < > = values in this interval not displayed.      LIVER AND PANCREAS PANEL   Recent Labs   Lab 12/29/22  0919   AST 11   ALT <9   ALKPHOS 74   BILITOTAL 0.9     IMAGING STUDIES        I have reviewed the current diagnostic and laboratory tests.              IMPRESSION   87 year old with history of a fib on Eliquis, HTN, HFpEF who presents with:  1) Acute anemia- attributed to actively bleeding duodenal AVM, but also had nonbleeding AVMs in the colon as well. Status post APC treatment 12/30. Hgb stable.  2) Atrial fibrillation- Eliquis held     RECOMMENDATIONS   ADAT  Hold anticoagulation for 5 days from 12/30 EGD/colonoscopy  Pantoprazole 40mg daily for one month  Hgb q12 hours today.   If she remains stable, consider discharge later today vs tomorrow.  No need for outpatient GI follow up.  If persistent anemia as outpatient, could consider pillcam.              Brenda Lopes MD  Thank you for the opportunity to participate in the care of this " patient.   Please feel free to call me with any questions or concerns.  Phone number [phone].

## 2022-12-31 NOTE — PLAN OF CARE
Goal Outcome Evaluation:    Problem: Plan of Care - These are the overarching goals to be used throughout the patient stay.    Goal: Plan of Care Review  Outcome: Progressing   Patient alert and oriented. Denies pain. Continues to be on 1 L O2 per NC, desat to 88% on room air.     Problem: Anemia  Goal: Anemia Symptom Improvement  Outcome: Progressing  Intervention: Monitor and Manage Anemia  Hemoglobin 7.1 this AM.  Patient tired and feels weak.  VS stable.       Problem: Gastrointestinal Bleeding  Goal: Hemostasis  Outcome: Progressing   No stools or signs of bleeding this shift.  Patient tolerating initiation of low-fat diet.

## 2022-12-31 NOTE — PROGRESS NOTES
Hospitalist Progress Note    Assessment/Plan    Principal Problem:    Hypoxia  Active Problems:    Hematochezia    Anemia, unspecified type    87-year-old with past medical history of anxiety hypercholesteremia A. fib, heart failure with preserved ejection fraction who was sent to the hospital for hemoglobin of 6.5 she had an EGD and colonoscopy done on 12/30 that showed evidence of AVM duodenum with bleeding that was cauterized and destroyed another AVM in the second without bleeding but also it was a started APC  Since yesterday her hemoglobin has been fluctuating continue to be trending low did not require blood transfusion after 1 unit was given in the ER on admission day, advised her diet this morning,       Acute blood loss anemia due to upper GI bleeding  -Hemoglobin of 6.5 when checked as an outpatient, in the ER to 6.9, patient takes Eliquis for A. 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 1 was bleeding APC was applied she also had AVM in the cecum without bleeding but it was also destroyed with APC  -Recommended clear liquid diet after procedure then advance further today   -hold Eliquis for 5 days, pantoprazole 40 mg daily for 1 month continue to monitor hemoglobin for now 7.1 last time it was checked,        Acute on chronic heart failure with preserved ejection fraction  Previously seen by cardiology this month she had dyspnea on exertion proBNP was elevated, she takes Lasix 40 at home,  She gets short of breath with minimum activity,  Received one-time IV Lasix yesterday last 5 pounds in 24 hours,   Resumed p.o. Lasix   Acute respiratory failure with hypoxia  Required 1 L of oxygen on admission chest x-ray showed evidence of tiny effusion  Still requiring 1 to 2 L  of oxygen, wean as tolerated  A. Fib  Patient is on Cardizem and Eliquis, GI recommended to continue to hold Eliquis for now, will continue to monitor hemoglobin       Anticoagulated with Eliquis  Patient is anticoagulated with Eliquis which can aggravate her GI bleeding will continue to hold Eliquis for now       Hypertension  On losartan and Cardizem    Barriers to Discharge: Monitor hemoglobin,    Anticipated discharge date/Disposition: Home in 1 to 2 days if hemoglobin is stable    Subjective  Patient was seen today she states she has mild discomfort in epigastric area, she is glad that she agreed to have the procedure done and figure out that she had a bleeding    Objective    Vital signs in last 24 hours  Temp:  [98.1  F (36.7  C)-99  F (37.2  C)] 98.9  F (37.2  C)  Pulse:  [62-93] 72  Resp:  [16-20] 18  BP: (103-148)/(49-65) 144/63  SpO2:  [90 %-99 %] 99 % [unfilled] O2 Device: Nasal cannula    Weight:   [unfilled] Weight change:     Intake/Output last 3 shifts  I/O last 3 completed shifts:  In: 1280 [P.O.:980; I.V.:300]  Out: 650 [Urine:650]  Body mass index is 22.93 kg/m .    Physical Exam:  General Appearance: Alert, oriented x3, does not appear in distress.  HEENT: Normocephalic. No scleral icterus, . Mucous membranes moist.  Heart: :Regular rate and rhythm, normal S1 ,S2, No murmurs, no JVD, no pedal edema   Lungs: Clear to auscultation bilaterally. No wheezing or crackles  Abdomen: Soft, non tender, no rebound or rigidity, non distended, bowel sounds p    Pertinent Labs   Lab Results: personally reviewed.   Recent Labs   Lab 12/30/22  0554 12/29/22  0919 12/28/22  2124    138 141   CO2 20* 17* 21*   BUN 20 24 27   ALBUMIN  --  3.5  --    ALKPHOS  --  74  --    ALT  --  <9  --    AST  --  11  --      Recent Labs   Lab 12/31/22  0617 12/30/22  2217 12/30/22  1323 12/29/22  1807 12/29/22  1222 12/28/22 2057   WBC  --   --   --   --  11.4* 7.9   HGB 7.1* 7.4* 7.3*   < > 8.1* 6.9*   HCT  --   --    --   --  29.1* 26.1*   PLT  --   --   --   --  322 304    < > = values in this interval not displayed.     No results for input(s): CKTOTAL, TROPONINI in the last 168 hours.    Invalid input(s): TROPONINT, CKMBINDEX  Invalid input(s): POCGLUFGR        Advanced Care Planning:  Discharge Planning discussed with patient  Total time with this patient is 25 min with 50% of time spent in examining the patient, reviewing records, discussing plan of care and counseling, 50% of time spent in coordination of care.  Care discussed and coordinated with RN.      Trent Torres MD  Internal Medicine Hospitalist  12/31/2022

## 2022-12-31 NOTE — PLAN OF CARE
"Problem: Gastrointestinal Bleeding  Goal: Optimal Coping with Acute Illness  Outcome: Progressing  Pt did not have any obvious signs of bleeding and no evidence of blood in stools this evening. Denies pain, denies N/V.    Problem: Anemia  Goal: Anemia Symptom Improvement  Outcome: Progressing  Intervention: Monitor and Manage Anemia  Pt reports feeling \"very tired\" and naps intermittently throughout shift; labs for Hgb are q 12 hr.    Problem: Gas Exchange Impaired  Goal: Optimal Gas Exchange  Outcome: Progressing  Pt continues O2 at 1LPM via NC with O2 sats in low 90s attempting to wean as able; pt desats to 88% on RA.    Updated Dr. Quiles via text page that pt has critical lab Hgb 6.8 resulted at 1750. Dr. Quiles ordered 1 unit PRBCs.    Transfused 1 unit PRBCs, pt tolerated well, VSS. Recheck Hgb at 0600 1/1/23. Recheck K in AM per protocols.                                  "

## 2022-12-31 NOTE — PLAN OF CARE
Goal Outcome Evaluation:         PRIMARY DIAGNOSIS: GI BLEED    OUTPATIENT/OBSERVATION GOALS TO BE MET BEFORE DISCHARGE  1. Orthostatic performed: No    2.    Recent Labs   Lab Test 12/30/22  2217 12/30/22  1323 12/30/22 2217   HGB 7.0 7.3 7.4       3. Resolved or declined bleeding episodes: Yes Last episode: 12/30/2022      4. Appropriate testing complete: Yes    5. Cleared for discharge by consultants (if involved): No    6. Safe discharge environment identified: Yes    Discharge Planner Nurse   Safe discharge environment identified: Yes  Barriers to discharge: Yes       Entered by: Quin Hernandez RN 12/31/2022 12:07 AM     Pt alert and oriented x4.VSS with 2 liters of O2. Hypoactive BM sounds. Denies pain. Independent.   Awaiting improved HGB for discharge.

## 2022-12-31 NOTE — PLAN OF CARE
Goal Outcome Evaluation:      Plan of Care Reviewed With: patient    Overall Patient Progress: improving

## 2023-01-01 PROBLEM — I50.32 CHRONIC HEART FAILURE WITH PRESERVED EJECTION FRACTION (HFPEF) (H): Status: ACTIVE | Noted: 2023-01-01

## 2023-01-01 PROBLEM — K55.20 COLON ARTERIOVENOUS MALFORMATION: Status: ACTIVE | Noted: 2023-01-01

## 2023-01-01 PROBLEM — D62 ANEMIA DUE TO BLOOD LOSS, ACUTE: Status: ACTIVE | Noted: 2023-01-01

## 2023-01-01 PROBLEM — K31.819 GASTRIC AVM: Status: ACTIVE | Noted: 2023-01-01

## 2023-01-01 PROBLEM — I25.10 CORONARY ARTERY DISEASE INVOLVING NATIVE CORONARY ARTERY OF NATIVE HEART WITHOUT ANGINA PECTORIS: Status: ACTIVE | Noted: 2022-12-27

## 2023-01-01 PROBLEM — D64.9 ANEMIA, UNSPECIFIED TYPE: Status: ACTIVE | Noted: 2022-12-28

## 2023-01-01 LAB
HGB BLD-MCNC: 8.1 G/DL (ref 11.7–15.7)
HGB BLD-MCNC: 8.7 G/DL (ref 11.7–15.7)
HGB BLD-MCNC: 8.8 G/DL (ref 11.7–15.7)
POTASSIUM BLD-SCNC: 4.3 MMOL/L (ref 3.5–5)

## 2023-01-01 PROCEDURE — 120N000001 HC R&B MED SURG/OB

## 2023-01-01 PROCEDURE — 94762 N-INVAS EAR/PLS OXIMTRY CONT: CPT

## 2023-01-01 PROCEDURE — C9113 INJ PANTOPRAZOLE SODIUM, VIA: HCPCS | Performed by: PHYSICIAN ASSISTANT

## 2023-01-01 PROCEDURE — 250N000013 HC RX MED GY IP 250 OP 250 PS 637: Performed by: STUDENT IN AN ORGANIZED HEALTH CARE EDUCATION/TRAINING PROGRAM

## 2023-01-01 PROCEDURE — 85018 HEMOGLOBIN: CPT | Performed by: HOSPITALIST

## 2023-01-01 PROCEDURE — 84132 ASSAY OF SERUM POTASSIUM: CPT | Performed by: HOSPITALIST

## 2023-01-01 PROCEDURE — 250N000011 HC RX IP 250 OP 636: Performed by: PHYSICIAN ASSISTANT

## 2023-01-01 PROCEDURE — 36415 COLL VENOUS BLD VENIPUNCTURE: CPT | Performed by: FAMILY MEDICINE

## 2023-01-01 PROCEDURE — 85018 HEMOGLOBIN: CPT | Performed by: FAMILY MEDICINE

## 2023-01-01 PROCEDURE — 250N000013 HC RX MED GY IP 250 OP 250 PS 637: Performed by: HOSPITALIST

## 2023-01-01 PROCEDURE — 36415 COLL VENOUS BLD VENIPUNCTURE: CPT | Performed by: HOSPITALIST

## 2023-01-01 PROCEDURE — 99232 SBSQ HOSP IP/OBS MODERATE 35: CPT | Performed by: FAMILY MEDICINE

## 2023-01-01 RX ADMIN — PANTOPRAZOLE SODIUM 40 MG: 40 INJECTION, POWDER, FOR SOLUTION INTRAVENOUS at 08:52

## 2023-01-01 RX ADMIN — FUROSEMIDE 40 MG: 40 TABLET ORAL at 08:52

## 2023-01-01 RX ADMIN — DILTIAZEM HYDROCHLORIDE 240 MG: 120 CAPSULE, COATED, EXTENDED RELEASE ORAL at 08:52

## 2023-01-01 RX ADMIN — PANTOPRAZOLE SODIUM 40 MG: 40 INJECTION, POWDER, FOR SOLUTION INTRAVENOUS at 21:03

## 2023-01-01 RX ADMIN — LOSARTAN POTASSIUM 25 MG: 25 TABLET, FILM COATED ORAL at 08:52

## 2023-01-01 RX ADMIN — SERTRALINE HYDROCHLORIDE 100 MG: 100 TABLET, FILM COATED ORAL at 21:03

## 2023-01-01 RX ADMIN — ATORVASTATIN CALCIUM 20 MG: 10 TABLET, FILM COATED ORAL at 21:03

## 2023-01-01 ASSESSMENT — ACTIVITIES OF DAILY LIVING (ADL)
ADLS_ACUITY_SCORE: 22
ADLS_ACUITY_SCORE: 26
ADLS_ACUITY_SCORE: 22
ADLS_ACUITY_SCORE: 22
ADLS_ACUITY_SCORE: 26
ADLS_ACUITY_SCORE: 22
ADLS_ACUITY_SCORE: 26
ADLS_ACUITY_SCORE: 22
ADLS_ACUITY_SCORE: 22
ADLS_ACUITY_SCORE: 26
ADLS_ACUITY_SCORE: 22
ADLS_ACUITY_SCORE: 22

## 2023-01-01 NOTE — PROGRESS NOTES
[facility] MEDICINE  PROGRESS NOTE     Code Status: No CPR- Do NOT Intubate  Procedure(s):  ESOPHAGOGASTRODUODENOSCOPY (EGD) with argon plasma coagulation  COLONOSCOPY with argon plasma coagulation  2 Days Post-Op  Identification/Summary:   Marva Gaines is a 87-year-old with past medical history of anxiety hypercholesteremia, A. fib, heart failure with preserved ejection fraction.  12/28 sent to the hospital for hemoglobin of 6.5.  Given PRBC x1 with hemoglobin up to 8.1.   12/30 had EGD and colonoscopy that showed evidence of bleeding duodenal AVM duodenum that was cauterized and multiple colonic AVMs cauterized.  Hemoglobin drifting down to 6.8.  Had a second unit of RBCs transfused.  Hemoglobin 8.7.  Slowly advancing diet.  Hypoxic when resting.  Follow serial hemoglobins.  Overnight pulse oximetry study.  Possible discharge 1/2.    Assessment and Plan:  Acute blood loss anemia due to upper GI bleeding  Bleeding duodenal AVM  Multiple colonic AVMs  Status post EGD and colonoscopy with AVM cautery.  PRBC transfused x2  -Hemoglobin of 6.5 when checked as an outpatient, in the ER to 6.9  Eliquis being held risedronate of blood transfusion hemoglobin went up to 8.1 continue to have low hemoglobin,  Underwent an EGD and colonoscopy , EGD showed AVM in the duodenum 1 was bleeding APC was applied she also had AVM in the cecum without bleeding but it was also destroyed with APC  Protonix 40 mg daily x1 month.  Hemoglobin drifted down to 6.8.  Second unit of blood transfused hemoglobin improved to 8.7.  Chronic anticoagulation  Hold Eliquis since admission.  Okay to resume Eliquis on 1/6/2022.  Acute on chronic heart failure with preserved ejection fraction  Previously seen by cardiology this month she had dyspnea on exertion proBNP was elevated, she takes Lasix 40 at home,  She gets short of breath with minimum activity,  Received one-time IV Lasix and lost 5 pounds in 24 hours,    Resumed p.o. Lasix.  Acute respiratory failure with hypoxia  Admission chest x-ray showed small pleural effusion.  Requiring 1 to 2 L of oxygen.  1/1 hypoxic to 85% on room air while napping.  Monitor oxygen and perform nocturnal pulse oximetry study overnight.  A. Fib  Essential hypertension  Continue Cardizem and losartan.    COVID-19 PCR negative from 12/28/2022  Noted.  Standard precautions.    Fluids: Saline lock  Pain meds: Tylenol as needed  Therapy: PT OT consulted  Valadez:Not present  Lines: None       Current Diet  Orders Placed This Encounter      Low Fat Diet    Supplements  None    Barriers to Discharge: Serial hemoglobins, overnight pulse oximetry study    Disposition: Hopeful discharge 1/2    Clinically Significant Risk Factors                                Interval History/Subjective:  Patient doing well after her procedure.  Tolerating diet without difficulty.  No abdominal pain.  While sleeping on room air does desaturate to 85 to 86%.  Unknown if she is a heavy snorer she lives alone.  No chest pain.  No shortness of breath.  Agreeable to staying.  Questions answered to verbalized satisfaction.    Physical Exam/Objective:  Temp:  [97.7  F (36.5  C)-98.5  F (36.9  C)] 97.7  F (36.5  C)  Pulse:  [50-76] 72  Resp:  [16-20] 16  BP: ()/(53-74) 172/74  SpO2:  [88 %-98 %] 95 %  Wt Readings from Last 4 Encounters:   01/01/23 60.8 kg (134 lb)   12/28/22 60.8 kg (134 lb)   12/02/22 61.2 kg (135 lb)   08/17/21 66.8 kg (147 lb 4.8 oz)     Body mass index is 23.74 kg/m .    Constitutional: awake, alert, cooperative, no apparent distress, and appears stated age  ENT: Normocephalic, without obvious abnormality, atraumatic, external ears without lesions, oral pharynx with moist mucous membranes, tonsils without erythema or exudates, gums normal and good dentition.  Respiratory: No increased work of breathing, good air exchange, clear to auscultation bilaterally, no crackles or wheezing  Cardiovascular:  Normal apical impulse, regular rate and rhythm, normal S1 and S2, no S3 or S4, and no murmur noted  GI: No scars, normal bowel sounds, soft, non-distended, non-tender, no masses palpated, no hepatosplenomegally  Skin: normal skin color, texture, turgor, no redness, warmth, or swelling, and no rashes  Musculoskeletal: There is no redness, warmth, or swelling of the joints.  Motor strength is 5 out of 5 all extremities bilaterally.  Tone is normal. no lower extremity pitting edema present  Neurologic: Cranial nerves II-XII are grossly intact. Sensory:  Sensory intact  Neuropsychiatric: General: normal, calm and normal eye contact Level of consciousness: alert / normal Affect: normal Orientation: oriented to self, place, time and situation Memory and insight: normal, memory for past and recent events intact and thought process normal      Medications:   Personally Reviewed.  Medications       [Held by provider] apixaban ANTICOAGULANT  5 mg Oral BID     atorvastatin  20 mg Oral Daily     diltiazem ER COATED BEADS  240 mg Oral Daily     furosemide  40 mg Oral Daily     losartan  25 mg Oral Daily     pantoprazole  40 mg Intravenous BID     sertraline  100 mg Oral At Bedtime     sodium chloride (PF)  3 mL Intracatheter Q8H       Data reviewed today: I personally reviewed all new medications, labs, imaging/diagnostics reports over the past 24 hours. Pertinent findings include:    Imaging:   No results found for this or any previous visit (from the past 24 hour(s)).    Labs:  XR Chest 2 Views   Final Result   IMPRESSION: No change. Heart size mildly enlarged. Lungs are clear. Some subtle blunting of the costophrenic angles could be due to very tiny effusions or some atelectasis. Stable since prior study.        Recent Results (from the past 24 hour(s))   Hemoglobin    Collection Time: 12/31/22  2:31 PM   Result Value Ref Range    Hemoglobin 7.2 (L) 11.7 - 15.7 g/dL   Hemoglobin    Collection Time: 12/31/22  5:50 PM   Result  Value Ref Range    Hemoglobin 6.8 (LL) 11.7 - 15.7 g/dL   Adult Type and Screen    Collection Time: 12/31/22  6:51 PM   Result Value Ref Range    ABO/RH(D) O POS     Antibody Screen Positive (A) Negative    SPECIMEN EXPIRATION DATE [id number]    Antibody identification    Collection Time: 12/31/22  6:51 PM   Result Value Ref Range    Antibody, Previously Identified Anti-[place]     SPECIMEN EXPIRATION DATE [id number]    Potassium    Collection Time: 01/01/23  6:24 AM   Result Value Ref Range    Potassium 4.3 3.5 - 5.0 mmol/L   Hemoglobin    Collection Time: 01/01/23  6:24 AM   Result Value Ref Range    Hemoglobin 8.7 (L) 11.7 - 15.7 g/dL       Pending Labs:  Unresulted Labs Ordered in the Past 30 Days of this Admission     No orders found from 11/28/2022 to 12/29/2022.            Omar Covarrubias MD  [facility]  Phone: #[phone]

## 2023-01-01 NOTE — PROGRESS NOTES
"  GASTROENTEROLOGY PROGRESS NOTE     SUBJECTIVE   Patient denies bleeding. Received transfusion yesterday. No pain. Tolerating diet.     OBJECTIVE     Vitals Blood pressure (!) 172/74, pulse 72, temperature 97.7  F (36.5  C), temperature source Oral, resp. rate 16, height 1.6 m (5' 2.99\"), weight 60.8 kg (134 lb), SpO2 95 %.      Physical exam:    A&O, NAD  Abd soft, NT/ND    LABORATORY    ELECTROLYTE PANEL   Recent Labs   Lab 01/01/23  0624 12/31/22  0617 12/30/22  1323 12/30/22  0554 12/29/22  0919 12/28/22  2124   NA  --   --   --  138 138 141   POTASSIUM 4.3 4.5 4.5 3.2* 4.0 3.3*   CHLORIDE  --   --   --  108* 111* 109*   CO2  --   --   --  20* 17* 21*   GLC  --   --   --  97 148* 105   CR  --   --   --  1.07 1.20* 1.20*   BUN  --   --   --  20 24 27      HEMATOLOGY PANEL   Recent Labs   Lab 01/01/23  0624 12/31/22  1750 12/31/22  1431 12/29/22  1807 12/29/22  1222 12/28/22  2057   HGB 8.7* 6.8* 7.2*   < > 8.1* 6.9*   MCV  --   --   --   --  68* 65*   WBC  --   --   --   --  11.4* 7.9   PLT  --   --   --   --  322 304    < > = values in this interval not displayed.      LIVER AND PANCREAS PANEL   Recent Labs   Lab 12/29/22  0919   AST 11   ALT <9   ALKPHOS 74   BILITOTAL 0.9     IMAGING STUDIES        I have reviewed the current diagnostic and laboratory tests.              IMPRESSION   87 year old with history of a fib on Eliquis, HTN, HFpEF who presents with:  1) Acute anemia- attributed to actively bleeding duodenal AVM, but also had nonbleeding AVMs in the colon as well. Status post APC treatment 12/30. Hgb stable.  2) Atrial fibrillation- Eliquis held     RECOMMENDATIONS   Diet as tolerated  Okay for discharge today  Restart anticoagulation 1/4  No need for outpatient GI follow up  If persistent anemia as outpatient, PCP can request pillcam at [facility]  Will sign off- call with questions             Brenda Lopes MD  Thank you for the opportunity to participate in the care of this patient.   Please feel free " to call me with any questions or concerns.  Phone number [phone].

## 2023-01-01 NOTE — PLAN OF CARE
Still on 1L NC, but vitals otherwise stable. Independent in room. No dizziness or weakness. Alert and oriented. Calls appropriately. No stools overnight. K protocol run, recheck tomorrow. Hgb 8.7 this morning. Possible discharge today.  Problem: Gastrointestinal Bleeding  Goal: Optimal Coping with Acute Illness  Outcome: Progressing     Problem: Anemia  Goal: Anemia Symptom Improvement  Outcome: Progressing  Intervention: Monitor and Manage Anemia  Recent Flowsheet Documentation  Taken 12/31/2022 9727 by Chary Lopez, RN  Safety Promotion/Fall Prevention:   assistive device/personal items within reach   clutter free environment maintained   fall prevention program maintained   room door open   room near nurse's station   room organization consistent   safety round/check completed   nonskid shoes/slippers when out of bed   patient and family education    Chary Lopez, RN

## 2023-01-01 NOTE — SIGNIFICANT EVENT
Significant Event Note    Time of event: 6:49 PM December 31, 2022    Description of event:  Hb 6.8  No over bleeding      Plan:  -Transfuse 1 unit  -Continue to monitor H&H    Discussed with: bedside nurse    Car Quiles MD

## 2023-01-02 ENCOUNTER — APPOINTMENT (OUTPATIENT)
Dept: OCCUPATIONAL THERAPY | Facility: CLINIC | Age: 88
DRG: 377 | End: 2023-01-02
Attending: FAMILY MEDICINE
Payer: COMMERCIAL

## 2023-01-02 ENCOUNTER — APPOINTMENT (OUTPATIENT)
Dept: PHYSICAL THERAPY | Facility: CLINIC | Age: 88
DRG: 377 | End: 2023-01-02
Attending: FAMILY MEDICINE
Payer: COMMERCIAL

## 2023-01-02 VITALS
BODY MASS INDEX: 23.74 KG/M2 | OXYGEN SATURATION: 92 % | HEIGHT: 63 IN | WEIGHT: 134 LBS | DIASTOLIC BLOOD PRESSURE: 64 MMHG | RESPIRATION RATE: 16 BRPM | SYSTOLIC BLOOD PRESSURE: 143 MMHG | TEMPERATURE: 98.4 F | HEART RATE: 78 BPM

## 2023-01-02 PROBLEM — G47.34 NOCTURNAL HYPOXIA: Status: ACTIVE | Noted: 2023-01-02

## 2023-01-02 LAB
HGB BLD-MCNC: 8.2 G/DL (ref 11.7–15.7)
POTASSIUM BLD-SCNC: 3.9 MMOL/L (ref 3.5–5)

## 2023-01-02 PROCEDURE — 250N000013 HC RX MED GY IP 250 OP 250 PS 637: Performed by: HOSPITALIST

## 2023-01-02 PROCEDURE — 250N000011 HC RX IP 250 OP 636: Performed by: PHYSICIAN ASSISTANT

## 2023-01-02 PROCEDURE — 84132 ASSAY OF SERUM POTASSIUM: CPT | Performed by: HOSPITALIST

## 2023-01-02 PROCEDURE — 36415 COLL VENOUS BLD VENIPUNCTURE: CPT | Performed by: FAMILY MEDICINE

## 2023-01-02 PROCEDURE — 97161 PT EVAL LOW COMPLEX 20 MIN: CPT | Mod: GP

## 2023-01-02 PROCEDURE — 85018 HEMOGLOBIN: CPT | Performed by: FAMILY MEDICINE

## 2023-01-02 PROCEDURE — 99239 HOSP IP/OBS DSCHRG MGMT >30: CPT | Performed by: FAMILY MEDICINE

## 2023-01-02 PROCEDURE — 97166 OT EVAL MOD COMPLEX 45 MIN: CPT | Mod: GO

## 2023-01-02 PROCEDURE — 250N000013 HC RX MED GY IP 250 OP 250 PS 637: Performed by: STUDENT IN AN ORGANIZED HEALTH CARE EDUCATION/TRAINING PROGRAM

## 2023-01-02 PROCEDURE — C9113 INJ PANTOPRAZOLE SODIUM, VIA: HCPCS | Performed by: PHYSICIAN ASSISTANT

## 2023-01-02 PROCEDURE — 97535 SELF CARE MNGMENT TRAINING: CPT | Mod: GO

## 2023-01-02 PROCEDURE — 999N000157 HC STATISTIC RCP TIME EA 10 MIN

## 2023-01-02 PROCEDURE — 95801 SLP STDY UNATND W/ANAL: CPT

## 2023-01-02 PROCEDURE — 97116 GAIT TRAINING THERAPY: CPT | Mod: GP

## 2023-01-02 RX ORDER — APIXABAN 5 MG/1
5 TABLET, FILM COATED ORAL 2 TIMES DAILY
Refills: 1
Start: 2023-01-04 | End: 2023-02-24

## 2023-01-02 RX ORDER — ACETAMINOPHEN 325 MG/1
650 TABLET ORAL EVERY 6 HOURS PRN
COMMUNITY
Start: 2023-01-02 | End: 2024-05-29

## 2023-01-02 RX ORDER — PANTOPRAZOLE SODIUM 40 MG/1
40 TABLET, DELAYED RELEASE ORAL 2 TIMES DAILY
Qty: 60 TABLET | Refills: 0 | Status: SHIPPED | OUTPATIENT
Start: 2023-01-02 | End: 2023-01-31

## 2023-01-02 RX ADMIN — DILTIAZEM HYDROCHLORIDE 240 MG: 120 CAPSULE, COATED, EXTENDED RELEASE ORAL at 08:41

## 2023-01-02 RX ADMIN — LOSARTAN POTASSIUM 25 MG: 25 TABLET, FILM COATED ORAL at 08:41

## 2023-01-02 RX ADMIN — PANTOPRAZOLE SODIUM 40 MG: 40 INJECTION, POWDER, FOR SOLUTION INTRAVENOUS at 08:42

## 2023-01-02 RX ADMIN — FUROSEMIDE 40 MG: 40 TABLET ORAL at 08:41

## 2023-01-02 ASSESSMENT — ACTIVITIES OF DAILY LIVING (ADL)
ADLS_ACUITY_SCORE: 26
PREVIOUS_RESPONSIBILITIES: MEAL PREP;HOUSEKEEPING;LAUNDRY;SHOPPING;MEDICATION MANAGEMENT;DRIVING;FINANCES
ADLS_ACUITY_SCORE: 26

## 2023-01-02 NOTE — PLAN OF CARE
Patient A&O, CMS intact, and VSS. Patient sent home with oxygen and educated about it as well with daughter in room. Patient ready for discharge. Completed discharge paperwork with patient and daughter. Patient and daughter stated understanding and questions were answered. All belongings were sent with the patient. Discharged safely.  Claudia Bah RN

## 2023-01-02 NOTE — PROGRESS NOTES
Care Management Discharge Note    Discharge Date: 01/02/2023       Discharge Disposition: Home, Home Care    Discharge Services: None    Discharge DME: Oxygen    Discharge Transportation: family or friend will provide    Private pay costs discussed: Not applicable    PAS Confirmation Code:  (N/A)  Patient/family educated on Medicare website which has current facility and service quality ratings: no    Education Provided on the Discharge Plan:  Yes   Persons Notified of Discharge Plans: Pt and daughter   Patient/Family in Agreement with the Plan: yes    Handoff Referral Completed: No    Additional Information:  Pt is discharging back to [facility].  LARRY currently looking for Home Care.  Referral out. Daughter to transport.     3:29 PM  LARRY talked with Pt, daughter Anabel via the phone and daughter Veronica who was visiting.  [facility] unable to find HC as HC agencies are closed.  LARRY notified hospitalist and LARRY will follow up in the AM.     JENNY Reddy

## 2023-01-02 NOTE — PROGRESS NOTES
Occupational Therapy Discharge Summary    Reason for therapy discharge:    Discharged to home with home therapy.    Progress towards therapy goal(s). See goals on Care Plan in [facility] electronic health record for goal details.  Goals partially met.  Barriers to achieving goals:   discharge from facility.    Therapy recommendation(s):    Continued therapy is recommended.  Rationale/Recommendations:  Home OT to increase indep with ADLs and trsfs.

## 2023-01-02 NOTE — DISCHARGE SUMMARY
[facility] MEDICINE  DISCHARGE SUMMARY     Primary Care Physician: Sabas Austin  Admission Date: 12/28/2022   Discharge Provider: Omar Covarrubias MD Discharge Date: 1/2/2023   Diet:   Active Diet and Nourishment Order   Procedures     Low Fat Diet     Diet     Code Status: No CPR- Do NOT Intubate   Activity: DCACTIVITY: Activity as tolerated  Utilize nocturnal oxygen 1 L      Condition at Discharge: Stable     REASON FOR PRESENTATION(See Admission Note for Details)   Low hemoglobin    PRINCIPAL & ACTIVE DISCHARGE DIAGNOSES     Principal Problem:    Hypoxia  Active Problems:    Permanent atrial fibrillation (H)    Coronary artery disease involving native coronary artery of native heart without angina pectoris    Hematochezia    Chronic heart failure with preserved ejection fraction (HFpEF) (H)    Gastric AVM    Colon arteriovenous malformation    Anemia due to blood loss, acute    Nocturnal hypoxia  PRBC transfused x2  Chronic anticoagulation  Acute on chronic heart failure with preserved ejection fraction  Acute hypoxemic respiratory failure  Essential hypertension    Clinically Significant Risk Factors                                PENDING LABS     Unresulted Labs Ordered in the Past 30 Days of this Admission     No orders found from 11/28/2022 to 12/29/2022.        PROCEDURES ( this hospitalization only)    Procedure(s):  ESOPHAGOGASTRODUODENOSCOPY (EGD) with argon plasma coagulation  COLONOSCOPY with argon plasma coagulation      RECOMMENDATIONS TO OUTPATIENT PROVIDER FOR F/U VISIT   Follow-up Appointments     Follow-up and recommended labs and tests      1.  Follow-up with primary care provider within 1 week.  Recheck   hemoglobin.  2.  Recommend outpatient sleep study.  3.  Should follow hemoglobins intermittently.  If begins to have worsening   anemia [facility] recommends contacting them for outpatient PillCam   study.           DISPOSITION     Home with home  care    SUMMARY OF HOSPITAL COURSE:      Marva Gaines is a 87-year-old with past medical history of anxiety hypercholesteremia, A. fib, heart failure with preserved ejection fraction.  12/28 sent to the hospital for hemoglobin of 6.5.    Admitted.  Home Eliquis held.    1.  GI bleed.  Given PRBC x1 with hemoglobin up to 8.1.   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  Hemoglobin drifting down to 6.8.  Had a second unit of RBCs transfused.  Hemoglobin 8.7.  Slowly advancing diet.  Hemoglobin has been relatively stable since that time.  Cleared for discharge.  Should she begin to have progressive anemia [facility] recommends contacting them for outpatient PillCam study.  Discharging on Protonix.    2.  Respiratory status.  Did have episodes of hypoxia during her stay.  Responded well to IV Lasix x1.  She then transition to having hypoxia at night or with rest.  Overnight pulse oximetry study done and patient was having 14 desaturations per hour.  Could be potential sleep apnea patient.  Recommend outpatient sleep study.  Does desaturate down to 85% with activity.  Will send home on nocturnal oxygen 1 L.     3.  Anticoagulation.  Patient's Eliquis has been held during her stay.  Okay to resume on 1/4/2023.      Discharge Medications with Med changes:     Current Discharge Medication List      START taking these medications    Details   acetaminophen (TYLENOL) 325 MG tablet Take 2 tablets (650 mg) by mouth every 6 hours as needed for mild pain, fever or headaches    Associated Diagnoses: Colon arteriovenous malformation; Gastric AVM      pantoprazole (PROTONIX) 40 MG EC tablet Take 1 tablet (40 mg) by mouth 2 times daily  Qty: 60 tablet, Refills: 0    Associated Diagnoses: Gastric AVM         CONTINUE these medications which have CHANGED    Details   ELIQUIS ANTICOAGULANT 5 MG tablet Take 1 tablet (5 mg) by mouth 2 times  daily  Refills: 1    Associated Diagnoses: Permanent atrial fibrillation (H)         CONTINUE these medications which have NOT CHANGED    Details   atorvastatin (LIPITOR) 20 MG tablet [ATORVASTATIN (LIPITOR) 20 MG TABLET] TAKE 1 TABLET BY MOUTH EVERYDAY AT BEDTIME  Qty: 90 tablet, Refills: 2    Associated Diagnoses: Pure hypercholesterolemia      cholecalciferol, vitamin D3, (VITAMIN D3) 2,000 unit Tab [CHOLECALCIFEROL, VITAMIN D3, (VITAMIN D3) 2,000 UNIT TAB] Take 1 tablet by mouth daily.      diltiazem ER COATED BEADS (CARDIZEM CD/CARTIA XT) 240 MG 24 hr capsule TAKE 1 CAPSULE BY MOUTH EVERY DAY  Qty: 90 capsule, Refills: 1    Associated Diagnoses: A-fib (H)      furosemide (LASIX) 20 MG tablet Take 2 tablets (40 mg) by mouth daily  Qty: 180 tablet, Refills: 3    Associated Diagnoses: Edema      losartan (COZAAR) 25 MG tablet TAKE 1 TABLET BY MOUTH EVERY DAY  Qty: 90 tablet, Refills: 3    Associated Diagnoses: Essential hypertension      sertraline (ZOLOFT) 100 MG tablet Take 1 tablet (100 mg) by mouth At Bedtime  Qty: 90 tablet, Refills: 0    Associated Diagnoses: Anxiety state               Rationale for medication changes:      Protonix for duodenal AVMs.  Eliquis to be resumed on 1/4/2023.  Tylenol preferred over ibuprofen.      Consults     GASTROENTEROLOGY IP CONSULT  PHYSICAL THERAPY ADULT IP CONSULT  OCCUPATIONAL THERAPY ADULT IP CONSULT      Immunizations given this encounter     Most Recent Immunizations   Administered Date(s) Administered     COVID-19 Vaccine 18+ (Moderna) 02/11/2021     FLU 6-35 months 09/13/2011     Influenza (IIV3) PF 11/01/2006     Pneumococcal 23 valent 01/26/2005     Td (Adult), Adsorbed 03/18/2008     Td,adult,historic,unspecified 03/18/2008           Anticoagulation Information      Recent INR results: No results for input(s): INR in the last 168 hours.  Warfarin doses (if applicable) or name of other anticoagulant: Eliquis to be resumed on 1/4/2023      SIGNIFICANT IMAGING  FINDINGS     Results for orders placed or performed during the hospital encounter of 12/28/22   XR Chest 2 Views    Impression    IMPRESSION: No change. Heart size mildly enlarged. Lungs are clear. Some subtle blunting of the costophrenic angles could be due to very tiny effusions or some atelectasis. Stable since prior study.       SIGNIFICANT LABORATORY FINDINGS     Most Recent 3 CBC's:  Recent Labs   Lab Test 01/02/23  0557 01/01/23  2155 01/01/23  1400 12/29/22  1807 12/29/22  1222 12/28/22  2057 12/28/22  1554 08/17/21  1629   WBC  --   --   --   --  11.4* 7.9  --  7.3   HGB 8.2* 8.1* 8.8*   < > 8.1* 6.9*   < > 12.2   MCV  --   --   --   --  68* 65*  --  90   PLT  --   --   --   --  322 304  --  262    < > = values in this interval not displayed.     Most Recent 3 BMP's:  Recent Labs   Lab Test 01/02/23  0557 01/01/23  0624 12/31/22  0617 12/30/22  1323 12/30/22  0554 12/29/22  0919 12/28/22  2124   NA  --   --   --   --  138 138 141   POTASSIUM 3.9 4.3 4.5   < > 3.2* 4.0 3.3*   CHLORIDE  --   --   --   --  108* 111* 109*   CO2  --   --   --   --  20* 17* 21*   BUN  --   --   --   --  20 24 27   CR  --   --   --   --  1.07 1.20* 1.20*   ANIONGAP  --   --   --   --  10 10 11   FAROOQ  --   --   --   --  9.0 9.2 9.0   GLC  --   --   --   --  97 148* 105    < > = values in this interval not displayed.     Most Recent 2 LFT's:  Recent Labs   Lab Test 12/29/22  0919 05/17/18  1550   AST 11 10   ALT <9 10   ALKPHOS 74 101   BILITOTAL 0.9 0.4     Most Recent 3 INR's:  Recent Labs   Lab Test 09/01/21  1537 07/19/21  1421 06/28/21  1342   INR 1.9* 2.5* 1.90*     Most Recent 3 BNP's:  Recent Labs   Lab Test 12/28/22  1554 12/02/22  1021   NTBNP 3,702* 2,360*     7-Day Micro Results     Collected Updated Procedure Result Status      12/28/2022 2103 12/28/2022 2144 Symptomatic Influenza A/B & SARS-CoV2 (COVID-19) Virus PCR Multiplex Nasopharyngeal [[id number]]    Swab from Nasopharyngeal    Final result Component Value    Influenza A PCR Negative   Influenza B PCR Negative   RSV PCR Negative   SARS CoV2 PCR Negative   NEGATIVE: SARS-CoV-2 (COVID-19) RNA not detected, presumed negative.                    Discharge Orders        Home care nursing referral      Brief Discharge Instructions    Hold Eliquis until 1/4/2023.     Reason for your hospital stay    GI bleed secondary to AVMs and nocturnal hypoxia     Follow-up and recommended labs and tests    1.  Follow-up with primary care provider within 1 week.  Recheck hemoglobin.  2.  Recommend outpatient sleep study.  3.  Should follow hemoglobins intermittently.  If begins to have worsening anemia [facility] recommends contacting them for outpatient PillCam study.     Activity    Your activity upon discharge: activity as tolerated     Monitor and record    Appearance of stool output.  If having dark black or bloody stools contact primary.     Oxygen Adult/Peds    Oxygen Documentation:   I certify that this patient, Marva Gaines has been under my care (or a nurse practitioner or physician's assistant working with me). This is the face-to-face encounter for oxygen medical necessity.      Marva Gaines is now in a chronic stable state and continues to require supplemental oxygen. Patient has continued oxygen desaturation due to Chronic Heart Failure I50.    Alternative treatment(s) tried or considered and deemed clinically infective for treatment of Chronic Heart Failure I50 include inhalers and diuresis.  If portability is ordered, is the patient mobile within the home? yes    **Patients who qualify for home O2 coverage under the CMS guidelines require ABG tests or O2 sat readings obtained closest to, but no earlier than 2 days prior to the discharge, as evidence of the need for home oxygen therapy. Testing must be performed while patient is in the chronic stable state. See notes for O2 sats.**     Diet    Follow this diet upon discharge: Orders Placed This Encounter      Low  Fat Diet       Examination   Physical Exam   Temp:  [97.3  F (36.3  C)-98.4  F (36.9  C)] 98.4  F (36.9  C)  Pulse:  [76-84] 78  Resp:  [16-18] 16  BP: (119-161)/(59-70) 143/64  SpO2:  [92 %-95 %] 92 %  Wt Readings from Last 4 Encounters:   01/01/23 60.8 kg (134 lb)   12/28/22 60.8 kg (134 lb)   12/02/22 61.2 kg (135 lb)   08/17/21 66.8 kg (147 lb 4.8 oz)       Constitutional: awake, alert, cooperative, no apparent distress, and appears stated age  Eyes: Lids and lashes normal, pupils equal, round and reactive to light, extra ocular muscles intact, sclera clear, conjunctiva normal  ENT: Normocephalic, without obvious abnormality, atraumatic, sinuses nontender on palpation, external ears without lesions, oral pharynx with moist mucous membranes, tonsils without erythema or exudates, gums normal and good dentition.  Respiratory: No increased work of breathing, good air exchange, clear to auscultation bilaterally, no crackles or wheezing  Cardiovascular: Normal apical impulse, normal rate and irregular rhythm, normal S1 and S2, no S3 or S4, and no murmur noted  GI: No scars, normal bowel sounds, soft, non-distended, non-tender, no masses palpated, no hepatosplenomegally  Skin: normal skin color, texture, turgor, no redness, warmth, or swelling, and no rashes  Musculoskeletal: There is no redness, warmth, or swelling of the joints.  Full range of motion noted.  Motor strength is 5 out of 5 all extremities bilaterally.  Tone is normal. no lower extremity pitting edema present  Neurologic: Cranial nerves II-XII are grossly intact. Sensory:  Sensory intact  Neuropsychiatric: General: normal, calm and normal eye contact Level of consciousness: alert / normal Affect: normal Orientation: oriented to self, place, time and situation Memory and insight: normal, memory for past and recent events intact and thought process normal      Please see EMR for more detailed significant labs, imaging, consultant notes etc.    Omar MONTERO  MD Marci, personally saw the patient today and spent greater than 30 minutes discharging this patient.    Omar Covarrubias MD  [facility]    CC:Sabas Austin

## 2023-01-02 NOTE — PLAN OF CARE
Patient pleasant all day.  Denies complaints.  O2 weaned off, however it was noted that her SpO2 declined while asleep; overnight O2 study ordered.      Problem: Plan of Care - These are the overarching goals to be used throughout the patient stay.    Goal: Plan of Care Review  Description: The Plan of Care Review/Shift note should be completed every shift.  The Outcome Evaluation is a brief statement about your assessment that the patient is improving, declining, or no change.  This information will be displayed automatically on your shift note.  Outcome: Progressing  Flowsheets (Taken 1/1/2023 1819)  Outcome Evaluation: Hg stable.  Overnight O2 study ordered for tonight.  Plan of Care Reviewed With: patient  Overall Patient Progress: improving     Problem: Gastrointestinal Bleeding  Goal: Optimal Coping with Acute Illness  Outcome: Progressing  Goal: Hemostasis  Outcome: Progressing     Problem: Gas Exchange Impaired  Goal: Optimal Gas Exchange  Outcome: Progressing     Problem: Risk for Delirium  Goal: Optimal Coping  Outcome: Met   Goal Outcome Evaluation:      Plan of Care Reviewed With: patient    Overall Patient Progress: improvingOverall Patient Progress: improving    Outcome Evaluation: Hg stable.  Overnight O2 study ordered for tonight.

## 2023-01-02 NOTE — PLAN OF CARE
Problem: Gastrointestinal Bleeding  Goal: Optimal Coping with Acute Illness  Outcome: Progressing     Problem: Gas Exchange Impaired  Goal: Optimal Gas Exchange  Outcome: Progressing  Intervention: Optimize Oxygenation and Ventilation  Recent Flowsheet Documentation  Taken 1/1/2023 2349 by Ani Pederson, RN  Head of Bed (HOB) Positioning: HOB at 20-30 degrees  Taken 1/1/2023 2130 by Ani Pederson, RN  Head of Bed (HOB) Positioning: HOB at 20-30 degrees   Goal Outcome Evaluation:                 Pt sleeping soundly overnight.  Denies pain. VSS.  Pt had a small soft brown stool on evening shift.  HGB 8.1 continue to monitor.

## 2023-01-02 NOTE — PROGRESS NOTES
Home Oxygen Assessment Tools  Patient's 02 sat on RA at rest 97% for 5 minutes. Patient is on 0 LPM/%  (02 device) Sp02 95%, after 5 minutes of standing on the side of the bed.     If patient's Sp02 at rest is less than 88%, then oxygen may be needed and must monitor patient's Sp02 with activity. Patient 02 85% sat on  RA with activity after 6 minutes. Patient's Sp02 94% on 0.5 LPM/02% after 6 minutes of walking.    Claudia Bah RN

## 2023-01-02 NOTE — PROGRESS NOTES
01/02/23 0730   Appointment Info   Signing Clinician's Name / Credentials (OT) Jame Villanueva, OTR/L   Living Environment   People in Home alone   Current Living Arrangements independent living facility   Home Accessibility no concerns   Transportation Anticipated family or friend will provide   Living Environment Comments WIS emi GB and shower chair, STd toilet with GB on the L   Self-Care   Usual Activity Tolerance good   Current Activity Tolerance moderate   Equipment Currently Used at Home walker, rolling  (on loan from her neighbor)   Instrumental Activities of Daily Living (IADL)   Previous Responsibilities meal prep;housekeeping;laundry;shopping;medication management;driving;finances   General Information   Onset of Illness/Injury or Date of Surgery 01/28/23   Referring Physician Dr. Omar Covarrubias   Patient/Family Therapy Goal Statement (OT) To return home   Additional Occupational Profile Info/Pertinent History of Current Problem Adm with SOB, low Hgb.  PMH:  Anxiety, hyperchol., A-fit, CHF   Cognitive Status Examination   Orientation Status orientation to person, place and time   Follows Commands WFL   Memory Deficit minimal deficit   Cognitive Screens/Assessments   Cognitive Assessments Completed [facility] Mental Status Exam (UMS):  Total Score out of /30 22/30   SLUMS Norms 21-26 equals mild neurocognitive disorder   SLUMS Domains assessed: orientation, memory, attention, executive functions   SLUMS Interpretation Pt sitting on EOB when taking this test.  No glasses or HA worn.  [facility]  Today's score indicates mild Neurocognitive Impairment.  This test does not diagnose.  It is only a screen.   Visual Perception   Visual Impairment/Limitations corrective lenses for reading  (HA at home.  Glasses at home)   Range of Motion Comprehensive   General Range of Motion upper extremity range of motion deficits identified    Comment, General Range of Motion bilat shoulder flex 0- 90 degrees   Bed Mobility   Bed Mobility supine-sit;sit-supine   Supine-Sit [place] (Bed Mobility) modified independence   Sit-Supine [place] (Bed Mobility) modified independence   Assistive Device (Bed Mobility) bed rails   Transfers   Transfers bed-chair transfer;sit-stand transfer;toilet transfer   Transfer Skill: Bed to Chair/Chair to Bed   Bed-Chair [place] (Transfers) supervision;verbal cues   Sit-Stand Transfer   Sit-Stand [place] (Transfers) supervision;verbal cues   Toilet Transfer   Type (Toilet Transfer) sit-stand;stand-sit   [place] Level (Toilet Transfer) supervision;verbal cues   Clinical Impression   Criteria for Skilled Therapeutic Interventions Met (OT) Yes, treatment indicated   OT Diagnosis decreased indep with ADLs due to weakness, GI bleed   OT Problem List-Impairments impacting ADL problems related to;mobility;cognition   Assessment of Occupational Performance 3-5 Performance Deficits   Identified Performance Deficits dressing, toileting, bathing, G.H. and trsfs   Planned Therapy Interventions (OT) ADL retraining   Clinical Decision Making Complexity (OT) moderate complexity   Risk & Benefits of therapy have been explained evaluation/treatment results reviewed;participants included;patient   OT Total Evaluation Time   OT Eval, Moderate Complexity Minutes (78741) 15   OT Goals   Therapy Frequency (OT) Daily   OT Predicted Duration/Target Date for Goal Attainment 01/06/23   OT Goals Hygiene/Grooming;Transfers;Cognition   OT: Hygiene/Grooming modified independent   OT: Transfer Modified independent   OT: Cognitive Patient/caregiver will verbalize understanding of cognitive assessment results/recommendations as needed for safe discharge planning  (without VC)   Interventions   Interventions Quick Adds Self-Care/Home Management   Self-Care/Home Management   Self-Care/Home Mgmt/ADL, Compensatory, Meal Prep Minutes  (16801) 30   Symptoms Noted During/After Treatment (Meal Preparation/Planning Training) none   Treatment Detail/Skilled Intervention Pt in bed when therapist arrived.  Bed mobility with mod indep using the bedside rail.  Sit to stand with SBA.  Trsf to commode next to bed with SBA.  See toileting and dressing below.  Return to bed with SBA.  Call light in hand and bed alarm on.   Lower Body Dressing Training Assistance stand-by assist   Lower Body Dressing Training Assistance supervision;verbal cues  (for safety)   [place] Level (Toilet Training) stand-by assist   Assistance (Toilet Training) supervision  (for safey.  Pt feels her knees are weak.)   Toilet Training Assistance - Assistive Device commode overlay   OT Discharge Planning   OT Plan Trsfs with FWW - try BR sink and toilet.  Assess WIS trsf.  FB dressing.  Daily R/O.  SLUMS score 22/30 on 1/2/23   OT Discharge Recommendation (DC Rec) (S)  home with home care occupational therapy   OT Rationale for DC Rec Home OT to increase indep with all self cares and trsfs using a FWW.  Assure area is set up for safe trsfs and advise on AE as needed.  Reassess cognition.   OT Brief overview of current status SBA for close trsfs.  SBA for dressing and toileting.  SLUMS score 22/30 on 1/2/23.   Total Session Time   Timed Code Treatment Minutes 30   Total Session Time (sum of timed and untimed services) 45

## 2023-01-02 NOTE — PROGRESS NOTES
01/02/23 0845   Appointment Info   Signing Clinician's Name / Credentials (PT) Jimbo Briscoe, PT, DPT   Living Environment   People in Home alone   Current Living Arrangements independent living facility   Home Accessibility no concerns   Self-Care   Usual Activity Tolerance good   Current Activity Tolerance moderate   Equipment Currently Used at Home walker, rolling   Fall history within last six months no   General Information   Onset of Illness/Injury or Date of Surgery 12/28/22   Referring Physician Dr. Covarrubias   Patient/Family Therapy Goals Statement (PT) Improve ambulation   Pertinent History of Current Problem (include personal factors and/or comorbidities that impact the POC) Hypoxia   Existing Precautions/Restrictions no known precautions/restrictions   Weight-Bearing Status - LLE full weight-bearing   Weight-Bearing Status - RLE full weight-bearing   Range of Motion (ROM)   ROM Comment WFL   Strength (Manual Muscle Testing)   Strength Comments WFL   Bed Mobility   Bed Mobility supine-sit;sit-supine   Supine-Sit [place] (Bed Mobility) modified independence   Sit-Supine [place] (Bed Mobility) modified independence   Transfers   Transfers sit-stand transfer   Sit-Stand Transfer   Sit-Stand [place] (Transfers) contact guard   Assistive Device (Sit-Stand Transfers) walker, front-wheeled   Gait/Stairs (Locomotion)   [place] Level (Gait) contact guard   Assistive Device (Gait) walker, front-wheeled   Distance in Feet 10'   Distance in Feet (Gait) 150'   Pattern (Gait) step-through   Deviations/Abnormal Patterns (Gait) gait speed decreased;stride length decreased   Clinical Impression   Criteria for Skilled Therapeutic Intervention Yes, treatment indicated   PT Diagnosis (PT) Impaired functional mobility   Influenced by the following impairments SOB, weakness   Functional limitations due to impairments Transfers, gait   Clinical Presentation (PT Evaluation Complexity) Stable/Uncomplicated    Clinical Presentation Rationale Pt presents as medically diagnosed   Clinical Decision Making (Complexity) low complexity   Planned Therapy Interventions (PT) gait training;patient/family education;transfer training   Anticipated Equipment Needs at Discharge (PT) walker, rolling   Risk & Benefits of therapy have been explained care plan/treatment goals reviewed;patient   PT Total Evaluation Time   PT Eval, Low Complexity Minutes (30804) 5   Physical Therapy Goals   PT Frequency One time eval and treatment only   PT Predicted Duration/Target Date for Goal Attainment 01/02/23   PT Goals Transfers;Gait   PT: Transfers Supervision/stand-by assist;Sit to/from stand;Goal Met   PT: Gait Supervision/stand-by assist;Rolling walker;150 feet;Goal Met   Interventions   Interventions Quick Adds Gait Training;Therapeutic Activity   Therapeutic Activity   Therapeutic Activities: dynamic activities to improve functional performance Minutes (85665) 5   Symptoms Noted During/After Treatment None   Treatment Detail/Skilled Intervention Supine<>sit Mod I with HOB elevated, sit<>stand CGA, no concerns with transfers, increased time for set up.   Gait Training   Gait Training Minutes (52254) 10   Symptoms Noted During/After Treatment (Gait Training) shortness of breath   Treatment Detail/Skilled Intervention Pt amb well in the halls CGA with FWW, moving slowly but no LOB or adverse s/s, feels close to baseline but FWW unable to be raised and pt mildly SOB, O2 95% after amb. Cues for navigation and safety with FWW.   [place] Level (Gait Training) contact guard   Physical Assistance Level (Gait Training) verbal cues;supervision   Weight Bearing (Gait Training) full weight-bearing   Assistive Device (Gait Training) rolling walker   Pattern Analysis (Gait Training) swing-through gait   Gait Analysis Deviations decreased chuck;decreased step length   PT Discharge Planning   PT Plan D/C PT   PT Discharge Recommendation (DC Rec) (S)   home with assist   PT Rationale for DC Rec Back to ILF with assist as before, family in the area to assist as needed, pt ambulating well with ', no O2 demands or mobility concerns with d/c at this time.   PT Brief overview of current status SBA for mobility, amb 150' with FWW

## 2023-01-03 NOTE — PROGRESS NOTES
Pt was accepted yesterday by [facility] with [facility] [employer].  SW called and spoke with Felisa and will go into system and get discharge orders.      11:12 AM  SWCM left VM for daughter Anabel with update that Pt has accepting home care and left contact information.     JENNY Reddy

## 2023-01-04 ENCOUNTER — PATIENT OUTREACH (OUTPATIENT)
Dept: CARE COORDINATION | Facility: CLINIC | Age: 88
End: 2023-01-04

## 2023-01-04 NOTE — PROGRESS NOTES
[facility] Contact  [facility]/Voicemail     Clinical Data: Transitional Care Management Outreach     Outreach attempted x 2.  Left message on patient's voicemail, providing [facility]'s 24/7 scheduling and nurse triage phone number [phone] ([phone]) for questions/concerns and/or to schedule an appt with an [facility] provider, if they do not have a PCP.      Plan:  [facility] will do no further outreaches at this time.       Yamilka Galvan  Community Health Worker  [facility], [facility]  [phone]      *Connected Care Resource Team does NOT follow patient ongoing. Referrals are identified based on internal discharge reports and the outreach is to ensure patient has an understanding of their discharge instructions.

## 2023-01-06 ENCOUNTER — NURSE TRIAGE (OUTPATIENT)
Dept: FAMILY MEDICINE | Facility: CLINIC | Age: 88
End: 2023-01-06

## 2023-01-06 NOTE — TELEPHONE ENCOUNTER
1-6-23  Pt called back wanted to know if those mon/tues appts were still available w/ Richie Reyes.  I stated no it looked like they were full now and the soonest appt was the appt she already has on the 12th

## 2023-01-06 NOTE — TELEPHONE ENCOUNTER
"Pt was hospitalized at [facility] from 12/28/2022- 01/02/2023 for low hemoglobin.    Pt is currently scheduled for ED follow-up on Thursday, 01/12/2023 with PCP.     Pt is very worried about their hemoglobin levels and would like to get in earlier than Thursday, 01/12/2023. Other [facility] providers have openings on Monday 01/09/2023 and Tuesday, 01/10/2023. Pt will contact their daughters and see when they are available to drive pt to the clinic.       TC or RN: When pt calls back with their availability please check the following providers schedules and assist pt with scheduling:    [facility] have ED follow-up appts availability at present time.     Gretchen Rodriguez RN    Additional Information    Negative: Nursing judgment    Negative: Nursing judgment    Information only question and nurse able to answer    Negative: Nursing judgment    Negative: Nursing judgment    Answer Assessment - Initial Assessment Questions  1. REASON FOR CALL or QUESTION: \"What is your reason for calling today?\" or \"How can I best help you?\" or \"What question do you have that I can help answer?\"      Pt wanting to get in to see PCP for ED follow-up earlier than pt is currently scheduled.    Protocols used: NO PROTOCOL AVAILABLE - INFORMATION ONLY-A-[place]      "

## 2023-01-12 ENCOUNTER — OFFICE VISIT (OUTPATIENT)
Dept: INTERNAL MEDICINE | Facility: CLINIC | Age: 88
End: 2023-01-12
Payer: COMMERCIAL

## 2023-01-12 VITALS
TEMPERATURE: 97.8 F | HEIGHT: 63 IN | OXYGEN SATURATION: 98 % | DIASTOLIC BLOOD PRESSURE: 81 MMHG | HEART RATE: 110 BPM | RESPIRATION RATE: 16 BRPM | WEIGHT: 128.8 LBS | BODY MASS INDEX: 22.82 KG/M2 | SYSTOLIC BLOOD PRESSURE: 130 MMHG

## 2023-01-12 DIAGNOSIS — Z09 HOSPITAL DISCHARGE FOLLOW-UP: Primary | ICD-10-CM

## 2023-01-12 DIAGNOSIS — G47.34 NOCTURNAL HYPOXIA: ICD-10-CM

## 2023-01-12 DIAGNOSIS — K31.819 GASTRIC AVM: ICD-10-CM

## 2023-01-12 DIAGNOSIS — F41.1 ANXIETY STATE: ICD-10-CM

## 2023-01-12 DIAGNOSIS — I25.10 CORONARY ARTERY DISEASE INVOLVING NATIVE CORONARY ARTERY OF NATIVE HEART WITHOUT ANGINA PECTORIS: ICD-10-CM

## 2023-01-12 DIAGNOSIS — D62 ANEMIA DUE TO BLOOD LOSS, ACUTE: ICD-10-CM

## 2023-01-12 DIAGNOSIS — K92.1 HEMATOCHEZIA: ICD-10-CM

## 2023-01-12 DIAGNOSIS — I48.21 PERMANENT ATRIAL FIBRILLATION (H): ICD-10-CM

## 2023-01-12 DIAGNOSIS — R09.02 HYPOXIA: ICD-10-CM

## 2023-01-12 PROBLEM — D64.9 ANEMIA, UNSPECIFIED TYPE: Status: RESOLVED | Noted: 2022-12-28 | Resolved: 2023-01-12

## 2023-01-12 LAB
ERYTHROCYTE [DISTWIDTH] IN BLOOD BY AUTOMATED COUNT: 24.7 % (ref 10–15)
HCT VFR BLD AUTO: 36.4 % (ref 35–47)
HGB BLD-MCNC: 10.1 G/DL (ref 11.7–15.7)
MCH RBC QN AUTO: 20.7 PG (ref 26.5–33)
MCHC RBC AUTO-ENTMCNC: 27.7 G/DL (ref 31.5–36.5)
MCV RBC AUTO: 75 FL (ref 78–100)
PLATELET # BLD AUTO: 293 10E3/UL (ref 150–450)
RBC # BLD AUTO: 4.87 10E6/UL (ref 3.8–5.2)
WBC # BLD AUTO: 9.1 10E3/UL (ref 4–11)

## 2023-01-12 PROCEDURE — 85027 COMPLETE CBC AUTOMATED: CPT | Performed by: INTERNAL MEDICINE

## 2023-01-12 PROCEDURE — 99495 TRANSJ CARE MGMT MOD F2F 14D: CPT | Performed by: INTERNAL MEDICINE

## 2023-01-12 PROCEDURE — 36415 COLL VENOUS BLD VENIPUNCTURE: CPT | Performed by: INTERNAL MEDICINE

## 2023-01-12 RX ORDER — SERTRALINE HYDROCHLORIDE 100 MG/1
150 TABLET, FILM COATED ORAL AT BEDTIME
Qty: 135 TABLET | Refills: 3 | Status: SHIPPED | OUTPATIENT
Start: 2023-01-12 | End: 2024-01-11

## 2023-01-12 NOTE — PATIENT INSTRUCTIONS
Schedule pill cam with [facility].    Check HGB every 10 days.      To see Sleep clinic for sleep apnea / nocturnal hypoxia.      Increase sertraline to 150 mg daily.

## 2023-01-12 NOTE — PROGRESS NOTES
Assessment & Plan     Hospital discharge follow-up  Admitted with Hgb 6.5.   Given PRBC x1 with hemoglobin up to 8.1.   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  Hemoglobin drifting down to 6.8.  Had a second unit of RBCs transfused.  Hemoglobin 8.7.  Slowly advancing diet.  Hemoglobin has been relatively stable since that time.  Cleared for discharge.  Should she begin to have progressive anemia [facility] recommends contacting them for outpatient PillCam study.  Discharging on Protonix.    Today, she denies abdominal pain, vomiting, dyspnea, weakness, dizziness. She does see a drop of blood when clean after bowel movement. Stool is greenish black, taking iron pills.    CBC was done stat:  Component      Latest Ref Rng & Units 1/12/2023   WBC      4.0 - 11.0 10e3/uL 9.1   RBC Count      3.80 - 5.20 10e6/uL 4.87   Hemoglobin      11.7 - 15.7 g/dL 10.1 (L)   Hematocrit      35.0 - 47.0 % 36.4   MCV      78 - 100 fL 75 (L)   MCH      26.5 - 33.0 pg 20.7 (L)   MCHC      31.5 - 36.5 g/dL 27.7 (L)   RDW      10.0 - 15.0 % 24.7 (H)   Platelet Count      150 - 450 10e3/uL 293     Since hemoglobin is stable and she is clinically doing well, we will continue with iron and ELiquis can be continued.   She will come for Hgb check every 10 days for 1-2 months. I will see her in 2-3 weeks.     Permanent atrial fibrillation (H)  On diltiazem.Patient's Eliquis was held during the hospital stay stay.  She resumed it on 1/4/2023.    Hypoxia  Did have episodes of hypoxia during her stay.  Responded well to IV Lasix x1.  She then transition to having hypoxia at night or with rest.  Overnight pulse oximetry study done and patient was having 14 desaturations per hour.  Could be potential sleep apnea patient.  Recommend outpatient sleep study.  Does desaturate down to 85% with activity.  Will send home on nocturnal oxygen 1 L.      Order for  home oxygen provided today. She will see Sleep clinic for sleep study.    Hematochezia   Given PRBC x1 with hemoglobin up to 8.1.   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  Hemoglobin drifting down to 6.8.  Had a second unit of RBCs transfused.  Hemoglobin 8.7.  Slowly advancing diet.  Hemoglobin has been relatively stable since that time.  Cleared for discharge.  Should she begin to have progressive anemia [facility] recommends contacting them for outpatient PillCam study.  Discharging on Protonix.       Gastric AVM   Given PRBC x1 with hemoglobin up to 8.1.   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  Hemoglobin drifting down to 6.8.  Had a second unit of RBCs transfused.  Hemoglobin 8.7.  Slowly advancing diet.  Hemoglobin has been relatively stable since that time.  Cleared for discharge.  Should she begin to have progressive anemia [facility] recommends contacting them for outpatient PillCam study.  Discharging on Protonix.       Coronary artery disease involving native coronary artery of native heart without angina pectoris and CHF, stable on losartan, Lasix      Anemia due to blood loss, acute  Stable, Hgb10.1  - CBC with platelets; Future  - CBC with platelets  - CBC with platelets; Standing    Nocturnal hypoxia  - Home Oxygen Order for DME - ONLY FOR DME    Anxiety  She is taking 100 mg sertraline for a long time and did help a lot with anxiety. Now, she and her daughter, think that she feels more anxious again. We will increase the dose to 150 mg daily.  - sertraline (ZOLOFT) 100 MG tablet; Take 1.5 tablets (150 mg) by mouth At Bedtime    Anxiety state    - sertraline (ZOLOFT) 100 MG tablet; Take 1.5 tablets (150 mg) by mouth At Bedtime           MED REC REQUIRED  Post Medication Reconciliation Status:  Discharge  "medications reconciled and changed, see notes/orders        Return in about 3 weeks (around 2/2/2023) for Follow up, multiple issues.    Sabas Austin MD  [facility]    Zenaida Dupont is a 87 year old, presenting for the following health issues:  Hospital F/U (Hospital F/U [facility] 12/28-1/02/23 Low Hemoglobin  )      [facility]       Hospital Follow-up Visit:    Hospital/Nursing Home/ Rehab Facility: [facility]  Date of Admission: 12/28/22  Date of Discharge: 1/02/23  Reason(s) for Admission: Low Hemoglobin      Was your hospitalization related to COVID-19? No   Problems taking medications regularly:  None  Medication changes since discharge: None  Problems adhering to non-medication therapy:  None    Summary of hospitalization:  [facility] discharge summary reviewed  Diagnostic Tests/Treatments reviewed.  Follow up needed: none  Other Healthcare Providers Involved in Patient s Care:         None  Update since discharge: improved.   Plan of care communicated with patient and family         Review of Systems   Constitutional, HEENT, cardiovascular, pulmonary, gi and gu systems are negative, except as otherwise noted.      Objective    /81   Pulse 110   Temp 97.8  F (36.6  C) (Oral)   Resp 16   Ht 1.6 m (5' 2.99\")   Wt 58.4 kg (128 lb 12.8 oz)   SpO2 98%   BMI 22.82 kg/m    Body mass index is 22.82 kg/m .  Physical Exam   Constitutional:  oriented to person, place, and time, appears well-nourished. No distress.   HENT:   Head: Normocephalic.   Mouth/Throat: Oropharynx is clear and moist.   Eyes: Conjunctivae are normal. Pupils are equal, round, and reactive to light.   Neck: Normal range of motion. Neck supple.   Cardiovascular: Irregular rhythm and normal heart sounds.    Pulmonary/Chest: Effort normal and breath sounds normal.   Abdominal: Soft. Bowel sounds are normal.   Musculoskeletal: Normal range of motion.   Neurological: alert " and oriented to person, place, and time. Skin: Skin is warm.   Psychiatric: normal mood and affect.

## 2023-01-15 ENCOUNTER — MEDICAL CORRESPONDENCE (OUTPATIENT)
Dept: HEALTH INFORMATION MANAGEMENT | Facility: CLINIC | Age: 88
End: 2023-01-15

## 2023-01-18 ENCOUNTER — LAB (OUTPATIENT)
Dept: LAB | Facility: CLINIC | Age: 88
End: 2023-01-18
Payer: COMMERCIAL

## 2023-01-18 DIAGNOSIS — D62 ANEMIA DUE TO BLOOD LOSS, ACUTE: ICD-10-CM

## 2023-01-18 LAB
ERYTHROCYTE [DISTWIDTH] IN BLOOD BY AUTOMATED COUNT: 25.5 % (ref 10–15)
HCT VFR BLD AUTO: 33.4 % (ref 35–47)
HGB BLD-MCNC: 9.8 G/DL (ref 11.7–15.7)
MCH RBC QN AUTO: 21.4 PG (ref 26.5–33)
MCHC RBC AUTO-ENTMCNC: 29.3 G/DL (ref 31.5–36.5)
MCV RBC AUTO: 73 FL (ref 78–100)
PLATELET # BLD AUTO: 402 10E3/UL (ref 150–450)
RBC # BLD AUTO: 4.58 10E6/UL (ref 3.8–5.2)
WBC # BLD AUTO: 8 10E3/UL (ref 4–11)

## 2023-01-18 PROCEDURE — 85027 COMPLETE CBC AUTOMATED: CPT

## 2023-01-18 PROCEDURE — 36415 COLL VENOUS BLD VENIPUNCTURE: CPT

## 2023-01-20 ENCOUNTER — TELEPHONE (OUTPATIENT)
Dept: INTERNAL MEDICINE | Facility: CLINIC | Age: 88
End: 2023-01-20

## 2023-01-20 NOTE — TELEPHONE ENCOUNTER
Situation: Patient needing clarity on Home oxygen order DME.  Background:  Patient was seen on 1/12/2023 by PCP. Order for Home Oxygen was placed.  Assessment:  Patient current has a home oxygen device.    States she has enough oxygen  Is confused about how to go about renting more oxygen?  Recommendation:  Will route to PCP and her team to review and advise what the next step is to help provide clarity on current order?  What her next steps are for renting more oxygen?  Does care coordination need to get involved?  Patient is frustrated and doesn't know what to do next?    Please review and route to appropriate team to help patient understand order.    Please call patient back with recommendations and next steps:  Marva  [phone]

## 2023-01-23 ENCOUNTER — TRANSFERRED RECORDS (OUTPATIENT)
Dept: HEALTH INFORMATION MANAGEMENT | Facility: CLINIC | Age: 88
End: 2023-01-23

## 2023-01-23 NOTE — TELEPHONE ENCOUNTER
"Outbound call to patient. She states she was confused on how to get more oxygen from home.   She states she currently has O2 - she has the tank and has been using 2L via [place].   States she only needs it at bedtime.   She is wondering what next steps are for getting more oxygen.     I did call [facility].   Spoke with their supplier and they did deliver the home O2 to her last week. They stated they made a note that they \"did a bedside delivery but Pt was slightly confused\". The rep I spoke with did say this is a lifetime supply and as long as it is plugged into her wall outlet, it'll supply O2.  The rep I spoke with recommended calling Pt back to let her know to call them at [phone] to get more training/information.     I did call patient to notify of this.   She states she did not want to call [facility] to get more information. She did explain how she uses her O2 and she reports she keeps it plugged in so it provides O2 at night.     I did try to discuss with Pt's daughter as well, CTC on file.   No answer.     Dr. Austin, please review/let us know if any further outreach needed or if care coordination needed. Pt does know how to use her home O2 and it was delivered.    Maria Luisa Romero RN, BSN  [facility]    "

## 2023-01-24 NOTE — TELEPHONE ENCOUNTER
I am not sure what the question is. For all the questions regarding oxygen at home, they should speak with Cynthia GIRON.

## 2023-01-27 ENCOUNTER — TELEPHONE (OUTPATIENT)
Dept: INTERNAL MEDICINE | Facility: CLINIC | Age: 88
End: 2023-01-27

## 2023-01-27 DIAGNOSIS — K31.819 GASTRIC AVM: ICD-10-CM

## 2023-01-27 NOTE — TELEPHONE ENCOUNTER
New Medication Request    Contacts       Type Contact Phone/Fax    01/27/2023 12:31 PM CST Phone (Incoming) Kiah Gaines (Self) [phone] (H)          What medication are you requesting?:     pantoprazole (PROTONIX) 40 MG EC tablet 60 tablet 0 1/2/2023  No   Sig - Route: Take 1 tablet (40 mg) by mouth 2 times daily - Oral     Reason for medication request: Pt needs a new Rx from PCP.    Have you taken this medication before?: Yes: Pt was given this Rx while in the hospital. Pt was told to contact her PCP to renew this Rx. Pt states she is ok with a 30 day supply since she is coming in on 02/02/2023 but would prefer a 90 supply if possible.    Controlled Substance Agreement on file:   CSA -- Patient Level:    CSA: None found at the patient level.         Patient offered an appointment? No Pt has an appointment on 02/02/2023    Preferred Pharmacy:    [facility]/pharmacy #[id number] [place], [place] - [address]  [address] [address]  Phone: [phone] Fax: [phone]      Okay to leave a detailed message?: Yes at Home number on file [phone] (home)    Magy Boston

## 2023-01-31 RX ORDER — PANTOPRAZOLE SODIUM 40 MG/1
40 TABLET, DELAYED RELEASE ORAL 2 TIMES DAILY
Qty: 180 TABLET | Refills: 0 | Status: SHIPPED | OUTPATIENT
Start: 2023-01-31 | End: 2023-03-21

## 2023-01-31 NOTE — TELEPHONE ENCOUNTER
Called pt to notify her that rx was sent to pharmacy. She has an appt on 2/2 Thursday and is wondering if she needs blood work before that appt. Please advise. Thanks.

## 2023-02-02 ENCOUNTER — OFFICE VISIT (OUTPATIENT)
Dept: INTERNAL MEDICINE | Facility: CLINIC | Age: 88
End: 2023-02-02
Payer: COMMERCIAL

## 2023-02-02 ENCOUNTER — ANCILLARY PROCEDURE (OUTPATIENT)
Dept: GENERAL RADIOLOGY | Facility: CLINIC | Age: 88
End: 2023-02-02
Attending: INTERNAL MEDICINE
Payer: COMMERCIAL

## 2023-02-02 VITALS
DIASTOLIC BLOOD PRESSURE: 64 MMHG | WEIGHT: 131 LBS | TEMPERATURE: 97.4 F | HEART RATE: 78 BPM | OXYGEN SATURATION: 92 % | RESPIRATION RATE: 16 BRPM | SYSTOLIC BLOOD PRESSURE: 121 MMHG | HEIGHT: 63 IN | BODY MASS INDEX: 23.21 KG/M2

## 2023-02-02 DIAGNOSIS — D62 ANEMIA DUE TO BLOOD LOSS, ACUTE: Primary | ICD-10-CM

## 2023-02-02 DIAGNOSIS — I48.21 PERMANENT ATRIAL FIBRILLATION (H): ICD-10-CM

## 2023-02-02 DIAGNOSIS — I50.31 ACUTE HEART FAILURE WITH PRESERVED EJECTION FRACTION (HFPEF) (H): ICD-10-CM

## 2023-02-02 DIAGNOSIS — R05.1 ACUTE COUGH: ICD-10-CM

## 2023-02-02 DIAGNOSIS — F41.1 ANXIETY STATE: ICD-10-CM

## 2023-02-02 DIAGNOSIS — K55.20 COLON ARTERIOVENOUS MALFORMATION: ICD-10-CM

## 2023-02-02 DIAGNOSIS — R09.02 HYPOXIA: ICD-10-CM

## 2023-02-02 PROBLEM — R06.09 DYSPNEA ON EXERTION: Status: RESOLVED | Noted: 2022-12-02 | Resolved: 2023-02-02

## 2023-02-02 LAB
ERYTHROCYTE [DISTWIDTH] IN BLOOD BY AUTOMATED COUNT: 29.6 % (ref 10–15)
FERRITIN SERPL-MCNC: 80 NG/ML (ref 11–328)
HCT VFR BLD AUTO: 32.9 % (ref 35–47)
HGB BLD-MCNC: 9.5 G/DL (ref 11.7–15.7)
IRON BINDING CAPACITY (ROCHE): 268 UG/DL (ref 240–430)
IRON SATN MFR SERPL: 5 % (ref 15–46)
IRON SERPL-MCNC: 13 UG/DL (ref 37–145)
MCH RBC QN AUTO: 23 PG (ref 26.5–33)
MCHC RBC AUTO-ENTMCNC: 28.9 G/DL (ref 31.5–36.5)
MCV RBC AUTO: 80 FL (ref 78–100)
PLATELET # BLD AUTO: 207 10E3/UL (ref 150–450)
RBC # BLD AUTO: 4.13 10E6/UL (ref 3.8–5.2)
VIT B12 SERPL-MCNC: 620 PG/ML (ref 232–1245)
WBC # BLD AUTO: 7.9 10E3/UL (ref 4–11)

## 2023-02-02 PROCEDURE — U0005 INFEC AGEN DETEC AMPLI PROBE: HCPCS | Performed by: INTERNAL MEDICINE

## 2023-02-02 PROCEDURE — U0003 INFECTIOUS AGENT DETECTION BY NUCLEIC ACID (DNA OR RNA); SEVERE ACUTE RESPIRATORY SYNDROME CORONAVIRUS 2 (SARS-COV-2) (CORONAVIRUS DISEASE [COVID-19]), AMPLIFIED PROBE TECHNIQUE, MAKING USE OF HIGH THROUGHPUT TECHNOLOGIES AS DESCRIBED BY CMS-2020-01-R: HCPCS | Performed by: INTERNAL MEDICINE

## 2023-02-02 PROCEDURE — 71046 X-RAY EXAM CHEST 2 VIEWS: CPT | Mod: TC | Performed by: RADIOLOGY

## 2023-02-02 PROCEDURE — 83550 IRON BINDING TEST: CPT | Performed by: INTERNAL MEDICINE

## 2023-02-02 PROCEDURE — 36415 COLL VENOUS BLD VENIPUNCTURE: CPT | Performed by: INTERNAL MEDICINE

## 2023-02-02 PROCEDURE — 82728 ASSAY OF FERRITIN: CPT | Performed by: INTERNAL MEDICINE

## 2023-02-02 PROCEDURE — 83540 ASSAY OF IRON: CPT | Performed by: INTERNAL MEDICINE

## 2023-02-02 PROCEDURE — 85027 COMPLETE CBC AUTOMATED: CPT | Performed by: INTERNAL MEDICINE

## 2023-02-02 PROCEDURE — 99214 OFFICE O/P EST MOD 30 MIN: CPT | Mod: CS | Performed by: INTERNAL MEDICINE

## 2023-02-02 PROCEDURE — 82607 VITAMIN B-12: CPT | Performed by: INTERNAL MEDICINE

## 2023-02-02 RX ORDER — ALBUTEROL SULFATE 90 UG/1
2 AEROSOL, METERED RESPIRATORY (INHALATION) EVERY 6 HOURS PRN
Qty: 18 G | Refills: 1 | Status: SHIPPED | OUTPATIENT
Start: 2023-02-02 | End: 2023-06-22

## 2023-02-02 RX ORDER — BENZONATATE 100 MG/1
100 CAPSULE ORAL 3 TIMES DAILY PRN
Qty: 30 CAPSULE | Refills: 0 | Status: SHIPPED | OUTPATIENT
Start: 2023-02-02 | End: 2023-03-23

## 2023-02-02 NOTE — PROGRESS NOTES
Assessment & Plan     Anemia due to blood loss, acute  Colon arteriovenous malformation  End of December was in the hospital.  Admitted with Hgb 6.5.   Given PRBC x1 with hemoglobin up to 8.1.   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  Hemoglobin drifting down to 6.8.  Had a second unit of RBCs transfused.  Hemoglobin 8.7.  Slowly advancing diet.  Hemoglobin has been relatively stable since that time.  Cleared for discharge.  Should she begin to have progressive anemia [facility] recommends contacting them for outpatient PillCam study.  Discharging on Protonix.     Today, she denies abdominal pain, vomiting, dyspnea, weakness, dizziness. No blood in the stool. Stool is greenish black, taking iron pills.    She decided not to do PillCam.  Hgb stays stable.  Component      Latest Ref Rng & Units 1/18/2023   WBC      4.0 - 11.0 10e3/uL 8.0   RBC Count      3.80 - 5.20 10e6/uL 4.58   Hemoglobin      11.7 - 15.7 g/dL 9.8 (L)   Hematocrit      35.0 - 47.0 % 33.4 (L)   MCV      78 - 100 fL 73 (L)   MCH      26.5 - 33.0 pg 21.4 (L)   MCHC      31.5 - 36.5 g/dL 29.3 (L)   RDW      10.0 - 15.0 % 25.5 (H)   Platelet Count      150 - 450 10e3/uL 402     - CBC with platelets; Future    Acute heart failure with preserved ejection fraction (HFpEF) (H)  Stable on furosemide. No edema.    Permanent atrial fibrillation (H)  On Eliquis and diltiazem.    Anxiety  We increased sertraline to 150 mg a day 2 weeks ago.      Acute cough  She has cough and runny nose and body aches since Monday. No fever, chills. She was in contact with her daughter who tested positive for Covid over the weekend. She feels slight shortness of breath, but otherwise feeling better today than on Monday. They think she has some wheezing.  Exam is benign. CXR will be done and Covid test.  Rx for albuterol and benzonatate is sent.  - Symptomatic COVID-19 Virus  "(Coronavirus) by PCR  - XR Chest 2 Views; Future  - albuterol (PROAIR HFA/PROVENTIL HFA/VENTOLIN HFA) 108 (90 Base) MCG/ACT inhaler; Inhale 2 puffs into the lungs every 6 hours as needed for shortness of breath, wheezing or cough  - benzonatate (TESSALON) 100 MG capsule; Take 1 capsule (100 mg) by mouth 3 times daily as needed for cough    Hypoxia  Did have episodes of hypoxia during her stay.  Responded well to IV Lasix x1.  She then transition to having hypoxia at night or with rest.  Overnight pulse oximetry study done and patient was having 14 desaturations per hour.  Could be potential sleep apnea patient.  Recommend outpatient sleep study.  Does desaturate down to 85% with activity.  Will send home on nocturnal oxygen 1 L.                Return in about 4 weeks (around 3/2/2023) for AWV.    Sabas Austin MD  [facility]    Zenaida Dupont is a 87 year old, presenting for the following health issues:  Follow Up (3 Week F/U)      HPI           Review of Systems         Objective    /64   Pulse 78   Temp 97.4  F (36.3  C)   Resp 16   Ht 1.6 m (5' 2.99\")   Wt 59.4 kg (131 lb)   SpO2 92%   BMI 23.21 kg/m    Body mass index is 23.21 kg/m .  Physical Exam     Constitutional:  oriented to person, place, and time, appears well-nourished. No distress.   HENT:   Head: Normocephalic.   Mouth/Throat: Oropharynx is clear and moist.   Eyes: Conjunctivae are normal. Pupils are equal, round, and reactive to light.   Neck: Normal range of motion. Neck supple.   Cardiovascular: Normal rate, regular rhythm and normal heart sounds.    Pulmonary/Chest: Effort normal and breath sounds normal.   Abdominal: Soft. Bowel sounds are normal.   Musculoskeletal: Normal range of motion.   Neurological: alert and oriented to person, place, and time. Skin: Skin is warm.   Psychiatric: normal mood and affect.                  "

## 2023-02-03 ENCOUNTER — TELEPHONE (OUTPATIENT)
Dept: SCHEDULING | Facility: CLINIC | Age: 88
End: 2023-02-03
Payer: COMMERCIAL

## 2023-02-03 ENCOUNTER — TELEPHONE (OUTPATIENT)
Dept: INTERNAL MEDICINE | Facility: CLINIC | Age: 88
End: 2023-02-03
Payer: COMMERCIAL

## 2023-02-03 LAB — SARS-COV-2 RNA RESP QL NAA+PROBE: POSITIVE

## 2023-02-03 NOTE — TELEPHONE ENCOUNTER
Coronavirus (COVID-19) Notification    Caller Name (Patient, parent, daughter/son, grandparent, etc)  patient    Reason for call  Notify of Positive Coronavirus (COVID-19) lab results, assess symptoms,  review [facility] recommendations    Lab Result    Lab test:  2019-nCoV rRt-PCR or SARS-CoV-2 PCR    Oropharyngeal AND/OR nasopharyngeal swabs is POSITIVE for 2019-nCoV RNA/SARS-COV-2 PCR (COVID-19 virus)      Gather patient reported symptoms   Assessment   Current Symptoms at time of phone call, reported by patient: (if no symptoms, document: No symptoms] Cough, wheezing, fatigued   Date of symptom(s) onset (if applicable) 1/23     If at time of call, Patients symptoms have worsened, the Patient should contact 911 or have someone drive them to Emergency Dept promptly:      If Patient calling 911, inform 911 personal that you have tested positive for the Coronavirus (COVID-19).  Place mask on and await 911 to arrive.    If Emergency Dept, If possible, please have another adult drive you to the Emergency Dept but you need to wear mask when in contact with other people.      Treatment Options:   Patient classified as COVID treatment eligible by Epic high risk algorithm: Yes  Is the patient symptomatic at the time of result notification? Yes. Was the onset of symptoms within the last 5 days? No. Was the onset of symptoms within the last 7 days? No.     Review information with Patient    Your result was positive. This means you have COVID-19 (coronavirus).    How can I protect others?    These guidelines are for isolating before returning to work, school or .    If you DO have symptoms    Stay home and away from others     For at least 5 days after your symptoms started, AND    You are fever free for 24 hours (with no medicine that reduces fever), AND    Your other symptoms are better    Wear a mask for 10 full days anytime you are around others    If you DON'T have symptoms    Stay home and away from  others for at least 5 days after your positive test    Wear a mask for 10 full days anytime you are around others    There may be different guidelines for healthcare facilities.  Please check with the specific sites before arriving.    If you have been told by a doctor that you were severely ill with COVID-19 or are immunocompromised, you should isolate for at least 10 days.    You should not go back to work until you meet the guidelines above for ending your home isolation. You don't need to be retested for COVID-19 before going back to work--studies show that you won't spread the virus if it's been at least 10 days since your symptoms started (or 20 days, if you have a weak immune system).    Employers, schools, and daycares: This is an official notice for this person's medical guidelines for returning in-person.  They must meet the above guidelines before going back to work, school or  in person.    You will receive a positive COVID-19 letter via [employer] or the mail soon with additional self-care information.    Would you like me to review some of that information with you now?  No    If you were tested for an upcoming procedure, please contact your provider for next steps.    Fadia Red

## 2023-02-06 DIAGNOSIS — Z53.9 DIAGNOSIS NOT YET DEFINED: Primary | ICD-10-CM

## 2023-02-06 PROCEDURE — G0180 MD CERTIFICATION HHA PATIENT: HCPCS | Performed by: INTERNAL MEDICINE

## 2023-02-14 ENCOUNTER — LAB (OUTPATIENT)
Dept: LAB | Facility: CLINIC | Age: 88
End: 2023-02-14
Payer: COMMERCIAL

## 2023-02-14 DIAGNOSIS — D62 ANEMIA DUE TO BLOOD LOSS, ACUTE: ICD-10-CM

## 2023-02-14 LAB
ERYTHROCYTE [DISTWIDTH] IN BLOOD BY AUTOMATED COUNT: ABNORMAL %
HCT VFR BLD AUTO: 37.9 % (ref 35–47)
HGB BLD-MCNC: 11.3 G/DL (ref 11.7–15.7)
MCH RBC QN AUTO: 24.4 PG (ref 26.5–33)
MCHC RBC AUTO-ENTMCNC: 29.8 G/DL (ref 31.5–36.5)
MCV RBC AUTO: 82 FL (ref 78–100)
PLATELET # BLD AUTO: 381 10E3/UL (ref 150–450)
RBC # BLD AUTO: 4.63 10E6/UL (ref 3.8–5.2)
WBC # BLD AUTO: 13.6 10E3/UL (ref 4–11)

## 2023-02-14 PROCEDURE — 36415 COLL VENOUS BLD VENIPUNCTURE: CPT

## 2023-02-14 PROCEDURE — 85027 COMPLETE CBC AUTOMATED: CPT

## 2023-02-15 ENCOUNTER — TELEPHONE (OUTPATIENT)
Dept: INTERNAL MEDICINE | Facility: CLINIC | Age: 88
End: 2023-02-15
Payer: COMMERCIAL

## 2023-02-15 DIAGNOSIS — R05.1 ACUTE COUGH: Primary | ICD-10-CM

## 2023-02-15 RX ORDER — AZITHROMYCIN 250 MG/1
TABLET, FILM COATED ORAL
Qty: 6 TABLET | Refills: 0 | Status: SHIPPED | OUTPATIENT
Start: 2023-02-15 | End: 2023-02-20

## 2023-02-15 NOTE — TELEPHONE ENCOUNTER
"Called patient to discuss lab results.   Pt will return in 1 week for recheck of hgb.     Pt does report cold symptoms: pt has lost her voice and has a sore throat - reports this is abnormal for her.   Pt does have a cough. No SOB or chest pain. No fever.  Pt recently had Covid - Reports she has not taken another test since her positive test on 2/2/23.    Pt requesting a Z Ghulam if appropriate or if an alternative prescription would be recommended.     ----- Message from Sabas Asutin MD sent at 2/15/2023 10:26 AM CST -----  \"Call the pt. Hemoglobin is much better, 11.3 now. WBC is higher and than can be seen of she has some infection ( cold, UTI, diarrhea). Please ask if she has any infection. I would repeat this test again in 1 week. She has standing orders.\"    Maria Luisa Romero RN, BSN  [facility]    "

## 2023-02-21 ENCOUNTER — OFFICE VISIT (OUTPATIENT)
Dept: CARDIOLOGY | Facility: CLINIC | Age: 88
End: 2023-02-21
Payer: COMMERCIAL

## 2023-02-21 VITALS
BODY MASS INDEX: 23.1 KG/M2 | RESPIRATION RATE: 16 BRPM | SYSTOLIC BLOOD PRESSURE: 126 MMHG | HEART RATE: 94 BPM | WEIGHT: 125.5 LBS | HEIGHT: 62 IN | DIASTOLIC BLOOD PRESSURE: 60 MMHG | OXYGEN SATURATION: 96 %

## 2023-02-21 DIAGNOSIS — I48.21 PERMANENT ATRIAL FIBRILLATION (H): Primary | ICD-10-CM

## 2023-02-21 PROCEDURE — 99214 OFFICE O/P EST MOD 30 MIN: CPT | Performed by: INTERNAL MEDICINE

## 2023-02-21 NOTE — PROGRESS NOTES
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  There is high likelihood that she will have recurrent bleeding.  I offered her watchman as alternative way of protecting her from stroke .  She declined it.  She will continue to take Eliquis and check hemoglobin periodically.  She will take over-the-counter iron preparation    Follow-up in 3 months    Subjective:   This is 87 year old female who comes in today for follow-up visit.  I saw her in early November when she was complaining of increasing shortness of breath.  She had mildly elevated BNP and small pleural effusion.  She was treated for CHF.  Later on she was noted to have significant anemia.  She was admitted to hospital and received blood transfusion.  She underwent upper and lower endoscopy.  She had AV malformation.  She underwent cauterization.  She reports no new episode of bleeding.  She feels better.  She has no shortness of breath or chest pain.  She is taking Eliquis again.    Review of Systems:   Negative other than history of present illness    Objective:   /60 (BP Location: Right arm, Patient Position: Sitting, Cuff Size: Adult Regular)   Pulse 94   Resp 16   Ht 1.575 m (5' 2\")   Wt 56.9 kg (125 lb 8 oz)   SpO2 96%   BMI 22.95 kg/m    Physical Exam:  GENERAL: no distress  NECK: No JVD  LUNGS: Clear to auscultation.  CARDIAC: irregular rhythm, S1 & S2 normal.  No heaves, thrills, gallops or murmurs.  ABDOMEN: flat, negative hepatosplenomegaly, soft and non-tender.  EXTREMITIES: No evidence of cyanosis, clubbing or " edema.    Current Outpatient Medications   Medication Sig Dispense Refill     acetaminophen (TYLENOL) 325 MG tablet Take 2 tablets (650 mg) by mouth every 6 hours as needed for mild pain, fever or headaches       albuterol (PROAIR HFA/PROVENTIL HFA/VENTOLIN HFA) 108 (90 Base) MCG/ACT inhaler Inhale 2 puffs into the lungs every 6 hours as needed for shortness of breath, wheezing or cough 18 g 1     atorvastatin (LIPITOR) 20 MG tablet [ATORVASTATIN (LIPITOR) 20 MG TABLET] TAKE 1 TABLET BY MOUTH EVERYDAY AT BEDTIME 90 tablet 2     benzonatate (TESSALON) 100 MG capsule Take 1 capsule (100 mg) by mouth 3 times daily as needed for cough 30 capsule 0     cholecalciferol, vitamin D3, (VITAMIN D3) 2,000 unit Tab [CHOLECALCIFEROL, VITAMIN D3, (VITAMIN D3) 2,000 UNIT TAB] Take 1 tablet by mouth daily.       diltiazem ER COATED BEADS (CARDIZEM CD/CARTIA XT) 240 MG 24 hr capsule TAKE 1 CAPSULE BY MOUTH EVERY DAY 90 capsule 1     ELIQUIS ANTICOAGULANT 5 MG tablet Take 1 tablet (5 mg) by mouth 2 times daily  1     furosemide (LASIX) 20 MG tablet Take 2 tablets (40 mg) by mouth daily 180 tablet 3     losartan (COZAAR) 25 MG tablet TAKE 1 TABLET BY MOUTH EVERY DAY 90 tablet 3     pantoprazole (PROTONIX) 40 MG EC tablet Take 1 tablet (40 mg) by mouth 2 times daily 180 tablet 0     sertraline (ZOLOFT) 100 MG tablet Take 1.5 tablets (150 mg) by mouth At Bedtime 135 tablet 3       Cardiographics:    Holter: April 2015   Persistent atrial fibrillation with overall fairly well controlled  ventricular response. There was some tendency toward rapid ventricular response  with activity in the early afternoon hours. A moderate number of ventricular  premature beats noted. 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     Echocardiogram: 2013   Normal LV systolic function, no significant valvular abnormalities     December 2022  Normal right ventricle size and systolic function.  The left ventricle is normal in size.  There is mild to moderate concentric left  ventricular hypertrophy.  The visual ejection fraction is 60-65%.  No regional wall motion abnormalities noted.  There is moderate (2+) mitral regurgitation.  The right ventricular systolic pressure is approximated at 38mmHg plus the  right atrial pressure.  Mild aortic valve calcification is present.  The aortic valve is trileaflet.  IVC diameter <2.1 cm collapsing >50% with sniff suggests a normal RA pressure  of 3 mmHg.  The rhythm was atrial fibrillation.      Stress Test: 2013   Mixed anterior perfusion defect     CT coronary angio: 2013   Normal coronaries, calcium score 2     Lab Results    Chemistry/lipid CBC Cardiac Enzymes/BNP/TSH/INR   Recent Labs   Lab Test 10/30/19  1632   CHOL 158   HDL 67   LDL 75   TRIG 79     Recent Labs   Lab Test 10/30/19  1632 05/17/18  1550 08/10/16  1147   LDL 75 86 103     Recent Labs   Lab Test 01/02/23  0557 12/30/22  1323 12/30/22  0554   NA  --   --  138   POTASSIUM 3.9   < > 3.2*   CHLORIDE  --   --  108*   CO2  --   --  20*   GLC  --   --  97   BUN  --   --  20   CR  --   --  1.07   GFRESTIMATED  --   --  51*   FAROOQ  --   --  9.0    < > = values in this interval not displayed.     Recent Labs   Lab Test 12/30/22  0554 12/29/22  0919 12/28/22  2124   CR 1.07 1.20* 1.20*     Recent Labs   Lab Test 05/17/18  1550   A1C 5.7          Recent Labs   Lab Test 02/14/23  1311   WBC 13.6*   HGB 11.3*   HCT 37.9   MCV 82        Recent Labs   Lab Test 02/14/23  1311 02/02/23  1721 01/18/23  1654   HGB 11.3* 9.5* 9.8*    No results for input(s): TROPONINI in the last 40218 hours.  Recent Labs   Lab Test 12/28/22  1900 12/28/22  1554 12/02/22  1021   *  --   --    NTBNP  --  3,702* 2,360*     No results for input(s): TSH in the last 97743 hours.  Recent Labs   Lab Test 09/01/21  1537 07/19/21  1421 06/28/21  1342   INR 1.9* 2.5* 1.90*

## 2023-02-21 NOTE — LETTER
"2/21/2023    Sabas Austin MD  [address] [address]    RE: Marva Gaines       Dear Colleague,     I had the pleasure of seeing Marva Gaines in the [facility].      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  There is high likelihood that she will have recurrent bleeding.  I offered her watchman as alternative way of protecting her from stroke .  She declined it.  She will continue to take Eliquis and check hemoglobin periodically.  She will take over-the-counter iron preparation    Follow-up in 3 months    Subjective:   This is 87 year old female who comes in today for follow-up visit.  I saw her in early November when she was complaining of increasing shortness of breath.  She had mildly elevated BNP and small pleural effusion.  She was treated for CHF.  Later on she was noted to have significant anemia.  She was admitted to hospital and received blood transfusion.  She underwent upper and lower endoscopy.  She had AV malformation.  She underwent cauterization.  She reports no new episode of bleeding.  She feels better.  She has no shortness of breath or chest pain.  She is taking Eliquis again.    Review of Systems:   Negative other than history of present illness    Objective:   /60 (BP Location: Right arm, Patient Position: Sitting, Cuff Size: Adult Regular)   Pulse 94   Resp 16   Ht 1.575 m (5' 2\")   Wt 56.9 kg (125 lb 8 oz)   SpO2 96%   BMI 22.95 kg/m    Physical Exam:  GENERAL: no distress  NECK: No JVD  LUNGS: Clear to " auscultation.  CARDIAC: irregular rhythm, S1 & S2 normal.  No heaves, thrills, gallops or murmurs.  ABDOMEN: flat, negative hepatosplenomegaly, soft and non-tender.  EXTREMITIES: No evidence of cyanosis, clubbing or edema.    Current Outpatient Medications   Medication Sig Dispense Refill     acetaminophen (TYLENOL) 325 MG tablet Take 2 tablets (650 mg) by mouth every 6 hours as needed for mild pain, fever or headaches       albuterol (PROAIR HFA/PROVENTIL HFA/VENTOLIN HFA) 108 (90 Base) MCG/ACT inhaler Inhale 2 puffs into the lungs every 6 hours as needed for shortness of breath, wheezing or cough 18 g 1     atorvastatin (LIPITOR) 20 MG tablet [ATORVASTATIN (LIPITOR) 20 MG TABLET] TAKE 1 TABLET BY MOUTH EVERYDAY AT BEDTIME 90 tablet 2     benzonatate (TESSALON) 100 MG capsule Take 1 capsule (100 mg) by mouth 3 times daily as needed for cough 30 capsule 0     cholecalciferol, vitamin D3, (VITAMIN D3) 2,000 unit Tab [CHOLECALCIFEROL, VITAMIN D3, (VITAMIN D3) 2,000 UNIT TAB] Take 1 tablet by mouth daily.       diltiazem ER COATED BEADS (CARDIZEM CD/CARTIA XT) 240 MG 24 hr capsule TAKE 1 CAPSULE BY MOUTH EVERY DAY 90 capsule 1     ELIQUIS ANTICOAGULANT 5 MG tablet Take 1 tablet (5 mg) by mouth 2 times daily  1     furosemide (LASIX) 20 MG tablet Take 2 tablets (40 mg) by mouth daily 180 tablet 3     losartan (COZAAR) 25 MG tablet TAKE 1 TABLET BY MOUTH EVERY DAY 90 tablet 3     pantoprazole (PROTONIX) 40 MG EC tablet Take 1 tablet (40 mg) by mouth 2 times daily 180 tablet 0     sertraline (ZOLOFT) 100 MG tablet Take 1.5 tablets (150 mg) by mouth At Bedtime 135 tablet 3       Cardiographics:    Holter: April 2015   Persistent atrial fibrillation with overall fairly well controlled  ventricular response. There was some tendency toward rapid ventricular response  with activity in the early afternoon hours. A moderate number of ventricular  premature beats noted. 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     Echocardiogram:  2013   Normal LV systolic function, no significant valvular abnormalities     December 2022  Normal right ventricle size and systolic function.  The left ventricle is normal in size.  There is mild to moderate concentric left ventricular hypertrophy.  The visual ejection fraction is 60-65%.  No regional wall motion abnormalities noted.  There is moderate (2+) mitral regurgitation.  The right ventricular systolic pressure is approximated at 38mmHg plus the  right atrial pressure.  Mild aortic valve calcification is present.  The aortic valve is trileaflet.  IVC diameter <2.1 cm collapsing >50% with sniff suggests a normal RA pressure  of 3 mmHg.  The rhythm was atrial fibrillation.      Stress Test: 2013   Mixed anterior perfusion defect     CT coronary angio: 2013   Normal coronaries, calcium score 2     Lab Results    Chemistry/lipid CBC Cardiac Enzymes/BNP/TSH/INR   Recent Labs   Lab Test 10/30/19  1632   CHOL 158   HDL 67   LDL 75   TRIG 79     Recent Labs   Lab Test 10/30/19  1632 05/17/18  1550 08/10/16  1147   LDL 75 86 103     Recent Labs   Lab Test 01/02/23  0557 12/30/22  1323 12/30/22  0554   NA  --   --  138   POTASSIUM 3.9   < > 3.2*   CHLORIDE  --   --  108*   CO2  --   --  20*   GLC  --   --  97   BUN  --   --  20   CR  --   --  1.07   GFRESTIMATED  --   --  51*   FAROOQ  --   --  9.0    < > = values in this interval not displayed.     Recent Labs   Lab Test 12/30/22  0554 12/29/22  0919 12/28/22  2124   CR 1.07 1.20* 1.20*     Recent Labs   Lab Test 05/17/18  1550   A1C 5.7          Recent Labs   Lab Test 02/14/23  1311   WBC 13.6*   HGB 11.3*   HCT 37.9   MCV 82        Recent Labs   Lab Test 02/14/23  1311 02/02/23  1721 01/18/23  1654   HGB 11.3* 9.5* 9.8*    No results for input(s): TROPONINI in the last 21935 hours.  Recent Labs   Lab Test 12/28/22  1900 12/28/22  1554 12/02/22  1021   *  --   --    NTBNP  --  3,702* 2,360*     No results for input(s): TSH in the last 42284  hours.  Recent Labs   Lab Test 09/01/21  1537 07/19/21  1421 06/28/21  1342   INR 1.9* 2.5* 1.90*                        Thank you for allowing me to participate in the care of your patient.      Sincerely,     Melvin Ricci MD     [facility]  cc:   No referring provider defined for this encounter.

## 2023-02-24 DIAGNOSIS — I48.21 PERMANENT ATRIAL FIBRILLATION (H): ICD-10-CM

## 2023-02-24 RX ORDER — APIXABAN 5 MG/1
TABLET, FILM COATED ORAL
Qty: 180 TABLET | Refills: 1 | Status: SHIPPED | OUTPATIENT
Start: 2023-02-24 | End: 2023-03-23

## 2023-03-08 ENCOUNTER — LAB (OUTPATIENT)
Dept: LAB | Facility: CLINIC | Age: 88
End: 2023-03-08
Payer: COMMERCIAL

## 2023-03-08 DIAGNOSIS — D62 ANEMIA DUE TO BLOOD LOSS, ACUTE: ICD-10-CM

## 2023-03-08 LAB
ELLIPTOCYTES BLD QL SMEAR: SLIGHT
ERYTHROCYTE [DISTWIDTH] IN BLOOD BY AUTOMATED COUNT: 27.5 % (ref 10–15)
HCT VFR BLD AUTO: 35.6 % (ref 35–47)
HGB BLD-MCNC: 11.6 G/DL (ref 11.7–15.7)
MCH RBC QN AUTO: 28.2 PG (ref 26.5–33)
MCHC RBC AUTO-ENTMCNC: 32.6 G/DL (ref 31.5–36.5)
MCV RBC AUTO: 87 FL (ref 78–100)
PLAT MORPH BLD: ABNORMAL
PLATELET # BLD AUTO: 283 10E3/UL (ref 150–450)
RBC # BLD AUTO: 4.11 10E6/UL (ref 3.8–5.2)
RBC MORPH BLD: ABNORMAL
WBC # BLD AUTO: 9.7 10E3/UL (ref 4–11)

## 2023-03-08 PROCEDURE — 85027 COMPLETE CBC AUTOMATED: CPT

## 2023-03-08 PROCEDURE — 36415 COLL VENOUS BLD VENIPUNCTURE: CPT

## 2023-03-09 ENCOUNTER — NURSE TRIAGE (OUTPATIENT)
Dept: INTERNAL MEDICINE | Facility: CLINIC | Age: 88
End: 2023-03-09
Payer: COMMERCIAL

## 2023-03-09 DIAGNOSIS — R68.84 JAW PAIN: Primary | ICD-10-CM

## 2023-03-09 RX ORDER — TRAMADOL HYDROCHLORIDE 50 MG/1
50 TABLET ORAL EVERY 6 HOURS PRN
Qty: 10 TABLET | Refills: 0 | Status: SHIPPED | OUTPATIENT
Start: 2023-03-09 | End: 2023-03-12

## 2023-03-09 NOTE — TELEPHONE ENCOUNTER
"S-(situation): left jaw hurts TMJ??     B-(background): started 3.6.23   Pt stated has RA     A-(assessment): Not sure if its a tooth pain   \"I can hardly swallow my pills, it so painful.\"  \"I can't eat or chew\"    The whole left side even ear hurts    No swelling   Headache     Going to the dentist tomorrow @ 7am  Wakes me up at night    No fever  Tylenol Xtra strenth 2X/day without relief   No chest pain    R-(recommendations): CALL DENTIST NOW  PT SCHEDULED FOR DENTIST APPT TOMORROW PT ASKING FOR PAIN MEDICATION DUE TO PAIN    PHARMACY:[facility]    Message will be routed to PCP for review    RN advised to call back with any changes, worsening symptoms, and questions or concerns. Pt verbalized understanding of and agreement with plan and had no further questions.     Deanna GALAVIZ RN  [facility]    Reason for Disposition    SEVERE toothache pain    Additional Information    Negative: Pale cold skin and very weak (can't stand)    Negative: Similar pain previously and it was from 'heart attack'    Negative: Similar pain previously and it was from 'angina' and not relieved by nitroglycerin    Negative: Sounds like a life-threatening emergency to the triager    Negative: Chest pain    Negative: Toothache followed tooth injury    Negative: Patient sounds very sick or weak to the triager    Negative: Face is swollen    Negative: Fever    Protocols used: TOOTHACHE-A-OH      "

## 2023-03-09 NOTE — TELEPHONE ENCOUNTER
Outgoing Call:    Dr. Austin's message relayed to pt  Pt verbalized understanding   Pt was appreciative and had no further questions or concerns    Deanna GALAVIZ RN  [facility]

## 2023-03-10 ENCOUNTER — APPOINTMENT (OUTPATIENT)
Dept: CT IMAGING | Facility: CLINIC | Age: 88
End: 2023-03-10
Attending: EMERGENCY MEDICINE
Payer: COMMERCIAL

## 2023-03-10 ENCOUNTER — APPOINTMENT (OUTPATIENT)
Dept: RADIOLOGY | Facility: CLINIC | Age: 88
End: 2023-03-10
Attending: EMERGENCY MEDICINE
Payer: COMMERCIAL

## 2023-03-10 ENCOUNTER — HOSPITAL ENCOUNTER (EMERGENCY)
Facility: CLINIC | Age: 88
Discharge: HOME | End: 2023-03-10
Attending: EMERGENCY MEDICINE | Admitting: EMERGENCY MEDICINE
Payer: COMMERCIAL

## 2023-03-10 VITALS
OXYGEN SATURATION: 95 % | SYSTOLIC BLOOD PRESSURE: 137 MMHG | RESPIRATION RATE: 20 BRPM | BODY MASS INDEX: 22.86 KG/M2 | WEIGHT: 125 LBS | HEART RATE: 75 BPM | TEMPERATURE: 97.6 F | DIASTOLIC BLOOD PRESSURE: 62 MMHG

## 2023-03-10 DIAGNOSIS — K38.8 MASS OF APPENDIX: ICD-10-CM

## 2023-03-10 DIAGNOSIS — S01.01XA LACERATION OF SCALP, INITIAL ENCOUNTER: ICD-10-CM
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
## 2023-03-10 LAB
ANION GAP SERPL CALCULATED.3IONS-SCNC: 11 MMOL/L (ref 5–18)
APTT PPP: 36 SECONDS (ref 22–38)
ATRIAL RATE - MUSE: 119 BPM
BASOPHILS # BLD AUTO: 0.1 10E3/UL (ref 0–0.2)
BASOPHILS NFR BLD AUTO: 0 %
BUN SERPL-MCNC: 26 MG/DL (ref 8–28)
CALCIUM SERPL-MCNC: 9.8 MG/DL (ref 8.5–10.5)
CHLORIDE BLD-SCNC: 97 MMOL/L (ref 98–107)
CO2 SERPL-SCNC: 27 MMOL/L (ref 22–31)
CREAT SERPL-MCNC: 1.24 MG/DL (ref 0.6–1.1)
DIASTOLIC BLOOD PRESSURE - MUSE: NORMAL MMHG
EOSINOPHIL # BLD AUTO: 0.1 10E3/UL (ref 0–0.7)
EOSINOPHIL NFR BLD AUTO: 1 %
ERYTHROCYTE [DISTWIDTH] IN BLOOD BY AUTOMATED COUNT: 24.1 % (ref 10–15)
GFR SERPL CREATININE-BSD FRML MDRD: 42 ML/MIN/1.73M2
GLUCOSE BLD-MCNC: 121 MG/DL (ref 70–125)
HCT VFR BLD AUTO: 36.5 % (ref 35–47)
HGB BLD-MCNC: 11.3 G/DL (ref 11.7–15.7)
IMM GRANULOCYTES # BLD: 0.1 10E3/UL
IMM GRANULOCYTES NFR BLD: 1 %
INR PPP: 1.68 (ref 0.85–1.15)
INTERPRETATION ECG - MUSE: NORMAL
LYMPHOCYTES # BLD AUTO: 0.7 10E3/UL (ref 0.8–5.3)
LYMPHOCYTES NFR BLD AUTO: 5 %
MCH RBC QN AUTO: 25.3 PG (ref 26.5–33)
MCHC RBC AUTO-ENTMCNC: 31 G/DL (ref 31.5–36.5)
MCV RBC AUTO: 82 FL (ref 78–100)
MONOCYTES # BLD AUTO: 1.4 10E3/UL (ref 0–1.3)
MONOCYTES NFR BLD AUTO: 11 %
NEUTROPHILS # BLD AUTO: 10.8 10E3/UL (ref 1.6–8.3)
NEUTROPHILS NFR BLD AUTO: 82 %
NRBC # BLD AUTO: 0 10E3/UL
NRBC BLD AUTO-RTO: 0 /100
P AXIS - MUSE: NORMAL DEGREES
PLATELET # BLD AUTO: 245 10E3/UL (ref 150–450)
POTASSIUM BLD-SCNC: 3.3 MMOL/L (ref 3.5–5)
PR INTERVAL - MUSE: NORMAL MS
QRS DURATION - MUSE: 90 MS
QT - MUSE: 414 MS
QTC - MUSE: 459 MS
R AXIS - MUSE: 58 DEGREES
RBC # BLD AUTO: 4.47 10E6/UL (ref 3.8–5.2)
SODIUM SERPL-SCNC: 135 MMOL/L (ref 136–145)
SYSTOLIC BLOOD PRESSURE - MUSE: NORMAL MMHG
T AXIS - MUSE: 35 DEGREES
TROPONIN I SERPL-MCNC: 0.02 NG/ML (ref 0–0.29)
VENTRICULAR RATE- MUSE: 74 BPM
WBC # BLD AUTO: 13.1 10E3/UL (ref 4–11)

## 2023-03-10 PROCEDURE — 93005 ELECTROCARDIOGRAM TRACING: CPT | Performed by: EMERGENCY MEDICINE

## 2023-03-10 PROCEDURE — 85610 PROTHROMBIN TIME: CPT | Performed by: EMERGENCY MEDICINE

## 2023-03-10 PROCEDURE — 12002 RPR S/N/AX/GEN/TRNK2.6-7.5CM: CPT

## 2023-03-10 PROCEDURE — 80048 BASIC METABOLIC PNL TOTAL CA: CPT | Performed by: EMERGENCY MEDICINE

## 2023-03-10 PROCEDURE — 36415 COLL VENOUS BLD VENIPUNCTURE: CPT | Performed by: EMERGENCY MEDICINE

## 2023-03-10 PROCEDURE — 85730 THROMBOPLASTIN TIME PARTIAL: CPT | Performed by: EMERGENCY MEDICINE

## 2023-03-10 PROCEDURE — 74177 CT ABD & PELVIS W/CONTRAST: CPT

## 2023-03-10 PROCEDURE — 99285 EMERGENCY DEPT VISIT HI MDM: CPT | Mod: 25

## 2023-03-10 PROCEDURE — 250N000011 HC RX IP 250 OP 636: Performed by: EMERGENCY MEDICINE

## 2023-03-10 PROCEDURE — 85025 COMPLETE CBC W/AUTO DIFF WBC: CPT | Performed by: EMERGENCY MEDICINE

## 2023-03-10 PROCEDURE — 70450 CT HEAD/BRAIN W/O DYE: CPT

## 2023-03-10 PROCEDURE — 250N000013 HC RX MED GY IP 250 OP 250 PS 637: Performed by: EMERGENCY MEDICINE

## 2023-03-10 PROCEDURE — 90471 IMMUNIZATION ADMIN: CPT | Performed by: EMERGENCY MEDICINE

## 2023-03-10 PROCEDURE — 72125 CT NECK SPINE W/O DYE: CPT

## 2023-03-10 PROCEDURE — 84484 ASSAY OF TROPONIN QUANT: CPT | Performed by: EMERGENCY MEDICINE

## 2023-03-10 PROCEDURE — 90715 TDAP VACCINE 7 YRS/> IM: CPT | Performed by: EMERGENCY MEDICINE

## 2023-03-10 PROCEDURE — 71046 X-RAY EXAM CHEST 2 VIEWS: CPT

## 2023-03-10 RX ORDER — IOPAMIDOL 755 MG/ML
75 INJECTION, SOLUTION INTRAVASCULAR ONCE
Status: COMPLETED | OUTPATIENT
Start: 2023-03-10 | End: 2023-03-10

## 2023-03-10 RX ORDER — ACETAMINOPHEN 325 MG/1
975 TABLET ORAL ONCE
Status: COMPLETED | OUTPATIENT
Start: 2023-03-10 | End: 2023-03-10

## 2023-03-10 RX ADMIN — IOPAMIDOL 75 ML: 755 INJECTION, SOLUTION INTRAVENOUS at 17:15

## 2023-03-10 RX ADMIN — CLOSTRIDIUM TETANI TOXOID ANTIGEN (FORMALDEHYDE INACTIVATED), CORYNEBACTERIUM DIPHTHERIAE TOXOID ANTIGEN (FORMALDEHYDE INACTIVATED), BORDETELLA PERTUSSIS TOXOID ANTIGEN (GLUTARALDEHYDE INACTIVATED), BORDETELLA PERTUSSIS FILAMENTOUS HEMAGGLUTININ ANTIGEN (FORMALDEHYDE INACTIVATED), BORDETELLA PERTUSSIS PERTACTIN ANTIGEN, AND BORDETELLA PERTUSSIS FIMBRIAE 2/3 ANTIGEN 0.5 ML: 5; 2; 2.5; 5; 3; 5 INJECTION, SUSPENSION INTRAMUSCULAR at 17:38

## 2023-03-10 RX ADMIN — ACETAMINOPHEN 975 MG: 325 TABLET ORAL at 16:43

## 2023-03-10 ASSESSMENT — ENCOUNTER SYMPTOMS
NAUSEA: 0
BACK PAIN: 1
NECK PAIN: 0

## 2023-03-10 ASSESSMENT — ACTIVITIES OF DAILY LIVING (ADL)
ADLS_ACUITY_SCORE: 35
ADLS_ACUITY_SCORE: 35

## 2023-03-10 NOTE — DISCHARGE INSTRUCTIONS
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 Please follow-up with your PCP and surgery.    On the CT Cervical spine:  1.8 cm left thyroid lobe nodule. Consider thyroid ultrasound for further evaluation.

## 2023-03-10 NOTE — ED TRIAGE NOTES
Pt presents to the ED via EMS with c/o fall on blood thinners. Pt takes Elequis. EMS reports it was an unwitnessed fall. Denies LOC. Pt has skin tear to right temple area, EMS wrapped with bandage, bleeding controlled. Pt denies any neck pain, endorses back pain.      Triage Assessment     Row Name 03/10/23 1207       Triage Assessment (Adult)    Airway WDL WDL       Respiratory WDL    Respiratory WDL WDL       Skin Circulation/Temperature WDL    Skin Circulation/Temperature WDL WDL       Cardiac WDL    Cardiac WDL WDL       Peripheral/Neurovascular WDL    Peripheral Neurovascular WDL WDL       Cognitive/Neuro/Behavioral WDL    Cognitive/Neuro/Behavioral WDL WDL

## 2023-03-10 NOTE — ED PROVIDER NOTES
EMERGENCY DEPARTMENT ENCOUNTER      NAME: Marva Gaines  AGE: 87 year old female  YOB: 1935  MRN: [id number]  EVALUATION DATE & TIME: 3/10/2023  4:11 PM    PCP: Sabas Austin    ED PROVIDER: Tova Ashton MD      Chief Complaint   Patient presents with     Fall         FINAL IMPRESSION:  No diagnosis found.      ED COURSE & MEDICAL DECISION MAKIN:15 PM Met with patient for initial interview and exam. Discussed initial plan for care for their stay in the emergency department.  5:23 PM I updated the patients family.   5:45 PM I reevaluated the patient.   6:20 PM I performed a laceration repair.     Pertinent Labs & Imaging studies reviewed. (See chart for details)  87 year old female presents to the Emergency Department for evaluation after a fall.  Patient states that she had open the door, she turned suddenly and lost her balance hitting the left side of her back on a chair and then falling on the ground.  She does not believe she had LOC.  This was unwitnessed.  She is on Eliquis.  Trauma alert was immediately called.  CT scan of the head, cervical spine, CT of the abdomen pelvis with no findings of acute hemorrhage or fracture.  Patient with a large 7-1/2 cm laceration of the right side of her forehead, abrasion to the left temple.  The laceration was repaired.  Her tetanus was updated.  Patient otherwise without any signs of injury.  Given this was a mechanical fall, the patient and patient's daughter are comfortable with discharge home.    At the conclusion of the encounter I discussed the results of all of the tests and the disposition. The questions were answered. The patient or family acknowledged understanding and was agreeable with the care plan.       Medical Decision Making    History:    Supplemental history from: Documented in chart, if applicable and EMS    External Record(s) reviewed: Documented in chart, if applicable. and Outpatient Record: I reviewed the patients  outpatient records.     Work Up:    Chart documentation includes differential considered and any EKGs or imaging independently interpreted by provider, where specified.    In additional to work up documented, I considered the following work up: Documented in chart, if applicable.    External consultation:    Discussion of management with another provider: Documented in chart, if applicable    Complicating factors:    Care impacted by chronic illness: Anticoagulated State    Care affected by social determinants of health: N/A    Disposition considerations: Discharge. No recommendations on prescription strength medication(s). See documentation for any additional details.      MEDICATIONS GIVEN IN THE EMERGENCY:  Medications   acetaminophen (TYLENOL) tablet 975 mg (has no administration in time range)       NEW PRESCRIPTIONS STARTED AT TODAY'S ER VISIT  New Prescriptions    No medications on file          =================================================================    HPI    Patient information was obtained from: Patient     Use of : N/A         Marva Gaines is a 87 year old female with a pertinent history of hypertension, atrial fibrillation, HFpEF, hypercholesterolemia, and osteopenia who presents to this ED via EMS for evaluation of a fall.     Per EMS,  The patient lives at a senior living home and had a fall earlier today. The patient denies any loss of consciousness but did hit her head. The patient is currently on Eliquis and endorses some back pain after the fall.     The patient believes that she took a wrong turn and fell. The patient is unsure what she hit her head on but believes it may have been some ceramic cats that were on the floor. She denies any frequent balance issues or any loss of consciousness. Since the fall the patient endorses head and jaw pain.    For the past 2 days the patient has been experiencing jaw pain. She reports that she went to the dentist this morning and was  prescribed a Z-pack for her pain.    She notes that she has bad knees and should have had them replaced but did not want to. The patient is unsure when her last tetanus shot was. She reports that she is currently on oxygen only at night. The patient denies any chest pain, nausea, blurry vision, or any other complaints.     REVIEW OF SYSTEMS   Review of Systems   Eyes: Negative for visual disturbance.   Cardiovascular: Negative for chest pain.   Gastrointestinal: Negative for nausea.   Musculoskeletal: Positive for back pain. Negative for neck pain.        Positive for head and jaw pain.    All other systems reviewed and are negative.       PAST MEDICAL HISTORY:  No past medical history on file.    PAST SURGICAL HISTORY:  Past Surgical History:   Procedure Laterality Date     COLONOSCOPY N/A 12/30/2022    Procedure: COLONOSCOPY with argon plasma coagulation;  Surgeon: Steven Driscoll MD;  Location: [facility] OR     ESOPHAGOSCOPY, GASTROSCOPY, DUODENOSCOPY (EGD), COMBINED N/A 12/30/2022    Procedure: ESOPHAGOGASTRODUODENOSCOPY (EGD) with argon plasma coagulation;  Surgeon: Steven rDiscoll MD;  Location: [facility] Main OR           CURRENT MEDICATIONS:    acetaminophen (TYLENOL) 325 MG tablet  albuterol (PROAIR HFA/PROVENTIL HFA/VENTOLIN HFA) 108 (90 Base) MCG/ACT inhaler  atorvastatin (LIPITOR) 20 MG tablet  benzonatate (TESSALON) 100 MG capsule  cholecalciferol, vitamin D3, (VITAMIN D3) 2,000 unit Tab  diltiazem ER COATED BEADS (CARDIZEM CD/CARTIA XT) 240 MG 24 hr capsule  ELIQUIS ANTICOAGULANT 5 MG tablet  furosemide (LASIX) 20 MG tablet  losartan (COZAAR) 25 MG tablet  pantoprazole (PROTONIX) 40 MG EC tablet  sertraline (ZOLOFT) 100 MG tablet  traMADol (ULTRAM) 50 MG tablet        ALLERGIES:  Allergies   Allergen Reactions     Blood Transfusion Related (Informational Only) Other (See Comments)     Patient has a history of a clinically significant antibody against RBC antigens.  A delay in compatible RBCs  may occur. 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     Hydrocodone-Acetaminophen Unknown       FAMILY HISTORY:  No family history on file.    SOCIAL HISTORY:   Social History     Socioeconomic History     Marital status: Single   Tobacco Use     Smoking status: Former     Smokeless tobacco: Never   Vaping Use     Vaping Use: Never used       VITALS:  BP (!) 142/84   Pulse 86   Temp 97.6  F (36.4  C) (Oral)   Resp 16   Wt 56.7 kg (125 lb)   BMI 22.86 kg/m      PHYSICAL EXAM    Gen:  Alert, awake, NAD  HENT:  Normocephalic.  PERRL.  EOMI.  No periorbital step-offs, depression, tenderness.  No tenderness along the zygomatic arch bilaterally.  Ears atraumatic with no external bleeding or signs of trauma.  No epistaxis.  Clear oropharynx.  Dentition intact.   Respiratory:  Normal respiratory rate.  Lungs CTA.  Chest wall stable to compression.  Nontender chest wall.   Trachea midline.  Cardiovascular:  Regular rate and rhythm.  Palpable radial and DP pulses bilaterally.  Abdomen:  Soft, nontender, normoactive bowel sounds.    Musculoskeletal:  No midline C-spine, T-spine, L-spine tenderness.  No midline spinal step-offs noted.  FROM in all extremities.  5/5 strength in all extremities.  No gross deformities noted.  Pelvis stable. Tenderness to palpation over left flank.   Integument:  Ecchymosis dorsum of the right hand, abrasion of the left parietal scalp, 7.5 cm gaping stellate laceration of the right temple with active oozing of blood  Neuro:  GCS 15, A & O x 3, sensation intact to light touch     LAB:  All pertinent labs reviewed and interpreted.       RADIOLOGY:  Reviewed all pertinent imaging. Please see official radiology report.  Head CT w/o contrast    (Results Pending)   Cervical spine CT w/o contrast    (Results Pending)   CT Abdomen Pelvis w Contrast    (Results Pending)   Chest XR,  PA & LAT    (Results Pending)       EKG:    Performed at: 16:31    Impression: Atrial fibrillation with premature ventricular or aberrantly  conducted complexes.     Rate: 74  Rhythm: None    NC Interval: None  QRS Interval: 90  QTc Interval: 459  ST Changes: None  Comparison: When compared with 12/28/22, no significant changes.     I have independently reviewed and interpreted the EKG(s) documented above.    PROCEDURES:   PROCEDURE: Laceration Repair   INDICATIONS: Laceration   PROCEDURE PROVIDER: Dr Tova Ashton   SITE: Right scalp   TYPE/SIZE: complex, stellate, clean, ragged edges and no foreign body visualized  7.5 cm (total length)   FUNCTIONAL ASSESSMENT: Distal sensation, circulation and motor intact   MEDICATION: 7 mLs of 1% Lidocaine with epinephrine   PREPARATION: scrubbing with Normal saline   DEBRIDEMENT: no debridement   CLOSURE:  Superficial layer closed with 9 stitches of 4-0 Ethilon simple interrupted  2 5-0 chromic stitches also placed  Total number of sutures/staples placed: 11         I, Laura Bah, am serving as a scribe to document services personally performed by Tova Ashton, based on my observation and the provider's statements to me. I, Tova Ashton MD, attest that Laura Bah is acting in a scribe capacity, has observed my performance of the services and has documented them in accordance with my direction.    Tova Ashton MD  Emergency Medicine  [facility] EMERGENCY ROOM  [address] [address]  [phone]     Tova Ashton MD  03/10/23 203

## 2023-03-13 ENCOUNTER — NURSE TRIAGE (OUTPATIENT)
Dept: NURSING | Facility: CLINIC | Age: 88
End: 2023-03-13
Payer: COMMERCIAL

## 2023-03-13 NOTE — TELEPHONE ENCOUNTER
Son In law was transferred to clinic.  Message sent to RN triage pool.    Carmelita Romero on 3/13/2023 at 9:38 AM

## 2023-03-13 NOTE — TELEPHONE ENCOUNTER
Relayed message from January Hutchinson.     No further questions at this time.     Gricel Hazel RN

## 2023-03-13 NOTE — TELEPHONE ENCOUNTER
Her head CT did not show a brain, bleed. Her EKG showed atrial fibrillation. I would suggest she continue her blood thinner for now as she is at a high risk of stroke being in atrial fibrillation.

## 2023-03-13 NOTE — TELEPHONE ENCOUNTER
Patient son-in-law calling, states that Kiah was in the ED over the weekend after falling and hitting her head. States that she is on eliquis and that they did not get any direction on what to do with her Eliquis.  Transferred to the clinic for further instruction.  No triage.     DORY FULLER RN      Reason for Disposition    Caller has NON-URGENT medicine question about med that PCP or specialist prescribed and triager unable to answer question    Additional Information    Negative: Intentional drug overdose and suicidal thoughts or ideas    Negative: Drug overdose and triager unable to answer question    Negative: Caller requesting a renewal or refill of a medicine patient is currently taking    Negative: Caller requesting information unrelated to medicine    Negative: Caller requesting information about COVID-19 Vaccine    Negative: Caller requesting information about Emergency Contraception    Negative: Caller requesting information about Combined Birth Control Pills    Negative: Caller requesting information about Progestin Birth Control Pills    Negative: Caller requesting information about Post-Op pain or medicines    Negative: Caller requesting a prescription antibiotic (such as penicillin) for Strep throat and has a positive culture result    Negative: Caller requesting a prescription anti-viral med (such as Tamiflu) and has influenza (flu) symptoms    Negative: Immunization reaction suspected    Negative: Rash while taking a medicine or within 3 days of stopping it    Negative: Asthma and having symptoms of asthma (cough, wheezing, etc.)    Negative: Symptom of illness (e.g., headache, abdominal pain, earache, vomiting) that are more than mild    Negative: Breastfeeding questions about mother's medicines and diet    Negative: MORE THAN A DOUBLE DOSE of a prescription or over-the-counter (OTC) drug    Negative: DOUBLE DOSE (an extra dose or lesser amount) of prescription drug and any symptoms  (e.g., dizziness, nausea, pain, sleepiness)    Negative: DOUBLE DOSE (an extra dose or lesser amount) of over-the-counter (OTC) drug and any symptoms (e.g., dizziness, nausea, pain, sleepiness)    Negative: Took another person's prescription drug    Negative: DOUBLE DOSE (an extra dose or lesser amount) of prescription drug and NO symptoms  (Exception: A double dose of antibiotics.)    Negative: Diabetes drug error or overdose (e.g., took wrong type of insulin or took extra dose)    Negative: Caller has medication question about med NOT prescribed by PCP and triager unable to answer question (e.g., compatibility with other med, storage)    Negative: Prescription not at pharmacy and was prescribed by PCP recently  (Exception: triager has access to EMR and prescription is recorded there. Go to Home Care and confirm for pharmacy.)    Negative: Pharmacy calling with prescription question and triager unable to answer question    Negative: Caller has URGENT medicine question about med that PCP or specialist prescribed and triager unable to answer question    Protocols used: MEDICATION QUESTION CALL-A-OH

## 2023-03-20 DIAGNOSIS — K31.819 GASTRIC AVM: ICD-10-CM

## 2023-03-20 DIAGNOSIS — E78.00 PURE HYPERCHOLESTEROLEMIA: ICD-10-CM

## 2023-03-21 RX ORDER — ATORVASTATIN CALCIUM 20 MG/1
TABLET, FILM COATED ORAL
Qty: 90 TABLET | Refills: 2 | Status: SHIPPED | OUTPATIENT
Start: 2023-03-21 | End: 2023-10-25

## 2023-03-21 RX ORDER — PANTOPRAZOLE SODIUM 40 MG/1
TABLET, DELAYED RELEASE ORAL
Qty: 180 TABLET | Refills: 0 | Status: SHIPPED | OUTPATIENT
Start: 2023-03-21 | End: 2023-07-22

## 2023-03-21 NOTE — TELEPHONE ENCOUNTER
"Last Written Prescription Date:  1/31/2023  Last Fill Quantity: 180,  # refills: 0   Last office visit provider:  2/2/2023     Requested Prescriptions   Pending Prescriptions Disp Refills     pantoprazole (PROTONIX) 40 MG EC tablet [Pharmacy Med Name: PANTOPRAZOLE SOD DR 40 MG TAB] 180 tablet 0     Sig: TAKE 1 TABLET BY MOUTH 2 TIMES DAILY       PPI Protocol Passed - 3/20/2023  3:40 PM        Passed - Not on Clopidogrel (unless Pantoprazole ordered)        Passed - No diagnosis of osteoporosis on record        Passed - Recent (12 mo) or future (30 days) visit within the authorizing provider's specialty     Patient has had an office visit with the authorizing provider or a provider within the authorizing providers department within the previous 12 mos or has a future within next 30 days. See \"Patient Info\" tab in inbasket, or \"Choose Columns\" in Meds & Orders section of the refill encounter.              Passed - Medication is active on med list        Passed - Patient is age 18 or older        Passed - No active pregnacy on record        Passed - No positive pregnancy test in past 12 months             Liz Hansen RN 03/21/23 12:06 AM  "

## 2023-03-23 ENCOUNTER — OFFICE VISIT (OUTPATIENT)
Dept: INTERNAL MEDICINE | Facility: CLINIC | Age: 88
End: 2023-03-23
Payer: COMMERCIAL

## 2023-03-23 VITALS
WEIGHT: 127.2 LBS | HEIGHT: 62 IN | HEART RATE: 81 BPM | TEMPERATURE: 97.3 F | BODY MASS INDEX: 23.41 KG/M2 | OXYGEN SATURATION: 95 % | SYSTOLIC BLOOD PRESSURE: 137 MMHG | RESPIRATION RATE: 16 BRPM | DIASTOLIC BLOOD PRESSURE: 82 MMHG

## 2023-03-23 DIAGNOSIS — K55.20 COLON ARTERIOVENOUS MALFORMATION: ICD-10-CM

## 2023-03-23 DIAGNOSIS — S01.81XD LACERATION OF OTHER PART OF HEAD WITHOUT FOREIGN BODY, SUBSEQUENT ENCOUNTER: Primary | ICD-10-CM

## 2023-03-23 DIAGNOSIS — I48.21 PERMANENT ATRIAL FIBRILLATION (H): ICD-10-CM

## 2023-03-23 DIAGNOSIS — E04.1 THYROID NODULE: ICD-10-CM

## 2023-03-23 DIAGNOSIS — K31.819 GASTRIC AVM: ICD-10-CM

## 2023-03-23 DIAGNOSIS — K63.89 CECUM MASS: ICD-10-CM

## 2023-03-23 PROBLEM — R09.02 HYPOXIA: Status: RESOLVED | Noted: 2022-12-28 | Resolved: 2023-03-23

## 2023-03-23 PROBLEM — I50.31 ACUTE HEART FAILURE WITH PRESERVED EJECTION FRACTION (HFPEF) (H): Status: RESOLVED | Noted: 2022-12-27 | Resolved: 2023-03-23

## 2023-03-23 PROCEDURE — 99215 OFFICE O/P EST HI 40 MIN: CPT | Performed by: INTERNAL MEDICINE

## 2023-03-23 RX ORDER — APIXABAN 5 MG/1
5 TABLET, FILM COATED ORAL 2 TIMES DAILY
Qty: 180 TABLET | Refills: 1 | Status: SHIPPED | OUTPATIENT
Start: 2023-03-23 | End: 2023-10-04

## 2023-03-23 NOTE — PROGRESS NOTES
Assessment & Plan     88 yo female was seen in the ER on 3/10/23 after she fell in her house.  Patient states that she had open the door, she turned suddenly and lost her balance hitting the left side of her back on a chair and then falling on the ground.  She does not believe she had LOC.  This was unwitnessed.  She is on Eliquis.  Trauma alert was immediately called.  CT scan of the head, cervical spine, CT of the abdomen pelvis with no findings of acute hemorrhage or fracture.  Patient with a large 7-1/2 cm laceration of the right side of her forehead, abrasion to the left temple.  The laceration was repaired and 9 stiches were placed.  Her tetanus was updated.    Laceration of other part of head without foreign body, subsequent encounter  Healed well with no sign of infection. I removed 9 stiches today with no complications. Home care discussed.   She denies headache, vision disturbance and vomiting, concussion symptoms.    Permanent atrial fibrillation (H)  She saw Dr Ricci in February and they discussed anticoagulation therapy with the presence of GI AV malformation. She did not have any GI bleeding since December and Hgb stays stable.  There is high likelihood that she will have recurrent bleeding.  Dr Ricci offered her watchman as alternative way of protecting her from stroke .  She declined it.  She will continue to take Eliquis and check hemoglobin periodically.  She will take over-the-counter iron preparation  - ELIQUIS ANTICOAGULANT 5 MG tablet; Take 1 tablet (5 mg) by mouth 2 times daily    Cecum mass  CT scan during the last ER visit 3/10/23:  Likely 2.4 cm enhancing cecal mass at the base of the appendix. There is some appendiceal dilation without evidence of ed appendicitis at this time. Recommend confirmation with colonoscopy and surgical referral.    She had colonoscopy in 12/2022 , which did not show this abnormality:  Multiple non-bleeding colonic angioectasias. Treated with argon  "plasma coagulation (APC).  Diverticulosis in the sigmoid colon and in the  descending colon. External hemorrhoids.    I suggested visit with general surgery for review of the CT scan images and further recommendations.  - Adult General Surg Referral; Future    Thyroid nodule  CT neck during the last ER visit 3/10/23 showed 1.8 cm left thyroid lobe nodule. Consider thyroid ultrasound for further evaluation.    She will schedule thyroid US.  - US Thyroid; Future    Gastric AVM  Colon arteriovenous malformation  12/30 had EGD and colonoscopy that showed evidence of bleeding duodenal AVM that was cauterized and multiple colonic AVMs cauterized. She did not have any GI bleeding since December and Hgb stays stable.      Total time spent on the date of this encounter doing: chart review, review of test results, patient visit, physical exam, education, counseling, developing plan of care, and documenting =  50   Minutes.    Return in about 3 months (around 6/23/2023) for Follow up.                  Sabas Austin MD  [facility]    Zenadia Dupont is a 87 year old, presenting for the following health issues:  Emergency Room F/U (Emergency follow up  03-10-23  Stitches on head (needs removal)  [facility])    Additional Questions 3/23/2023   Roomed by [place]     [facility]             Review of Systems         Objective    /82   Pulse 81   Temp 97.3  F (36.3  C)   Resp 16   Ht 1.575 m (5' 2\")   Wt 57.7 kg (127 lb 3.2 oz)   SpO2 95%   BMI 23.27 kg/m    Body mass index is 23.27 kg/m .  Physical Exam   Constitutional:  oriented to person, place, and time, appears well-nourished. No distress.   HENT:   Head: Normocephalic.Right forehead laceration healed, 9 stiches removed. No bruises.    Eyes: Conjunctivae are normal.   Neck: Normal range of motion. .   Cardiovascular: Normal rate, regular rhythm and normal heart sounds.    Pulmonary/Chest: Effort normal and breath sounds " normal.  Musculoskeletal: Normal range of motion.   Neurological: alert and oriented to person, place, and time. Skin: Skin is warm.   Psychiatric: normal mood and affect.

## 2023-05-17 ENCOUNTER — OFFICE VISIT (OUTPATIENT)
Dept: CARDIOLOGY | Facility: CLINIC | Age: 88
End: 2023-05-17
Payer: COMMERCIAL

## 2023-05-17 VITALS
SYSTOLIC BLOOD PRESSURE: 122 MMHG | WEIGHT: 130 LBS | RESPIRATION RATE: 16 BRPM | DIASTOLIC BLOOD PRESSURE: 70 MMHG | BODY MASS INDEX: 23.78 KG/M2 | HEART RATE: 89 BPM | OXYGEN SATURATION: 98 %

## 2023-05-17 DIAGNOSIS — I48.21 PERMANENT ATRIAL FIBRILLATION (H): Primary | ICD-10-CM

## 2023-05-17 PROCEDURE — 99214 OFFICE O/P EST MOD 30 MIN: CPT | Performed by: INTERNAL MEDICINE

## 2023-05-17 NOTE — LETTER
5/17/2023    Sabas Austin MD  [address] [address]    RE: Marva Gaines       Dear Colleague,     I had the pleasure of seeing Marva Gaines in the [facility].      Cardiology Progress Note     Assessment:  Chronic GI bleeding due to AV malformations with development of anemia, improved  Permanent atrial fibrillation with controlled ventricular response on Eliquis  Chronic heart failure improved after correction of anemia fluid overload  PVCs, asymptomatic  Hypertension, good control  Hypercholesterolemia on atorvastatin, good control  Depression/ anxiety      Plan:  Continue current cardiac medications  She does not appear to be fluid overloaded or having any additional GI bleeding.  She was instructed to use walker to reduce risk of falling again.  She still does not want to consider watchman    Follow-up in 6 months    Subjective:   This is 87 year old female who comes in today for follow-up visit.  She has done well from a cardiac standpoint she denies chest pain or shortness of breath her weight has been stable.  She has no PND and orthopnea.  She has not had syncope.  She did fall when she was moving rapidly at her home.  She had cut on her scalp requiring stitches in the Emergency Room.  She did not have any intracranial bleed    Review of Systems:   Negative other than history of present illness    Objective:   /70   Pulse 89   Resp 16   Wt 59 kg (130 lb)   SpO2 98%   BMI 23.78 kg/m    Physical Exam:  GENERAL: no distress  NECK: No JVD  LUNGS: Clear to auscultation.  CARDIAC: irregular rhythm, S1 & S2 normal.  No heaves, thrills, gallops or murmurs.  ABDOMEN: flat, negative hepatosplenomegaly, soft and non-tender.  EXTREMITIES: No evidence of cyanosis, clubbing or edema.    Current Outpatient Medications   Medication Sig Dispense Refill    acetaminophen (TYLENOL) 325 MG tablet Take 2 tablets (650 mg) by mouth every 6 hours as needed for mild pain, fever  or headaches      albuterol (PROAIR HFA/PROVENTIL HFA/VENTOLIN HFA) 108 (90 Base) MCG/ACT inhaler Inhale 2 puffs into the lungs every 6 hours as needed for shortness of breath, wheezing or cough 18 g 1    atorvastatin (LIPITOR) 20 MG tablet TAKE 1 TABLET BY MOUTH EVERYDAY AT BEDTIME 90 tablet 2    cholecalciferol, vitamin D3, (VITAMIN D3) 2,000 unit Tab [CHOLECALCIFEROL, VITAMIN D3, (VITAMIN D3) 2,000 UNIT TAB] Take 1 tablet by mouth daily.      diltiazem ER COATED BEADS (CARDIZEM CD/CARTIA XT) 240 MG 24 hr capsule TAKE 1 CAPSULE BY MOUTH EVERY DAY 90 capsule 1    ELIQUIS ANTICOAGULANT 5 MG tablet Take 1 tablet (5 mg) by mouth 2 times daily 180 tablet 1    losartan (COZAAR) 25 MG tablet TAKE 1 TABLET BY MOUTH EVERY DAY 90 tablet 3    pantoprazole (PROTONIX) 40 MG EC tablet TAKE 1 TABLET BY MOUTH 2 TIMES DAILY 180 tablet 0    sertraline (ZOLOFT) 100 MG tablet Take 1.5 tablets (150 mg) by mouth At Bedtime 135 tablet 3    furosemide (LASIX) 20 MG tablet Take 2 tablets (40 mg) by mouth daily (Patient not taking: Reported on 5/17/2023) 180 tablet 3       Cardiographics:      Holter: April 2015   Persistent atrial fibrillation with overall fairly well controlled  ventricular response. There was some tendency toward rapid ventricular response  with activity in the early afternoon hours. A moderate number of ventricular  premature beats noted. 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     Echocardiogram: 2013   Normal LV systolic function, no significant valvular abnormalities     December 2022  Normal right ventricle size and systolic function.  The left ventricle is normal in size.  There is mild to moderate concentric left ventricular hypertrophy.  The visual ejection fraction is 60-65%.  No regional wall motion abnormalities noted.  There is moderate (2+) mitral regurgitation.  The right ventricular systolic pressure is approximated at 38mmHg plus the  right atrial pressure.  Mild aortic valve calcification is present.  The aortic  valve is trileaflet.  IVC diameter <2.1 cm collapsing >50% with sniff suggests a normal RA pressure  of 3 mmHg.  The rhythm was atrial fibrillation.        Stress Test: 2013   Mixed anterior perfusion defect     CT coronary angio: 2013   Normal coronaries, calcium score 2   Lab Results    Chemistry/lipid CBC Cardiac Enzymes/BNP/TSH/INR   Recent Labs   Lab Test 10/30/19  1632   CHOL 158   HDL 67   LDL 75   TRIG 79     Recent Labs   Lab Test 10/30/19  1632 05/17/18  1550 08/10/16  1147   LDL 75 86 103     Recent Labs   Lab Test 03/10/23  1627   *   POTASSIUM 3.3*   CHLORIDE 97*   CO2 27      BUN 26   CR 1.24*   GFRESTIMATED 42*   FAROOQ 9.8     Recent Labs   Lab Test 03/10/23  1627 12/30/22  0554 12/29/22  0919   CR 1.24* 1.07 1.20*     Recent Labs   Lab Test 05/17/18  1550   A1C 5.7          Recent Labs   Lab Test 03/10/23  1627   WBC 13.1*   HGB 11.3*   HCT 36.5   MCV 82        Recent Labs   Lab Test 03/10/23  1627 03/08/23  1334 02/14/23  1311   HGB 11.3* 11.6* 11.3*    Recent Labs   Lab Test 03/10/23  1627   TROPONINI 0.02     Recent Labs   Lab Test 12/28/22  1900 12/28/22  1554 12/02/22  1021   *  --   --    NTBNP  --  3,702* 2,360*     No results for input(s): TSH in the last [id number] hours.  Recent Labs   Lab Test 03/10/23  1627 09/01/21  1537 07/19/21  1421   INR 1.68* 1.9* 2.5*                        Thank you for allowing me to participate in the care of your patient.      Sincerely,     Melvin Ricci MD     [facility]  cc:   Melvin Ricci MD  [address]  [address]  [place] [address]

## 2023-05-17 NOTE — PATIENT INSTRUCTIONS
Marva Gaines,    It was a pleasure to see you today at the [facility].     My recommendations after this visit include:    Same heart medications    JEN Ricci MD, FACC, ASHLI

## 2023-05-17 NOTE — PROGRESS NOTES
Cardiology Progress Note     Assessment:  Chronic GI bleeding due to AV malformations with development of anemia, improved  Permanent atrial fibrillation with controlled ventricular response on Eliquis  Chronic heart failure improved after correction of anemia fluid overload  PVCs, asymptomatic  Hypertension, good control  Hypercholesterolemia on atorvastatin, good control  Depression/ anxiety      Plan:  Continue current cardiac medications  She does not appear to be fluid overloaded or having any additional GI bleeding.  She was instructed to use walker to reduce risk of falling again.  She still does not want to consider watchman    Follow-up in 6 months    Subjective:   This is 87 year old female who comes in today for follow-up visit.  She has done well from a cardiac standpoint she denies chest pain or shortness of breath her weight has been stable.  She has no PND and orthopnea.  She has not had syncope.  She did fall when she was moving rapidly at her home.  She had cut on her scalp requiring stitches in the Emergency Room.  She did not have any intracranial bleed    Review of Systems:   Negative other than history of present illness    Objective:   /70   Pulse 89   Resp 16   Wt 59 kg (130 lb)   SpO2 98%   BMI 23.78 kg/m    Physical Exam:  GENERAL: no distress  NECK: No JVD  LUNGS: Clear to auscultation.  CARDIAC: irregular rhythm, S1 & S2 normal.  No heaves, thrills, gallops or murmurs.  ABDOMEN: flat, negative hepatosplenomegaly, soft and non-tender.  EXTREMITIES: No evidence of cyanosis, clubbing or edema.    Current Outpatient Medications   Medication Sig Dispense Refill     acetaminophen (TYLENOL) 325 MG tablet Take 2 tablets (650 mg) by mouth every 6 hours as needed for mild pain, fever or headaches       albuterol (PROAIR HFA/PROVENTIL HFA/VENTOLIN HFA) 108 (90 Base) MCG/ACT inhaler Inhale 2 puffs into the lungs every 6 hours as needed for shortness of breath, wheezing or cough 18 g 1      atorvastatin (LIPITOR) 20 MG tablet TAKE 1 TABLET BY MOUTH EVERYDAY AT BEDTIME 90 tablet 2     cholecalciferol, vitamin D3, (VITAMIN D3) 2,000 unit Tab [CHOLECALCIFEROL, VITAMIN D3, (VITAMIN D3) 2,000 UNIT TAB] Take 1 tablet by mouth daily.       diltiazem ER COATED BEADS (CARDIZEM CD/CARTIA XT) 240 MG 24 hr capsule TAKE 1 CAPSULE BY MOUTH EVERY DAY 90 capsule 1     ELIQUIS ANTICOAGULANT 5 MG tablet Take 1 tablet (5 mg) by mouth 2 times daily 180 tablet 1     losartan (COZAAR) 25 MG tablet TAKE 1 TABLET BY MOUTH EVERY DAY 90 tablet 3     pantoprazole (PROTONIX) 40 MG EC tablet TAKE 1 TABLET BY MOUTH 2 TIMES DAILY 180 tablet 0     sertraline (ZOLOFT) 100 MG tablet Take 1.5 tablets (150 mg) by mouth At Bedtime 135 tablet 3     furosemide (LASIX) 20 MG tablet Take 2 tablets (40 mg) by mouth daily (Patient not taking: Reported on 5/17/2023) 180 tablet 3       Cardiographics:      Holter: April 2015   Persistent atrial fibrillation with overall fairly well controlled  ventricular response. There was some tendency toward rapid ventricular response  with activity in the early afternoon hours. A moderate number of ventricular  premature beats noted. 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     Echocardiogram: 2013   Normal LV systolic function, no significant valvular abnormalities     December 2022  Normal right ventricle size and systolic function.  The left ventricle is normal in size.  There is mild to moderate concentric left ventricular hypertrophy.  The visual ejection fraction is 60-65%.  No regional wall motion abnormalities noted.  There is moderate (2+) mitral regurgitation.  The right ventricular systolic pressure is approximated at 38mmHg plus the  right atrial pressure.  Mild aortic valve calcification is present.  The aortic valve is trileaflet.  IVC diameter <2.1 cm collapsing >50% with sniff suggests a normal RA pressure  of 3 mmHg.  The rhythm was atrial fibrillation.        Stress Test: 2013   Mixed anterior  perfusion defect     CT coronary angio: 2013   Normal coronaries, calcium score 2   Lab Results    Chemistry/lipid CBC Cardiac Enzymes/BNP/TSH/INR   Recent Labs   Lab Test 10/30/19  1632   CHOL 158   HDL 67   LDL 75   TRIG 79     Recent Labs   Lab Test 10/30/19  1632 05/17/18  1550 08/10/16  1147   LDL 75 86 103     Recent Labs   Lab Test 03/10/23  1627   *   POTASSIUM 3.3*   CHLORIDE 97*   CO2 27      BUN 26   CR 1.24*   GFRESTIMATED 42*   FAROOQ 9.8     Recent Labs   Lab Test 03/10/23  1627 12/30/22  0554 12/29/22  0919   CR 1.24* 1.07 1.20*     Recent Labs   Lab Test 05/17/18  1550   A1C 5.7          Recent Labs   Lab Test 03/10/23  1627   WBC 13.1*   HGB 11.3*   HCT 36.5   MCV 82        Recent Labs   Lab Test 03/10/23  1627 03/08/23  1334 02/14/23  1311   HGB 11.3* 11.6* 11.3*    Recent Labs   Lab Test 03/10/23  1627   TROPONINI 0.02     Recent Labs   Lab Test 12/28/22  1900 12/28/22  1554 12/02/22  1021   *  --   --    NTBNP  --  3,702* 2,360*     No results for input(s): TSH in the last 79824 hours.  Recent Labs   Lab Test 03/10/23  1627 09/01/21  1537 07/19/21  1421   INR 1.68* 1.9* 2.5*

## 2023-06-22 ENCOUNTER — OFFICE VISIT (OUTPATIENT)
Dept: INTERNAL MEDICINE | Facility: CLINIC | Age: 88
End: 2023-06-22
Payer: COMMERCIAL

## 2023-06-22 VITALS
DIASTOLIC BLOOD PRESSURE: 71 MMHG | HEART RATE: 56 BPM | HEIGHT: 62 IN | SYSTOLIC BLOOD PRESSURE: 129 MMHG | WEIGHT: 131.3 LBS | BODY MASS INDEX: 24.16 KG/M2 | TEMPERATURE: 97.2 F | OXYGEN SATURATION: 99 %

## 2023-06-22 DIAGNOSIS — I48.21 PERMANENT ATRIAL FIBRILLATION (H): Primary | ICD-10-CM

## 2023-06-22 DIAGNOSIS — I10 ESSENTIAL HYPERTENSION: ICD-10-CM

## 2023-06-22 DIAGNOSIS — E04.1 THYROID NODULE: ICD-10-CM

## 2023-06-22 DIAGNOSIS — R19.7 DIARRHEA, UNSPECIFIED TYPE: ICD-10-CM

## 2023-06-22 DIAGNOSIS — K63.89 CECUM MASS: ICD-10-CM

## 2023-06-22 DIAGNOSIS — K55.20 COLON ARTERIOVENOUS MALFORMATION: ICD-10-CM

## 2023-06-22 DIAGNOSIS — I50.32 CHRONIC HEART FAILURE WITH PRESERVED EJECTION FRACTION (HFPEF) (H): ICD-10-CM

## 2023-06-22 DIAGNOSIS — K31.819 GASTRIC AVM: ICD-10-CM

## 2023-06-22 PROBLEM — D62 ANEMIA DUE TO BLOOD LOSS, ACUTE: Status: RESOLVED | Noted: 2023-01-01 | Resolved: 2023-06-22

## 2023-06-22 LAB
ALBUMIN SERPL BCG-MCNC: 4 G/DL (ref 3.5–5.2)
ALP SERPL-CCNC: 94 U/L (ref 35–104)
ALT SERPL W P-5'-P-CCNC: 8 U/L (ref 0–50)
ANION GAP SERPL CALCULATED.3IONS-SCNC: 12 MMOL/L (ref 7–15)
AST SERPL W P-5'-P-CCNC: 22 U/L (ref 0–45)
BILIRUB SERPL-MCNC: 0.5 MG/DL
BUN SERPL-MCNC: 23.6 MG/DL (ref 8–23)
CALCIUM SERPL-MCNC: 9.3 MG/DL (ref 8.8–10.2)
CHLORIDE SERPL-SCNC: 104 MMOL/L (ref 98–107)
CREAT SERPL-MCNC: 1.05 MG/DL (ref 0.51–0.95)
DEPRECATED HCO3 PLAS-SCNC: 23 MMOL/L (ref 22–29)
ERYTHROCYTE [DISTWIDTH] IN BLOOD BY AUTOMATED COUNT: 13.6 % (ref 10–15)
GFR SERPL CREATININE-BSD FRML MDRD: 52 ML/MIN/1.73M2
GLUCOSE SERPL-MCNC: 108 MG/DL (ref 70–99)
HCT VFR BLD AUTO: 37.6 % (ref 35–47)
HGB BLD-MCNC: 12 G/DL (ref 11.7–15.7)
MCH RBC QN AUTO: 29.2 PG (ref 26.5–33)
MCHC RBC AUTO-ENTMCNC: 31.9 G/DL (ref 31.5–36.5)
MCV RBC AUTO: 92 FL (ref 78–100)
PLATELET # BLD AUTO: 237 10E3/UL (ref 150–450)
POTASSIUM SERPL-SCNC: 4.1 MMOL/L (ref 3.4–5.3)
PROT SERPL-MCNC: 6.3 G/DL (ref 6.4–8.3)
RBC # BLD AUTO: 4.11 10E6/UL (ref 3.8–5.2)
SODIUM SERPL-SCNC: 139 MMOL/L (ref 136–145)
WBC # BLD AUTO: 8.4 10E3/UL (ref 4–11)

## 2023-06-22 PROCEDURE — 80053 COMPREHEN METABOLIC PANEL: CPT | Performed by: INTERNAL MEDICINE

## 2023-06-22 PROCEDURE — 36415 COLL VENOUS BLD VENIPUNCTURE: CPT | Performed by: INTERNAL MEDICINE

## 2023-06-22 PROCEDURE — 99214 OFFICE O/P EST MOD 30 MIN: CPT | Performed by: INTERNAL MEDICINE

## 2023-06-22 PROCEDURE — 85027 COMPLETE CBC AUTOMATED: CPT | Performed by: INTERNAL MEDICINE

## 2023-06-22 NOTE — PROGRESS NOTES
Assessment & Plan     Permanent atrial fibrillation (H)  with controlled ventricular response on Eliquis. Watchman procedure was discussed again today and with Dr Ricci on the visit on 5/17/23.    Hypertension  Good control.    Gastric AVM  No additional GI bleed since Dec 2022.Hgb stays stable, taking one iron pill daily. She refused pill cam.  - CBC with platelets; Future  - Comprehensive metabolic panel; Future  - CBC with platelets  - Comprehensive metabolic panel    Chronic heart failure with preserved ejection fraction (HFpEF) (H)  On furosemide, stable.    Colon arteriovenous malformation  No additional GI bleed since Dec 2022.Hgb stays stable, taking one iron pill daily. She refused repeating colonoscopy.    Cecum mass  CT scan during the last ER visit 3/10/23:  Likely 2.4 cm enhancing cecal mass at the base of the appendix. There is some appendiceal dilation without evidence of ed appendicitis at this time. Recommend confirmation with colonoscopy and surgical referral.     She had colonoscopy in 12/2022 , which did not show this abnormality:  Multiple non-bleeding colonic angioectasias. Treated with argon plasma coagulation (APC).  Diverticulosis in the sigmoid colon and in the  descending colon. External hemorrhoids.     I suggested visit with general surgery for review of the CT scan images and further recommendations. She did not want any further evaluation. She reports occasional loose bowel movement, no blood.    Thyroid nodule  CT neck during the last ER visit 3/10/23 showed 1.8 cm left thyroid lobe nodule. Consider thyroid ultrasound for further evaluation.     She will schedule thyroid US.  This was discussed few times in the last 6 months but she did not want any further workup.  - US Thyroid; Future    Diarrhea, unspecified type    She reports occasional loose bowel movement, no blood. Some days, normal formed stool. No weight loss, vomiting, fever, chills, night sweats. No recent  "traveling. She will continue monitoring, good fiber and fluid intake.               Return in about 3 months (around 9/22/2023) for AWV.    Sabas Austin MD  [facility]ANI[facility]ont is a 87 year old, presenting for the following health issues:  Follow Up (F/U)        6/22/2023     4:07 PM   Additional Questions   Roomed by Kenya     History of Present Illness       Hypertension: She presents for follow up of hypertension.  She does not check blood pressure  regularly outside of the clinic. Outpatient blood pressures have not been over 140/90. She does not follow a low salt diet.     Vascular Disease:  She presents for follow up of vascular disease.  She never takes nitroglycerin. She is not taking daily aspirin.    She eats 2-3 servings of fruits and vegetables daily.She consumes 0 sweetened beverage(s) daily.She exercises with enough effort to increase her heart rate 9 or less minutes per day.  She exercises with enough effort to increase her heart rate 3 or less days per week.   She is taking medications regularly.               Review of Systems         Objective    /71   Pulse 56   Temp 97.2  F (36.2  C)   Ht 1.575 m (5' 2\")   Wt 59.6 kg (131 lb 4.8 oz)   SpO2 99%   BMI 24.02 kg/m    Body mass index is 24.02 kg/m .  Physical Exam                       "

## 2023-07-02 DIAGNOSIS — I48.91 A-FIB (H): ICD-10-CM

## 2023-07-03 RX ORDER — DILTIAZEM HYDROCHLORIDE 240 MG/1
CAPSULE, COATED, EXTENDED RELEASE ORAL
Qty: 90 CAPSULE | Refills: 3 | Status: SHIPPED | OUTPATIENT
Start: 2023-07-03 | End: 2024-01-11

## 2023-07-22 DIAGNOSIS — K31.819 GASTRIC AVM: ICD-10-CM

## 2023-07-22 RX ORDER — PANTOPRAZOLE SODIUM 40 MG/1
TABLET, DELAYED RELEASE ORAL
Qty: 180 TABLET | Refills: 3 | Status: SHIPPED | OUTPATIENT
Start: 2023-07-22 | End: 2024-07-18

## 2023-07-22 NOTE — TELEPHONE ENCOUNTER
"Last Written Prescription Date:  3/21/2023  Last Fill Quantity: 180,  # refills: 0   Last office visit provider:  6/22/2023     Requested Prescriptions   Pending Prescriptions Disp Refills    pantoprazole (PROTONIX) 40 MG EC tablet [Pharmacy Med Name: PANTOPRAZOLE SOD DR 40 MG TAB] 180 tablet 0     Sig: TAKE 1 TABLET BY MOUTH TWICE A DAY       PPI Protocol Passed - 7/22/2023  9:32 AM        Passed - Not on Clopidogrel (unless Pantoprazole ordered)        Passed - No diagnosis of osteoporosis on record        Passed - Recent (12 mo) or future (30 days) visit within the authorizing provider's specialty     Patient has had an office visit with the authorizing provider or a provider within the authorizing providers department within the previous 12 mos or has a future within next 30 days. See \"Patient Info\" tab in inbasket, or \"Choose Columns\" in Meds & Orders section of the refill encounter.              Passed - Medication is active on med list        Passed - Patient is age 18 or older        Passed - No active pregnacy on record        Passed - No positive pregnancy test in past 12 months             Liz Hansen RN 07/22/23 6:16 PM  "

## 2023-10-25 DIAGNOSIS — I10 ESSENTIAL HYPERTENSION: ICD-10-CM

## 2023-10-25 DIAGNOSIS — R60.9 EDEMA: ICD-10-CM

## 2023-10-25 DIAGNOSIS — E78.00 PURE HYPERCHOLESTEROLEMIA: ICD-10-CM

## 2023-10-25 RX ORDER — ATORVASTATIN CALCIUM 20 MG/1
TABLET, FILM COATED ORAL
Qty: 90 TABLET | Refills: 1 | Status: SHIPPED | OUTPATIENT
Start: 2023-10-25 | End: 2024-09-11

## 2023-10-25 RX ORDER — FUROSEMIDE 20 MG
40 TABLET ORAL DAILY
Qty: 180 TABLET | Refills: 1 | Status: SHIPPED | OUTPATIENT
Start: 2023-10-25

## 2023-10-25 RX ORDER — LOSARTAN POTASSIUM 25 MG/1
TABLET ORAL
Qty: 90 TABLET | Refills: 1 | Status: SHIPPED | OUTPATIENT
Start: 2023-10-25 | End: 2024-05-23

## 2023-10-31 ENCOUNTER — TELEPHONE (OUTPATIENT)
Dept: INTERNAL MEDICINE | Facility: CLINIC | Age: 88
End: 2023-10-31

## 2023-10-31 NOTE — TELEPHONE ENCOUNTER
Patient had to cancel her Annual Wellness appt with Dr. Austin 10/31 due to weather. The Doctor's next opening will not be until January 2024. Patient would like to be on the waiting list to be called if there are any cancellations before this year.     Please call [phone]-  okay to leave a msg.

## 2023-11-08 ENCOUNTER — TELEPHONE (OUTPATIENT)
Dept: INTERNAL MEDICINE | Facility: CLINIC | Age: 88
End: 2023-11-08
Payer: COMMERCIAL

## 2023-11-08 DIAGNOSIS — D64.9 ANEMIA, UNSPECIFIED TYPE: Primary | ICD-10-CM

## 2023-11-08 NOTE — TELEPHONE ENCOUNTER
General Call    Contacts         Type Contact Phone/Fax    11/08/2023 03:52 PM CST Phone (Incoming) Kiah Gaines (Self) [phone] (H)          Reason for Call: Pt is asking for a blood draw to make sure her hemoglobin is in a good place. She doesn't want to wait until January 2024 for her annual wellness.  Pt is wondering about her oxygen as well. She still uses it at night but she doesn't really feel a difference with it on or not, wondering if she should still use? Please advise.    Okay to leave a detailed message?: No at Home number on file [phone] (home)

## 2023-11-10 ENCOUNTER — LAB (OUTPATIENT)
Dept: LAB | Facility: CLINIC | Age: 88
End: 2023-11-10
Payer: COMMERCIAL

## 2023-11-10 DIAGNOSIS — D64.9 ANEMIA, UNSPECIFIED TYPE: ICD-10-CM

## 2023-11-10 LAB
ERYTHROCYTE [DISTWIDTH] IN BLOOD BY AUTOMATED COUNT: 13.4 % (ref 10–15)
HCT VFR BLD AUTO: 37.9 % (ref 35–47)
HGB BLD-MCNC: 12.2 G/DL (ref 11.7–15.7)
MCH RBC QN AUTO: 29 PG (ref 26.5–33)
MCHC RBC AUTO-ENTMCNC: 32.2 G/DL (ref 31.5–36.5)
MCV RBC AUTO: 90 FL (ref 78–100)
PLATELET # BLD AUTO: 262 10E3/UL (ref 150–450)
RBC # BLD AUTO: 4.21 10E6/UL (ref 3.8–5.2)
WBC # BLD AUTO: 7.5 10E3/UL (ref 4–11)

## 2023-11-10 PROCEDURE — 85027 COMPLETE CBC AUTOMATED: CPT

## 2023-11-10 PROCEDURE — 36415 COLL VENOUS BLD VENIPUNCTURE: CPT

## 2024-01-02 DIAGNOSIS — I48.21 PERMANENT ATRIAL FIBRILLATION (H): ICD-10-CM

## 2024-01-02 RX ORDER — APIXABAN 5 MG/1
5 TABLET, FILM COATED ORAL 2 TIMES DAILY
Qty: 60 TABLET | Refills: 2 | Status: SHIPPED | OUTPATIENT
Start: 2024-01-02 | End: 2024-01-24

## 2024-01-11 ENCOUNTER — OFFICE VISIT (OUTPATIENT)
Dept: INTERNAL MEDICINE | Facility: CLINIC | Age: 89
End: 2024-01-11
Payer: COMMERCIAL

## 2024-01-11 VITALS
OXYGEN SATURATION: 99 % | DIASTOLIC BLOOD PRESSURE: 82 MMHG | TEMPERATURE: 97.1 F | HEART RATE: 63 BPM | BODY MASS INDEX: 24.95 KG/M2 | RESPIRATION RATE: 17 BRPM | HEIGHT: 62 IN | SYSTOLIC BLOOD PRESSURE: 138 MMHG | WEIGHT: 135.6 LBS

## 2024-01-11 DIAGNOSIS — K63.89 CECUM MASS: ICD-10-CM

## 2024-01-11 DIAGNOSIS — F41.1 ANXIETY STATE: ICD-10-CM

## 2024-01-11 DIAGNOSIS — I50.32 CHRONIC HEART FAILURE WITH PRESERVED EJECTION FRACTION (HFPEF) (H): ICD-10-CM

## 2024-01-11 DIAGNOSIS — I25.10 CORONARY ARTERY DISEASE INVOLVING NATIVE CORONARY ARTERY OF NATIVE HEART WITHOUT ANGINA PECTORIS: ICD-10-CM

## 2024-01-11 DIAGNOSIS — K31.819 GASTRIC AVM: ICD-10-CM

## 2024-01-11 DIAGNOSIS — I48.21 PERMANENT ATRIAL FIBRILLATION (H): ICD-10-CM

## 2024-01-11 DIAGNOSIS — R73.09 OTHER ABNORMAL GLUCOSE: ICD-10-CM

## 2024-01-11 DIAGNOSIS — E78.00 PURE HYPERCHOLESTEROLEMIA: ICD-10-CM

## 2024-01-11 DIAGNOSIS — M94.9 DISORDER OF BONE AND CARTILAGE: ICD-10-CM

## 2024-01-11 DIAGNOSIS — K55.20 COLON ARTERIOVENOUS MALFORMATION: ICD-10-CM

## 2024-01-11 DIAGNOSIS — M89.9 DISORDER OF BONE AND CARTILAGE: ICD-10-CM

## 2024-01-11 DIAGNOSIS — E04.1 THYROID NODULE: ICD-10-CM

## 2024-01-11 DIAGNOSIS — Z00.00 ENCOUNTER FOR MEDICARE ANNUAL WELLNESS EXAM: Primary | ICD-10-CM

## 2024-01-11 DIAGNOSIS — I10 ESSENTIAL HYPERTENSION: ICD-10-CM

## 2024-01-11 LAB
ALBUMIN UR-MCNC: NEGATIVE MG/DL
APPEARANCE UR: CLEAR
BACTERIA #/AREA URNS HPF: ABNORMAL /HPF
BILIRUB UR QL STRIP: NEGATIVE
COLOR UR AUTO: YELLOW
ERYTHROCYTE [DISTWIDTH] IN BLOOD BY AUTOMATED COUNT: 13.2 % (ref 10–15)
GLUCOSE UR STRIP-MCNC: NEGATIVE MG/DL
HBA1C MFR BLD: 5.3 % (ref 0–5.6)
HCT VFR BLD AUTO: 39.1 % (ref 35–47)
HGB BLD-MCNC: 12.6 G/DL (ref 11.7–15.7)
HGB UR QL STRIP: NEGATIVE
KETONES UR STRIP-MCNC: NEGATIVE MG/DL
LEUKOCYTE ESTERASE UR QL STRIP: ABNORMAL
MCH RBC QN AUTO: 29 PG (ref 26.5–33)
MCHC RBC AUTO-ENTMCNC: 32.2 G/DL (ref 31.5–36.5)
MCV RBC AUTO: 90 FL (ref 78–100)
NITRATE UR QL: NEGATIVE
PH UR STRIP: 6 [PH] (ref 5–8)
PLATELET # BLD AUTO: 253 10E3/UL (ref 150–450)
RBC # BLD AUTO: 4.34 10E6/UL (ref 3.8–5.2)
RBC #/AREA URNS AUTO: ABNORMAL /HPF
SP GR UR STRIP: 1.01 (ref 1–1.03)
SQUAMOUS #/AREA URNS AUTO: ABNORMAL /LPF
UROBILINOGEN UR STRIP-ACNC: 0.2 E.U./DL
WBC # BLD AUTO: 7.6 10E3/UL (ref 4–11)
WBC #/AREA URNS AUTO: ABNORMAL /HPF
WBC CLUMPS #/AREA URNS HPF: PRESENT /HPF

## 2024-01-11 PROCEDURE — 80053 COMPREHEN METABOLIC PANEL: CPT | Performed by: INTERNAL MEDICINE

## 2024-01-11 PROCEDURE — 85027 COMPLETE CBC AUTOMATED: CPT | Performed by: INTERNAL MEDICINE

## 2024-01-11 PROCEDURE — G0439 PPPS, SUBSEQ VISIT: HCPCS | Performed by: INTERNAL MEDICINE

## 2024-01-11 PROCEDURE — 87186 SC STD MICRODIL/AGAR DIL: CPT | Performed by: INTERNAL MEDICINE

## 2024-01-11 PROCEDURE — 82306 VITAMIN D 25 HYDROXY: CPT | Performed by: INTERNAL MEDICINE

## 2024-01-11 PROCEDURE — 81001 URINALYSIS AUTO W/SCOPE: CPT | Performed by: INTERNAL MEDICINE

## 2024-01-11 PROCEDURE — 80061 LIPID PANEL: CPT | Performed by: INTERNAL MEDICINE

## 2024-01-11 PROCEDURE — 87086 URINE CULTURE/COLONY COUNT: CPT | Mod: GZ | Performed by: INTERNAL MEDICINE

## 2024-01-11 PROCEDURE — G0009 ADMIN PNEUMOCOCCAL VACCINE: HCPCS | Performed by: INTERNAL MEDICINE

## 2024-01-11 PROCEDURE — 99214 OFFICE O/P EST MOD 30 MIN: CPT | Mod: 25 | Performed by: INTERNAL MEDICINE

## 2024-01-11 PROCEDURE — 90677 PCV20 VACCINE IM: CPT | Performed by: INTERNAL MEDICINE

## 2024-01-11 PROCEDURE — 36415 COLL VENOUS BLD VENIPUNCTURE: CPT | Performed by: INTERNAL MEDICINE

## 2024-01-11 PROCEDURE — 83036 HEMOGLOBIN GLYCOSYLATED A1C: CPT | Performed by: INTERNAL MEDICINE

## 2024-01-11 PROCEDURE — 84443 ASSAY THYROID STIM HORMONE: CPT | Performed by: INTERNAL MEDICINE

## 2024-01-11 RX ORDER — OMEGA-3/DHA/EPA/FISH OIL 60 MG-90MG
1 CAPSULE ORAL DAILY
COMMUNITY

## 2024-01-11 RX ORDER — DILTIAZEM HYDROCHLORIDE 240 MG/1
240 CAPSULE, COATED, EXTENDED RELEASE ORAL DAILY
Qty: 90 CAPSULE | Refills: 3 | Status: SHIPPED | OUTPATIENT
Start: 2024-01-11

## 2024-01-11 RX ORDER — VENLAFAXINE HYDROCHLORIDE 150 MG/1
150 CAPSULE, EXTENDED RELEASE ORAL DAILY
Qty: 90 CAPSULE | Refills: 3 | Status: SHIPPED | OUTPATIENT
Start: 2024-01-11 | End: 2024-01-15

## 2024-01-11 NOTE — PATIENT INSTRUCTIONS
Labs and Pneumo 20 today.  Stop sertraline, start venlafaxine daily.  Let me know how you feel on this new medication in a month. You can send me the message through My Chart or we can do the telephone visit.      Patient Education   Personalized Prevention Plan  You are due for the preventive services outlined below.  Your care team is available to assist you in scheduling these services.  If you have already completed any of these items, please share that information with your care team to update in your medical record.  Health Maintenance Due   Topic Date Due    Heart Failure Action Plan  Never done    Thyroid Function Lab  Never done    ANNUAL REVIEW OF HM ORDERS  Never done    Zoster (Shingles) Vaccine (1 of 2) Never done    RSV VACCINE (Pregnancy & 60+) (1 - 1-dose 60+ series) Never done    Pneumococcal Vaccine (2 of 2 - PCV) 01/26/2006    Cholesterol Lab  10/30/2020    Basic Metabolic Panel  12/22/2023

## 2024-01-11 NOTE — PROGRESS NOTES
SUBJECTIVE:   Kiah is a 88 year old, presenting for the following:  Wellness Visit (Mammo 7/21/21 Colon 12/30/22)        1/11/2024     4:16 PM   Additional Questions   Roomed by Kenya       Are you in the first 12 months of your Medicare coverage?  No    HPI        Have you ever done Advance Care Planning? (For example, a Health Directive, POLST, or a discussion with a medical provider or your loved ones about your wishes): Yes, advance care planning is on file.       Fall risk  Fallen 2 or more times in the past year?: No  Any fall with injury in the past year?: No    Cognitive Screening   1) Repeat 3 items (Leader, Season, Table)    2) Clock draw: NORMAL  3) 3 item recall: Recalls 3 objects  Results: NORMAL clock, 1-2 items recalled: COGNITIVE IMPAIRMENT LESS LIKELY    Mini-CogTM Copyright CAROLINE Bustillo. Licensed by the author for use in [facility]; reprinted with permission (cyndi@[facility]). All rights reserved.      Do you have sleep apnea, excessive snoring or daytime drowsiness? : no    Reviewed and updated as needed this visit by clinical staff   Tobacco  Allergies  Meds              Reviewed and updated as needed this visit by Provider                 Social History     Tobacco Use    Smoking status: Former     Passive exposure: Never    Smokeless tobacco: Never   Substance Use Topics    Alcohol use: Not on file              No data to display              Do you have a current opioid prescription? No  Do you use any other controlled substances or medications that are not prescribed by a provider? None              Current providers sharing in care for this patient include:   Patient Care Team:  Sabas Austin MD as PCP - General (Internal Medicine)  Sabas Austin MD as Assigned PCP  Kai Ricci MD as Assigned Heart and Vascular Provider    The following health maintenance items are reviewed in Epic and correct as of today:  Health Maintenance   Topic Date Due    HF ACTION PLAN  Never  "done    TSH W/FREE T4 REFLEX  Never done    ANNUAL REVIEW OF HM ORDERS  Never done    ZOSTER IMMUNIZATION (1 of 2) Never done    RSV VACCINE (Pregnancy & 60+) (1 - 1-dose 60+ series) Never done    Pneumococcal Vaccine: 65+ Years (2 of 2 - PCV) 01/26/2006    MEDICARE ANNUAL WELLNESS VISIT  05/17/2019    LIPID  10/30/2020    ADVANCE CARE PLANNING  05/17/2023    BMP  12/22/2023    ALT  06/22/2024    CBC  11/10/2024    FALL RISK ASSESSMENT  01/11/2025    DTAP/TDAP/TD IMMUNIZATION (2 - Td or Tdap) 03/10/2033    PHQ-2 (once per calendar year)  Completed    INFLUENZA VACCINE  Completed    COVID-19 Vaccine  Completed    IPV IMMUNIZATION  Aged Out    HPV IMMUNIZATION  Aged Out    MENINGITIS IMMUNIZATION  Aged Out    RSV MONOCLONAL ANTIBODY  Aged Out    COLORECTAL CANCER SCREENING  Discontinued             Pertinent mammograms are reviewed under the imaging tab.    Review of Systems      OBJECTIVE:   /82   Pulse 63   Temp 97.1  F (36.2  C)   Resp 17   Ht 1.575 m (5' 2\")   Wt 61.5 kg (135 lb 9.6 oz)   LMP  (LMP Unknown)   SpO2 99%   BMI 24.80 kg/m   Estimated body mass index is 24.8 kg/m  as calculated from the following:    Height as of this encounter: 1.575 m (5' 2\").    Weight as of this encounter: 61.5 kg (135 lb 9.6 oz).  Physical Exam  General Appearance: Alert, cooperative, no distress, appears stated age.  Head: Normocephalic, without obvious abnormality, atraumatic  Eyes: PERRL, conjunctiva/corneas clear, EOM's intact  Ears: Normal TM's and external ear canals, both ears  Nose: Nares normal, septum midline,mucosa normal, no drainage  Throat: Lips, mucosa, and tongue normal; teeth and gums normal  Neck: Supple, symmetrical, trachea midline, no adenopathy;  thyroid: not enlarged, symmetric, no tenderness/mass/nodules; no carotid bruit or JVD  Back: Symmetric, no curvature, ROM normal, no CVA tenderness.  Lungs: Clear to auscultation bilaterally, respirations unlabored.  Breasts: No breast masses, " tenderness, asymmetry, or nipple discharge.  Heart: Regular rate and rhythm, S1 and S2 normal, no murmur, rub, or gallop.  Abdomen: Soft, non-tender, bowel sounds active all four quadrants,  no masses, no organomegaly.  Extremities: Extremities normal, atraumatic, no cyanosis or edema.  Skin: Skin color, texture, turgor normal, no rashes or lesions.  Lymph nodes: Cervical, supraclavicular, and axillary nodes normal.  Neurologic: No focal neurological findings.          ASSESSMENT / PLAN:   (Z00.00) Encounter for Medicare annual wellness exam  (primary encounter diagnosis)    (E04.1) Thyroid nodule  Comment: CT neck during the last ER visit 3/10/23 showed 1.8 cm left thyroid lobe nodule. Consider thyroid ultrasound for further evaluation.     She will schedule thyroid US.  This was discussed few times in the last 6 months but she did not want any further workup.  Plan: TSH with free T4 reflex            (I25.10) Coronary artery disease involving native coronary artery of native heart without angina pectoris  Comment: she denies anginal chest pain, dyspnea      (I10) Essential hypertension  Comment: Good control.  Plan: UA Macroscopic with reflex to Microscopic and         Culture, UA Microscopic with Reflex to Culture,        Urine Culture            (I48.21) Permanent atrial fibrillation (H)  Comment: with controlled ventricular response on Eliquis. Watchman procedure was discussed again today and with Dr Ricci on the visit on 5/17/23.   Plan: diltiazem ER COATED BEADS (CARDIZEM CD/CARTIA         XT) 240 MG 24 hr capsule            (R73.09) Hyperglycemia  Comment: HgbA1c 5.3 today.  Plan: Hemoglobin A1c            (M89.9,  M94.9) Osteopenia  Comment: patient declined future DXA scans  Plan: Vitamin D Deficiency            (E78.00) Hypercholesterolemia  Comment: on statin  Plan: Comprehensive metabolic panel, Lipid panel         reflex to direct LDL Fasting            (K31.819) Gastric AVM    (K55.20) Colon  arteriovenous malformation  Comment: No additional GI bleed since Dec 2022.Hgb stays stable, taking one iron pill daily. She refused pill cam.   Hgb today 12.6.      (I50.32) Chronic heart failure with preserved ejection fraction (HFpEF) (H)  Comment: On furosemide 20 mg bid, stable. She was taking only 20 mg but noticed increased leg swelling. Now back on 40 mg daily and the swelling is down. She will continue 40 mg daily.   Plan: CBC with platelets, Comprehensive metabolic         panel            (F41.1) Anxiety  Comment: on Zoloft 150 mg daily for many years. She denies worsening depression, but gets agitated easy and can be very emotional and cries often. She is sleeping well and using oxygen during the night. She denies suicidal ideas, but has symptoms of generalized anxiety. We will switch to Effexor. Follow-up in 4 weeks.  Plan: venlafaxine (EFFEXOR XR) 150 MG 24 hr capsule            (K63.89) Cecum mass  Comment: CT scan during the last ER visit 3/10/23:  Likely 2.4 cm enhancing cecal mass at the base of the appendix. There is some appendiceal dilation without evidence of ed appendicitis at this time. Recommend confirmation with colonoscopy and surgical referral.     She had colonoscopy in 12/2022 , which did not show this abnormality:  Multiple non-bleeding colonic angioectasias. Treated with argon plasma coagulation (APC).  Diverticulosis in the sigmoid colon and in the  descending colon. External hemorrhoids.     I suggested visit with general surgery for review of the CT scan images and further recommendations. She did not want any further evaluation. She reports occasional loose bowel movement, no blood.         Patient has been advised of split billing requirements and indicates understanding: Yes      COUNSELING:  Reviewed preventive health counseling, as reflected in patient instructions       Healthy diet/nutrition        She reports that she has quit smoking. She has never been exposed to  tobacco smoke. She has never used smokeless tobacco.      Appropriate preventive services were discussed with this patient, including applicable screening as appropriate for fall prevention, nutrition, physical activity, Tobacco-use cessation, weight loss and cognition.  Checklist reviewing preventive services available has been given to the patient.    Reviewed patients plan of care and provided an AVS. The Basic Care Plan (routine screening as documented in Health Maintenance) for Marva meets the Care Plan requirement. This Care Plan has been established and reviewed with the Patient.          Sabas Austin MD  [facility]    Identified Health Risks:  I have reviewed Opioid Use Disorder and Substance Use Disorder risk factors and made any needed referrals.

## 2024-01-12 LAB
ALBUMIN SERPL BCG-MCNC: 4.1 G/DL (ref 3.5–5.2)
ALP SERPL-CCNC: 97 U/L (ref 40–150)
ALT SERPL W P-5'-P-CCNC: 12 U/L (ref 0–50)
ANION GAP SERPL CALCULATED.3IONS-SCNC: 13 MMOL/L (ref 7–15)
AST SERPL W P-5'-P-CCNC: 16 U/L (ref 0–45)
BACTERIA UR CULT: ABNORMAL
BILIRUB SERPL-MCNC: 0.6 MG/DL
BUN SERPL-MCNC: 27.6 MG/DL (ref 8–23)
CALCIUM SERPL-MCNC: 9.4 MG/DL (ref 8.8–10.2)
CHLORIDE SERPL-SCNC: 100 MMOL/L (ref 98–107)
CHOLEST SERPL-MCNC: 153 MG/DL
CREAT SERPL-MCNC: 1.18 MG/DL (ref 0.51–0.95)
DEPRECATED HCO3 PLAS-SCNC: 26 MMOL/L (ref 22–29)
EGFRCR SERPLBLD CKD-EPI 2021: 45 ML/MIN/1.73M2
FASTING STATUS PATIENT QL REPORTED: NO
GLUCOSE SERPL-MCNC: 116 MG/DL (ref 70–99)
HDLC SERPL-MCNC: 62 MG/DL
LDLC SERPL CALC-MCNC: 74 MG/DL
NONHDLC SERPL-MCNC: 91 MG/DL
POTASSIUM SERPL-SCNC: 3.4 MMOL/L (ref 3.4–5.3)
PROT SERPL-MCNC: 6.4 G/DL (ref 6.4–8.3)
SODIUM SERPL-SCNC: 139 MMOL/L (ref 135–145)
TRIGL SERPL-MCNC: 86 MG/DL
TSH SERPL DL<=0.005 MIU/L-ACNC: 3.58 UIU/ML (ref 0.3–4.2)
VIT D+METAB SERPL-MCNC: 54 NG/ML (ref 20–50)

## 2024-01-15 ENCOUNTER — TELEPHONE (OUTPATIENT)
Dept: INTERNAL MEDICINE | Facility: CLINIC | Age: 89
End: 2024-01-15
Payer: COMMERCIAL

## 2024-01-15 DIAGNOSIS — F41.1 ANXIETY STATE: Primary | ICD-10-CM

## 2024-01-15 RX ORDER — SERTRALINE HYDROCHLORIDE 100 MG/1
100 TABLET, FILM COATED ORAL DAILY
Start: 2024-01-15 | End: 2024-09-07

## 2024-01-15 NOTE — TELEPHONE ENCOUNTER
Outgoing call, relayed provider message.     Pt report taking 2 water pills (Lasik) daily. Pt report urinating frequently, but properly because of the medication per pt.   Denies any bladder infection symptoms including bloody urine, pain/burning while urinating, fever/chills.   Writer advise pt to monitor symptoms and to report it if any she develop any over time.   Pt agree to plan.     Pt also wanted pcp to know that she is not taking Venlafaxine for her anxiety per order on 1/11/24 due to its out of pocket cost.   Pt continues taking Sertraline (1 tab at bedtime) at this time and it is going well.   Pt states since there are no changes to her medication, there no need for a follow-up in 2/8/24 as planned.     No further questions.     Edwar GILLIAM RN      ----- Message from Taj Mallory MD sent at 1/15/2024 11:22 AM CST -----  Please contact patient.  Is she having any symptoms of a bladder infection.  There was some bacteria growing out on her urine culture but below the threshold at which a bladder infection is diagnosed.

## 2024-01-16 NOTE — TELEPHONE ENCOUNTER
Ok. If she has no UTI symptoms, no need for antibiotic. If she does not want to switch from sertraline to venlafaxine, no need for follow-up. Thanks

## 2024-01-18 NOTE — TELEPHONE ENCOUNTER
PCP noted pt concerns.     Cancelled pt follow-up appointment.     Closing encounter.     Pay P. RN

## 2024-01-24 ENCOUNTER — OFFICE VISIT (OUTPATIENT)
Dept: CARDIOLOGY | Facility: CLINIC | Age: 89
End: 2024-01-24
Payer: COMMERCIAL

## 2024-01-24 VITALS
SYSTOLIC BLOOD PRESSURE: 122 MMHG | HEIGHT: 62 IN | HEART RATE: 92 BPM | DIASTOLIC BLOOD PRESSURE: 78 MMHG | RESPIRATION RATE: 16 BRPM | WEIGHT: 136.9 LBS | BODY MASS INDEX: 25.19 KG/M2

## 2024-01-24 DIAGNOSIS — I48.21 PERMANENT ATRIAL FIBRILLATION (H): ICD-10-CM

## 2024-01-24 DIAGNOSIS — K31.819 GASTRIC AVM: ICD-10-CM

## 2024-01-24 PROCEDURE — 99214 OFFICE O/P EST MOD 30 MIN: CPT | Performed by: INTERNAL MEDICINE

## 2024-01-24 RX ORDER — PANTOPRAZOLE SODIUM 40 MG/1
40 TABLET, DELAYED RELEASE ORAL 2 TIMES DAILY
Qty: 180 TABLET | Refills: 3 | Status: CANCELLED | OUTPATIENT
Start: 2024-01-24

## 2024-01-24 NOTE — PATIENT INSTRUCTIONS
Marva Gaines,    It was a pleasure to see you today at the [facility].     My recommendations after this visit include:    Use the dose of Eliquis to 2.5 mg twice a day because of age and weight    JEN Ricci MD, FACC, [facility]         
Congruent

## 2024-01-24 NOTE — PROGRESS NOTES
"    Cardiology Progress Note     Assessment:  Chronic GI bleeding due to AV malformations   Anemia, resolved  Permanent atrial fibrillation with controlled ventricular response on Eliquis  Chronic heart failure with preserved ejection fraction, euvolemic  PVCs, asymptomatic  Hypertension, good control  Hypercholesterolemia on atorvastatin, good control  Depression/ anxiety      Plan:  Because of her age over 82, weight around 60 kg and history of GI bleeding I think we should decrease dose of Eliquis to 2.5 mg twice a day    Follow-up in 1 year    Subjective:   This is 88 year old female who comes in today for follow-up visit.  She has done well.  She denies weight gain, PND, orthopnea.  She has not had syncope or near syncope.  She reports that she has not fallen down or lost consciousness lately.  She has not had any bleeding issues.    Review of Systems:   Negative other than history of present illness    Objective:   /78   Pulse 92   Resp 16   Ht 1.575 m (5' 2\")   Wt 62.1 kg (136 lb 14.4 oz)   LMP  (LMP Unknown)   BMI 25.04 kg/m    Physical Exam:  GENERAL: no distress  NECK: No JVD  LUNGS: Clear to auscultation.  CARDIAC: irregular rhythm, S1 & S2 normal.  No heaves, thrills, gallops or murmurs.  ABDOMEN: flat, negative hepatosplenomegaly, soft and non-tender.  EXTREMITIES: No evidence of cyanosis, clubbing or edema.    Current Outpatient Medications   Medication Sig Dispense Refill    acetaminophen (TYLENOL) 325 MG tablet Take 2 tablets (650 mg) by mouth every 6 hours as needed for mild pain, fever or headaches      apixaban ANTICOAGULANT (ELIQUIS ANTICOAGULANT) 2.5 MG tablet Take 1 tablet (2.5 mg) by mouth 2 times daily 180 tablet 3    atorvastatin (LIPITOR) 20 MG tablet TAKE 1 TABLET BY MOUTH EVERYDAY AT BEDTIME 90 tablet 1    cholecalciferol, vitamin D3, (VITAMIN D3) 2,000 unit Tab [CHOLECALCIFEROL, VITAMIN D3, (VITAMIN D3) 2,000 UNIT TAB] Take 1 tablet by mouth daily.      diltiazem ER COATED " BEADS (CARDIZEM CD/CARTIA XT) 240 MG 24 hr capsule Take 1 capsule (240 mg) by mouth daily 90 capsule 3    fish oil-omega-3 fatty acids 500 MG capsule Take 1 capsule by mouth daily      furosemide (LASIX) 20 MG tablet TAKE 2 TABLETS BY MOUTH EVERY DAY (Patient taking differently: Take 20 mg by mouth 2 times daily) 180 tablet 1    losartan (COZAAR) 25 MG tablet TAKE 1 TABLET BY MOUTH EVERY DAY 90 tablet 1    pantoprazole (PROTONIX) 40 MG EC tablet TAKE 1 TABLET BY MOUTH TWICE A  tablet 3    sertraline (ZOLOFT) 100 MG tablet Take 1 tablet (100 mg) by mouth daily         Cardiographics:      Holter: April 2015   Persistent atrial fibrillation with overall fairly well controlled  ventricular response. There was some tendency toward rapid ventricular response  with activity in the early afternoon hours. A moderate number of ventricular  premature beats noted. 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     Echocardiogram: 2013   Normal LV systolic function, no significant valvular abnormalities     December 2022  Normal right ventricle size and systolic function.  The left ventricle is normal in size.  There is mild to moderate concentric left ventricular hypertrophy.  The visual ejection fraction is 60-65%.  No regional wall motion abnormalities noted.  There is moderate (2+) mitral regurgitation.  The right ventricular systolic pressure is approximated at 38mmHg plus the  right atrial pressure.  Mild aortic valve calcification is present.  The aortic valve is trileaflet.  IVC diameter <2.1 cm collapsing >50% with sniff suggests a normal RA pressure  of 3 mmHg.  The rhythm was atrial fibrillation.        Stress Test: 2013   Mixed anterior perfusion defect     CT coronary angio: 2013   Normal coronaries, calcium score 2   Lab Results    Chemistry/lipid CBC Cardiac Enzymes/BNP/TSH/INR   Recent Labs   Lab Test 01/11/24  1722   CHOL 153   HDL 62   LDL 74   TRIG 86     Recent Labs   Lab Test 01/11/24  1722 10/30/19  1632  05/17/18  1550   LDL 74 75 86     Recent Labs   Lab Test 01/11/24  1722      POTASSIUM 3.4   CHLORIDE 100   CO2 26   *   BUN 27.6*   CR 1.18*   GFRESTIMATED 45*   FAROOQ 9.4     Recent Labs   Lab Test 01/11/24  1722 06/22/23  1646 03/10/23  1627   CR 1.18* 1.05* 1.24*     Recent Labs   Lab Test 01/11/24  1722 05/17/18  1550   A1C 5.3 5.7          Recent Labs   Lab Test 01/11/24  1722   WBC 7.6   HGB 12.6   HCT 39.1   MCV 90        Recent Labs   Lab Test 01/11/24  1722 11/10/23  1506 06/22/23  1646   HGB 12.6 12.2 12.0    Recent Labs   Lab Test 03/10/23  1627   TROPONINI 0.02     Recent Labs   Lab Test 12/28/22  1900 12/28/22  1554 12/02/22  1021   *  --   --    NTBNP  --  3,702* 2,360*     Recent Labs   Lab Test 01/11/24  1722   TSH 3.58     Recent Labs   Lab Test 03/10/23  1627 09/01/21  1537 07/19/21  1421   INR 1.68* 1.9* 2.5*

## 2024-01-24 NOTE — LETTER
"1/24/2024    Sabas Austin MD  [address] [facility] [address]    RE: Marva Gaines       Dear Colleague,     I had the pleasure of seeing Marva REMI Gaines in the [facility].      Cardiology Progress Note     Assessment:  Chronic GI bleeding due to AV malformations   Anemia, resolved  Permanent atrial fibrillation with controlled ventricular response on Eliquis  Chronic heart failure with preserved ejection fraction, euvolemic  PVCs, asymptomatic  Hypertension, good control  Hypercholesterolemia on atorvastatin, good control  Depression/ anxiety      Plan:  Because of her age over 82, weight around 60 kg and history of GI bleeding I think we should decrease dose of Eliquis to 2.5 mg twice a day    Follow-up in 1 year    Subjective:   This is 88 year old female who comes in today for follow-up visit.  She has done well.  She denies weight gain, PND, orthopnea.  She has not had syncope or near syncope.  She reports that she has not fallen down or lost consciousness lately.  She has not had any bleeding issues.    Review of Systems:   Negative other than history of present illness    Objective:   /78   Pulse 92   Resp 16   Ht 1.575 m (5' 2\")   Wt 62.1 kg (136 lb 14.4 oz)   LMP  (LMP Unknown)   BMI 25.04 kg/m    Physical Exam:  GENERAL: no distress  NECK: No JVD  LUNGS: Clear to auscultation.  CARDIAC: irregular rhythm, S1 & S2 normal.  No heaves, thrills, gallops or murmurs.  ABDOMEN: flat, negative hepatosplenomegaly, soft and non-tender.  EXTREMITIES: No evidence of cyanosis, clubbing or edema.    Current Outpatient Medications   Medication Sig Dispense Refill    acetaminophen (TYLENOL) 325 MG tablet Take 2 tablets (650 mg) by mouth every 6 hours as needed for mild pain, fever or headaches      apixaban ANTICOAGULANT (ELIQUIS ANTICOAGULANT) 2.5 MG tablet Take 1 tablet (2.5 mg) by mouth 2 times daily 180 tablet 3    atorvastatin (LIPITOR) 20 MG tablet TAKE 1 TABLET BY MOUTH " EVERYDAY AT BEDTIME 90 tablet 1    cholecalciferol, vitamin D3, (VITAMIN D3) 2,000 unit Tab [CHOLECALCIFEROL, VITAMIN D3, (VITAMIN D3) 2,000 UNIT TAB] Take 1 tablet by mouth daily.      diltiazem ER COATED BEADS (CARDIZEM CD/CARTIA XT) 240 MG 24 hr capsule Take 1 capsule (240 mg) by mouth daily 90 capsule 3    fish oil-omega-3 fatty acids 500 MG capsule Take 1 capsule by mouth daily      furosemide (LASIX) 20 MG tablet TAKE 2 TABLETS BY MOUTH EVERY DAY (Patient taking differently: Take 20 mg by mouth 2 times daily) 180 tablet 1    losartan (COZAAR) 25 MG tablet TAKE 1 TABLET BY MOUTH EVERY DAY 90 tablet 1    pantoprazole (PROTONIX) 40 MG EC tablet TAKE 1 TABLET BY MOUTH TWICE A  tablet 3    sertraline (ZOLOFT) 100 MG tablet Take 1 tablet (100 mg) by mouth daily         Cardiographics:      Holter: April 2015   Persistent atrial fibrillation with overall fairly well controlled  ventricular response. There was some tendency toward rapid ventricular response  with activity in the early afternoon hours. A moderate number of ventricular  premature beats noted. 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     Echocardiogram: 2013   Normal LV systolic function, no significant valvular abnormalities     December 2022  Normal right ventricle size and systolic function.  The left ventricle is normal in size.  There is mild to moderate concentric left ventricular hypertrophy.  The visual ejection fraction is 60-65%.  No regional wall motion abnormalities noted.  There is moderate (2+) mitral regurgitation.  The right ventricular systolic pressure is approximated at 38mmHg plus the  right atrial pressure.  Mild aortic valve calcification is present.  The aortic valve is trileaflet.  IVC diameter <2.1 cm collapsing >50% with sniff suggests a normal RA pressure  of 3 mmHg.  The rhythm was atrial fibrillation.        Stress Test: 2013   Mixed anterior perfusion defect     CT coronary angio: 2013   Normal coronaries, calcium score 2   Lab  Results    Chemistry/lipid CBC Cardiac Enzymes/BNP/TSH/INR   Recent Labs   Lab Test 01/11/24  1722   CHOL 153   HDL 62   LDL 74   TRIG 86     Recent Labs   Lab Test 01/11/24  1722 10/30/19  1632 05/17/18  1550   LDL 74 75 86     Recent Labs   Lab Test 01/11/24  1722      POTASSIUM 3.4   CHLORIDE 100   CO2 26   *   BUN 27.6*   CR 1.18*   GFRESTIMATED 45*   FAROOQ 9.4     Recent Labs   Lab Test 01/11/24  1722 06/22/23  1646 03/10/23  1627   CR 1.18* 1.05* 1.24*     Recent Labs   Lab Test 01/11/24  1722 05/17/18  1550   A1C 5.3 5.7          Recent Labs   Lab Test 01/11/24  1722   WBC 7.6   HGB 12.6   HCT 39.1   MCV 90        Recent Labs   Lab Test 01/11/24  1722 11/10/23  1506 06/22/23  1646   HGB 12.6 12.2 12.0    Recent Labs   Lab Test 03/10/23  1627   TROPONINI 0.02     Recent Labs   Lab Test 12/28/22  1900 12/28/22  1554 12/02/22  1021   *  --   --    NTBNP  --  3,702* 2,360*     Recent Labs   Lab Test 01/11/24  1722   TSH 3.58     Recent Labs   Lab Test 03/10/23  1627 09/01/21  1537 07/19/21  1421   INR 1.68* 1.9* 2.5*                         Thank you for allowing me to participate in the care of your patient.      Sincerely,     Melvin Ricci MD     [facility]  cc:   Kai Ricci MD  [address]  [address]  [place] [address]

## 2024-05-23 DIAGNOSIS — I10 ESSENTIAL HYPERTENSION: ICD-10-CM

## 2024-05-23 RX ORDER — LOSARTAN POTASSIUM 25 MG/1
TABLET ORAL
Qty: 90 TABLET | Refills: 1 | Status: ON HOLD | OUTPATIENT
Start: 2024-05-23 | End: 2024-06-04

## 2024-05-29 ENCOUNTER — APPOINTMENT (OUTPATIENT)
Dept: ULTRASOUND IMAGING | Facility: CLINIC | Age: 89
DRG: 503 | End: 2024-05-29
Attending: PODIATRIST
Payer: COMMERCIAL

## 2024-05-29 ENCOUNTER — APPOINTMENT (OUTPATIENT)
Dept: MRI IMAGING | Facility: CLINIC | Age: 89
DRG: 503 | End: 2024-05-29
Attending: EMERGENCY MEDICINE
Payer: COMMERCIAL

## 2024-05-29 ENCOUNTER — OFFICE VISIT (OUTPATIENT)
Dept: INTERNAL MEDICINE | Facility: CLINIC | Age: 89
End: 2024-05-29
Payer: COMMERCIAL

## 2024-05-29 ENCOUNTER — HOSPITAL ENCOUNTER (INPATIENT)
Facility: CLINIC | Age: 89
LOS: 6 days | Discharge: NURSING, REHAB OR LONG-TERM CARE | DRG: 503 | End: 2024-06-04
Attending: EMERGENCY MEDICINE | Admitting: STUDENT IN AN ORGANIZED HEALTH CARE EDUCATION/TRAINING PROGRAM
Payer: COMMERCIAL

## 2024-05-29 VITALS
SYSTOLIC BLOOD PRESSURE: 138 MMHG | RESPIRATION RATE: 18 BRPM | DIASTOLIC BLOOD PRESSURE: 72 MMHG | HEIGHT: 62 IN | HEART RATE: 65 BPM | WEIGHT: 132 LBS | TEMPERATURE: 97.4 F | OXYGEN SATURATION: 100 % | BODY MASS INDEX: 24.29 KG/M2

## 2024-05-29 DIAGNOSIS — R41.82 ALTERED MENTAL STATUS, UNSPECIFIED ALTERED MENTAL STATUS TYPE: ICD-10-CM

## 2024-05-29 DIAGNOSIS — N39.0 ACUTE UTI: ICD-10-CM

## 2024-05-29 DIAGNOSIS — M79.676 PAIN OF FIFTH TOE: Primary | ICD-10-CM

## 2024-05-29 DIAGNOSIS — I10 ESSENTIAL HYPERTENSION: ICD-10-CM

## 2024-05-29 DIAGNOSIS — L03.031 CELLULITIS OF FIFTH TOE OF RIGHT FOOT: ICD-10-CM

## 2024-05-29 DIAGNOSIS — I48.21 PERMANENT ATRIAL FIBRILLATION (H): ICD-10-CM

## 2024-05-29 DIAGNOSIS — M86.9 OSTEOMYELITIS OF RIGHT FOOT, UNSPECIFIED TYPE (H): Primary | ICD-10-CM

## 2024-05-29 LAB
ALBUMIN SERPL BCG-MCNC: 4.1 G/DL (ref 3.5–5.2)
ALBUMIN UR-MCNC: 30 MG/DL
ALP SERPL-CCNC: 114 U/L (ref 40–150)
ALT SERPL W P-5'-P-CCNC: <5 U/L (ref 0–50)
ANION GAP SERPL CALCULATED.3IONS-SCNC: 11 MMOL/L (ref 7–15)
APPEARANCE UR: ABNORMAL
AST SERPL W P-5'-P-CCNC: 12 U/L (ref 0–45)
BACTERIA #/AREA URNS HPF: ABNORMAL /HPF
BASOPHILS # BLD AUTO: 0.1 10E3/UL (ref 0–0.2)
BASOPHILS NFR BLD AUTO: 1 %
BILIRUB SERPL-MCNC: 0.9 MG/DL
BILIRUB UR QL STRIP: NEGATIVE
BUN SERPL-MCNC: 12.9 MG/DL (ref 8–23)
CALCIUM SERPL-MCNC: 9.8 MG/DL (ref 8.8–10.2)
CHLORIDE SERPL-SCNC: 106 MMOL/L (ref 98–107)
COLOR UR AUTO: YELLOW
CREAT SERPL-MCNC: 1.09 MG/DL (ref 0.51–0.95)
CRP SERPL-MCNC: <3 MG/L
DEPRECATED HCO3 PLAS-SCNC: 24 MMOL/L (ref 22–29)
EGFRCR SERPLBLD CKD-EPI 2021: 49 ML/MIN/1.73M2
EOSINOPHIL # BLD AUTO: 0.1 10E3/UL (ref 0–0.7)
EOSINOPHIL NFR BLD AUTO: 1 %
ERYTHROCYTE [DISTWIDTH] IN BLOOD BY AUTOMATED COUNT: 15.1 % (ref 10–15)
ERYTHROCYTE [SEDIMENTATION RATE] IN BLOOD BY WESTERGREN METHOD: 9 MM/HR (ref 0–30)
FLUAV RNA SPEC QL NAA+PROBE: NEGATIVE
FLUBV RNA RESP QL NAA+PROBE: NEGATIVE
GLUCOSE SERPL-MCNC: 113 MG/DL (ref 70–99)
GLUCOSE UR STRIP-MCNC: NEGATIVE MG/DL
HCT VFR BLD AUTO: 40.6 % (ref 35–47)
HGB BLD-MCNC: 12.9 G/DL (ref 11.7–15.7)
HGB UR QL STRIP: ABNORMAL
IMM GRANULOCYTES # BLD: 0 10E3/UL
IMM GRANULOCYTES NFR BLD: 0 %
KETONES UR STRIP-MCNC: NEGATIVE MG/DL
LEUKOCYTE ESTERASE UR QL STRIP: ABNORMAL
LYMPHOCYTES # BLD AUTO: 0.7 10E3/UL (ref 0.8–5.3)
LYMPHOCYTES NFR BLD AUTO: 8 %
MCH RBC QN AUTO: 27.8 PG (ref 26.5–33)
MCHC RBC AUTO-ENTMCNC: 31.8 G/DL (ref 31.5–36.5)
MCV RBC AUTO: 88 FL (ref 78–100)
MONOCYTES # BLD AUTO: 0.7 10E3/UL (ref 0–1.3)
MONOCYTES NFR BLD AUTO: 7 %
MUCOUS THREADS #/AREA URNS LPF: PRESENT /LPF
NEUTROPHILS # BLD AUTO: 7.5 10E3/UL (ref 1.6–8.3)
NEUTROPHILS NFR BLD AUTO: 83 %
NITRATE UR QL: POSITIVE
NRBC # BLD AUTO: 0 10E3/UL
NRBC BLD AUTO-RTO: 0 /100
PH UR STRIP: 5.5 [PH] (ref 5–7)
PLATELET # BLD AUTO: 240 10E3/UL (ref 150–450)
POTASSIUM SERPL-SCNC: 3.9 MMOL/L (ref 3.4–5.3)
PROT SERPL-MCNC: 6.3 G/DL (ref 6.4–8.3)
RBC # BLD AUTO: 4.64 10E6/UL (ref 3.8–5.2)
RBC URINE: 4 /HPF
RSV RNA SPEC NAA+PROBE: NEGATIVE
SARS-COV-2 RNA RESP QL NAA+PROBE: NEGATIVE
SODIUM SERPL-SCNC: 141 MMOL/L (ref 135–145)
SP GR UR STRIP: 1.02 (ref 1–1.03)
SQUAMOUS EPITHELIAL: 5 /HPF
UROBILINOGEN UR STRIP-MCNC: <2 MG/DL
WBC # BLD AUTO: 9.1 10E3/UL (ref 4–11)
WBC CLUMPS #/AREA URNS HPF: PRESENT /HPF
WBC URINE: 178 /HPF

## 2024-05-29 PROCEDURE — 93922 UPR/L XTREMITY ART 2 LEVELS: CPT

## 2024-05-29 PROCEDURE — 120N000001 HC R&B MED SURG/OB

## 2024-05-29 PROCEDURE — 86140 C-REACTIVE PROTEIN: CPT | Performed by: EMERGENCY MEDICINE

## 2024-05-29 PROCEDURE — 255N000002 HC RX 255 OP 636: Performed by: STUDENT IN AN ORGANIZED HEALTH CARE EDUCATION/TRAINING PROGRAM

## 2024-05-29 PROCEDURE — 99285 EMERGENCY DEPT VISIT HI MDM: CPT | Mod: 25

## 2024-05-29 PROCEDURE — 87186 SC STD MICRODIL/AGAR DIL: CPT | Performed by: EMERGENCY MEDICINE

## 2024-05-29 PROCEDURE — 93925 LOWER EXTREMITY STUDY: CPT

## 2024-05-29 PROCEDURE — 250N000013 HC RX MED GY IP 250 OP 250 PS 637: Performed by: INTERNAL MEDICINE

## 2024-05-29 PROCEDURE — 81001 URINALYSIS AUTO W/SCOPE: CPT | Performed by: EMERGENCY MEDICINE

## 2024-05-29 PROCEDURE — 36415 COLL VENOUS BLD VENIPUNCTURE: CPT | Performed by: EMERGENCY MEDICINE

## 2024-05-29 PROCEDURE — 250N000013 HC RX MED GY IP 250 OP 250 PS 637: Performed by: EMERGENCY MEDICINE

## 2024-05-29 PROCEDURE — 82040 ASSAY OF SERUM ALBUMIN: CPT | Performed by: EMERGENCY MEDICINE

## 2024-05-29 PROCEDURE — 99207 PR INPT ADMISSION FROM CLINIC: CPT | Performed by: NURSE PRACTITIONER

## 2024-05-29 PROCEDURE — 258N000003 HC RX IP 258 OP 636: Performed by: EMERGENCY MEDICINE

## 2024-05-29 PROCEDURE — 99223 1ST HOSP IP/OBS HIGH 75: CPT | Performed by: STUDENT IN AN ORGANIZED HEALTH CARE EDUCATION/TRAINING PROGRAM

## 2024-05-29 PROCEDURE — 87637 SARSCOV2&INF A&B&RSV AMP PRB: CPT | Performed by: EMERGENCY MEDICINE

## 2024-05-29 PROCEDURE — 87086 URINE CULTURE/COLONY COUNT: CPT | Performed by: EMERGENCY MEDICINE

## 2024-05-29 PROCEDURE — A9585 GADOBUTROL INJECTION: HCPCS | Performed by: STUDENT IN AN ORGANIZED HEALTH CARE EDUCATION/TRAINING PROGRAM

## 2024-05-29 PROCEDURE — 250N000011 HC RX IP 250 OP 636: Performed by: INTERNAL MEDICINE

## 2024-05-29 PROCEDURE — 85652 RBC SED RATE AUTOMATED: CPT | Performed by: EMERGENCY MEDICINE

## 2024-05-29 PROCEDURE — 250N000013 HC RX MED GY IP 250 OP 250 PS 637: Performed by: STUDENT IN AN ORGANIZED HEALTH CARE EDUCATION/TRAINING PROGRAM

## 2024-05-29 PROCEDURE — 85025 COMPLETE CBC W/AUTO DIFF WBC: CPT | Performed by: EMERGENCY MEDICINE

## 2024-05-29 PROCEDURE — 250N000011 HC RX IP 250 OP 636: Performed by: EMERGENCY MEDICINE

## 2024-05-29 PROCEDURE — 73720 MRI LWR EXTREMITY W/O&W/DYE: CPT | Mod: RT

## 2024-05-29 RX ORDER — ACETAMINOPHEN 325 MG/1
650 TABLET ORAL EVERY 6 HOURS PRN
Status: DISCONTINUED | OUTPATIENT
Start: 2024-05-29 | End: 2024-06-04 | Stop reason: HOSPADM

## 2024-05-29 RX ORDER — FERROUS SULFATE 324(65)MG
1 TABLET, DELAYED RELEASE (ENTERIC COATED) ORAL DAILY
COMMUNITY

## 2024-05-29 RX ORDER — PROCHLORPERAZINE MALEATE 5 MG
5 TABLET ORAL EVERY 6 HOURS PRN
Status: DISCONTINUED | OUTPATIENT
Start: 2024-05-29 | End: 2024-05-30

## 2024-05-29 RX ORDER — ONDANSETRON 2 MG/ML
4 INJECTION INTRAMUSCULAR; INTRAVENOUS EVERY 6 HOURS PRN
Status: DISCONTINUED | OUTPATIENT
Start: 2024-05-29 | End: 2024-05-31

## 2024-05-29 RX ORDER — ACETAMINOPHEN 500 MG
1000 TABLET ORAL ONCE
Status: COMPLETED | OUTPATIENT
Start: 2024-05-29 | End: 2024-05-29

## 2024-05-29 RX ORDER — PROCHLORPERAZINE 25 MG
12.5 SUPPOSITORY, RECTAL RECTAL EVERY 12 HOURS PRN
Status: DISCONTINUED | OUTPATIENT
Start: 2024-05-29 | End: 2024-05-30

## 2024-05-29 RX ORDER — ONDANSETRON 4 MG/1
4 TABLET, ORALLY DISINTEGRATING ORAL EVERY 6 HOURS PRN
Status: DISCONTINUED | OUTPATIENT
Start: 2024-05-29 | End: 2024-05-31

## 2024-05-29 RX ORDER — GADOBUTROL 604.72 MG/ML
6 INJECTION INTRAVENOUS ONCE
Status: COMPLETED | OUTPATIENT
Start: 2024-05-29 | End: 2024-05-29

## 2024-05-29 RX ORDER — LOSARTAN POTASSIUM 25 MG/1
25 TABLET ORAL EVERY EVENING
Status: DISCONTINUED | OUTPATIENT
Start: 2024-05-29 | End: 2024-06-04 | Stop reason: HOSPADM

## 2024-05-29 RX ORDER — PIPERACILLIN SODIUM, TAZOBACTAM SODIUM 3; .375 G/15ML; G/15ML
3.38 INJECTION, POWDER, LYOPHILIZED, FOR SOLUTION INTRAVENOUS EVERY 8 HOURS
Status: DISCONTINUED | OUTPATIENT
Start: 2024-05-30 | End: 2024-05-31

## 2024-05-29 RX ORDER — AMOXICILLIN 250 MG
1 CAPSULE ORAL 2 TIMES DAILY PRN
Status: DISCONTINUED | OUTPATIENT
Start: 2024-05-29 | End: 2024-05-30

## 2024-05-29 RX ORDER — CALCIUM CARBONATE 500 MG/1
1000 TABLET, CHEWABLE ORAL 4 TIMES DAILY PRN
Status: DISCONTINUED | OUTPATIENT
Start: 2024-05-29 | End: 2024-06-04 | Stop reason: HOSPADM

## 2024-05-29 RX ORDER — ACETAMINOPHEN 500 MG
1000 TABLET ORAL EVERY 6 HOURS PRN
COMMUNITY

## 2024-05-29 RX ORDER — PIPERACILLIN SODIUM, TAZOBACTAM SODIUM 3; .375 G/15ML; G/15ML
3.38 INJECTION, POWDER, LYOPHILIZED, FOR SOLUTION INTRAVENOUS ONCE
Status: COMPLETED | OUTPATIENT
Start: 2024-05-29 | End: 2024-05-29

## 2024-05-29 RX ORDER — SALIVA STIMULANT COMB. NO.3
1 SPRAY, NON-AEROSOL (ML) MUCOUS MEMBRANE 4 TIMES DAILY
Status: DISCONTINUED | OUTPATIENT
Start: 2024-05-29 | End: 2024-06-04 | Stop reason: HOSPADM

## 2024-05-29 RX ORDER — AMOXICILLIN 250 MG
2 CAPSULE ORAL 2 TIMES DAILY PRN
Status: DISCONTINUED | OUTPATIENT
Start: 2024-05-29 | End: 2024-05-30

## 2024-05-29 RX ORDER — RESPIRATORY SYNCYTIAL VIRUS VACCINE 120MCG/0.5
0.5 KIT INTRAMUSCULAR ONCE
Qty: 1 EACH | Refills: 0 | Status: CANCELLED | OUTPATIENT
Start: 2024-05-29 | End: 2024-05-29

## 2024-05-29 RX ORDER — SERTRALINE HYDROCHLORIDE 100 MG/1
100 TABLET, FILM COATED ORAL EVERY EVENING
Status: DISCONTINUED | OUTPATIENT
Start: 2024-05-29 | End: 2024-06-04 | Stop reason: HOSPADM

## 2024-05-29 RX ORDER — LIDOCAINE 40 MG/G
CREAM TOPICAL
Status: DISCONTINUED | OUTPATIENT
Start: 2024-05-29 | End: 2024-05-31

## 2024-05-29 RX ORDER — HYDRALAZINE HYDROCHLORIDE 20 MG/ML
10 INJECTION INTRAMUSCULAR; INTRAVENOUS EVERY 4 HOURS PRN
Status: DISCONTINUED | OUTPATIENT
Start: 2024-05-29 | End: 2024-06-04 | Stop reason: HOSPADM

## 2024-05-29 RX ORDER — FUROSEMIDE 40 MG
40 TABLET ORAL DAILY
Status: DISCONTINUED | OUTPATIENT
Start: 2024-05-29 | End: 2024-06-04 | Stop reason: HOSPADM

## 2024-05-29 RX ORDER — DILTIAZEM HYDROCHLORIDE 120 MG/1
240 CAPSULE, COATED, EXTENDED RELEASE ORAL EVERY EVENING
Status: DISCONTINUED | OUTPATIENT
Start: 2024-05-29 | End: 2024-06-04 | Stop reason: HOSPADM

## 2024-05-29 RX ORDER — ATORVASTATIN CALCIUM 10 MG/1
20 TABLET, FILM COATED ORAL AT BEDTIME
Status: DISCONTINUED | OUTPATIENT
Start: 2024-05-29 | End: 2024-06-04 | Stop reason: HOSPADM

## 2024-05-29 RX ORDER — PANTOPRAZOLE SODIUM 40 MG/1
40 TABLET, DELAYED RELEASE ORAL 2 TIMES DAILY
Status: DISCONTINUED | OUTPATIENT
Start: 2024-05-29 | End: 2024-06-04 | Stop reason: HOSPADM

## 2024-05-29 RX ORDER — VANCOMYCIN HYDROCHLORIDE 1 G/200ML
1000 INJECTION, SOLUTION INTRAVENOUS EVERY 24 HOURS
Status: DISCONTINUED | OUTPATIENT
Start: 2024-05-30 | End: 2024-05-31

## 2024-05-29 RX ADMIN — ACETAMINOPHEN 1000 MG: 500 TABLET ORAL at 13:35

## 2024-05-29 RX ADMIN — LOSARTAN POTASSIUM 25 MG: 25 TABLET, FILM COATED ORAL at 19:24

## 2024-05-29 RX ADMIN — ATORVASTATIN CALCIUM 20 MG: 10 TABLET, FILM COATED ORAL at 20:48

## 2024-05-29 RX ADMIN — FUROSEMIDE 40 MG: 40 TABLET ORAL at 19:24

## 2024-05-29 RX ADMIN — PANTOPRAZOLE SODIUM 40 MG: 40 TABLET, DELAYED RELEASE ORAL at 19:25

## 2024-05-29 RX ADMIN — DILTIAZEM HYDROCHLORIDE 240 MG: 120 CAPSULE, EXTENDED RELEASE ORAL at 20:44

## 2024-05-29 RX ADMIN — GADOBUTROL 6 ML: 604.72 INJECTION INTRAVENOUS at 18:18

## 2024-05-29 RX ADMIN — PIPERACILLIN AND TAZOBACTAM 3.38 G: 3; .375 INJECTION, POWDER, FOR SOLUTION INTRAVENOUS at 15:11

## 2024-05-29 RX ADMIN — Medication 1 SPRAY: at 20:45

## 2024-05-29 RX ADMIN — HYDRALAZINE HYDROCHLORIDE 10 MG: 20 INJECTION INTRAMUSCULAR; INTRAVENOUS at 20:44

## 2024-05-29 RX ADMIN — VANCOMYCIN HYDROCHLORIDE 1250 MG: 5 INJECTION, POWDER, LYOPHILIZED, FOR SOLUTION INTRAVENOUS at 17:06

## 2024-05-29 RX ADMIN — SERTRALINE 100 MG: 100 TABLET, FILM COATED ORAL at 19:24

## 2024-05-29 ASSESSMENT — ACTIVITIES OF DAILY LIVING (ADL)
ADLS_ACUITY_SCORE: 35
ADLS_ACUITY_SCORE: 41
ADLS_ACUITY_SCORE: 35
ADLS_ACUITY_SCORE: 35
ADLS_ACUITY_SCORE: 41
ADLS_ACUITY_SCORE: 35
ADLS_ACUITY_SCORE: 35
ADLS_ACUITY_SCORE: 41
ADLS_ACUITY_SCORE: 35
ADLS_ACUITY_SCORE: 41

## 2024-05-29 ASSESSMENT — COLUMBIA-SUICIDE SEVERITY RATING SCALE - C-SSRS
6. HAVE YOU EVER DONE ANYTHING, STARTED TO DO ANYTHING, OR PREPARED TO DO ANYTHING TO END YOUR LIFE?: NO
1. IN THE PAST MONTH, HAVE YOU WISHED YOU WERE DEAD OR WISHED YOU COULD GO TO SLEEP AND NOT WAKE UP?: NO
2. HAVE YOU ACTUALLY HAD ANY THOUGHTS OF KILLING YOURSELF IN THE PAST MONTH?: NO

## 2024-05-29 NOTE — H&P
[facility]    History and Physical - Hospitalist Service       Date of Admission:  5/29/2024    Assessment & Plan      Marva Gaines is a 88 year old female admitted on 5/29/2024. 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 Podiatry consulted. They will decide if also request vascular input. They discussed w/ ED team and broad empiric abx, zosyn and vancomycin (pharm dosing) which are continued on admission for both the cellulitis (and possible accompanying abscess) and UTI and pending MRI.   ------  Cellulitis of 5th toe  Ashen appearing toe though with dopplerable pulses in ER  Concern for ischemic tissue but not critical limb ischemia  A- as above. Stable on admit though elevated pressures, may be from pain.   P:   - admit to hospital   - consult to podiatry, appreciate   - MRI per podiatry  -continue zosyn (q8 hours) and vancomycin (pharm dosing)  -pain and nausea control  - if planning for procedure, would do npo, for now not yet clear  - will need pt and ot and sw, likely consult on 5/30 after initial workup   - npo at midnight in case of procedure     UTI, and prior hx  A/p- consistent clinically and objectively with episodic confusion and urinary urgency and prior similar episodes  -abx as above  -- follow the in-process cultures, follow speciation of organism(s) and subsequently their sensitivities and resistance (S&R) and narrow antibiotics as appropriately      Ckd  A/p- hold losartan w/c for renal function    afib on eliquis  A/p- given the ecchymoses around the 5th toe and prior hx of bleed and also possibly needing intervention pending podiatric input, will hold the eliquis for now.   -  ordered eliquis as HELD  - would continue diltiazem w/ hold parameters, pending med rec    Htn  A/p- elevated but pain would exacerbate, no headaches or vision changes  - med rec pending, would continue both diltiazem and losartan with hold parameters    Hld  A/p- hold fish oil, would continue statin; pending med rec    Anxiety  A/p- stable, would continue zoloft     HFpEF  A/p- appears slightly euvolemic vs a bit volume down but not floridly up so will hold lasix for now on admission     prior hospitalization for GI bleed in January 2023, duodenal AVM  Reflux  A/p- stable, would resume ppi            Diet: Combination Diet Low Saturated Fat Na <2400mg Diet, No Caffeine Diet   DVT Prophylaxis: Pneumatic Compression Devices and given the ecchymoses around the 5th toe and prior hx of bleed and also possibly needing intervention pending podiatric input, hold the eliquis for now  Valadez Catheter: Not present  Lines: None     Cardiac Monitoring: None  Code Status: Full Code     Clinically Significant Risk Factors Present on Admission               # Drug Induced Coagulation Defect: home medication list includes an anticoagulant medication    # Hypertension: Noted on problem list                 Disposition Plan     Medically Ready for Discharge: Anticipated in 2-4 Days        Jimbo Joseph MD  Hospitalist Service  [facility]  Securely message with [employer] (more info)  Text page via [employer] Paging/Directory     ______________________________________________________________________    Chief Complaint   Toe pain and urinary frequency; per daughter, also episodes of confusion     History is obtained from the patient and her daughter Veronica at bedside     History of Present Illness   Marva Gaines is a 88 year old female who  has a pmhx of afib on eliquis, hld, anxiety, HFpEF, prior hospitalization for GI bleed in January 2023 (required transfusions, 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 Podiatry consulted. They will decide if also request vascular input. They discussed w/ ED team and broad empiric abx, zosyn and vancomycin (pharm dosing) which are continued on admission for both the cellulitis (and possible accompanying abscess) and UTI and pending MRI.     On interview, the patient confirms the aforementioned and also reports about a week plus of right 5th toe pain and she purposely kept from her family (not wanting them to worry) and also on/off urinary frequency and per daughter Veronica also episodes of confusion which the pt agrees with. She is currently oriented now but was not earlier per report; she also reports no other symptoms noted including no headaches, fevers, chills, chest pain or shortness of breath including tachypnea dyspnea or coughs, no abdominal pain, no new diarrhea (at baseline has diarrhea, including over past week possibly increased frequency but now close to baseline; last BM was yesterday morning) or constipation, no dysuria (but confusion and urinary urgency as aforementioned), no neurologic changes or sensory changes. The pt is Full code. Updated her daughter Veronica at-length and discussed with bedside RN too.  Discussed abx, podiatric consult and possibly vascular team too.      Past Medical History    No past medical history on file.    Past Surgical History   Past Surgical History:   Procedure Laterality Date    COLONOSCOPY N/A 12/30/2022    Procedure: COLONOSCOPY with argon plasma coagulation;  Surgeon: Steven Driscoll MD;  Location: [facility] OR    ESOPHAGOSCOPY, GASTROSCOPY, DUODENOSCOPY (EGD), COMBINED N/A 12/30/2022    Procedure: ESOPHAGOGASTRODUODENOSCOPY (EGD) with argon plasma coagulation;  Surgeon: Steven Driscoll MD;  Location: [facility] OR       Prior to Admission Medications   Prior to Admission  Medications   Prescriptions Last Dose Informant Patient Reported? Taking?   Ferrous Sulfate 324 (65 Fe) MG TBEC 5/29/2024 at AM  Yes Yes   Sig: Take 1 tablet by mouth daily   acetaminophen (TYLENOL) 500 MG tablet 5/29/2024 at AM; 1 g (ED admin 1335, 1 g)  Yes Yes   Sig: Take 1,000 mg by mouth every 6 hours as needed for mild pain   apixaban ANTICOAGULANT (ELIQUIS ANTICOAGULANT) 2.5 MG tablet 5/29/2024 at AM; 1 of 2  No Yes   Sig: Take 1 tablet (2.5 mg) by mouth 2 times daily   atorvastatin (LIPITOR) 20 MG tablet 5/28/2024 at HS  No Yes   Sig: TAKE 1 TABLET BY MOUTH EVERYDAY AT BEDTIME   cholecalciferol, vitamin D3, (VITAMIN D3) 2,000 unit Tab 5/29/2024 at AM  Yes Yes   Sig: [CHOLECALCIFEROL, VITAMIN D3, (VITAMIN D3) 2,000 UNIT TAB] Take 1 tablet by mouth daily.   diltiazem ER COATED BEADS (CARDIZEM CD/CARTIA XT) 240 MG 24 hr capsule 5/28/2024 at PM  No Yes   Sig: Take 1 capsule (240 mg) by mouth daily   fish oil-omega-3 fatty acids 500 MG capsule 5/29/2024 at AM  Yes Yes   Sig: Take 1 capsule by mouth daily   furosemide (LASIX) 20 MG tablet Past Week at few days ago  No Yes   Sig: TAKE 2 TABLETS BY MOUTH EVERY DAY   losartan (COZAAR) 25 MG tablet 5/28/2024 at PM  No Yes   Sig: TAKE 1 TABLET BY MOUTH EVERY DAY   pantoprazole (PROTONIX) 40 MG EC tablet 5/29/2024 at AM; 1 of 2  No Yes   Sig: TAKE 1 TABLET BY MOUTH TWICE A DAY   sertraline (ZOLOFT) 100 MG tablet 5/28/2024 at PM  No Yes   Sig: Take 1 tablet (100 mg) by mouth daily      Facility-Administered Medications: None        Review of Systems    The 10 point Review of Systems is negative other than noted in the HPI or here.      Social History   I have reviewed this patient's social history and updated it with pertinent information if needed.  Social History     Tobacco Use    Smoking status: Former     Passive exposure: Past    Smokeless tobacco: Never   Vaping Use    Vaping status: Never Used         Allergies   Allergies   Allergen Reactions    Blood  Transfusion Related (Informational Only) Other (See Comments)     Patient has a history of a clinically significant antibody against RBC antigens.  A delay in compatible RBCs may occur. 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    Hydrocodone-Acetaminophen Unknown        Physical Exam   Vital Signs: Temp: 97.4  F (36.3  C) Temp src: Oral BP: (!) 181/81 Pulse: 94   Resp: 16 SpO2: 97 %      Weight: 132 lbs 0 oz      GENERAL:  Alert, appears comfortable, in no acute distress, appears stated age   HEAD:  Normocephalic, without obvious abnormality, atraumatic   EYES:  PERRL, conjunctiva/corneas clear, no scleral icterus, EOM's intact   NOSE: Nares normal, septum midline, mucosa normal, no drainage   THROAT: Lips, mucosa, and tongue normal; teeth and gums normal, mouth moist   NECK: Supple, symmetrical, trachea midline   BACK:   Symmetric, no curvature   LUNGS:   Clear to auscultation bilaterally, no rales, rhonchi, or wheezing, symmetric chest rise on inhalation, respirations unlabored, on room air    CHEST WALL:  No tenderness or conspicuous deformity   HEART:  Regular rate and rhythm, S1 and S2 normal, no murmur, rub, or gallop, no conspicuous jugular venous distention noted, peripheral pulses intact    ABDOMEN:   Soft, non-tender, bowel sounds auscultated in all four quadrants, no masses, no organomegaly, no rebound or guarding   EXTREMITIES: Right 5th toe with scattered ecchymoses as well as dusky appearance distally and with preserved sensation and was dopplerable pulses by ER physician; not tender peripherally outside of the areas with skin changes; no active drainage not but is indeed fluctuant on exam; other Extremities normal, atraumatic, no cyanosis or edema elsewhere   SKIN: Dry to touch, no exanthems in the visualized areas or erythema   NEURO: Alert, oriented x3 on admission exam (noted to be confused earlier prior to my conversation), moves all four extremities of their own volition, strength is 5/5 in 4 limbs    PSYCH: Cooperative  and behavior is appropriate          Medical Decision Making       85 MINUTES SPENT BY ME on the date of service doing chart review, history, exam, documentation & further activities per the note.      Data   ------------------------- PAST 24 HR DATA REVIEWED -----------------------------------------------    I have personally reviewed the following data over the past 24 hrs:    9.1  \   12.9   / 240     141 106 12.9 /  113 (H)   3.9 24 1.09 (H) \     ALT: <5 AST: 12 AP: 114 TBILI: 0.9   ALB: 4.1 TOT PROTEIN: 6.3 (L) LIPASE: N/A     Procal: N/A CRP: <3.00 Lactic Acid: N/A         Imaging results reviewed over the past 24 hrs:   Recent Results (from the past 24 hour(s))   US Lower Extremity Arterial Duplex Bilateral    Narrative    EXAM: US LOWER EXTREMITY ARTERIAL DUPLEX BILATERAL  LOCATION: [facility]  DATE: 5/29/2024    INDICATION: Discoloration and pain to right fifth digit.  COMPARISON: 7/21/2023.  TECHNIQUE: Duplex utilizing 2D gray-scale imaging, Doppler interrogation with color-flow and spectral waveform analysis.    FINDINGS: Patent arteries throughout the right and left lower extremities. In the right lower extremity, waveforms become monophasic in the mid SFA, suggesting stenosis that is not well visualized. In the left lower extremities, waveforms are multiphasic   throughout.    RIGHT LOWER EXTREMITY ARTERIAL ASSESSMENT:  External iliac artery 181 cm/s  Common femoral artery: 167 cm/s  Profunda femoris artery: 148 cm/s  SFA (proximal): 146 cm/s  SFA (mid): 136 cm/s  SFA (distal): 107 cm/s  Popliteal artery:  cm/s  Posterior tibial artery: 104 cm/s  Anterior tibial artery: 50 cm/s  Dorsalis pedis artery: 90 cm/s    LEFT LOWER EXTREMITY ARTERIAL ASSESSMENT:  External iliac artery 207 cm/s  Common femoral artery: 158 cm/s  Profunda femoris artery: 180 cm/s  SFA (proximal): 144 cm/s  SFA (mid): 192 cm/s  SFA (distal): 161 cm/s  Popliteal artery:  cm/s  Posterior tibial  artery: 55 cm/s  Anterior tibial artery: 33 cm/s  Dorsalis pedis artery: 51 cm/s      Impression    IMPRESSION:  1.  Right lower extremity: Patent arteries throughout the right lower extremity. However, waveforms become monophasic at the level of the mid SFA, suggesting stenosis.  2.  Left lower extremity: Patent arteries throughout the left lower extremity. Waveforms are multiphasic throughout.   US LINDA with PPG wo Exercise    Narrative    EXAM: RESTING ANKLE-BRACHIAL INDICES (ABIs)  LOCATION: [facility]  DATE: 5/29/2024    INDICATION: PAD  COMPARISON: 7/21/2023.    LINDA FINDINGS:  RIGHT  Brachial: 215  Ankle (PT): Noncompressible  Ankle (DP): 179 Index: 0.83  Digit: 100 Index: 0.47    LEFT  Brachial: 183  Ankle (PT): Noncompressible  Ankle (DP): Noncompressible  Digit: 161 Index: 0.75    The right LINDA at rest is 0.83. The left LINDA at rest is 0.75.        Impression    IMPRESSION:  1.  RIGHT LOWER EXTREMITY: LINDA at rest shows moderate arterial insufficiency.  2.  LEFT LOWER EXTREMITY: LINDA at rest shows moderate arterial insufficiency   3.  Dampened digital waveforms bilaterally.

## 2024-05-29 NOTE — PHARMACY-VANCOMYCIN DOSING SERVICE
Pharmacy Vancomycin Initial Note  Date of Service May 29, 2024  Patient's  1935  88 year old, female    Indication: Skin and Soft Tissue Infection    Current estimated CrCl = Estimated Creatinine Clearance: 35 mL/min (A) (based on SCr of 1.09 mg/dL (H)).    Creatinine for last 3 days  2024: 11:53 AM Creatinine 1.09 mg/dL    Recent Vancomycin Level(s) for last 3 days  No results found for requested labs within last 3 days.      Vancomycin IV Administrations (past 72 hours)        No vancomycin orders with administrations in past 72 hours.                    Nephrotoxins and other renal medications (From now, onward)      Start     Dose/Rate Route Frequency Ordered Stop    24 1500  vancomycin (VANCOCIN) 1,250 mg in 0.9% NaCl 250 mL intermittent infusion         1,250 mg  over 90 Minutes Intravenous ONCE 24 1357      24 1400  piperacillin-tazobactam (ZOSYN) 3.375 g vial to attach to  mL bag         3.375 g  over 30 Minutes Intravenous ONCE 24 1348              Contrast Orders - past 72 hours (72h ago, onward)      None                  Plan:  Start vancomycin  1250 mg IV once  Please re-consult pharmacy if to continue inpatient    Petty Todd RPH

## 2024-05-29 NOTE — PHARMACY-VANCOMYCIN DOSING SERVICE
"Pharmacy Vancomycin Initial Note  Date of Service May 29, 2024  Patient's  1935  88 year old, female    Indication: Abscess, Skin and Soft Tissue Infection, and Urinary Tract Infection    Current estimated CrCl = Estimated Creatinine Clearance: 35 mL/min (A) (based on SCr of 1.09 mg/dL (H)).    Creatinine for last 3 days  2024: 11:53 AM Creatinine 1.09 mg/dL    Recent Vancomycin Level(s) for last 3 days  No results found for requested labs within last 3 days.      Vancomycin IV Administrations (past 72 hours)        No vancomycin orders with administrations in past 72 hours.                    Nephrotoxins and other renal medications (From now, onward)      Start     Dose/Rate Route Frequency Ordered Stop    24 2100  piperacillin-tazobactam (ZOSYN) 3.375 g vial to attach to  mL bag        Note to Pharmacy: For [facility], [facility] and [facility]: For Zosyn-naive patients, use the \"Zosyn initial dose + extended infusion\" order panel.    3.375 g  over 240 Minutes Intravenous EVERY 8 HOURS 24 1541      24 1500  vancomycin (VANCOCIN) 1,250 mg in 0.9% NaCl 250 mL intermittent infusion         1,250 mg  over 90 Minutes Intravenous ONCE 24 1357              Contrast Orders - past 72 hours (72h ago, onward)      None            [employer] Prediction of Planned Initial Vancomycin Regimen  Loading dose: 1,250 mg IV x 1  @   Regimen: 1000 mg IV every 24 hours.  Start time: 16:47 on 2024  Exposure target: AUC24 (range)400-600 mg/L.hr   AUC24,ss: 535 mg/L.hr  Probability of AUC24 > 400: 82 %  Ctrough,ss: 17 mg/L  Probability of Ctrough,ss > 20: 32 %  Probability of nephrotoxicity (Lodise CASIE ): 13 %          Plan:  Start vancomycin 1,000 mg IV q24h.   Vancomycin monitoring method: AUC  Vancomycin therapeutic monitoring goal: 400-600 mg*h/L  Pharmacy will check vancomycin levels as appropriate in 1-3 Days.    Serum creatinine levels will be ordered daily for the first week of therapy and at " least twice weekly for subsequent weeks.      Thank you for the consult.  Piper Mcgarry, PharmD, BCPS

## 2024-05-29 NOTE — MEDICATION SCRIBE - ADMISSION MEDICATION HISTORY
Medication Scribe Admission Medication History    Admission medication history is complete. The information provided in this note is only as accurate as the sources available at the time of the update.    Information Source(s): Patient, Family member, and CareEverywhere/SureScripts via in-person    Pertinent Information: Daughter in room to assist with med hx. Patient very hard of hearing and does not have hearing aids with today. Patient mildly confused during interview. Did take apixaban this morning. Has had 2 g of acetaminophen in last 24 hours. Last dose was ED admin at 1335.     Changes made to PTA medication list:  Added:   Ferrous sulfate 324 mg DR  Deleted: None  Changed:   APAP 325 mg --> 500 mg    Allergies reviewed with patient and updates made in EHR: yes    Medication History Completed By: Cameron Haley 5/29/2024 3:36 PM    PTA Med List   Medication Sig Last Dose    acetaminophen (TYLENOL) 500 MG tablet Take 1,000 mg by mouth every 6 hours as needed for mild pain 5/29/2024 at AM; 1 g (ED admin 1335, 1 g)    apixaban ANTICOAGULANT (ELIQUIS ANTICOAGULANT) 2.5 MG tablet Take 1 tablet (2.5 mg) by mouth 2 times daily 5/29/2024 at AM; 1 of 2    atorvastatin (LIPITOR) 20 MG tablet TAKE 1 TABLET BY MOUTH EVERYDAY AT BEDTIME 5/28/2024 at HS    cholecalciferol, vitamin D3, (VITAMIN D3) 2,000 unit Tab [CHOLECALCIFEROL, VITAMIN D3, (VITAMIN D3) 2,000 UNIT TAB] Take 1 tablet by mouth daily. 5/29/2024 at AM    diltiazem ER COATED BEADS (CARDIZEM CD/CARTIA XT) 240 MG 24 hr capsule Take 1 capsule (240 mg) by mouth daily 5/28/2024 at PM    Ferrous Sulfate 324 (65 Fe) MG TBEC Take 1 tablet by mouth daily 5/29/2024 at AM    fish oil-omega-3 fatty acids 500 MG capsule Take 1 capsule by mouth daily 5/29/2024 at AM    furosemide (LASIX) 20 MG tablet TAKE 2 TABLETS BY MOUTH EVERY DAY Past Week at few days ago    losartan (COZAAR) 25 MG tablet TAKE 1 TABLET BY MOUTH EVERY DAY 5/28/2024 at PM    pantoprazole (PROTONIX) 40  MG EC tablet TAKE 1 TABLET BY MOUTH TWICE A DAY 5/29/2024 at AM; 1 of 2    sertraline (ZOLOFT) 100 MG tablet Take 1 tablet (100 mg) by mouth daily 5/28/2024 at PM

## 2024-05-29 NOTE — ED TRIAGE NOTES
"Right little toe discolored and bruised.  Pt does not recall injury.  Her memory has been declining since the tie problem.  Family noticed the toe was painful around Mothers Day.  Per daughter, pt has just been \"off\" lately.     Triage Assessment (Adult)       Row Name 05/29/24 1016          Triage Assessment    Airway WDL WDL        Respiratory WDL    Respiratory WDL WDL        Skin Circulation/Temperature WDL    Skin Circulation/Temperature WDL WDL        Cardiac WDL    Cardiac WDL WDL        Peripheral/Neurovascular WDL    Peripheral Neurovascular WDL WDL        Cognitive/Neuro/Behavioral WDL    Cognitive/Neuro/Behavioral WDL WDL                     "

## 2024-05-29 NOTE — ED NOTES
Expected Patient Referral to ED  9:54 AM    Referring Clinic/Provider:  Urgent Care    Reason for referral/Clinical facts:  Pain in 5th toe, little ashen and black.  Poor historian.  May have started a couple weeks ago?  No trauma.  Xray 3 days ago at [facility] negative.  Little more confused than normal.    Recommendations provided:  Send to ED for further evaluation    Caller was informed that this institution does possess the capabilities and/or resources to provide for patient and should be transferred to our facility.    Discussed that if direct admit is sought and any hurdles are encountered, this ED would be happy to see the patient and evaluate.    Informed caller that recommendations provided are recommendations based only on the facts provided and that they responsible to accept or reject the advice, or to seek a formal in person consultation as needed and that this ED will see/treat patient should they arrive.      Anel Wright DO  [facility] EMERGENCY ROOM  [address] [id number]  [phone]       Anel Wright DO  05/29/24 0955

## 2024-05-29 NOTE — ED PROVIDER NOTES
EMERGENCY DEPARTMENT ENCOUNTER      NAME: Marva Gaines  AGE: 88 year old female  YOB: 1935  MRN: [id number]  EVALUATION DATE & TIME: No admission date for patient encounter.    PCP: Sabas Ausitn    ED PROVIDER: Susan Baron MD      Chief Complaint   Patient presents with    Toe Pain         FINAL IMPRESSION:  1. Cellulitis of fifth toe of right foot    2. Acute UTI          ED COURSE & MEDICAL DECISION MAKING:    Pertinent Labs & Imaging studies reviewed. (See chart for details)    11:09 AM I introduced myself to the patient, obtained patient history, performed a physical exam, and discussed plan for ED workup including potential diagnostic laboratory/imaging studies and interventions.  1:58 PM Rechecked and updated the patient.   2:56 PM I spoke with Dr. Joseph, hospitalist, who agrees for hospitalization.     88 year old female with history of a fib on eliquis, HTN, hyperlipidemia, CAD, CHF presents to the Emergency Department for evaluation of right fifth toe pain and color changes.  Patient's symptoms have been reportedly going on at least 2 weeks potentially longer.  Daughter reports 2 weeks ago is when she burned of this.  It has been progressively worsening and the patient has been having pain.  As discussed below, she went to [facility] urgent care 3 days ago and had an x-ray that was unremarkable and was given a postop shoe.  She then went to [facility] urgent care clinic today and they sent her here for further evaluation.  On examination I am concerned for the potential of infection as it almost appears that there is a area of purulence just under the skin to the distal pulp of the fifth toe on the right.  She does have extending redness.  It is slightly dusky looking however she has palpable pedal pulses bilaterally as well as good Doppler pulses of both the right PT and DP.  Relatively good capillary refill in the right fifth toe most overall significant ischemia felt to be less likely.   She certainly does not have any critical limb ischemia of the larger arterial vessels.  Did have concern for possible osteomyelitis, abscess, cellulitis, necrotizing fasciitis, etc.  Felt that neck Fash may be less likely as it has been ongoing for 2 weeks.  Could have microwaved ischemia in the smaller vessels that could be causing gangrene as well.  Otherwise remainder of the toes are well-perfused.  She is neurovascularly intact.  Very tender to palpation of the right fifth toe.  Laboratory studies obtained.  I did speak with Dr. Peres with podiatry who reviewed the pictures and her laboratory studies.  He recommended MRI with and without of the right foot and also he ordered ABIs as well to further evaluate.  He was in agreement that she requires admission and they would be happy to see the patient and continue further workup.  He recommended despite her relatively unrevealing labs to start broad-spectrum IV antibiotics with Zosyn and vancomycin.  Patient was also given 1000 g of oral Tylenol here for pain.  She does also have a UTI with urine culture sent.  Interestingly her white blood cell count is 9.1 with CRP of less than 3 and a sed rate of 9.  He was in agreement with admission.  Patient and her daughter are in agreement with this plan as well.  MRI and ABIs pending at this point.  Spoke with the hospitalist who accepted the patient.  Patient was admitted in stable condition.         At the conclusion of the encounter I discussed the results of all of the tests and the disposition. The questions were answered. The patient or family acknowledged understanding and was agreeable with the care plan.       Medical Decision Making  Obtained supplemental history:Supplemental history obtained?: Documented in chart and Family Member/Significant Other  Reviewed external records: External records reviewed?: Documented in chart  Care impacted by chronic illness:Anticoagulated State, Heart Disease, Hyperlipidemia,  and Hypertension  Care significantly affected by social determinants of health:N/A  Did you consider but not order tests?: Work up considered but not performed and documented in chart, if applicable  Did you interpret images independently?: Independent interpretation of ECG and images noted in documentation, when applicable.  Consultation discussion with other provider:Did you involve another provider (consultant, , pharmacy, etc.)?: I discussed the care with another health care provider, see documentation for details.  Admit.      MEDICATIONS GIVEN IN THE EMERGENCY:  Medications   acetaminophen (TYLENOL) tablet 650 mg (650 mg Oral $Given 5/31/24 1820)   lidocaine 1 % 0.1-1 mL ( Other Unhold 5/30/24 1925)   sodium chloride (PF) 0.9% PF flush 3 mL (3 mLs Intracatheter $Given 6/1/24 0850)   sodium chloride (PF) 0.9% PF flush 3 mL ( Intracatheter Unhold 5/30/24 1925)   calcium carbonate (TUMS) chewable tablet 1,000 mg ( Oral Unhold 5/30/24 1925)   benzocaine-menthol (CEPACOL) 15-3.6 MG lozenge 1 lozenge ( Buccal Unhold 5/30/24 1925)   artificial saliva (BIOTENE MT) solution 1 spray (1 spray Mouth/Throat $Given 6/1/24 0850)   atorvastatin (LIPITOR) tablet 20 mg (20 mg Oral $Given 5/31/24 2212)   diltiazem ER COATED BEADS (CARDIZEM CD/CARTIA XT) 24 hr capsule 240 mg (240 mg Oral $Given 5/31/24 2030)   pantoprazole (PROTONIX) EC tablet 40 mg (40 mg Oral $Given 6/1/24 0850)   sertraline (ZOLOFT) tablet 100 mg (100 mg Oral $Given 5/31/24 2030)   furosemide (LASIX) tablet 40 mg (40 mg Oral $Given 6/1/24 0849)   losartan (COZAAR) tablet 25 mg (25 mg Oral $Given 5/31/24 2030)   hydrALAZINE (APRESOLINE) injection 10 mg ( Intravenous Unhold 5/30/24 1925)   naloxone (NARCAN) injection 0.2 mg ( Intravenous Unhold 5/30/24 1925)     Or   naloxone (NARCAN) injection 0.4 mg ( Intravenous Unhold 5/30/24 1925)     Or   naloxone (NARCAN) injection 0.2 mg ( Intramuscular Unhold 5/30/24 1925)     Or   naloxone (NARCAN) injection 0.4 mg (  Intramuscular Unhold 5/30/24 1925)   lidocaine (LMX4) cream (has no administration in time range)   sodium chloride (PF) 0.9% PF flush 3 mL (has no administration in time range)   ondansetron (ZOFRAN ODT) ODT tab 4 mg (has no administration in time range)     Or   ondansetron (ZOFRAN) injection 4 mg (has no administration in time range)   prochlorperazine (COMPAZINE) injection 5 mg (has no administration in time range)     Or   prochlorperazine (COMPAZINE) tablet 5 mg (has no administration in time range)   senna-docusate (SENOKOT-S/PERICOLACE) 8.6-50 MG per tablet 1 tablet (1 tablet Oral Not Given 5/31/24 2037)   polyethylene glycol (MIRALAX) Packet 17 g (17 g Oral Not Given 5/31/24 0732)   magnesium hydroxide (MILK OF MAGNESIA) suspension 30 mL (has no administration in time range)   bisacodyl (DULCOLAX) suppository 10 mg (has no administration in time range)   oxyCODONE IR (ROXICODONE) half-tab 2.5 mg (2.5 mg Oral $Given 6/1/24 0858)     Or   oxyCODONE (ROXICODONE) tablet 5 mg ( Oral See Alternative 6/1/24 0858)   gabapentin (NEURONTIN) capsule 100 mg (100 mg Oral $Given 5/31/24 2212)   apixaban ANTICOAGULANT (ELIQUIS) tablet 2.5 mg (2.5 mg Oral $Given 6/1/24 0849)   amoxicillin-clavulanate (AUGMENTIN) 500-125 MG per tablet 1 tablet (1 tablet Oral $Given 6/1/24 0849)   sodium chloride 0.9 % infusion ( Intravenous $New Bag 6/1/24 0916)   acetaminophen (TYLENOL) tablet 1,000 mg (1,000 mg Oral $Given 5/29/24 1335)   piperacillin-tazobactam (ZOSYN) 3.375 g vial to attach to  mL bag (0 g Intravenous Stopped 5/29/24 1627)   vancomycin (VANCOCIN) 1,250 mg in 0.9% NaCl 250 mL intermittent infusion (0 mg Intravenous Stopped 5/29/24 1930)   gadobutrol (GADAVIST) injection 6 mL (6 mLs Intravenous $Given 5/29/24 1818)   ceFAZolin Sodium (ANCEF) injection 2 g (2 g Intravenous $Given 5/30/24 1830)   potassium chloride stacey ER (KLOR-CON M20) CR tablet 20 mEq (20 mEq Oral $Given 5/31/24 1041)   potassium chloride stacey ER  (KLOR-CON M20) CR tablet 20 mEq (20 mEq Oral $Given 5/31/24 1539)   potassium chloride stacey ER (KLOR-CON M20) CR tablet 20 mEq (20 mEq Oral $Given 5/31/24 2309)       NEW PRESCRIPTIONS STARTED AT TODAY'S ER VISIT  Current Discharge Medication List             =================================================================    HPI    Patient information was obtained from: Patient and daughter        Marva Gaines is a 88 year old female with a pertinent history of hypercholesterolemia, hypertension, permanent A-fib, coronary artery disease, CHF, and hyperglycemia who presents to this ED for evaluation of right fifth toe pain.     Daughter reports that the patient alerted family of the appearance of her right great toe which was red and almost bruised appearing and very painful about 2 weeks ago.  It has progressed since that time per daughter.  The patient does admit it may have been there for longer than 2 weeks.  As discussed below, they went to [facility] urgent care and was seen on the 26th and they gave her a postop shoe with a normal x-ray and there was question of whether this could have potentially been traumatic.  However patient denies any known trauma to the area.  She is on Eliquis.  States the pain has continued and thus they presented to urgent care today and then were sent here for further evaluation.  Patient denies any known history of diabetes.  Denies any known history of any peripheral artery disease.  Has not taken any Tylenol since much earlier this morning.  Daughter is also concerned about a possible UTI as the patient has been somewhat more repetitive and slightly confused over the past week or so.  No known falls.  No known fevers.  Patient denies any pain up further into the right foot or right leg.  Denies any numbness or weakness.  No chest pain, shortness of breath, cough, abdominal pain, vomiting, or other complaints.    Per chart review,  Patient visited [facility]  [facility] on 5/29/2024 for pain on her fifth toe on her right foot. Daughter thinks pain started approximately 2 weeks ago although does not sounds like there was any precipitating injury. Daughter mentions patient is more confused and repetitive. Presented with ashen/darker colored fifth toe on right foot. Pulses intact. Possible osteomyelitis or thrombosis. Patient may need imaging of head or stroke workup although on Eliquis for A-fib, decreasing likelihood of ischemic stroke type etiology. Called to [facility] ED and gave report to ED physician. Saw [facility] on 5/26 and xray unremarkable and discharged with post op shoe.       REVIEW OF SYSTEMS   Pertinent positives and negatives are documented in the HPI. All other systems reviewed and are negative.      PAST MEDICAL HISTORY:  History reviewed. No pertinent past medical history.    PAST SURGICAL HISTORY:  Past Surgical History:   Procedure Laterality Date    AMPUTATE TOE(S) Right 5/30/2024    Procedure: AMPUTATION, FIFTH DIGIT RIGHT FOOT;  Surgeon: Henry Peres DPM;  Location: [facility] OR    COLONOSCOPY N/A 12/30/2022    Procedure: COLONOSCOPY with argon plasma coagulation;  Surgeon: Steven Driscoll MD;  Location: [facility] OR    ESOPHAGOSCOPY, GASTROSCOPY, DUODENOSCOPY (EGD), COMBINED N/A 12/30/2022    Procedure: ESOPHAGOGASTRODUODENOSCOPY (EGD) with argon plasma coagulation;  Surgeon: Steven Driscoll MD;  Location: [facility] Main OR           CURRENT MEDICATIONS:    No current outpatient medications on file.      ALLERGIES:  Allergies   Allergen Reactions    Blood Transfusion Related (Informational Only) Other (See Comments)     Patient has a history of a clinically significant antibody against RBC antigens.  A delay in compatible RBCs may occur. 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    Hydrocodone-Acetaminophen Unknown       FAMILY HISTORY:  History reviewed. No pertinent family history.    SOCIAL HISTORY:   Social History     Socioeconomic History     "Marital status: Single   Tobacco Use    Smoking status: Former     Passive exposure: Past    Smokeless tobacco: Never   Vaping Use    Vaping status: Never Used     Social Determinants of Health     Interpersonal Safety: Low Risk  (1/11/2024)    Interpersonal Safety     Do you feel physically and emotionally safe where you currently live?: Yes     Within the past 12 months, have you been hit, slapped, kicked or otherwise physically hurt by someone?: No     Within the past 12 months, have you been humiliated or emotionally abused in other ways by your partner or ex-partner?: No       VITALS:  BP (!) 140/65 (BP Location: Left arm)   Pulse 83   Temp 98.5  F (36.9  C) (Oral)   Resp 14   Ht 1.575 m (5' 2\")   Wt 59.9 kg (132 lb)   LMP  (LMP Unknown)   SpO2 98%   BMI 24.14 kg/m      PHYSICAL EXAM    Physical Exam  Constitutional: Well developed, Well nourished, NAD  HENT: Normocephalic, Atraumatic, Bilateral external ears normal, Oropharynx normal, mucous membranes moist, Nose normal. Neck-  Normal range of motion, No tenderness, Supple, No stridor.    Eyes: PERRL, EOMI, Conjunctiva normal, No discharge.   Respiratory: Normal breath sounds, No respiratory distress, No wheezing or crackles, Speaks in full sentences easily.    Cardiovascular: Normal heart rate, Regular rhythm, No murmurs, No rubs, No gallops. 2+ radial pulses bilaterally  GI: Bowel sounds normal, Soft, No tenderness, No masses, No rebound or guarding.   Musculoskeletal: 2+ DP and 1+ PT pulses bilaterally, 2-3 second capillary refill in right fifth toe and less than 2 second in remainder of right toes.  Positive Doppler signal in both the right PT and DP.  She also has no notable lower extremity edema. See photos below. Significant tenderness over right fifth toe. Still has range of motion intact.   Integument: Warm, Dry, see photos of right foot and 5th toe below.   Neurologic: Alert & oriented x 3, 5/5 strength in all 4 extremities bilaterally. " Sensation intact to light touch in all 4 extremities and the face bilaterally. No focal deficits noted.   Psychiatric: Affect normal, Judgment normal, Mood normal. Cooperative.                    LAB:  All pertinent labs reviewed and interpreted.  Labs Ordered and Resulted from Time of ED Arrival to Time of ED Departure   COMPREHENSIVE METABOLIC PANEL - Abnormal       Result Value    Sodium 141      Potassium 3.9      Carbon Dioxide (CO2) 24      Anion Gap 11      Urea Nitrogen 12.9      Creatinine 1.09 (*)     GFR Estimate 49 (*)     Calcium 9.8      Chloride 106      Glucose 113 (*)     Alkaline Phosphatase 114      AST 12      ALT <5      Protein Total 6.3 (*)     Albumin 4.1      Bilirubin Total 0.9     ROUTINE UA WITH MICROSCOPIC REFLEX TO CULTURE - Abnormal    Color Urine Yellow      Appearance Urine Turbid (*)     Glucose Urine Negative      Bilirubin Urine Negative      Ketones Urine Negative      Specific Gravity Urine 1.019      Blood Urine 0.03 mg/dL (*)     pH Urine 5.5      Protein Albumin Urine 30 (*)     Urobilinogen Urine <2.0      Nitrite Urine Positive (*)     Leukocyte Esterase Urine 500 Carmen/uL (*)     Bacteria Urine Few (*)     WBC Clumps Urine Present (*)     Mucus Urine Present (*)     RBC Urine 4 (*)     WBC Urine 178 (*)     Squamous Epithelials Urine 5 (*)    CBC WITH PLATELETS AND DIFFERENTIAL - Abnormal    WBC Count 9.1      RBC Count 4.64      Hemoglobin 12.9      Hematocrit 40.6      MCV 88      MCH 27.8      MCHC 31.8      RDW 15.1 (*)     Platelet Count 240      % Neutrophils 83      % Lymphocytes 8      % Monocytes 7      % Eosinophils 1      % Basophils 1      % Immature Granulocytes 0      NRBCs per 100 WBC 0      Absolute Neutrophils 7.5      Absolute Lymphocytes 0.7 (*)     Absolute Monocytes 0.7      Absolute Eosinophils 0.1      Absolute Basophils 0.1      Absolute Immature Granulocytes 0.0      Absolute NRBCs 0.0     CRP INFLAMMATION - Normal    CRP Inflammation <3.00      ERYTHROCYTE SEDIMENTATION RATE AUTO - Normal    Erythrocyte Sedimentation Rate 9     INFLUENZA A/B, RSV, & SARS-COV2 PCR - Normal    Influenza A PCR Negative      Influenza B PCR Negative      RSV PCR Negative      SARS CoV2 PCR Negative           RADIOLOGY:  Reviewed all pertinent imaging. Please see official radiology report.  MR Foot Right w/o & w Contrast   US LINDA with PPG wo Exercise   US Lower Extremity Arterial Duplex Bilateral       PROCEDURES:   None      [facility] Documentation:   CMS Diagnoses:               I, Emy Salinas, am serving as a scribe to document services personally performed by Susan Baron MD based on my observation and the provider's statements to me. I, Susan Baron MD, attest that Emy Salinas is acting in a scribe capacity, has observed my performance of the services and has documented them in accordance with my direction.    Susan Baron MD  [facility] EMERGENCY ROOM  [address] [address] [phone]     Susan Baron MD  06/01/24 121

## 2024-05-29 NOTE — PROGRESS NOTES
"  Assessment & Plan     Pain of fifth toe  Patient relatively poor historian.  Her daughter seems to think that she started having pain in the fifth toe on her right foot approximately 2 weeks ago although it does not sound like there was any precipitating injury.    Patient presenting with ashen/darker colored fifth toe on right foot.  I was able to check her dorsal pedis pulses with the Doppler today and pulses do appear intact.    The concern is possible osteomyelitis or thrombosis.  I called over to the [facility] ED and gave report to the ED physician    Altered mental status, unspecified altered mental status type  Daughter mentions that patient has been more confused and repetitive lately.  She is concerned about possible UTI.  Patient may need imaging of her head or stroke workup although she is on Eliquis for A-fib decreasing likelihood of ischemic stroke type etiology    Hypertension  Controlled on losartan    Permanent atrial fibrillation (H)  Rate controlled on diltiazem.  Anticoagulated with Eliquis.        Zenaida Dupont is a 88 year old, presenting for the following health issues:  Follow Up (Right 5th toe pain, red and swollen)      5/29/2024     9:32 AM   Additional Questions   Roomed by      HPI     Increased pain, color changes fifth toe right foot.  No precipitating injury.      Objective    /72 (BP Location: Right arm, Patient Position: Sitting, Cuff Size: Adult Regular)   Pulse 65   Temp 97.4  F (36.3  C) (Oral)   Resp 18   Ht 1.575 m (5' 2\")   Wt 59.9 kg (132 lb)   LMP  (LMP Unknown)   SpO2 100%   Breastfeeding No   BMI 24.14 kg/m    Body mass index is 24.14 kg/m .  Physical Exam   Fifth toe right foot ashen colored.  Cold to touch.  Very sensitive to palpation.              Signed Electronically by: Richie Reyes, CNP    "

## 2024-05-29 NOTE — ED TRIAGE NOTES
Pt went to [facility] and had xray but no fracture.  Was told she might have a circulation issue.  Pedal pulse was good at [facility]     Triage Assessment (Adult)       Row Name 05/29/24 1016          Triage Assessment    Airway WDL WDL        Respiratory WDL    Respiratory WDL WDL        Skin Circulation/Temperature WDL    Skin Circulation/Temperature WDL WDL        Cardiac WDL    Cardiac WDL WDL        Peripheral/Neurovascular WDL    Peripheral Neurovascular WDL WDL        Cognitive/Neuro/Behavioral WDL    Cognitive/Neuro/Behavioral WDL WDL

## 2024-05-30 ENCOUNTER — PREP FOR PROCEDURE (OUTPATIENT)
Dept: OTHER | Facility: CLINIC | Age: 89
End: 2024-05-30

## 2024-05-30 ENCOUNTER — ANESTHESIA EVENT (OUTPATIENT)
Dept: SURGERY | Facility: CLINIC | Age: 89
DRG: 503 | End: 2024-05-30
Payer: COMMERCIAL

## 2024-05-30 ENCOUNTER — ANESTHESIA (OUTPATIENT)
Dept: SURGERY | Facility: CLINIC | Age: 89
DRG: 503 | End: 2024-05-30
Payer: COMMERCIAL

## 2024-05-30 DIAGNOSIS — M86.171 ACUTE OSTEOMYELITIS OF TOE, RIGHT (H): Primary | ICD-10-CM

## 2024-05-30 LAB
ANION GAP SERPL CALCULATED.3IONS-SCNC: 13 MMOL/L (ref 7–15)
BACTERIA SPEC CULT: NORMAL
BACTERIA UR CULT: ABNORMAL
BUN SERPL-MCNC: 15.1 MG/DL (ref 8–23)
CALCIUM SERPL-MCNC: 10.1 MG/DL (ref 8.8–10.2)
CHLORIDE SERPL-SCNC: 105 MMOL/L (ref 98–107)
CREAT SERPL-MCNC: 1.12 MG/DL (ref 0.51–0.95)
DEPRECATED HCO3 PLAS-SCNC: 26 MMOL/L (ref 22–29)
EGFRCR SERPLBLD CKD-EPI 2021: 48 ML/MIN/1.73M2
ERYTHROCYTE [DISTWIDTH] IN BLOOD BY AUTOMATED COUNT: 15 % (ref 10–15)
GLUCOSE SERPL-MCNC: 107 MG/DL (ref 70–99)
GRAM STAIN RESULT: NORMAL
GRAM STAIN RESULT: NORMAL
HCT VFR BLD AUTO: 41.6 % (ref 35–47)
HGB BLD-MCNC: 13.4 G/DL (ref 11.7–15.7)
MCH RBC QN AUTO: 27.9 PG (ref 26.5–33)
MCHC RBC AUTO-ENTMCNC: 32.2 G/DL (ref 31.5–36.5)
MCV RBC AUTO: 87 FL (ref 78–100)
PLATELET # BLD AUTO: 237 10E3/UL (ref 150–450)
POTASSIUM SERPL-SCNC: 3.5 MMOL/L (ref 3.4–5.3)
RBC # BLD AUTO: 4.8 10E6/UL (ref 3.8–5.2)
SODIUM SERPL-SCNC: 144 MMOL/L (ref 135–145)
WBC # BLD AUTO: 9.3 10E3/UL (ref 4–11)

## 2024-05-30 PROCEDURE — 87075 CULTR BACTERIA EXCEPT BLOOD: CPT | Performed by: PODIATRIST

## 2024-05-30 PROCEDURE — 272N000001 HC OR GENERAL SUPPLY STERILE: Performed by: PODIATRIST

## 2024-05-30 PROCEDURE — 258N000001 HC RX 258: Performed by: PODIATRIST

## 2024-05-30 PROCEDURE — 370N000017 HC ANESTHESIA TECHNICAL FEE, PER MIN: Performed by: PODIATRIST

## 2024-05-30 PROCEDURE — 250N000013 HC RX MED GY IP 250 OP 250 PS 637: Performed by: PODIATRIST

## 2024-05-30 PROCEDURE — 88311 DECALCIFY TISSUE: CPT | Mod: TC | Performed by: PODIATRIST

## 2024-05-30 PROCEDURE — 360N000075 HC SURGERY LEVEL 2, PER MIN: Performed by: PODIATRIST

## 2024-05-30 PROCEDURE — 87205 SMEAR GRAM STAIN: CPT | Performed by: PODIATRIST

## 2024-05-30 PROCEDURE — 250N000011 HC RX IP 250 OP 636: Performed by: PODIATRIST

## 2024-05-30 PROCEDURE — 250N000013 HC RX MED GY IP 250 OP 250 PS 637: Performed by: STUDENT IN AN ORGANIZED HEALTH CARE EDUCATION/TRAINING PROGRAM

## 2024-05-30 PROCEDURE — 250N000011 HC RX IP 250 OP 636: Performed by: NURSE ANESTHETIST, CERTIFIED REGISTERED

## 2024-05-30 PROCEDURE — 250N000009 HC RX 250: Performed by: PODIATRIST

## 2024-05-30 PROCEDURE — 99233 SBSQ HOSP IP/OBS HIGH 50: CPT | Performed by: STUDENT IN AN ORGANIZED HEALTH CARE EDUCATION/TRAINING PROGRAM

## 2024-05-30 PROCEDURE — 87186 SC STD MICRODIL/AGAR DIL: CPT | Performed by: PODIATRIST

## 2024-05-30 PROCEDURE — 258N000003 HC RX IP 258 OP 636: Performed by: ANESTHESIOLOGY

## 2024-05-30 PROCEDURE — 28820 AMPUTATION OF TOE: CPT | Mod: T9 | Performed by: PODIATRIST

## 2024-05-30 PROCEDURE — 80048 BASIC METABOLIC PNL TOTAL CA: CPT | Performed by: STUDENT IN AN ORGANIZED HEALTH CARE EDUCATION/TRAINING PROGRAM

## 2024-05-30 PROCEDURE — 258N000003 HC RX IP 258 OP 636: Performed by: PODIATRIST

## 2024-05-30 PROCEDURE — 999N000141 HC STATISTIC PRE-PROCEDURE NURSING ASSESSMENT: Performed by: PODIATRIST

## 2024-05-30 PROCEDURE — 36415 COLL VENOUS BLD VENIPUNCTURE: CPT | Performed by: STUDENT IN AN ORGANIZED HEALTH CARE EDUCATION/TRAINING PROGRAM

## 2024-05-30 PROCEDURE — 250N000013 HC RX MED GY IP 250 OP 250 PS 637: Performed by: INTERNAL MEDICINE

## 2024-05-30 PROCEDURE — 99222 1ST HOSP IP/OBS MODERATE 55: CPT | Mod: 57 | Performed by: PODIATRIST

## 2024-05-30 PROCEDURE — 85027 COMPLETE CBC AUTOMATED: CPT | Performed by: STUDENT IN AN ORGANIZED HEALTH CARE EDUCATION/TRAINING PROGRAM

## 2024-05-30 PROCEDURE — 0Y6X0Z0 DETACHMENT AT RIGHT 5TH TOE, COMPLETE, OPEN APPROACH: ICD-10-PCS | Performed by: PODIATRIST

## 2024-05-30 PROCEDURE — 120N000001 HC R&B MED SURG/OB

## 2024-05-30 RX ORDER — BUPIVACAINE HYDROCHLORIDE 5 MG/ML
INJECTION, SOLUTION EPIDURAL; INTRACAUDAL
Status: DISCONTINUED
Start: 2024-05-30 | End: 2024-05-30 | Stop reason: HOSPADM

## 2024-05-30 RX ORDER — NALOXONE HYDROCHLORIDE 0.4 MG/ML
0.1 INJECTION, SOLUTION INTRAMUSCULAR; INTRAVENOUS; SUBCUTANEOUS
Status: CANCELLED | OUTPATIENT
Start: 2024-05-30

## 2024-05-30 RX ORDER — CEFAZOLIN SODIUM/WATER 2 G/20 ML
2 SYRINGE (ML) INTRAVENOUS SEE ADMIN INSTRUCTIONS
Status: DISCONTINUED | OUTPATIENT
Start: 2024-05-30 | End: 2024-05-31

## 2024-05-30 RX ORDER — PROCHLORPERAZINE MALEATE 5 MG
5 TABLET ORAL EVERY 6 HOURS PRN
Status: DISCONTINUED | OUTPATIENT
Start: 2024-05-30 | End: 2024-06-04 | Stop reason: HOSPADM

## 2024-05-30 RX ORDER — GABAPENTIN 100 MG/1
100 CAPSULE ORAL AT BEDTIME
Status: DISCONTINUED | OUTPATIENT
Start: 2024-05-30 | End: 2024-06-04 | Stop reason: HOSPADM

## 2024-05-30 RX ORDER — CEFAZOLIN SODIUM/WATER 2 G/20 ML
2 SYRINGE (ML) INTRAVENOUS
Status: COMPLETED | OUTPATIENT
Start: 2024-05-30 | End: 2024-05-30

## 2024-05-30 RX ORDER — SODIUM CHLORIDE, SODIUM LACTATE, POTASSIUM CHLORIDE, CALCIUM CHLORIDE 600; 310; 30; 20 MG/100ML; MG/100ML; MG/100ML; MG/100ML
INJECTION, SOLUTION INTRAVENOUS CONTINUOUS
Status: DISCONTINUED | OUTPATIENT
Start: 2024-05-30 | End: 2024-05-30

## 2024-05-30 RX ORDER — BUPIVACAINE HYDROCHLORIDE 5 MG/ML
INJECTION, SOLUTION PERINEURAL PRN
Status: DISCONTINUED | OUTPATIENT
Start: 2024-05-30 | End: 2024-05-30

## 2024-05-30 RX ORDER — PROPOFOL 10 MG/ML
INJECTION, EMULSION INTRAVENOUS PRN
Status: DISCONTINUED | OUTPATIENT
Start: 2024-05-30 | End: 2024-05-30

## 2024-05-30 RX ORDER — OXYCODONE HYDROCHLORIDE 5 MG/1
5 TABLET ORAL EVERY 4 HOURS PRN
Status: DISCONTINUED | OUTPATIENT
Start: 2024-05-30 | End: 2024-06-04 | Stop reason: HOSPADM

## 2024-05-30 RX ORDER — FENTANYL CITRATE 50 UG/ML
50 INJECTION, SOLUTION INTRAMUSCULAR; INTRAVENOUS EVERY 5 MIN PRN
Status: CANCELLED | OUTPATIENT
Start: 2024-05-30

## 2024-05-30 RX ORDER — ONDANSETRON 2 MG/ML
INJECTION INTRAMUSCULAR; INTRAVENOUS PRN
Status: DISCONTINUED | OUTPATIENT
Start: 2024-05-30 | End: 2024-05-30

## 2024-05-30 RX ORDER — ONDANSETRON 4 MG/1
4 TABLET, ORALLY DISINTEGRATING ORAL EVERY 30 MIN PRN
Status: CANCELLED | OUTPATIENT
Start: 2024-05-30

## 2024-05-30 RX ORDER — NALOXONE HYDROCHLORIDE 0.4 MG/ML
0.2 INJECTION, SOLUTION INTRAMUSCULAR; INTRAVENOUS; SUBCUTANEOUS
Status: DISCONTINUED | OUTPATIENT
Start: 2024-05-30 | End: 2024-06-04 | Stop reason: HOSPADM

## 2024-05-30 RX ORDER — SODIUM CHLORIDE, SODIUM LACTATE, POTASSIUM CHLORIDE, CALCIUM CHLORIDE 600; 310; 30; 20 MG/100ML; MG/100ML; MG/100ML; MG/100ML
INJECTION, SOLUTION INTRAVENOUS CONTINUOUS
Status: CANCELLED | OUTPATIENT
Start: 2024-05-30

## 2024-05-30 RX ORDER — LIDOCAINE 40 MG/G
CREAM TOPICAL
Status: DISCONTINUED | OUTPATIENT
Start: 2024-05-30 | End: 2024-06-04 | Stop reason: HOSPADM

## 2024-05-30 RX ORDER — PROPOFOL 10 MG/ML
INJECTION, EMULSION INTRAVENOUS CONTINUOUS PRN
Status: DISCONTINUED | OUTPATIENT
Start: 2024-05-30 | End: 2024-05-30

## 2024-05-30 RX ORDER — LIDOCAINE 40 MG/G
CREAM TOPICAL
Status: DISCONTINUED | OUTPATIENT
Start: 2024-05-30 | End: 2024-05-30

## 2024-05-30 RX ORDER — FENTANYL CITRATE 50 UG/ML
25 INJECTION, SOLUTION INTRAMUSCULAR; INTRAVENOUS EVERY 5 MIN PRN
Status: CANCELLED | OUTPATIENT
Start: 2024-05-30

## 2024-05-30 RX ORDER — NALOXONE HYDROCHLORIDE 0.4 MG/ML
0.4 INJECTION, SOLUTION INTRAMUSCULAR; INTRAVENOUS; SUBCUTANEOUS
Status: DISCONTINUED | OUTPATIENT
Start: 2024-05-30 | End: 2024-06-04 | Stop reason: HOSPADM

## 2024-05-30 RX ORDER — ONDANSETRON 2 MG/ML
4 INJECTION INTRAMUSCULAR; INTRAVENOUS EVERY 30 MIN PRN
Status: CANCELLED | OUTPATIENT
Start: 2024-05-30

## 2024-05-30 RX ORDER — ONDANSETRON 2 MG/ML
4 INJECTION INTRAMUSCULAR; INTRAVENOUS EVERY 6 HOURS PRN
Status: DISCONTINUED | OUTPATIENT
Start: 2024-05-30 | End: 2024-06-04 | Stop reason: HOSPADM

## 2024-05-30 RX ORDER — DEXAMETHASONE SODIUM PHOSPHATE 4 MG/ML
4 INJECTION, SOLUTION INTRA-ARTICULAR; INTRALESIONAL; INTRAMUSCULAR; INTRAVENOUS; SOFT TISSUE
Status: CANCELLED | OUTPATIENT
Start: 2024-05-30

## 2024-05-30 RX ORDER — MAGNESIUM HYDROXIDE 1200 MG/15ML
LIQUID ORAL PRN
Status: DISCONTINUED | OUTPATIENT
Start: 2024-05-30 | End: 2024-05-30

## 2024-05-30 RX ORDER — POLYETHYLENE GLYCOL 3350 17 G/17G
17 POWDER, FOR SOLUTION ORAL DAILY
Status: DISCONTINUED | OUTPATIENT
Start: 2024-05-31 | End: 2024-06-04 | Stop reason: HOSPADM

## 2024-05-30 RX ORDER — AMOXICILLIN 250 MG
1 CAPSULE ORAL 2 TIMES DAILY
Status: DISCONTINUED | OUTPATIENT
Start: 2024-05-30 | End: 2024-06-04 | Stop reason: HOSPADM

## 2024-05-30 RX ORDER — BISACODYL 10 MG
10 SUPPOSITORY, RECTAL RECTAL DAILY PRN
Status: DISCONTINUED | OUTPATIENT
Start: 2024-06-02 | End: 2024-06-04 | Stop reason: HOSPADM

## 2024-05-30 RX ORDER — ONDANSETRON 4 MG/1
4 TABLET, ORALLY DISINTEGRATING ORAL EVERY 6 HOURS PRN
Status: DISCONTINUED | OUTPATIENT
Start: 2024-05-30 | End: 2024-06-04 | Stop reason: HOSPADM

## 2024-05-30 RX ADMIN — ATORVASTATIN CALCIUM 20 MG: 10 TABLET, FILM COATED ORAL at 22:47

## 2024-05-30 RX ADMIN — PANTOPRAZOLE SODIUM 40 MG: 40 TABLET, DELAYED RELEASE ORAL at 20:14

## 2024-05-30 RX ADMIN — PROPOFOL 100 MCG/KG/MIN: 10 INJECTION, EMULSION INTRAVENOUS at 18:48

## 2024-05-30 RX ADMIN — SODIUM CHLORIDE, POTASSIUM CHLORIDE, SODIUM LACTATE AND CALCIUM CHLORIDE: 600; 310; 30; 20 INJECTION, SOLUTION INTRAVENOUS at 19:15

## 2024-05-30 RX ADMIN — DILTIAZEM HYDROCHLORIDE 240 MG: 120 CAPSULE, EXTENDED RELEASE ORAL at 20:14

## 2024-05-30 RX ADMIN — SERTRALINE 100 MG: 100 TABLET, FILM COATED ORAL at 20:14

## 2024-05-30 RX ADMIN — PANTOPRAZOLE SODIUM 40 MG: 40 TABLET, DELAYED RELEASE ORAL at 08:17

## 2024-05-30 RX ADMIN — PROPOFOL 50 MG: 10 INJECTION, EMULSION INTRAVENOUS at 18:48

## 2024-05-30 RX ADMIN — VANCOMYCIN HYDROCHLORIDE 1000 MG: 1 INJECTION, SOLUTION INTRAVENOUS at 19:46

## 2024-05-30 RX ADMIN — GABAPENTIN 100 MG: 100 CAPSULE ORAL at 22:47

## 2024-05-30 RX ADMIN — ONDANSETRON 4 MG: 2 INJECTION INTRAMUSCULAR; INTRAVENOUS at 18:55

## 2024-05-30 RX ADMIN — Medication 1 SPRAY: at 08:17

## 2024-05-30 RX ADMIN — PIPERACILLIN AND TAZOBACTAM 3.38 G: 3; .375 INJECTION, POWDER, FOR SOLUTION INTRAVENOUS at 22:47

## 2024-05-30 RX ADMIN — SODIUM CHLORIDE, POTASSIUM CHLORIDE, SODIUM LACTATE AND CALCIUM CHLORIDE: 600; 310; 30; 20 INJECTION, SOLUTION INTRAVENOUS at 16:52

## 2024-05-30 RX ADMIN — Medication 2 G: at 18:30

## 2024-05-30 RX ADMIN — VANCOMYCIN HYDROCHLORIDE 1000 MG: 1 INJECTION, SOLUTION INTRAVENOUS at 21:27

## 2024-05-30 RX ADMIN — FUROSEMIDE 40 MG: 40 TABLET ORAL at 08:17

## 2024-05-30 RX ADMIN — LOSARTAN POTASSIUM 25 MG: 25 TABLET, FILM COATED ORAL at 20:14

## 2024-05-30 ASSESSMENT — ACTIVITIES OF DAILY LIVING (ADL)
ADLS_ACUITY_SCORE: 39
ADLS_ACUITY_SCORE: 41
ADLS_ACUITY_SCORE: 58
ADLS_ACUITY_SCORE: 41
ADLS_ACUITY_SCORE: 58
ADLS_ACUITY_SCORE: 41
ADLS_ACUITY_SCORE: 41
ADLS_ACUITY_SCORE: 39
ADLS_ACUITY_SCORE: 41
ADLS_ACUITY_SCORE: 39
ADLS_ACUITY_SCORE: 41
ADLS_ACUITY_SCORE: 41
ADLS_ACUITY_SCORE: 58
ADLS_ACUITY_SCORE: 39
ADLS_ACUITY_SCORE: 41
ADLS_ACUITY_SCORE: 41
ADLS_ACUITY_SCORE: 39
ADLS_ACUITY_SCORE: 41
ADLS_ACUITY_SCORE: 39
ADLS_ACUITY_SCORE: 58
ADLS_ACUITY_SCORE: 41

## 2024-05-30 ASSESSMENT — ENCOUNTER SYMPTOMS: DYSRHYTHMIAS: 1

## 2024-05-30 NOTE — PROGRESS NOTES
[facility]    Medicine Progress Note - Hospitalist Service    Date of Admission:  5/29/2024    Assessment & Plan      Marva Gaines is a 88 year old female admitted on 5/29/2024. 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 Podiatry consulted. They will decide if also request vascular input. They discussed w/ ED team and broad empiric abx, zosyn and vancomycin (pharm dosing) which are continued on admission for both the cellulitis (and possible accompanying abscess) and UTI.    MRI with equivocal results, confirms cellulitis but questions early osteomyelitis; reviewed with pt and appreciate forthecoming/pending Podiatry consult.     ------  Cellulitis of 5th toe  Ashen appearing toe though with dopplerable pulses in ER  Concern for ischemic tissue but not critical limb ischemia  A- as above. Stable on admit though elevated pressures, may be from pain.   P:   - admit to hospital   - consult to podiatry, appreciate   - MRI per podiatry  -continue zosyn (q8 hours) and vancomycin (pharm dosing)  -pain and nausea control  - if planning for procedure, would do npo, for now not yet clear  - will need pt and ot and sw, likely consult on 5/30 after initial workup   - npo at midnight in case of procedure     UTI, and prior hx  A/p- consistent clinically and objectively with episodic confusion and urinary urgency and prior similar episodes  -abx as above  -- follow the in-process cultures, follow speciation of organism(s) and subsequently their sensitivities and resistance (S&R) and narrow antibiotics as appropriately      Ckd  A/p- hold losartan w/c for renal function; follow labs    afib on eliquis  A/p- given  the ecchymoses around the 5th toe and prior hx of bleed and also possibly needing intervention pending podiatric input, will hold the eliquis for now.   - ordered eliquis as HELD  - would continue diltiazem w/ hold parameters, pending med rec    Htn  A/p- elevated but pain would exacerbate, no headaches or vision changes  - med rec pending, would continue both diltiazem and losartan with hold parameters    Hld  A/p- hold fish oil, would continue statin; pending med rec    Anxiety  A/p- stable, would continue zoloft     HFpEF  A/p- appears slightly euvolemic vs a bit volume down but not floridly up so will hold lasix for now on admission     prior hospitalization for GI bleed in January 2023, duodenal AVM  Reflux  A/p- stable, would resume ppi              Diet: NPO per Anesthesia Guidelines for Procedure/Surgery Except for: Meds    DVT Prophylaxis: Pneumatic Compression Devices and given the ecchymoses around the 5th toe and prior hx of bleed and also possibly needing intervention pending podiatric input, hold the eliquis for now; pending Podiatry consult   Valadez Catheter: Not present  Lines: None     Cardiac Monitoring: None  Code Status: Full Code      Clinically Significant Risk Factors Present on Admission               # Drug Induced Coagulation Defect: home medication list includes an anticoagulant medication    # Hypertension: Noted on problem list                 Disposition Plan   Dispo- 1-3 days pending podiatry consult           Jimbo Joseph MD  Hospitalist Service  [facility]  Securely message with [employer] (more info)  Text page via [employer] Paging/Directory   ______________________________________________________________________    Interval History   NAEO  Pain is stable  Pt has no other sx or concerns  Reviewed MRI and discussed pending podiatry consult  Later discussed w/ RN; pt is npo in case of need for intervention but if no plans then would resume a diet for pt  Answered  all of pt's questions     Physical Exam   Vital Signs: Temp: 97.7  F (36.5  C) Temp src: Oral BP: 137/77 Pulse: 83   Resp: 18 SpO2: 95 % O2 Device: None (Room air)    Weight: 132 lbs 0 oz    General: alert, oriented, and in no acute distress  Pulmonary: clear to auscultation bilaterally, normal respiratory effort, on room air, no rales or wheezes or evidence of accessory muscle use  CVS: regular rate and rhythm, no murmurs, rubs, or gallops; no blatant jugular venous distention; no extremity edema and extremities are warm to the touch  GI: soft, nontender, BS+, no rebound or guarding, no conspicuous organomegaly   Neuro: nonfocal, moves all extremities of own volition, oriented  Msk- stable; Right 5th toe with scattered ecchymoses as well as dusky appearance distally and with preserved sensation and was dopplerable pulses by ER physician; not tender peripherally outside of the areas with skin changes; no active drainage not but is indeed fluctuant on exam; other Extremities normal, atraumatic, no cyanosis or edema elsewhere   Psych: appropriate          Medical Decision Making       55 MINUTES SPENT BY ME on the date of service doing chart review, history, exam, documentation & further activities per the note.      Data   ------------------------- PAST 24 HR DATA REVIEWED -----------------------------------------------    I have personally reviewed the following data over the past 24 hrs:    9.3  \   13.4   / 237     144 105 15.1 /  107 (H)   3.5 26 1.12 (H) \     ALT: <5 AST: 12 AP: 114 TBILI: 0.9   ALB: 4.1 TOT PROTEIN: 6.3 (L) LIPASE: N/A     Procal: N/A CRP: <3.00 Lactic Acid: N/A         Imaging results reviewed over the past 24 hrs:   Recent Results (from the past 24 hour(s))   US Lower Extremity Arterial Duplex Bilateral    Narrative    EXAM: US LOWER EXTREMITY ARTERIAL DUPLEX BILATERAL  LOCATION: [facility]  DATE: 5/29/2024    INDICATION: Discoloration and pain to right fifth  digit.  COMPARISON: 7/21/2023.  TECHNIQUE: Duplex utilizing 2D gray-scale imaging, Doppler interrogation with color-flow and spectral waveform analysis.    FINDINGS: Patent arteries throughout the right and left lower extremities. In the right lower extremity, waveforms become monophasic in the mid SFA, suggesting stenosis that is not well visualized. In the left lower extremities, waveforms are multiphasic   throughout.    RIGHT LOWER EXTREMITY ARTERIAL ASSESSMENT:  External iliac artery 181 cm/s  Common femoral artery: 167 cm/s  Profunda femoris artery: 148 cm/s  SFA (proximal): 146 cm/s  SFA (mid): 136 cm/s  SFA (distal): 107 cm/s  Popliteal artery:  cm/s  Posterior tibial artery: 104 cm/s  Anterior tibial artery: 50 cm/s  Dorsalis pedis artery: 90 cm/s    LEFT LOWER EXTREMITY ARTERIAL ASSESSMENT:  External iliac artery 207 cm/s  Common femoral artery: 158 cm/s  Profunda femoris artery: 180 cm/s  SFA (proximal): 144 cm/s  SFA (mid): 192 cm/s  SFA (distal): 161 cm/s  Popliteal artery:  cm/s  Posterior tibial artery: 55 cm/s  Anterior tibial artery: 33 cm/s  Dorsalis pedis artery: 51 cm/s      Impression    IMPRESSION:  1.  Right lower extremity: Patent arteries throughout the right lower extremity. However, waveforms become monophasic at the level of the mid SFA, suggesting stenosis.  2.  Left lower extremity: Patent arteries throughout the left lower extremity. Waveforms are multiphasic throughout.   US LINDA with PPG wo Exercise    Narrative    EXAM: RESTING ANKLE-BRACHIAL INDICES (ABIs)  LOCATION: [facility]  DATE: 5/29/2024    INDICATION: PAD  COMPARISON: 7/21/2023.    LINDA FINDINGS:  RIGHT  Brachial: 215  Ankle (PT): Noncompressible  Ankle (DP): 179 Index: 0.83  Digit: 100 Index: 0.47    LEFT  Brachial: 183  Ankle (PT): Noncompressible  Ankle (DP): Noncompressible  Digit: 161 Index: 0.75    The right LINDA at rest is 0.83. The left LINDA at rest is 0.75.        Impression     IMPRESSION:  1.  RIGHT LOWER EXTREMITY: LINDA at rest shows moderate arterial insufficiency.  2.  LEFT LOWER EXTREMITY: LINDA at rest shows moderate arterial insufficiency   3.  Dampened digital waveforms bilaterally.   MR Foot Right w/o & w Contrast    Narrative    EXAM: MR FOOT RIGHT W/O and W CONTRAST  LOCATION: [facility]  DATE: 5/29/2024    INDICATION: Right 5th toe redness, question purulence at the pulp; pain, eval for osteomyelitis, gas, etc.  COMPARISON: Right foot radiographic exam 5/26/2024  TECHNIQUE: Routine. Additional postgadolinium T1 sequences were obtained.  IV CONTRAST: 6 mL Gadavist given    FINDINGS:     JOINTS AND BONES:   -Apparent abnormal T2 hyperintense signal and enhancement within the proximal aspect of the ankylosed distalmost phalanx fifth toe. No definitive confluent T1 hypointense signal in the corresponding area. The fifth toe proximal phalanx is intact without   evidence for fracture or bone infection. The remaining toes demonstrate normal marrow signal intensity without fracture or acute osteomyelitis of the first through fourth toes. No metatarsal fracture or metatarsal stress fracture. Scattered degenerative   change in the right foot including at the first MTP and IP joints and in the mid foot. Synovitis at the first MTP and IP joints. No sizable joint effusion.    TENDONS:   -No acute forefoot tendon injury or significant tenosynovitis. Distal peroneus longus longus intact. Distal anterior tibialis intact. No significant tendinopathy.     LIGAMENTS:   -Lisfranc ligament: Intact. No subluxation.    MUSCLES AND SOFT TISSUES:   -Largely preserved intrinsic muscle bulk. Some mild myositis is seen surrounding the fifth metatarsal. Lateral foot soft tissue swelling with likely cellulitis. Fifth toe cellulitis. No well-defined or drainable fluid collection is seen to suggest   abscess. Visualized plantar fascia is intact.      Impression    IMPRESSION:  1.  Fifth  toe soft tissue swelling with likely cellulitis and probable shallow ulceration along the dorsum of the distal most ankylosed phalanx fifth toe particularly along the proximal margin.  2.  Abnormal edema like signal intensity and enhancement in the proximal aspect of the ankylosed distalmost phalanx fifth toe, which could represent early acute osteomyelitis or reactive osteitis. Can a probe contact bone?  3.  Additional incidental findings as detailed fully above.

## 2024-05-30 NOTE — PLAN OF CARE
Patient arrived at unit at 2245, settled and oriented to room set up.  Alert and oriented but can be forgetful. BP rechecked still elevated, patient stated too painful when getting her Bps. Denies pain on Right foot. A1 with walker . NPO at midnight. Call light within reach. Alarms on.

## 2024-05-30 NOTE — ANESTHESIA PREPROCEDURE EVALUATION
Anesthesia Pre-Procedure Evaluation    Patient: Marva Gaines   MRN: [id number] : 1935        Procedure : Procedure(s):  AMPUTATION, fifth digit right foot          History reviewed. No pertinent past medical history.   Past Surgical History:   Procedure Laterality Date     COLONOSCOPY N/A 2022    Procedure: COLONOSCOPY with argon plasma coagulation;  Surgeon: Steven Driscoll MD;  Location: [facility] Main OR     ESOPHAGOSCOPY, GASTROSCOPY, DUODENOSCOPY (EGD), COMBINED N/A 2022    Procedure: ESOPHAGOGASTRODUODENOSCOPY (EGD) with argon plasma coagulation;  Surgeon: Steven Driscoll MD;  Location: [facility] Main OR      Allergies   Allergen Reactions     Blood Transfusion Related (Informational Only) Other (See Comments)     Patient has a history of a clinically significant antibody against RBC antigens.  A delay in compatible RBCs may occur. 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     Hydrocodone-Acetaminophen Unknown      Social History     Tobacco Use     Smoking status: Former     Passive exposure: Past     Smokeless tobacco: Never   Substance Use Topics     Alcohol use: Not on file      Wt Readings from Last 1 Encounters:   24 59.9 kg (132 lb)        Anesthesia Evaluation   Pt has had prior anesthetic.         ROS/MED HX  ENT/Pulmonary:  - neg pulmonary ROS     Neurologic:  - neg neurologic ROS     Cardiovascular:     (+)  hypertension- -  CAD -  - -      CHF                  dysrhythmias,              METS/Exercise Tolerance:     Hematologic:  - neg hematologic  ROS     Musculoskeletal:  - neg musculoskeletal ROS     GI/Hepatic:  - neg GI/hepatic ROS     Renal/Genitourinary:  - neg Renal ROS     Endo:     (+)          thyroid problem,            Psychiatric/Substance Use:  - neg psychiatric ROS     Infectious Disease:  - neg infectious disease ROS     Malignancy:  - neg malignancy ROS     Other:  - neg other ROS          Physical Exam    Airway  airway exam normal           Respiratory Devices and  "Support         Dental           Cardiovascular   cardiovascular exam normal          Pulmonary   pulmonary exam normal            OUTSIDE LABS:  CBC:   Lab Results   Component Value Date    WBC 9.3 05/30/2024    WBC 9.1 05/29/2024    HGB 13.4 05/30/2024    HGB 12.9 05/29/2024    HCT 41.6 05/30/2024    HCT 40.6 05/29/2024     05/30/2024     05/29/2024     BMP:   Lab Results   Component Value Date     05/30/2024     05/29/2024    POTASSIUM 3.5 05/30/2024    POTASSIUM 3.9 05/29/2024    CHLORIDE 105 05/30/2024    CHLORIDE 106 05/29/2024    CO2 26 05/30/2024    CO2 24 05/29/2024    BUN 15.1 05/30/2024    BUN 12.9 05/29/2024    CR 1.12 (H) 05/30/2024    CR 1.09 (H) 05/29/2024     (H) 05/30/2024     (H) 05/29/2024     COAGS:   Lab Results   Component Value Date    PTT 36 03/10/2023    INR 1.68 (H) 03/10/2023     POC: No results found for: \"BGM\", \"HCG\", \"HCGS\"  HEPATIC:   Lab Results   Component Value Date    ALBUMIN 4.1 05/29/2024    PROTTOTAL 6.3 (L) 05/29/2024    ALT <5 05/29/2024    AST 12 05/29/2024    ALKPHOS 114 05/29/2024    BILITOTAL 0.9 05/29/2024     OTHER:   Lab Results   Component Value Date    A1C 5.3 01/11/2024    FAROOQ 10.1 05/30/2024    MAG 1.9 12/28/2022    TSH 3.58 01/11/2024    CRP 0.3 06/20/2018    SED 9 05/29/2024       Anesthesia Plan    ASA Status:  3       Anesthesia Type: MAC.              Consents    Anesthesia Plan(s) and associated risks, benefits, and realistic alternatives discussed. Questions answered and patient/representative(s) expressed understanding.     - Discussed:     - Discussed with:  Patient            Postoperative Care    Pain management: Multi-modal analgesia.   PONV prophylaxis: Ondansetron (or other 5HT-3)     Comments:             Nam Justice MD    I have reviewed the pertinent notes and labs in the chart from the past 30 days and (re)examined the patient.  Any updates or changes from those notes are reflected in this note.             "

## 2024-05-30 NOTE — PLAN OF CARE
Goal Outcome Evaluation:  Problem: Adult Inpatient Plan of Care  Goal: Absence of Hospital-Acquired Illness or Injury  Intervention: Identify and Manage Fall Risk  Problem: Adult Inpatient Plan of Care  Goal: Absence of Hospital-Acquired Illness or Injury  Intervention: Prevent Skin Injury  A&Ox4. VSS, on RA. Rated moderate pain in right 5th toe; offered pain medications, but declined.  CMS intact. Pt NPO for possible surgery; podiatry consultation around noon (talked to Podiatrist and was inform to keep pt NPO until further evaluation).  Pt up with assist of 1 with gaitbelt and walker. Call light within reach and bed alarm activated for safety.

## 2024-05-30 NOTE — PLAN OF CARE
Patient alert and oriented; forgetful. Easily reoriented. BP continues to be elevated despite IV hydralazine; handed off to receiving nurse on 3E. Denies pain to right foot. CMS intact. Up with 1 assist and walker. Voiding without issues. NPO at midnight. Plan of care ongoing.     Problem: Adult Inpatient Plan of Care  Goal: Absence of Hospital-Acquired Illness or Injury  5/29/2024 2228 by Frances Enciso RN  Outcome: Adequate for Care Transition  5/29/2024 2228 by Frances Enciso RN  Outcome: Not Progressing  5/29/2024 2145 by Frances Enciso RN  Outcome: Progressing  Intervention: Identify and Manage Fall Risk  Recent Flowsheet Documentation  Taken 5/29/2024 1900 by Frances Enciso RN  Safety Promotion/Fall Prevention:   activity supervised   nonskid shoes/slippers when out of bed   mobility aid in reach   safety round/check completed  Intervention: Prevent Skin Injury  Recent Flowsheet Documentation  Taken 5/29/2024 1900 by Frances Enciso RN  Body Position: position changed independently     Problem: Skin Injury Risk Increased  Goal: Skin Health and Integrity  5/29/2024 2228 by Frances Enciso RN  Outcome: Adequate for Care Transition  5/29/2024 2228 by Frances Enciso RN  Outcome: Not Progressing  5/29/2024 2145 by Frances Enciso RN  Outcome: Progressing  Intervention: Plan: Nurse Driven Intervention: Moisture Management  Recent Flowsheet Documentation  Taken 5/29/2024 2000 by Frances Enciso RN  Moisture Interventions: Encourage regular toileting  Bathing/Skin Care:   linen changed   moisturizer applied  Intervention: Optimize Skin Protection  Recent Flowsheet Documentation  Taken 5/29/2024 1900 by Frances Enciso RN  Activity Management: ambulated to bathroom

## 2024-05-30 NOTE — PLAN OF CARE
Goal Outcome Evaluation:  Pt A/OX4. [place]. VSS. Pleasant mood. On room air. Denies SOB/dyspnea. Needs made known to staff. AST x1 with gait belt/walker. Peripheral IV to left upper arm flushed. Reports pain rated 4/5 on scale in affected toe, denied wanting pharmacologic intervention. No need for Prn hydralazine this shift, parameters not met. Radial and dorsal pedis pulses palpable. NPO at midnight. Call light within reach.       Problem: Adult Inpatient Plan of Care  Goal: Plan of Care Review  Description: The Plan of Care Review/Shift note should be completed every shift.  The Outcome Evaluation is a brief statement about your assessment that the patient is improving, declining, or no change.  This information will be displayed automatically on your shift  note.  Outcome: Progressing     Problem: Comorbidity Management  Goal: Blood Pressure in Desired Range  Outcome: Progressing  Intervention: Maintain Blood Pressure Management

## 2024-05-30 NOTE — CONSULTS
Consultation - Foot and Ankle/Podiatry  Marva Gaines,  1935, MRN [id number]    Admitting Dx: Acute UTI [N39.0]  Cellulitis of fifth toe of right foot [L03.031]    PCP: Sabas Austin, [phone]   Code status:  Full Code       Extended Emergency Contact Information  Primary Emergency Contact: Anabel Maza   [place]  Mobile Phone: [phone]  Relation: Daughter  Secondary Emergency Contact: Veronica Patterson   [facility]  Home Phone: [phone]  Relation: Daughter       ASSESSMENT   Acute osteomyelitis fifth digit right foot  Active Problems:    Cellulitis of fifth toe of right foot       PLAN   -I discussed the patient and her daughter today that the majority of the fifth digit on the right foot has a dusky appearance with what appears to be fluid along the plantar margin of the digit with localized erythema.  I was unable to evaluate the digit in greater detail due to patient guarding.  WBC 9.3.  Afebrile.    MRI right foot:1.  Fifth toe soft tissue swelling with likely cellulitis and probable shallow ulceration along the dorsum of the distal most ankylosed phalanx fifth toe particularly along the proximal margin.  2.  Abnormal edema like signal intensity and enhancement in the proximal aspect of the ankylosed distalmost phalanx fifth toe, which could represent early acute osteomyelitis or reactive osteitis. Can a probe contact bone?  3.  Additional incidental findings as detailed fully above.    -Right foot ABIs indicate adequate perfusion for wound healing including right TBI of 100 mmHg.    -I discussed the above with the patient and her daughter today including positive osteomyelitis within the fifth digit right foot.    -Due to the presence of osteomyelitis, I reviewed the treatment options to include either 6 weeks IV antibiotics with Infectious Disease/surgical debridement with removal of bone/use of wound vac/non-weight bearing vs amputation of the involved bone. Patient would like to  proceed with amputation of the fifth digit for more definitive procedure.    -I described the surgical procedure to include disarticulation of the fifth MPJ with primary closure.  Risk include but not limited to need for additional surgical intervention including partial foot amputation if necessary.  All questions invited and answered.  Patient consents to surgery.    -Medical management per hospice.  Continue with Zosyn/Vanco.  Holding anticoagulation.  Right foot surgery to be scheduled later this afternoon.  Patient has been n.p.o., continue.    Thank you for the consultation. I will follow.     Henry Peres DPM  [facility] Podiatry/Foot & Ankle Surgery  On-Call: [phone]  ______________________________________________________________________        Reason For Consult: Acute osteomyelitis fifth digit right foot  Cellulitis right foot       HPI    I have been requested by hospital service to evaluate Marva Gaines who is a 88 year old year old female for the above. The patient was admitted to [facility] for a right foot infection.  Patient reports that she first noticed discoloration about the fifth digit on the right foot 2 weeks ago.  Patient's daughter is present for today's visit and she believes that the discoloration started long before 2 weeks ago.  Patient denies any known trauma.  Right foot MRI has been obtained during this hospitalization.  Patient is currently being treated for UTI.  Past medical history significant for CKD.         Medical History  No past medical history on file.  Surgical History  She  has a past surgical history that includes Esophagoscopy, gastroscopy, duodenoscopy (EGD), combined (N/A, 12/30/2022) and Colonoscopy (N/A, 12/30/2022).   Social History  Reviewed, and she  reports that she has quit smoking. She has been exposed to tobacco smoke. She has never used smokeless tobacco.   Allergies  Allergies   Allergen Reactions    Blood Transfusion Related (Informational  Only) Other (See Comments)     Patient has a history of a clinically significant antibody against RBC antigens.  A delay in compatible RBCs may occur. 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    Hydrocodone-Acetaminophen Unknown    Family History  family history is not on file.  family history not pertinent to presenting problem.    Psychosocial Needs  Social History     Social History Narrative    Not on file     Additional psychosocial needs reviewed per nursing assessment.       Prior to Admission Medications   Medications Prior to Admission   Medication Sig Dispense Refill Last Dose    acetaminophen (TYLENOL) 500 MG tablet Take 1,000 mg by mouth every 6 hours as needed for mild pain   5/29/2024 at AM; 1 g (ED admin 1335, 1 g)    apixaban ANTICOAGULANT (ELIQUIS ANTICOAGULANT) 2.5 MG tablet Take 1 tablet (2.5 mg) by mouth 2 times daily 180 tablet 3 5/29/2024 at AM; 1 of 2    atorvastatin (LIPITOR) 20 MG tablet TAKE 1 TABLET BY MOUTH EVERYDAY AT BEDTIME 90 tablet 1 5/28/2024 at HS    cholecalciferol, vitamin D3, (VITAMIN D3) 2,000 unit Tab [CHOLECALCIFEROL, VITAMIN D3, (VITAMIN D3) 2,000 UNIT TAB] Take 1 tablet by mouth daily.   5/29/2024 at AM    diltiazem ER COATED BEADS (CARDIZEM CD/CARTIA XT) 240 MG 24 hr capsule Take 1 capsule (240 mg) by mouth daily 90 capsule 3 5/28/2024 at PM    Ferrous Sulfate 324 (65 Fe) MG TBEC Take 1 tablet by mouth daily   5/29/2024 at AM    fish oil-omega-3 fatty acids 500 MG capsule Take 1 capsule by mouth daily   5/29/2024 at AM    furosemide (LASIX) 20 MG tablet TAKE 2 TABLETS BY MOUTH EVERY  tablet 1 Past Week at few days ago    losartan (COZAAR) 25 MG tablet TAKE 1 TABLET BY MOUTH EVERY DAY 90 tablet 1 5/28/2024 at PM    pantoprazole (PROTONIX) 40 MG EC tablet TAKE 1 TABLET BY MOUTH TWICE A  tablet 3 5/29/2024 at AM; 1 of 2    sertraline (ZOLOFT) 100 MG tablet Take 1 tablet (100 mg) by mouth daily   5/28/2024 at PM          Imaging: reviewed images          Review of Systems:  All other  systems negative in detail except what is noted in HPI.  Physical Exam:  Temp:  [97.7  F (36.5  C)-98.1  F (36.7  C)] 97.7  F (36.5  C)  Pulse:  [66-83] 83  Resp:  [17-18] 18  BP: (137-209)/(72-92) 137/77  SpO2:  [93 %-97 %] 95 %    General appearance: Patient is alert and fully cooperative with history & exam.  No sign of distress is noted during the visit.  Psychiatric: Affect is pleasant & appropriate.  Patient appears motivated to improve health.  Respiratory: Breathing is regular & unlabored while sitting.  HEENT: Hearing is intact to spoken word.  Speech is clear.  No gross evidence of visual impairment that would impact ambulation.    Vascular: Trace palpable dorsalis pedis right foot.  Dermatologic: Majority of fifth digit on the right foot has dusky appearance with fluctuance along the distal margin with localized erythema, difficult to evaluate due to patient guarding.  Neurologic: All epicritic and proprioceptive sensations are grossly intact right.  Musculoskeletal: Pain to palpation fifth digit right foot.     /77 (BP Location: Right arm)   Pulse 83   Temp 97.7  F (36.5  C) (Oral)   Resp 18   Wt 59.9 kg (132 lb)   LMP  (LMP Unknown)   SpO2 95%   BMI 24.14 kg/m      BMI= Body mass index is 24.14 kg/m .    Pertinent Labs    [unfilled]       Recent Labs   Lab 05/30/24  0636 05/29/24  1153    141   CO2 26 24   BUN 15.1 12.9       Lab Results   Component Value Date    ALT <5 05/29/2024    AST 12 05/29/2024    ALKPHOS 114 05/29/2024          Lab Results   Component Value Date    CRP 0.3 06/20/2018      [unfilled]     Pertinent Radiology  Radiology Results: Reviewed  MR Foot Right w/o & w Contrast    Result Date: 5/29/2024  EXAM: MR FOOT RIGHT W/O and W CONTRAST LOCATION: [facility] DATE: 5/29/2024 INDICATION: Right 5th toe redness, question purulence at the pulp; pain, eval for osteomyelitis, gas, etc. COMPARISON: Right foot radiographic exam 5/26/2024 TECHNIQUE:  Routine. Additional postgadolinium T1 sequences were obtained. IV CONTRAST: 6 mL Gadavist given FINDINGS: JOINTS AND BONES: -Apparent abnormal T2 hyperintense signal and enhancement within the proximal aspect of the ankylosed distalmost phalanx fifth toe. No definitive confluent T1 hypointense signal in the corresponding area. The fifth toe proximal phalanx is intact without evidence for fracture or bone infection. The remaining toes demonstrate normal marrow signal intensity without fracture or acute osteomyelitis of the first through fourth toes. No metatarsal fracture or metatarsal stress fracture. Scattered degenerative change in the right foot including at the first MTP and IP joints and in the mid foot. Synovitis at the first MTP and IP joints. No sizable joint effusion. TENDONS: -No acute forefoot tendon injury or significant tenosynovitis. Distal peroneus longus longus intact. Distal anterior tibialis intact. No significant tendinopathy. LIGAMENTS: -Lisfranc ligament: Intact. No subluxation. MUSCLES AND SOFT TISSUES: -Largely preserved intrinsic muscle bulk. Some mild myositis is seen surrounding the fifth metatarsal. Lateral foot soft tissue swelling with likely cellulitis. Fifth toe cellulitis. No well-defined or drainable fluid collection is seen to suggest abscess. Visualized plantar fascia is intact.     IMPRESSION: 1.  Fifth toe soft tissue swelling with likely cellulitis and probable shallow ulceration along the dorsum of the distal most ankylosed phalanx fifth toe particularly along the proximal margin. 2.  Abnormal edema like signal intensity and enhancement in the proximal aspect of the ankylosed distalmost phalanx fifth toe, which could represent early acute osteomyelitis or reactive osteitis. Can a probe contact bone? 3.  Additional incidental findings as detailed fully above.    US Lower Extremity Arterial Duplex Bilateral    Result Date: 5/29/2024  EXAM: US LOWER EXTREMITY ARTERIAL DUPLEX  BILATERAL LOCATION: [facility] DATE: 5/29/2024 INDICATION: Discoloration and pain to right fifth digit. COMPARISON: 7/21/2023. TECHNIQUE: Duplex utilizing 2D gray-scale imaging, Doppler interrogation with color-flow and spectral waveform analysis. FINDINGS: Patent arteries throughout the right and left lower extremities. In the right lower extremity, waveforms become monophasic in the mid SFA, suggesting stenosis that is not well visualized. In the left lower extremities, waveforms are multiphasic  throughout. RIGHT LOWER EXTREMITY ARTERIAL ASSESSMENT: External iliac artery 181 cm/s Common femoral artery: 167 cm/s Profunda femoris artery: 148 cm/s SFA (proximal): 146 cm/s SFA (mid): 136 cm/s SFA (distal): 107 cm/s Popliteal artery:  cm/s Posterior tibial artery: 104 cm/s Anterior tibial artery: 50 cm/s Dorsalis pedis artery: 90 cm/s LEFT LOWER EXTREMITY ARTERIAL ASSESSMENT: External iliac artery 207 cm/s Common femoral artery: 158 cm/s Profunda femoris artery: 180 cm/s SFA (proximal): 144 cm/s SFA (mid): 192 cm/s SFA (distal): 161 cm/s Popliteal artery:  cm/s Posterior tibial artery: 55 cm/s Anterior tibial artery: 33 cm/s Dorsalis pedis artery: 51 cm/s     IMPRESSION: 1.  Right lower extremity: Patent arteries throughout the right lower extremity. However, waveforms become monophasic at the level of the mid SFA, suggesting stenosis. 2.  Left lower extremity: Patent arteries throughout the left lower extremity. Waveforms are multiphasic throughout.    US LINDA with PPG wo Exercise    Result Date: 5/29/2024  EXAM: RESTING ANKLE-BRACHIAL INDICES (ABIs) LOCATION: [facility] DATE: 5/29/2024 INDICATION: PAD COMPARISON: 7/21/2023. LINDA FINDINGS: RIGHT Brachial: 215 Ankle (PT): Noncompressible Ankle (DP): 179 Index: 0.83 Digit: 100 Index: 0.47 LEFT Brachial: 183 Ankle (PT): Noncompressible Ankle (DP): Noncompressible Digit: 161 Index: 0.75 The right LINDA at rest is  0.83. The left LINDA at rest is 0.75.      IMPRESSION: 1.  RIGHT LOWER EXTREMITY: LINDA at rest shows moderate arterial insufficiency. 2.  LEFT LOWER EXTREMITY: LINDA at rest shows moderate arterial insufficiency 3.  Dampened digital waveforms bilaterally.    XR Toe Rt 5th Little 3 Views    Result Date: 5/26/2024  EXAM: XR TOE RIGHT 5TH LITTLE 3 VIEWS, XR FOOT RIGHT 3+ VIEWS LOCATION: [facility] DATE: 05/26/2024 INDICATION: Toe pain and discoloration. One lateral view of little toe needed only, Pain in right foot. COMPARISON: None. IMPRESSION: Anatomic alignment right foot. No acute displaced right foot fracture is identified. Diffuse bone demineralization. Specifically, no right fifth toe fracture. Ankylosis at the DIP joint of the fifth toe. Degenerative change in the right foot is most pronounced at the first MTP joint, moderate. Mild swelling medial to the first MTP joint. Benign cortical thickening lateral shaft right fourth metatarsal.    XR Foot Rt 3+ Views    Result Date: 5/26/2024  EXAM: XR TOE RIGHT 5TH LITTLE 3 VIEWS, XR FOOT RIGHT 3+ VIEWS LOCATION: [facility] DATE: 05/26/2024 INDICATION: Toe pain and discoloration. One lateral view of little toe needed only, Pain in right foot. COMPARISON: None. IMPRESSION: Anatomic alignment right foot. No acute displaced right foot fracture is identified. Diffuse bone demineralization. Specifically, no right fifth toe fracture. Ankylosis at the DIP joint of the fifth toe. Degenerative change in the right foot is most pronounced at the first MTP joint, moderate. Mild swelling medial to the first MTP joint. Benign cortical thickening lateral shaft right fourth metatarsal.

## 2024-05-31 ENCOUNTER — APPOINTMENT (OUTPATIENT)
Dept: PHYSICAL THERAPY | Facility: CLINIC | Age: 89
DRG: 503 | End: 2024-05-31
Attending: PODIATRIST
Payer: COMMERCIAL

## 2024-05-31 LAB
ANION GAP SERPL CALCULATED.3IONS-SCNC: 16 MMOL/L (ref 7–15)
BUN SERPL-MCNC: 20.6 MG/DL (ref 8–23)
CALCIUM SERPL-MCNC: 8.8 MG/DL (ref 8.8–10.2)
CHLORIDE SERPL-SCNC: 102 MMOL/L (ref 98–107)
CREAT SERPL-MCNC: 1.4 MG/DL (ref 0.51–0.95)
DEPRECATED HCO3 PLAS-SCNC: 23 MMOL/L (ref 22–29)
EGFRCR SERPLBLD CKD-EPI 2021: 36 ML/MIN/1.73M2
GLUCOSE SERPL-MCNC: 71 MG/DL (ref 70–99)
GLUCOSE SERPL-MCNC: 71 MG/DL (ref 70–99)
POTASSIUM SERPL-SCNC: 3.1 MMOL/L (ref 3.4–5.3)
POTASSIUM SERPL-SCNC: 3.3 MMOL/L (ref 3.4–5.3)
POTASSIUM SERPL-SCNC: 3.4 MMOL/L (ref 3.4–5.3)
SODIUM SERPL-SCNC: 141 MMOL/L (ref 135–145)
VANCOMYCIN SERPL-MCNC: 12.6 UG/ML

## 2024-05-31 PROCEDURE — 84132 ASSAY OF SERUM POTASSIUM: CPT | Performed by: STUDENT IN AN ORGANIZED HEALTH CARE EDUCATION/TRAINING PROGRAM

## 2024-05-31 PROCEDURE — 99222 1ST HOSP IP/OBS MODERATE 55: CPT | Performed by: INTERNAL MEDICINE

## 2024-05-31 PROCEDURE — 36415 COLL VENOUS BLD VENIPUNCTURE: CPT | Performed by: PODIATRIST

## 2024-05-31 PROCEDURE — 250N000013 HC RX MED GY IP 250 OP 250 PS 637: Performed by: PODIATRIST

## 2024-05-31 PROCEDURE — 99231 SBSQ HOSP IP/OBS SF/LOW 25: CPT | Performed by: PODIATRIST

## 2024-05-31 PROCEDURE — 80048 BASIC METABOLIC PNL TOTAL CA: CPT | Performed by: PODIATRIST

## 2024-05-31 PROCEDURE — 36415 COLL VENOUS BLD VENIPUNCTURE: CPT | Performed by: STUDENT IN AN ORGANIZED HEALTH CARE EDUCATION/TRAINING PROGRAM

## 2024-05-31 PROCEDURE — 250N000011 HC RX IP 250 OP 636: Performed by: PODIATRIST

## 2024-05-31 PROCEDURE — 80202 ASSAY OF VANCOMYCIN: CPT | Performed by: STUDENT IN AN ORGANIZED HEALTH CARE EDUCATION/TRAINING PROGRAM

## 2024-05-31 PROCEDURE — 250N000013 HC RX MED GY IP 250 OP 250 PS 637: Performed by: STUDENT IN AN ORGANIZED HEALTH CARE EDUCATION/TRAINING PROGRAM

## 2024-05-31 PROCEDURE — 250N000013 HC RX MED GY IP 250 OP 250 PS 637: Performed by: INTERNAL MEDICINE

## 2024-05-31 PROCEDURE — 97161 PT EVAL LOW COMPLEX 20 MIN: CPT | Mod: GP

## 2024-05-31 PROCEDURE — 97530 THERAPEUTIC ACTIVITIES: CPT | Mod: GP

## 2024-05-31 PROCEDURE — 99232 SBSQ HOSP IP/OBS MODERATE 35: CPT | Performed by: STUDENT IN AN ORGANIZED HEALTH CARE EDUCATION/TRAINING PROGRAM

## 2024-05-31 PROCEDURE — 120N000001 HC R&B MED SURG/OB

## 2024-05-31 RX ORDER — POTASSIUM CHLORIDE 1500 MG/1
20 TABLET, EXTENDED RELEASE ORAL ONCE
Status: COMPLETED | OUTPATIENT
Start: 2024-05-31 | End: 2024-05-31

## 2024-05-31 RX ORDER — AMOXICILLIN AND CLAVULANATE POTASSIUM 500; 125 MG/1; MG/1
1 TABLET, FILM COATED ORAL EVERY 12 HOURS SCHEDULED
Status: DISCONTINUED | OUTPATIENT
Start: 2024-05-31 | End: 2024-06-04 | Stop reason: HOSPADM

## 2024-05-31 RX ADMIN — PANTOPRAZOLE SODIUM 40 MG: 40 TABLET, DELAYED RELEASE ORAL at 20:30

## 2024-05-31 RX ADMIN — ATORVASTATIN CALCIUM 20 MG: 10 TABLET, FILM COATED ORAL at 22:12

## 2024-05-31 RX ADMIN — AMOXICILLIN AND CLAVULANATE POTASSIUM 1 TABLET: 500; 125 TABLET, FILM COATED ORAL at 20:30

## 2024-05-31 RX ADMIN — OXYCODONE 5 MG: 5 TABLET ORAL at 15:39

## 2024-05-31 RX ADMIN — POTASSIUM CHLORIDE 20 MEQ: 1500 TABLET, EXTENDED RELEASE ORAL at 10:41

## 2024-05-31 RX ADMIN — FUROSEMIDE 40 MG: 40 TABLET ORAL at 07:38

## 2024-05-31 RX ADMIN — APIXABAN 2.5 MG: 2.5 TABLET, FILM COATED ORAL at 20:30

## 2024-05-31 RX ADMIN — SERTRALINE 100 MG: 100 TABLET, FILM COATED ORAL at 20:30

## 2024-05-31 RX ADMIN — POTASSIUM CHLORIDE 20 MEQ: 1500 TABLET, EXTENDED RELEASE ORAL at 23:09

## 2024-05-31 RX ADMIN — APIXABAN 2.5 MG: 2.5 TABLET, FILM COATED ORAL at 10:41

## 2024-05-31 RX ADMIN — GABAPENTIN 100 MG: 100 CAPSULE ORAL at 22:12

## 2024-05-31 RX ADMIN — OXYCODONE 5 MG: 5 TABLET ORAL at 20:38

## 2024-05-31 RX ADMIN — PANTOPRAZOLE SODIUM 40 MG: 40 TABLET, DELAYED RELEASE ORAL at 07:38

## 2024-05-31 RX ADMIN — AMOXICILLIN AND CLAVULANATE POTASSIUM 1 TABLET: 500; 125 TABLET, FILM COATED ORAL at 10:41

## 2024-05-31 RX ADMIN — POTASSIUM CHLORIDE 20 MEQ: 1500 TABLET, EXTENDED RELEASE ORAL at 15:39

## 2024-05-31 RX ADMIN — ACETAMINOPHEN 650 MG: 325 TABLET ORAL at 18:20

## 2024-05-31 RX ADMIN — LOSARTAN POTASSIUM 25 MG: 25 TABLET, FILM COATED ORAL at 20:30

## 2024-05-31 RX ADMIN — DILTIAZEM HYDROCHLORIDE 240 MG: 120 CAPSULE, EXTENDED RELEASE ORAL at 20:30

## 2024-05-31 RX ADMIN — OXYCODONE 5 MG: 5 TABLET ORAL at 10:43

## 2024-05-31 RX ADMIN — PIPERACILLIN AND TAZOBACTAM 3.38 G: 3; .375 INJECTION, POWDER, FOR SOLUTION INTRAVENOUS at 04:21

## 2024-05-31 ASSESSMENT — ACTIVITIES OF DAILY LIVING (ADL)
ADLS_ACUITY_SCORE: 41
ADLS_ACUITY_SCORE: 36
ADLS_ACUITY_SCORE: 58
ADLS_ACUITY_SCORE: 36
ADLS_ACUITY_SCORE: 58
ADLS_ACUITY_SCORE: 36
ADLS_ACUITY_SCORE: 45
ADLS_ACUITY_SCORE: 36
ADLS_ACUITY_SCORE: 41
ADLS_ACUITY_SCORE: 36
ADLS_ACUITY_SCORE: 58
ADLS_ACUITY_SCORE: 36
ADLS_ACUITY_SCORE: 36
ADLS_ACUITY_SCORE: 58
ADLS_ACUITY_SCORE: 36
ADLS_ACUITY_SCORE: 58
ADLS_ACUITY_SCORE: 36

## 2024-05-31 NOTE — CONSULTS
Consultation - Infectious Disease  [facility]  Marva Gaines,  1935, MRN [id number]    Admitting Dx: Acute UTI [N39.0]  Cellulitis of fifth toe of right foot [L03.031]    PCP: Sabas Austin, [phone]       ASSESSMENT   88-year-old woman with history of A-fib, hyperlipidemia, HFpEF admitted with right fifth toe infection and urinary tract infection.    Right fifth toe osteomyelitis.  Increasing pain for several weeks.  Reportedly dusky color changes on admission.  MRI showing bone edema at the distal phalanx.  Normal inflammatory markers.  Status post fifth toe amputation down to the metatarsal phalangeal joints on .  Cultures collected and pending.  Urinary tract infection.  History of previous urinary tract infections.  Patient notes increased urgency prior to arrival.  Concern for some confusion on admission, which could be from urinary tract infection or from the foot infection.  Urine culture with greater than 100,000 colonies pansensitive E. coli.    Active Problems:    Cellulitis of fifth toe of right foot       PLAN   -Discontinue vancomycin and Zosyn  -Start Augmentin twice daily x 7 days.  Short duration recommended since amputation margin extends beyond the area of infection based on imaging.  -Okay to discharge from ID perspective.  Please call if there are any other significant changes in culture results.    Thank you for this consult. ID will sign-off.    Shon Dong MD  [facility]  Direct messaging: [facility] Paging  On-Call ID provider: [phone], option: [phone]      ===========================================      Chief Complaint   <principal problem not specified>       HPI     We have been requested by Jimbo Joseph MD to evaluate Marva Gaines for the above.    History obtained by patient    Marva Gaines is a 88 year old woman with a history of A-fib, hyperlipidemia, HFpEF who is admitted with foot infection.  The patient says that  she began having pain in the fifth toe on her right foot for several weeks.  She did not want to tell anybody because she did not want to go see doctors.  She denies any fevers at home or any swelling in the foot.  She does say that she was having urinary urgency prior to admission.  There was also reports from family on admission that she was not acting herself.  She was seen by podiatry who ordered MRI which was concerning for fifth toe osteomyelitis at the distal phalanx.  She underwent amputation of the fifth toe yesterday without complications.  She is without any complaints today.  ABIs showed moderate arterial insufficiency.  The patient has a history of smoking, but quit 30 years ago.          Review of Systems   Ten systems reviewed and negative except for what is noted in the HPI       Medical History  See HPI above Surgical History  She  has a past surgical history that includes Esophagoscopy, gastroscopy, duodenoscopy (EGD), combined (N/A, 12/30/2022); Colonoscopy (N/A, 12/30/2022); and Amputate toe(s) (Right, 5/30/2024).     Social History  Reviewed, and she  reports that she has quit smoking. She has been exposed to tobacco smoke. She has never used smokeless tobacco.  Social History     Social History Narrative    Not on file     Family History  family history is not on file.  family history reviewed and is not pertinent to the presenting problem.            Allergies     Allergies   Allergen Reactions    Blood Transfusion Related (Informational Only) Other (See Comments)     Patient has a history of a clinically significant antibody against RBC antigens.  A delay in compatible RBCs may occur. 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    Hydrocodone-Acetaminophen Unknown         Antibiotics   Zosyn 5/29-  Vancomycin 5/29  Perioperative cefazolin    Previous:  None      Physical Exam     Temp:  [97.2  F (36.2  C)-98.9  F (37.2  C)] 98.8  F (37.1  C)  Pulse:  [64-89] 69  Resp:  [10-22] 14  BP: (111-176)/(55-79) 145/65  SpO2:   "[94 %-100 %] 95 %    BP (!) 145/65 (BP Location: Right arm)   Pulse 69   Temp 98.8  F (37.1  C) (Oral)   Resp 14   Ht 1.575 m (5' 2\")   Wt 59.9 kg (132 lb)   LMP  (LMP Unknown)   SpO2 95%   BMI 24.14 kg/m      GENERAL:  well-developed, well-nourished, sitting in chair in no acute distress.   HENT:  Head is normocephalic, atraumatic. Oropharynx is moist without exudates or ulcers.  EYES:  Eyes have anicteric sclerae without conjunctival injection or stigmata of endocarditis.   NECK:  Supple.  LUNGS:  Clear to auscultation.  CARDIOVASCULAR:  Regular rate and rhythm with no murmurs, gallops or rubs.  ABDOMEN:  Normal bowel sounds, soft, nontender. No appreciable hepatosplenomegaly  EXT: Extremities warm and without edema.  Right foot wrapped  SKIN:  No acute rashes. No stigmata of endocarditis.  NEUROLOGIC:  Grossly nonfocal.  Hard of hearing      Cultures   5/29 urine culture: Greater than 100,000 colonies E. coli  5/30 right toe culture: No growth to date    Susceptibility data from last 90 days.  Collected Specimen Info Organism Ampicillin Ampicillin/Sulbactam Cefazolin Cefepime Cefoxitin Ceftazidime Ceftriaxone Ciprofloxacin Gentamicin Levofloxacin Nitrofurantoin Piperacillin/Tazobactam Tobramycin   05/29/24 Urine, Midstream Escherichia coli  S  S  S  S  S  S  S  S  S  S  S  S  S     Collected Specimen Info Organism Trimethoprim/Sulfamethoxazole    05/29/24 Urine, Midstream Escherichia coli  S        Laboratory results     Recent Labs   Lab 05/30/24  0636 05/29/24  1153   WBC 9.3 9.1   HGB 13.4 12.9    240       Recent Labs   Lab 05/31/24  0735 05/30/24  0636 05/29/24  1153    144 141   CO2 23 26 24   BUN 20.6 15.1 12.9   ALBUMIN  --   --  4.1   ALKPHOS  --   --  114   ALT  --   --  <5   AST  --   --  12       Recent Labs   Lab 05/29/24  1153   CRPI <3.00   SED 9           Imaging   Radiology results reviewed    MR Foot Right w/o & w Contrast    Result Date: 5/29/2024  EXAM: MR FOOT RIGHT W/O " and W CONTRAST LOCATION: [facility] DATE: 5/29/2024 INDICATION: Right 5th toe redness, question purulence at the pulp; pain, eval for osteomyelitis, gas, etc. COMPARISON: Right foot radiographic exam 5/26/2024 TECHNIQUE: Routine. Additional postgadolinium T1 sequences were obtained. IV CONTRAST: 6 mL Gadavist given FINDINGS: JOINTS AND BONES: -Apparent abnormal T2 hyperintense signal and enhancement within the proximal aspect of the ankylosed distalmost phalanx fifth toe. No definitive confluent T1 hypointense signal in the corresponding area. The fifth toe proximal phalanx is intact without evidence for fracture or bone infection. The remaining toes demonstrate normal marrow signal intensity without fracture or acute osteomyelitis of the first through fourth toes. No metatarsal fracture or metatarsal stress fracture. Scattered degenerative change in the right foot including at the first MTP and IP joints and in the mid foot. Synovitis at the first MTP and IP joints. No sizable joint effusion. TENDONS: -No acute forefoot tendon injury or significant tenosynovitis. Distal peroneus longus longus intact. Distal anterior tibialis intact. No significant tendinopathy. LIGAMENTS: -Lisfranc ligament: Intact. No subluxation. MUSCLES AND SOFT TISSUES: -Largely preserved intrinsic muscle bulk. Some mild myositis is seen surrounding the fifth metatarsal. Lateral foot soft tissue swelling with likely cellulitis. Fifth toe cellulitis. No well-defined or drainable fluid collection is seen to suggest abscess. Visualized plantar fascia is intact.     IMPRESSION: 1.  Fifth toe soft tissue swelling with likely cellulitis and probable shallow ulceration along the dorsum of the distal most ankylosed phalanx fifth toe particularly along the proximal margin. 2.  Abnormal edema like signal intensity and enhancement in the proximal aspect of the ankylosed distalmost phalanx fifth toe, which could represent early  acute osteomyelitis or reactive osteitis. Can a probe contact bone? 3.  Additional incidental findings as detailed fully above.    US Lower Extremity Arterial Duplex Bilateral    Result Date: 5/29/2024  EXAM: US LOWER EXTREMITY ARTERIAL DUPLEX BILATERAL LOCATION: [facility] DATE: 5/29/2024 INDICATION: Discoloration and pain to right fifth digit. COMPARISON: 7/21/2023. TECHNIQUE: Duplex utilizing 2D gray-scale imaging, Doppler interrogation with color-flow and spectral waveform analysis. FINDINGS: Patent arteries throughout the right and left lower extremities. In the right lower extremity, waveforms become monophasic in the mid SFA, suggesting stenosis that is not well visualized. In the left lower extremities, waveforms are multiphasic  throughout. RIGHT LOWER EXTREMITY ARTERIAL ASSESSMENT: External iliac artery 181 cm/s Common femoral artery: 167 cm/s Profunda femoris artery: 148 cm/s SFA (proximal): 146 cm/s SFA (mid): 136 cm/s SFA (distal): 107 cm/s Popliteal artery:  cm/s Posterior tibial artery: 104 cm/s Anterior tibial artery: 50 cm/s Dorsalis pedis artery: 90 cm/s LEFT LOWER EXTREMITY ARTERIAL ASSESSMENT: External iliac artery 207 cm/s Common femoral artery: 158 cm/s Profunda femoris artery: 180 cm/s SFA (proximal): 144 cm/s SFA (mid): 192 cm/s SFA (distal): 161 cm/s Popliteal artery:  cm/s Posterior tibial artery: 55 cm/s Anterior tibial artery: 33 cm/s Dorsalis pedis artery: 51 cm/s     IMPRESSION: 1.  Right lower extremity: Patent arteries throughout the right lower extremity. However, waveforms become monophasic at the level of the mid SFA, suggesting stenosis. 2.  Left lower extremity: Patent arteries throughout the left lower extremity. Waveforms are multiphasic throughout.    US LINDA with PPG wo Exercise    Result Date: 5/29/2024  EXAM: RESTING ANKLE-BRACHIAL INDICES (ABIs) LOCATION: [facility] DATE: 5/29/2024 INDICATION: PAD COMPARISON:  7/21/2023. LINDA FINDINGS: RIGHT Brachial: 215 Ankle (PT): Noncompressible Ankle (DP): 179 Index: 0.83 Digit: 100 Index: 0.47 LEFT Brachial: 183 Ankle (PT): Noncompressible Ankle (DP): Noncompressible Digit: 161 Index: 0.75 The right LINDA at rest is 0.83. The left LINDA at rest is 0.75.      IMPRESSION: 1.  RIGHT LOWER EXTREMITY: LINDA at rest shows moderate arterial insufficiency. 2.  LEFT LOWER EXTREMITY: LINDA at rest shows moderate arterial insufficiency 3.  Dampened digital waveforms bilaterally.    XR Toe Rt 5th Little 3 Views    Result Date: 5/26/2024  EXAM: XR TOE RIGHT 5TH LITTLE 3 VIEWS, XR FOOT RIGHT 3+ VIEWS LOCATION: [facility] DATE: 05/26/2024 INDICATION: Toe pain and discoloration. One lateral view of little toe needed only, Pain in right foot. COMPARISON: None. IMPRESSION: Anatomic alignment right foot. No acute displaced right foot fracture is identified. Diffuse bone demineralization. Specifically, no right fifth toe fracture. Ankylosis at the DIP joint of the fifth toe. Degenerative change in the right foot is most pronounced at the first MTP joint, moderate. Mild swelling medial to the first MTP joint. Benign cortical thickening lateral shaft right fourth metatarsal.    XR Foot Rt 3+ Views    Result Date: 5/26/2024  EXAM: XR TOE RIGHT 5TH LITTLE 3 VIEWS, XR FOOT RIGHT 3+ VIEWS LOCATION: [facility] DATE: 05/26/2024 INDICATION: Toe pain and discoloration. One lateral view of little toe needed only, Pain in right foot. COMPARISON: None. IMPRESSION: Anatomic alignment right foot. No acute displaced right foot fracture is identified. Diffuse bone demineralization. Specifically, no right fifth toe fracture. Ankylosis at the DIP joint of the fifth toe. Degenerative change in the right foot is most pronounced at the first MTP joint, moderate. Mild swelling medial to the first MTP joint. Benign cortical thickening lateral shaft right fourth metatarsal.      Data reviewed today: I reviewed all medications,  new labs and imaging results over the last 24 hours. I personally reviewed the foot MR image(s) showing Osteomyelitis  .  The patient's care was discussed with the Bedside Nurse and Patient.

## 2024-05-31 NOTE — ANESTHESIA CARE TRANSFER NOTE
Patient: Marva Gaines    Procedure: Procedure(s):  AMPUTATION, fifth digit right foot       Diagnosis: Acute osteomyelitis of toe, right (H) [M86.171]  Diagnosis Additional Information: No value filed.    Anesthesia Type:   MAC     Note:    Oropharynx: spontaneously breathing and oropharynx clear of all foreign objects  Level of Consciousness: awake  Oxygen Supplementation: nasal cannula  Level of Supplemental Oxygen (L/min / FiO2): 3  Independent Airway: airway patency satisfactory and stable  Dentition: dentition unchanged  Vital Signs Stable: post-procedure vital signs reviewed and stable  Report to RN Given: handoff report given  Patient transferred to: Medical/Surgical Unit    Handoff Report: Identifed the Patient, Identified the Reponsible Provider, Reviewed the pertinent medical history, Discussed the surgical course, Reviewed Intra-OP anesthesia mangement and issues during anesthesia, Set expectations for post-procedure period and Allowed opportunity for questions and acknowledgement of understanding    Vitals:  Vitals Value Taken Time   /57 05/30/24 1917   Temp     Pulse 65 05/30/24 1917   Resp 20 05/30/24 1917   SpO2 99 % 05/30/24 1917       Electronically Signed By: DEBORAH Rodríguez CRNA  May 30, 2024  7:17 PM

## 2024-05-31 NOTE — PLAN OF CARE
Goal Outcome Evaluation:  Patient vital signs are at baseline: Yes. Was on oxygen via NC at night.  Patient able to ambulate as they were prior to admission or with assist devices provided by therapies during their stay:  No,  Reason:  Was up to the bedside commode with Assist of 2. Non weight bearing to right foot.   Patient MUST void prior to discharge:  Yes  Patient able to tolerate oral intake:  Yes  Pain has adequate pain control using Oral analgesics:  Yes. Patient denied pain.   Does patient have an identified :  Yes  Has goal D/C date and time been discussed with patient:  Yes

## 2024-05-31 NOTE — ANESTHESIA POSTPROCEDURE EVALUATION
Patient: Marva Gaines    Procedure: Procedure(s):  AMPUTATION, fifth digit right foot       Anesthesia Type:  MAC    Note:  Disposition: Inpatient   Postop Pain Control: Uneventful            Sign Out: Well controlled pain   PONV: No   Neuro/Psych: Uneventful            Sign Out: Acceptable/Baseline neuro status   Airway/Respiratory: Uneventful            Sign Out: Acceptable/Baseline resp. status   CV/Hemodynamics: Uneventful            Sign Out: Acceptable CV status; No obvious hypovolemia; No obvious fluid overload   Other NRE: NONE   DID A NON-ROUTINE EVENT OCCUR? No           Last vitals:  Vitals:    05/30/24 1950 05/30/24 2000 05/30/24 2015   BP: (!) 168/77  (!) 167/76   Pulse: 77  77   Resp: 11  13   Temp: 37.2  C (98.9  F)     SpO2: 96% 99%        Electronically Signed By: Nam Justice MD  May 30, 2024  9:03 PM

## 2024-05-31 NOTE — PROGRESS NOTES
S: Pt is an 88 yof seen today at [facility], room 3110, for delivery of a right PRAFO (pressure reducing ankle foot orthosis), ordered by Dr. Ja DPM.  DX: Osteomyelitis of 5th digit right foot; s/p right 5th ray amputation  O: 5  2 . 132 lbs.  A: Pt was fit with a PRAFO for the right lower extremity over her ace bandage dressing without incident. Straps were trimmed appropriately. Pt was educated on donning/doffing and wear/care of the device. Pt stated her daughter will also be able to help her with donning and doffing of the device.  s written instructions were provided.  P: Pt is to wear PRAFO boot at all times, including overnight, per Dr. Peres's instructions. F/U PRN.  G: Protect lower extremity and prevent contracture of calf muscle while in bed.    Electronically signed by Yvonne Carrasco CPO, [id number]

## 2024-05-31 NOTE — PROGRESS NOTES
05/31/24 0130   Appointment Info   Signing Clinician's Name / Credentials (PT) Arturo Verma, KARLEY   Living Environment   People in Home alone   Current Living Arrangements independent living facility   Home Accessibility no concerns   Transportation Anticipated family or friend will provide   Self-Care   Usual Activity Tolerance good   Current Activity Tolerance moderate   Regular Exercise No   Equipment Currently Used at Home walker, rolling   Fall history within last six months no   General Information   Onset of Illness/Injury or Date of Surgery 05/29/24   Referring Physician Jimbo Joseph MD   Patient/Family Therapy Goals Statement (PT) To get back home safely.   Pertinent History of Current Problem (include personal factors and/or comorbidities that impact the POC) s/p 5th toe ampuation   Existing Precautions/Restrictions weight bearing;fall   Weight-Bearing Status - RLE nonweight-bearing   Cognition   Follows Commands (Cognition) follows one-step commands   Safety Deficit (Cognition) ability to follow commands   Range of Motion (ROM)   Range of Motion ROM deficits secondary to surgical procedure   Strength (Manual Muscle Testing)   Strength (Manual Muscle Testing) Deficits observed during functional mobility   Transfers   Transfers sit-stand transfer   Maintains Weight-bearing Status (Transfers) physical assist to maintain   Transfer Safety Concerns Noted decreased sequencing ability;inability to maintain weight-bearing restrictions w/o assist   Impairments Contributing to Impaired Transfers pain;decreased ROM;decreased strength   Sit-Stand Transfer   Sit-Stand [place] (Transfers) moderate assist (50% patient effort)   Assistive Device (Sit-Stand Transfers) walker, front-wheeled   Comment, (Sit-Stand Transfer) PRAFO RLE   Gait/Stairs (Locomotion)   [place] Level (Gait) unable to assess   Clinical Impression   Criteria for Skilled Therapeutic Intervention Yes, treatment indicated   PT Diagnosis (PT)  impaired functional mobility   Influenced by the following impairments pain, NWB RLE, decreased strength   Functional limitations due to impairments gait, transfers   Clinical Presentation (PT Evaluation Complexity) stable   Clinical Presentation Rationale patient presents as medically diagnosed   Clinical Decision Making (Complexity) low complexity   Planned Therapy Interventions (PT) gait training;patient/family education;transfer training   Risk & Benefits of therapy have been explained patient   PT Total Evaluation Time   PT Eval, Low Complexity Minutes (59883) 20   Physical Therapy Goals   PT Frequency 5x/week   PT Predicted Duration/Target Date for Goal Attainment 06/03/24   PT Goals Transfers;Gait   PT: Transfers Minimal assist;Sit to/from stand;Assistive device;Within precautions   PT: Gait Minimal assist;Assistive device;Within precautions;10 feet   Interventions   Interventions Quick Adds Therapeutic Activity   Therapeutic Activity   Therapeutic Activities: dynamic activities to improve functional performance Minutes (43869) 15   Treatment Detail/Skilled Intervention Patient sitting in bedside chair upon arrival. Education provided on using FWW for transfers and how to maintain NWB RLE. Sit to stand using FWW, mod A x 1 for safety and to maintain NWB restrictions. Patient able to stand with verbal cues for hand placement. Unable to walk or stand for very long requiring verbal and tactile cues to assume seated position. Patient able to scoot back into chair with verbal cueing.   PT Discharge Planning   PT Plan transfer training, HEP supine/seated   PT Discharge Recommendation (DC Rec) (S)  Transitional Care Facility   PT Rationale for DC Rec Patient is well below baseline, was living in independent living however will need higher level of assistance to complete ADL's and mobility.   PT Brief overview of current status Patient able to sit to stand transfer using FWW, mod A, verbal and tactile cues for hand  and foot placement. Physical assist under RLE to maintain NWB RLE. Unable to safely ambulate at this time.   Total Session Time   Timed Code Treatment Minutes 15   Total Session Time (sum of timed and untimed services) 35

## 2024-05-31 NOTE — PROGRESS NOTES
Patient vital signs are at baseline: No,  Reason:  on 1L o2 NC  Patient able to ambulate as they were prior to admission or with assist devices provided by therapies during their stay:  No,  Reason:  NWB on R foot  Patient MUST void prior to discharge:  Yes  voiding  Patient able to tolerate oral intake:  Yes  Pain has adequate pain control using Oral analgesics:  Yes  Does patient have an identified :  Yes  Has goal D/C date and time been discussed with patient:  Yes

## 2024-05-31 NOTE — PROGRESS NOTES
FOOT AND ANKLE SURGERY/PODIATRY Progress Note        ASSESSMENT:   S/p amputation fifth digit right foot  DM2      TREATMENT:  -Doing well today. Denies foot pain. Pathology and cultures pending. Afebrile.    -I reviewed yesterday's surgery with the patient to include amputation of the fifth digit with primary closure.     -Recommend continued non-weight bearing on the right foot. Surgical dressing to remain intact.  PRAFO boot right foot at all times including overnight.    -Medical management per hospitalist. Antibiotics per ID.  Patient okay for discharge from podiatry perspective pending final ID input.  I like to see her for follow-up in approximately 7 to 10 days.    Henry Peres DPM  [facility] Podiatry/Foot & Ankle Surgery      HPI: Marva Gaines was seen again today s/p amputation fifth digit right foot.  Patient denies pain today.  Patient was unable to communicate effectively with me during my visit.    History reviewed. No pertinent past medical history.    Past Surgical History:   Procedure Laterality Date    AMPUTATE TOE(S) Right 5/30/2024    Procedure: AMPUTATION, FIFTH DIGIT RIGHT FOOT;  Surgeon: Henry Peres DPM;  Location: [facility] OR    COLONOSCOPY N/A 12/30/2022    Procedure: COLONOSCOPY with argon plasma coagulation;  Surgeon: Steven Driscoll MD;  Location: [facility] OR    ESOPHAGOSCOPY, GASTROSCOPY, DUODENOSCOPY (EGD), COMBINED N/A 12/30/2022    Procedure: ESOPHAGOGASTRODUODENOSCOPY (EGD) with argon plasma coagulation;  Surgeon: Steven Driscoll MD;  Location: [facility] OR       Allergies   Allergen Reactions    Blood Transfusion Related (Informational Only) Other (See Comments)     Patient has a history of a clinically significant antibody against RBC antigens.  A delay in compatible RBCs may occur. 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    Hydrocodone-Acetaminophen Unknown         Current Facility-Administered Medications:     acetaminophen (TYLENOL) tablet 650 mg, 650 mg,  Oral, Q6H PRN, Henry Peres DPM    amoxicillin-clavulanate (AUGMENTIN) 500-125 MG per tablet 1 tablet, 1 tablet, Oral, Q12H JOANIE (08/20), Shon Dong MD, 1 tablet at 05/31/24 1041    apixaban ANTICOAGULANT (ELIQUIS) tablet 2.5 mg, 2.5 mg, Oral, BID, Jimbo Joseph MD, 2.5 mg at 05/31/24 1041    artificial saliva (BIOTENE MT) solution 1 spray, 1 spray, Mouth/Throat, 4x Daily, Henry Peres DPM, 1 spray at 05/30/24 0817    atorvastatin (LIPITOR) tablet 20 mg, 20 mg, Oral, At Bedtime, Henry Peres DPM, 20 mg at 05/30/24 2247    benzocaine-menthol (CEPACOL) 15-3.6 MG lozenge 1 lozenge, 1 lozenge, Buccal, Q1H PRN, Henry Peres DPM    [START ON 6/2/2024] bisacodyl (DULCOLAX) suppository 10 mg, 10 mg, Rectal, Daily PRN, Henry Peres DPM    calcium carbonate (TUMS) chewable tablet 1,000 mg, 1,000 mg, Oral, 4x Daily PRN, Henry Peres DPISAI    diltiazem ER COATED BEADS (CARDIZEM CD/CARTIA XT) 24 hr capsule 240 mg, 240 mg, Oral, QPM, Henry Peres DPM, 240 mg at 05/30/24 2014    furosemide (LASIX) tablet 40 mg, 40 mg, Oral, Daily, Henry Peres DPM, 40 mg at 05/31/24 0738    gabapentin (NEURONTIN) capsule 100 mg, 100 mg, Oral, At Bedtime, Henry Peres DPM, 100 mg at 05/30/24 2247    hydrALAZINE (APRESOLINE) injection 10 mg, 10 mg, Intravenous, Q4H PRN, Henry Peres DPM, 10 mg at 05/29/24 2044    lidocaine (LMX4) cream, , Topical, Q1H PRN, Henry Peres DPM    lidocaine (LMX4) cream, , Topical, Q1H PRN, Henry Peres DPM    lidocaine 1 % 0.1-1 mL, 0.1-1 mL, Other, Q1H PRN, Henry Peres DPM    losartan (COZAAR) tablet 25 mg, 25 mg, Oral, QPM, Henry Peres DPM, 25 mg at 05/30/24 2014    [START ON 6/1/2024] magnesium hydroxide (MILK OF MAGNESIA) suspension 30 mL, 30 mL, Oral, Daily PRN, Henry Peres DPM    naloxone (NARCAN) injection 0.2 mg, 0.2 mg, Intravenous, Q2 Min PRN **OR**  naloxone (NARCAN) injection 0.4 mg, 0.4 mg, Intravenous, Q2 Min PRN **OR** naloxone (NARCAN) injection 0.2 mg, 0.2 mg, Intramuscular, Q2 Min PRN **OR** naloxone (NARCAN) injection 0.4 mg, 0.4 mg, Intramuscular, Q2 Min PRN, Henry Peres DPM    ondansetron (ZOFRAN ODT) ODT tab 4 mg, 4 mg, Oral, Q6H PRN **OR** ondansetron (ZOFRAN) injection 4 mg, 4 mg, Intravenous, Q6H PRN, Henry Peres DPM    ondansetron (ZOFRAN ODT) ODT tab 4 mg, 4 mg, Oral, Q6H PRN **OR** ondansetron (ZOFRAN) injection 4 mg, 4 mg, Intravenous, Q6H PRN, Henry Peres DPM    oxyCODONE IR (ROXICODONE) half-tab 2.5 mg, 2.5 mg, Oral, Q4H PRN **OR** oxyCODONE (ROXICODONE) tablet 5 mg, 5 mg, Oral, Q4H PRN, Henry Peres DPM, 5 mg at 05/31/24 1043    oxyCODONE IR (ROXICODONE) half-tab 2.5 mg, 2.5 mg, Oral, Q4H PRN, Henry Peres DPM    pantoprazole (PROTONIX) EC tablet 40 mg, 40 mg, Oral, BID, Henry Peres DPM, 40 mg at 05/31/24 0738    polyethylene glycol (MIRALAX) Packet 17 g, 17 g, Oral, Daily, Henry Peres DPM    prochlorperazine (COMPAZINE) injection 5 mg, 5 mg, Intravenous, Q6H PRN **OR** prochlorperazine (COMPAZINE) tablet 5 mg, 5 mg, Oral, Q6H PRN, Henry Peres DPM    senna-docusate (SENOKOT-S/PERICOLACE) 8.6-50 MG per tablet 1 tablet, 1 tablet, Oral, BID, Henry Peres DPM    sertraline (ZOLOFT) tablet 100 mg, 100 mg, Oral, QPM, Henry Peres DPM, 100 mg at 05/30/24 2014    sodium chloride (PF) 0.9% PF flush 3 mL, 3 mL, Intracatheter, q1 min prn, Henry Peres, RODY    sodium chloride (PF) 0.9% PF flush 3 mL, 3 mL, Intracatheter, Q8H, Henry Peres DPM    sodium chloride (PF) 0.9% PF flush 3 mL, 3 mL, Intracatheter, q1 min prn, Henry Peres, RODY    sodium chloride (PF) 0.9% PF flush 3 mL, 3 mL, Intracatheter, Q8H, Henry Peres DPM, 3 mL at 05/30/24 0818    sodium chloride (PF) 0.9% PF flush 3 mL, 3 mL, Intracatheter, q1  "Ja martin Vincent Allen, DPM    History reviewed. No pertinent family history.    Social History     Socioeconomic History    Marital status: Single     Spouse name: Not on file    Number of children: Not on file    Years of education: Not on file    Highest education level: Not on file   Occupational History    Not on file   Tobacco Use    Smoking status: Former     Passive exposure: Past    Smokeless tobacco: Never   Vaping Use    Vaping status: Never Used   Substance and Sexual Activity    Alcohol use: Not on file    Drug use: Not on file    Sexual activity: Not on file   Other Topics Concern    Not on file   Social History Narrative    Not on file     Social Determinants of Health     Financial Resource Strain: Not on file   Food Insecurity: Not on file   Transportation Needs: Not on file   Physical Activity: Not on file   Stress: Not on file   Social Connections: Not on file   Interpersonal Safety: Low Risk  (1/11/2024)    Interpersonal Safety     Do you feel physically and emotionally safe where you currently live?: Yes     Within the past 12 months, have you been hit, slapped, kicked or otherwise physically hurt by someone?: No     Within the past 12 months, have you been humiliated or emotionally abused in other ways by your partner or ex-partner?: No   Housing Stability: Not on file       10 point Review of Systems is negative except for right foot infection which is noted in HPI.     BP (!) 145/65 (BP Location: Right arm)   Pulse 69   Temp 98.8  F (37.1  C) (Oral)   Resp 14   Ht 1.575 m (5' 2\")   Wt 59.9 kg (132 lb)   LMP  (LMP Unknown)   SpO2 95%   BMI 24.14 kg/m      BMI= Body mass index is 24.14 kg/m .    OBJECTIVE:  General appearance: Patient is alert and fully cooperative with history & exam.  No sign of distress is noted during the visit.  Surgical dressing c/d/I right foot.    Imaging:     MR Foot Right w/o & w Contrast    Result Date: 5/29/2024  EXAM: MR FOOT RIGHT W/O and W " CONTRAST LOCATION: [facility] DATE: 5/29/2024 INDICATION: Right 5th toe redness, question purulence at the pulp; pain, eval for osteomyelitis, gas, etc. COMPARISON: Right foot radiographic exam 5/26/2024 TECHNIQUE: Routine. Additional postgadolinium T1 sequences were obtained. IV CONTRAST: 6 mL Gadavist given FINDINGS: JOINTS AND BONES: -Apparent abnormal T2 hyperintense signal and enhancement within the proximal aspect of the ankylosed distalmost phalanx fifth toe. No definitive confluent T1 hypointense signal in the corresponding area. The fifth toe proximal phalanx is intact without evidence for fracture or bone infection. The remaining toes demonstrate normal marrow signal intensity without fracture or acute osteomyelitis of the first through fourth toes. No metatarsal fracture or metatarsal stress fracture. Scattered degenerative change in the right foot including at the first MTP and IP joints and in the mid foot. Synovitis at the first MTP and IP joints. No sizable joint effusion. TENDONS: -No acute forefoot tendon injury or significant tenosynovitis. Distal peroneus longus longus intact. Distal anterior tibialis intact. No significant tendinopathy. LIGAMENTS: -Lisfranc ligament: Intact. No subluxation. MUSCLES AND SOFT TISSUES: -Largely preserved intrinsic muscle bulk. Some mild myositis is seen surrounding the fifth metatarsal. Lateral foot soft tissue swelling with likely cellulitis. Fifth toe cellulitis. No well-defined or drainable fluid collection is seen to suggest abscess. Visualized plantar fascia is intact.     IMPRESSION: 1.  Fifth toe soft tissue swelling with likely cellulitis and probable shallow ulceration along the dorsum of the distal most ankylosed phalanx fifth toe particularly along the proximal margin. 2.  Abnormal edema like signal intensity and enhancement in the proximal aspect of the ankylosed distalmost phalanx fifth toe, which could represent early acute  osteomyelitis or reactive osteitis. Can a probe contact bone? 3.  Additional incidental findings as detailed fully above.    US Lower Extremity Arterial Duplex Bilateral    Result Date: 5/29/2024  EXAM: US LOWER EXTREMITY ARTERIAL DUPLEX BILATERAL LOCATION: [facility] DATE: 5/29/2024 INDICATION: Discoloration and pain to right fifth digit. COMPARISON: 7/21/2023. TECHNIQUE: Duplex utilizing 2D gray-scale imaging, Doppler interrogation with color-flow and spectral waveform analysis. FINDINGS: Patent arteries throughout the right and left lower extremities. In the right lower extremity, waveforms become monophasic in the mid SFA, suggesting stenosis that is not well visualized. In the left lower extremities, waveforms are multiphasic  throughout. RIGHT LOWER EXTREMITY ARTERIAL ASSESSMENT: External iliac artery 181 cm/s Common femoral artery: 167 cm/s Profunda femoris artery: 148 cm/s SFA (proximal): 146 cm/s SFA (mid): 136 cm/s SFA (distal): 107 cm/s Popliteal artery:  cm/s Posterior tibial artery: 104 cm/s Anterior tibial artery: 50 cm/s Dorsalis pedis artery: 90 cm/s LEFT LOWER EXTREMITY ARTERIAL ASSESSMENT: External iliac artery 207 cm/s Common femoral artery: 158 cm/s Profunda femoris artery: 180 cm/s SFA (proximal): 144 cm/s SFA (mid): 192 cm/s SFA (distal): 161 cm/s Popliteal artery:  cm/s Posterior tibial artery: 55 cm/s Anterior tibial artery: 33 cm/s Dorsalis pedis artery: 51 cm/s     IMPRESSION: 1.  Right lower extremity: Patent arteries throughout the right lower extremity. However, waveforms become monophasic at the level of the mid SFA, suggesting stenosis. 2.  Left lower extremity: Patent arteries throughout the left lower extremity. Waveforms are multiphasic throughout.    US LINDA with PPG wo Exercise    Result Date: 5/29/2024  EXAM: RESTING ANKLE-BRACHIAL INDICES (ABIs) LOCATION: [facility] DATE: 5/29/2024 INDICATION: PAD COMPARISON: 7/21/2023.  LINDA FINDINGS: RIGHT Brachial: 215 Ankle (PT): Noncompressible Ankle (DP): 179 Index: 0.83 Digit: 100 Index: 0.47 LEFT Brachial: 183 Ankle (PT): Noncompressible Ankle (DP): Noncompressible Digit: 161 Index: 0.75 The right LINDA at rest is 0.83. The left LINDA at rest is 0.75.      IMPRESSION: 1.  RIGHT LOWER EXTREMITY: LINDA at rest shows moderate arterial insufficiency. 2.  LEFT LOWER EXTREMITY: LINDA at rest shows moderate arterial insufficiency 3.  Dampened digital waveforms bilaterally.    XR Toe Rt 5th Little 3 Views    Result Date: 5/26/2024  EXAM: XR TOE RIGHT 5TH LITTLE 3 VIEWS, XR FOOT RIGHT 3+ VIEWS LOCATION: [facility] DATE: 05/26/2024 INDICATION: Toe pain and discoloration. One lateral view of little toe needed only, Pain in right foot. COMPARISON: None. IMPRESSION: Anatomic alignment right foot. No acute displaced right foot fracture is identified. Diffuse bone demineralization. Specifically, no right fifth toe fracture. Ankylosis at the DIP joint of the fifth toe. Degenerative change in the right foot is most pronounced at the first MTP joint, moderate. Mild swelling medial to the first MTP joint. Benign cortical thickening lateral shaft right fourth metatarsal.    XR Foot Rt 3+ Views    Result Date: 5/26/2024  EXAM: XR TOE RIGHT 5TH LITTLE 3 VIEWS, XR FOOT RIGHT 3+ VIEWS LOCATION: [facility] DATE: 05/26/2024 INDICATION: Toe pain and discoloration. One lateral view of little toe needed only, Pain in right foot. COMPARISON: None. IMPRESSION: Anatomic alignment right foot. No acute displaced right foot fracture is identified. Diffuse bone demineralization. Specifically, no right fifth toe fracture. Ankylosis at the DIP joint of the fifth toe. Degenerative change in the right foot is most pronounced at the first MTP joint, moderate. Mild swelling medial to the first MTP joint. Benign cortical thickening lateral shaft right fourth metatarsal.

## 2024-05-31 NOTE — CONSULTS
Care Management Initial Consult    General Information  Assessment completed with: Patient, Children, daughter Anabel       Primary Care Provider verified and updated as needed:     Readmission within the last 30 days:           Advance Care Planning:            Communication Assessment  Patient's communication style: spoken language (English or Bilingual)    Hearing Difficulty or Deaf: yes   Wear Glasses or Blind: yes    Cognitive  Cognitive/Neuro/Behavioral: WDL           Mood/Behavior: calm, cooperative          Living Environment:   People in home: alone     Current living Arrangements: independent living facility      Able to return to prior arrangements:         Family/Social Support:  Care provided by:    Provides care for:                  Description of Support System:           Current Resources:   Patient receiving home care services:       Community Resources:    Equipment currently used at home: walker, rolling  Supplies currently used at home:            Does the patient's insurance plan have a 3 day qualifying hospital stay waiver?  Yes     Which insurance plan 3 day waiver is available? Alternative insurance waiver    Will the waiver be used for post-acute placement? Undetermined at this time        Additional Information:  Initial assessment completed with pt and daughter Anabel.  Pt lives at [facility].  Discussed recommendation for TCU, pt and daughter in agreement.  Would prefer facility in [facility].    Referrals sent to   [facility]-declined pt due to do not accept pt's insurance.  [facility]-can accept pt pending [facility] auth.    JENNY Amezquita

## 2024-05-31 NOTE — OP NOTE
Date: 5/30/2024    Surgeon: KEDNRA Peres DPM    Preoperative diagnosis: Osteomyelitis fifth digit right foot    Postoperative diagnosis: Same    Procedure: Amputation fifth digit right foot    Anesthesia: Local with IV-sedation    Hemostasis: None    Pathology: Fifth digit, Aerobic/anaerobic culture     Injectables: None    Materials: 3-0 Vicryl, 3-0 Nylon    Complications: None    Blood loss: 4 cc      Findings: Patient presents for operative intervention for osteomyelitis of the fifth digit right foot.  I discussed today's surgical procedure with the patient and her daughter to include amputation of the fifth digit with primary closure.  Risks include but not limited to need for additional surgical intervention or partial foot amputation.  All questions invited and answered.  Consent has been obtained.  Patient questions invited and answered, including appropriate risk, benefits and complications. No guarantees given or implied. Patient has been NPO.    Description: Patient was brought to the operating room and placed on the table in supine position. IV-sedation was administered by the anesthesia department.  Injection of 0.5% Marcaine plain was administered to surgical site right foot. The foot was then prepped and draped in usual aseptic manner and the following procedure was then performed: Attention was directed to the fifth digit of the right foot where two semi-elliptical incisions were made at the base of the digit. The incisions were carried full thickness into the fifth MPJ where the digit was disarticulated. The digit was removed from the surgical site in toto and sent to pathology. Deep aerobic and anaerobic cultures taken. Next, a 1000cc pulse lavage was used to irrigate the surgical site. The subcutaneous tissue was reapproximated with 3-0 vicryl in a simple fashion and the skin was closed with 3-0 nylon in a simple suture fashion.     Dressings consisted of adaptic, 4x4's, ABD, kerlix/bran roll and an  ace wrap.     The patient appeared to tolerate all the procedures and anesthesia well without apparent complications. Patient was transported from the operating room to the recovery room with vital signs stable and neurovascular status as it was pre-operatively to the right foot. Patient to be returned to her in-house room for continued medical management per hospitalist.  Continue Zosyn/Vanco.  ID consult.  She should remain nonweightbearing on the right foot.  Surgical dressing to remain intact.  Elevate right foot above waist at all times.  PRAFO boot right foot at all times including overnight.    I discussed the above with the patient's daughter Anabel postoperatively

## 2024-05-31 NOTE — PROGRESS NOTES
[facility]    Medicine Progress Note - Hospitalist Service    Date of Admission:  5/29/2024    Assessment & Plan      Marva Gaines is a 88 year old female admitted on 5/29/2024. 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 then on day of admit to Urgent care and referred to ED. Podiatry consulted. They will decide if also request vascular input. They discussed w/ ED team and broad empiric abx, zosyn and vancomycin (pharm dosing) which are continued on admission for both the cellulitis (and possible accompanying abscess) and UTI. Urine cx w/ pansensitive E.Coli.    MRI confirmed cellulitis but raised question of early osteomyelitis; reviewed with pt and appreciate Podiatry consult. They discussed findings and options for iv abx plus debridement versus definitive amputation of 5th digit and pt opted for latter.     S/p Amputation fifth digit right foot by Dr. Peres with [facility] of [facility].     Therapies to see. ID consulted, pending, appreciate forthecoming. Apixaban resumption defer to podiatry, have paged about timing - received clearance to resume apixban, which will be started this morning.    ------  Cellulitis of 5th toe; Osteomyelitis s/p amputation  Ashen appearing toe though with dopplerable pulses in ER  Concern for ischemic tissue but not critical limb ischemia  A- as above. Appreciate podiatry and ID forthecoming.  P:   - consult to podiatry, appreciate   - MRI per podiatry  -had been on zosyn (q8 hours) and vancomycin (pharm dosing)   - defer further modification to Infectious Disease  -pain and nausea control  -pt and ot and sw     UTI, and prior hx  A/p- consistent clinically and objectively with episodic confusion and urinary urgency and  prior similar episodes  -abx as above  -- follow the in-process cultures, follow speciation of organism(s) and subsequently their sensitivities and resistance (S&R) and narrow antibiotics as appropriately     -> pansensitive e.coli    Hypokalemia  A/p- k replacement     Ckd 3b  A/p- hold losartan w/c for renal function; follow labs; monitor for progression, if does not improve, consider nephrology input    afib on eliquis  A/p-  resume 5/31/2024, cleared by podiatry  - would continue diltiazem w/ hold parameters    Htn  A/p- elevated but pain would exacerbate, no headaches or vision changes  - med rec pending, would continue both diltiazem and losartan with hold parameters    Hld  A/p- hold fish oil, continue statin    Anxiety  A/p- stable, would continue zoloft     HFpEF  A/p- appears slightly euvolemic vs a bit volume down but not floridly up so will hold lasix for now on admission   - resumed    prior hospitalization for GI bleed in January 2023, duodenal AVM  Reflux  A/p- stable, resume ppi              Diet: Advance Diet as Tolerated: Regular Diet Adult    DVT Prophylaxis: Pneumatic Compression Devices and given the ecchymoses around the 5th toe and prior hx of bleed and also possibly needing intervention pending podiatric input, hold the eliquis for now; pending Podiatry consult   Valadez Catheter: Not present  Lines: None     Cardiac Monitoring: None  Code Status: Full Code      Clinically Significant Risk Factors                  # Hypertension: Noted on problem list                   Disposition Plan   Therapies to see. ID consulted. Restarting blood thinner today. Likely tomorrow.           Jimbo Joseph MD  Hospitalist Service  [facility]  Securely message with Ronda (more info)  Text page via [facility] Paging/Directory   ______________________________________________________________________    Interval History   S/p surgery as in summary  No new sx or concerns and pain is 5/10 this  AM  Answered all of pt's questions   Discussed therapies and ID to see   Discussed her blood thinner  Discussed w RN  Paged to podiatry and cleared to resume; resumed apixban this morning     Physical Exam   Vital Signs: Temp: 98.8  F (37.1  C) Temp src: Oral BP: (!) 145/65 Pulse: 69   Resp: 14 SpO2: 95 % O2 Device: None (Room air) Oxygen Delivery: 1 LPM  Weight: 132 lbs 0 oz    General: alert, oriented, and in no acute distress  Pulmonary: clear to auscultation bilaterally, normal respiratory effort, on room air, no rales or wheezes or evidence of accessory muscle use  CVS: regular rate and rhythm, no murmurs, rubs, or gallops; no blatant jugular venous distention; no extremity edema and extremities are warm to the touch  GI: soft, nontender, BS+, no rebound or guarding, no conspicuous organomegaly   Neuro: nonfocal, moves all extremities of own volition, oriented  Msk- right foot in dressing and c/d/I; other digits outside of 5th with good ROM and sensation, s/p amputation stable dressing  Psych: appropriate          Medical Decision Making       49 MINUTES SPENT BY ME on the date of service doing chart review, history, exam, documentation & further activities per the note.      Data   ------------------------- PAST 24 HR DATA REVIEWED -----------------------------------------------        Imaging results reviewed over the past 24 hrs:   No results found for this or any previous visit (from the past 24 hour(s)).

## 2024-06-01 ENCOUNTER — APPOINTMENT (OUTPATIENT)
Dept: ULTRASOUND IMAGING | Facility: CLINIC | Age: 89
DRG: 503 | End: 2024-06-01
Attending: STUDENT IN AN ORGANIZED HEALTH CARE EDUCATION/TRAINING PROGRAM
Payer: COMMERCIAL

## 2024-06-01 ENCOUNTER — APPOINTMENT (OUTPATIENT)
Dept: PHYSICAL THERAPY | Facility: CLINIC | Age: 89
DRG: 503 | End: 2024-06-01
Payer: COMMERCIAL

## 2024-06-01 LAB
ANION GAP SERPL CALCULATED.3IONS-SCNC: 8 MMOL/L (ref 7–15)
BUN SERPL-MCNC: 26.6 MG/DL (ref 8–23)
CALCIUM SERPL-MCNC: 9.2 MG/DL (ref 8.8–10.2)
CHLORIDE SERPL-SCNC: 100 MMOL/L (ref 98–107)
CREAT SERPL-MCNC: 1.53 MG/DL (ref 0.51–0.95)
DEPRECATED HCO3 PLAS-SCNC: 27 MMOL/L (ref 22–29)
EGFRCR SERPLBLD CKD-EPI 2021: 33 ML/MIN/1.73M2
ERYTHROCYTE [DISTWIDTH] IN BLOOD BY AUTOMATED COUNT: 14.9 % (ref 10–15)
GLUCOSE BLDC GLUCOMTR-MCNC: 135 MG/DL (ref 70–99)
GLUCOSE SERPL-MCNC: 156 MG/DL (ref 70–99)
HCT VFR BLD AUTO: 37.3 % (ref 35–47)
HGB BLD-MCNC: 12.1 G/DL (ref 11.7–15.7)
MCH RBC QN AUTO: 28.2 PG (ref 26.5–33)
MCHC RBC AUTO-ENTMCNC: 32.4 G/DL (ref 31.5–36.5)
MCV RBC AUTO: 87 FL (ref 78–100)
PLATELET # BLD AUTO: 218 10E3/UL (ref 150–450)
POTASSIUM SERPL-SCNC: 3.9 MMOL/L (ref 3.4–5.3)
POTASSIUM SERPL-SCNC: 3.9 MMOL/L (ref 3.4–5.3)
RBC # BLD AUTO: 4.29 10E6/UL (ref 3.8–5.2)
SODIUM SERPL-SCNC: 135 MMOL/L (ref 135–145)
WBC # BLD AUTO: 14.8 10E3/UL (ref 4–11)

## 2024-06-01 PROCEDURE — 80048 BASIC METABOLIC PNL TOTAL CA: CPT | Performed by: STUDENT IN AN ORGANIZED HEALTH CARE EDUCATION/TRAINING PROGRAM

## 2024-06-01 PROCEDURE — 97530 THERAPEUTIC ACTIVITIES: CPT | Mod: GP

## 2024-06-01 PROCEDURE — 250N000013 HC RX MED GY IP 250 OP 250 PS 637: Performed by: PODIATRIST

## 2024-06-01 PROCEDURE — 250N000013 HC RX MED GY IP 250 OP 250 PS 637: Performed by: STUDENT IN AN ORGANIZED HEALTH CARE EDUCATION/TRAINING PROGRAM

## 2024-06-01 PROCEDURE — 250N000013 HC RX MED GY IP 250 OP 250 PS 637: Performed by: INTERNAL MEDICINE

## 2024-06-01 PROCEDURE — 85014 HEMATOCRIT: CPT | Performed by: STUDENT IN AN ORGANIZED HEALTH CARE EDUCATION/TRAINING PROGRAM

## 2024-06-01 PROCEDURE — 258N000003 HC RX IP 258 OP 636: Performed by: STUDENT IN AN ORGANIZED HEALTH CARE EDUCATION/TRAINING PROGRAM

## 2024-06-01 PROCEDURE — 99232 SBSQ HOSP IP/OBS MODERATE 35: CPT | Performed by: STUDENT IN AN ORGANIZED HEALTH CARE EDUCATION/TRAINING PROGRAM

## 2024-06-01 PROCEDURE — 76770 US EXAM ABDO BACK WALL COMP: CPT

## 2024-06-01 PROCEDURE — 120N000001 HC R&B MED SURG/OB

## 2024-06-01 PROCEDURE — 36415 COLL VENOUS BLD VENIPUNCTURE: CPT | Performed by: STUDENT IN AN ORGANIZED HEALTH CARE EDUCATION/TRAINING PROGRAM

## 2024-06-01 PROCEDURE — 97110 THERAPEUTIC EXERCISES: CPT | Mod: GP

## 2024-06-01 RX ORDER — SODIUM CHLORIDE 9 MG/ML
INJECTION, SOLUTION INTRAVENOUS CONTINUOUS
Status: ACTIVE | OUTPATIENT
Start: 2024-06-01 | End: 2024-06-02

## 2024-06-01 RX ADMIN — APIXABAN 2.5 MG: 2.5 TABLET, FILM COATED ORAL at 20:14

## 2024-06-01 RX ADMIN — APIXABAN 2.5 MG: 2.5 TABLET, FILM COATED ORAL at 08:49

## 2024-06-01 RX ADMIN — AMOXICILLIN AND CLAVULANATE POTASSIUM 1 TABLET: 500; 125 TABLET, FILM COATED ORAL at 08:49

## 2024-06-01 RX ADMIN — Medication 1 SPRAY: at 08:50

## 2024-06-01 RX ADMIN — ATORVASTATIN CALCIUM 20 MG: 10 TABLET, FILM COATED ORAL at 20:14

## 2024-06-01 RX ADMIN — OXYCODONE 5 MG: 5 TABLET ORAL at 00:47

## 2024-06-01 RX ADMIN — DILTIAZEM HYDROCHLORIDE 240 MG: 120 CAPSULE, EXTENDED RELEASE ORAL at 20:14

## 2024-06-01 RX ADMIN — OXYCODONE 2.5 MG: 5 TABLET ORAL at 17:39

## 2024-06-01 RX ADMIN — OXYCODONE 2.5 MG: 5 TABLET ORAL at 13:27

## 2024-06-01 RX ADMIN — PANTOPRAZOLE SODIUM 40 MG: 40 TABLET, DELAYED RELEASE ORAL at 20:14

## 2024-06-01 RX ADMIN — PANTOPRAZOLE SODIUM 40 MG: 40 TABLET, DELAYED RELEASE ORAL at 08:50

## 2024-06-01 RX ADMIN — GABAPENTIN 100 MG: 100 CAPSULE ORAL at 20:14

## 2024-06-01 RX ADMIN — SERTRALINE 100 MG: 100 TABLET, FILM COATED ORAL at 20:14

## 2024-06-01 RX ADMIN — Medication 1 SPRAY: at 12:16

## 2024-06-01 RX ADMIN — FUROSEMIDE 40 MG: 40 TABLET ORAL at 08:49

## 2024-06-01 RX ADMIN — SODIUM CHLORIDE: 9 INJECTION, SOLUTION INTRAVENOUS at 09:16

## 2024-06-01 RX ADMIN — AMOXICILLIN AND CLAVULANATE POTASSIUM 1 TABLET: 500; 125 TABLET, FILM COATED ORAL at 20:14

## 2024-06-01 RX ADMIN — OXYCODONE 2.5 MG: 5 TABLET ORAL at 08:58

## 2024-06-01 RX ADMIN — OXYCODONE 5 MG: 5 TABLET ORAL at 21:28

## 2024-06-01 ASSESSMENT — ACTIVITIES OF DAILY LIVING (ADL)
ADLS_ACUITY_SCORE: 53
ADLS_ACUITY_SCORE: 53
ADLS_ACUITY_SCORE: 37
ADLS_ACUITY_SCORE: 47
ADLS_ACUITY_SCORE: 37
ADLS_ACUITY_SCORE: 45
ADLS_ACUITY_SCORE: 51
ADLS_ACUITY_SCORE: 47
ADLS_ACUITY_SCORE: 37
ADLS_ACUITY_SCORE: 41
ADLS_ACUITY_SCORE: 45
ADLS_ACUITY_SCORE: 37
ADLS_ACUITY_SCORE: 49
ADLS_ACUITY_SCORE: 37
ADLS_ACUITY_SCORE: 47
ADLS_ACUITY_SCORE: 45
ADLS_ACUITY_SCORE: 43
ADLS_ACUITY_SCORE: 53
ADLS_ACUITY_SCORE: 53
ADLS_ACUITY_SCORE: 37
ADLS_ACUITY_SCORE: 41
ADLS_ACUITY_SCORE: 49
ADLS_ACUITY_SCORE: 41

## 2024-06-01 NOTE — PROGRESS NOTES
[facility]    Medicine Progress Note - Hospitalist Service    Date of Admission:  5/29/2024    Assessment & Plan   Marva Gaines is a 88 year old female admitted on 5/29/2024. 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 Podiatry consulted. They will decide if also request vascular input. They discussed w/ ED team and broad empiric abx, zosyn and vancomycin (pharm dosing) which are continued on admission for both the cellulitis (and possible accompanying abscess) and UTI. Urine cx w/ pansensitive E.Coli.    MRI confirmed cellulitis but raised question of early osteomyelitis; reviewed with pt and appreciate Podiatry consult. They discussed findings and options for iv abx plus debridement versus definitive amputation of 5th digit and pt opted for latter.     S/p Amputation fifth digit right foot by Dr. Peres with EBL of 4ccs. ID consulted and switched abx to Augmentin twice daily x 7 days. Therapies recommend TCU. Discussed with SW. Apixaban resumed 5/31 w/ podiatric clearance.    Renal function also continues to worsen now 1.53, starting MIVFs gently x 24 hours and renal US. Was on room air, then 1-2L but no respiratory sx, titrate with 02 goals for 90% and above (do not keep too high at 99%).     Barriers: worsening SHY, renal US, and TCU placement  ------  Cellulitis of 5th toe; Osteomyelitis s/p amputation  Ashen appearing toe though with dopplerable pulses in ER  Concern for ischemic tissue but not critical limb ischemia  leukocytosis  A- as above. Appreciate podiatry and ID. Wbc up may be from surgery recently, no fevers  P:   - consult to podiatry, appreciate   - MRI per podiatry  -had  been on zosyn (q8 hours) and vancomycin (pharm dosing)   - defer further modification to Infectious Disease   -Augmentin twice daily x 7 days.   -pain and nausea control  -pt and ot and sw     UTI, and prior hx  A/p- consistent clinically and objectively with episodic confusion and urinary urgency and prior similar episodes  -abx as above  -- follow the in-process cultures, follow speciation of organism(s) and subsequently their sensitivities and resistance (S&R) and narrow antibiotics as appropriately     -> pansensitive e.coli    Hypokalemia  A/p- k replacement     Acute on chronic Kidney injury, ckd 3b  A/p- hold losartan w/c for renal function; follow labs; monitor for progression, if does not improve, consider nephrology input  - MIVFs 75ccs/hr x24 hours and recheck labs  - US renal for obstruction   - losartan was resumed by a crosscover physician -- I have held it    afib on eliquis  A/p-  resume eliquis 5/31/2024, cleared by podiatry  - continue diltiazem w/ hold parameters    Htn  A/p- elevated but pain would exacerbate, no headaches or vision changes  - med rec pending, would continue both diltiazem and losartan with hold parameters    Hld  A/p- hold fish oil, continue statin    Anxiety  A/p- stable, would continue zoloft     HFpEF  A/p- appears euvolemic, no rales. However was on room air now 1-2L but pt does not have symptoms-- per chart 02 is at 98 and 99%, can wean as able. She was on RA last night.  -titrate with 02 goals for 90% and above (do not keep too high at 99%).  - resumed lasix  - for SHY, gentle MIVFs cautiously    prior hospitalization for GI bleed in January 2023, duodenal AVM  Reflux  A/p- stable, resume ppi             Diet: Advance Diet as Tolerated: Regular Diet Adult    DVT Prophylaxis: Pneumatic Compression Devices and given the ecchymoses around the 5th toe and prior hx of bleed and also possibly needing intervention pending podiatric input, hold the eliquis for now; pending  Podiatry consult   Valadez Catheter: Not present  Lines: None     Cardiac Monitoring: None  Code Status: Full Code      Clinically Significant Risk Factors        # Hypokalemia: Lowest K = 3.1 mmol/L in last 2 days, will replace as needed           # Hypertension: Noted on problem list                   Disposition Plan    Barriers: worsening SHY, renal US, and TCU placement           Jimbo Joseph MD  Hospitalist Service  [facility]  Securely message with [employer] (more info)  Text page via [employer] Paging/[employer]   ______________________________________________________________________    Interval History   Pt is  sleepy this AM, pain is worse, however she still has room with her PRNs  Messaged RN  Discussed w/ SW  Pt has otherwise no new sx or concerns   Answered all of pt's questions       Physical Exam   Vital Signs: Temp: 98.5  F (36.9  C) Temp src: Oral BP: (!) 140/65 Pulse: 83   Resp: 14 SpO2: 98 % O2 Device: Nasal cannula Oxygen Delivery: 2 LPM  Weight: 132 lbs 0 oz    General: alert, oriented, and in no acute distress  Pulmonary: clear to auscultation bilaterally, normal respiratory effort, on room air, no rales or wheezes or evidence of accessory muscle use  CVS: regular rate and rhythm, no murmurs, rubs, or gallops; no blatant jugular venous distention; no extremity edema and extremities are warm to the touch  GI: soft, nontender, BS+, no rebound or guarding, no conspicuous organomegaly   Neuro: nonfocal, moves all extremities of own volition, oriented  Msk- right foot in dressing and c/d/I and in boot; other digits outside of 5th with good ROM and sensation, s/p amputation stable dressing  Psych: appropriate          Medical Decision Making       45 MINUTES SPENT BY ME on the date of service doing chart review, history, exam, documentation & further activities per the note.      Data   ------------------------- PAST 24 HR DATA REVIEWED  -----------------------------------------------    I have personally reviewed the following data over the past 24 hrs:    14.8 (H)  \   12.1   / 218     135 100 26.6 (H) /  135 (H)   3.9; 3.9 27 1.53 (H) \       Imaging results reviewed over the past 24 hrs:   No results found for this or any previous visit (from the past 24 hour(s)).

## 2024-06-01 NOTE — PLAN OF CARE
Problem: Lower Extremity Amputation  Goal: Optimal Adjustment to Amputation  Outcome: Not Progressing  Intervention: Promote Patient and Family Adjustment to Amputation  Recent Flowsheet Documentation  Taken 6/1/2024 0845 by Ling Taylor RN  Supportive Measures:   active listening utilized   counseling provided   relaxation techniques promoted   self-care encouraged   goal-setting facilitated  Social Functional Ability Promotion: social interaction promoted  Goal: Optimal Safe IADL Performance  Outcome: Not Progressing  Goal: Optimal Mobility [place] and Safety  Outcome: Not Progressing  Goal: Effective Prosthesis Use and Management  Outcome: Not Progressing   Goal Outcome Evaluation:  Pt pain controlled with po pain meds pain 8/10 to 6/10. Pt resting in between cares with pulse ox and end tidal in nose.     Pt ambulates heavy assist of 3 with walker and gait belt.     Pt has had frequent loose stools, incontinent of bowel and bladder... stool becoming more structured with time.     Pt needs table set up for meals, HOB elevated, sitting up straight, lights on.     Daughter at bedside today.     Renal US completed.

## 2024-06-01 NOTE — PLAN OF CARE
Problem: Fall Injury Risk  Goal: Absence of Fall and Fall-Related Injury  Outcome: Progressing  Intervention: Identify and Manage Contributors     Problem: Lower Extremity Amputation  Goal: Optimal Adjustment to Amputation  Outcome: Progressing     Problem: Pain Acute  Goal: Optimal Pain Control and Function  Intervention: Develop Pain Management Plan     Patient vital signs are at baseline: Yes, except required 1L of O2 during the night.  Baseline uses O2 at home, but pt. Unsure how much.  Patient able to ambulate as they were prior to admission or with assist devices provided by therapies during their stay:  No,  Reason:  requires AO 1-2 pivot to bedside commode.   Patient MUST void prior to discharge:  Yes, incontinent of urine, up to the bedside commode.  Patient able to tolerate oral intake:  Yes  Pain has adequate pain control using Oral analgesics:  Yes  Does patient have an identified :  Yes  Has goal D/C date and time been discussed with patient:  Yes    Patient a/o x4.  IV fluids SL. Tolerated oral medications. Boot intact and dressing CDI and CMS intact. Held scheduled senna due to loose BM on previous shift.  Had x1 incontinent loose BM during shift.  Pain was controlled with PRN PO Oxycodone, scheduled medications, repositioning, and rest. Micro Q2 turns, redness on buttocks, applied barrier cream. On potassium protocol, replaced during shift and ran protocol, recheck: 3.9, ran protocol next recheck 6/2 AM.  Denied chest pain, SOB, lightheadedness, dizziness, or N/V.  Bed alarm on for safety precautions.

## 2024-06-02 ENCOUNTER — APPOINTMENT (OUTPATIENT)
Dept: RADIOLOGY | Facility: CLINIC | Age: 89
DRG: 503 | End: 2024-06-02
Attending: STUDENT IN AN ORGANIZED HEALTH CARE EDUCATION/TRAINING PROGRAM
Payer: COMMERCIAL

## 2024-06-02 ENCOUNTER — APPOINTMENT (OUTPATIENT)
Dept: OCCUPATIONAL THERAPY | Facility: CLINIC | Age: 89
DRG: 503 | End: 2024-06-02
Attending: STUDENT IN AN ORGANIZED HEALTH CARE EDUCATION/TRAINING PROGRAM
Payer: COMMERCIAL

## 2024-06-02 ENCOUNTER — APPOINTMENT (OUTPATIENT)
Dept: PHYSICAL THERAPY | Facility: CLINIC | Age: 89
DRG: 503 | End: 2024-06-02
Payer: COMMERCIAL

## 2024-06-02 LAB
ANION GAP SERPL CALCULATED.3IONS-SCNC: 9 MMOL/L (ref 7–15)
BUN SERPL-MCNC: 24.4 MG/DL (ref 8–23)
CALCIUM SERPL-MCNC: 9.8 MG/DL (ref 8.8–10.2)
CHLORIDE SERPL-SCNC: 98 MMOL/L (ref 98–107)
CREAT SERPL-MCNC: 1.19 MG/DL (ref 0.51–0.95)
DEPRECATED HCO3 PLAS-SCNC: 26 MMOL/L (ref 22–29)
EGFRCR SERPLBLD CKD-EPI 2021: 44 ML/MIN/1.73M2
ERYTHROCYTE [DISTWIDTH] IN BLOOD BY AUTOMATED COUNT: 14.7 % (ref 10–15)
GLUCOSE BLDC GLUCOMTR-MCNC: 114 MG/DL (ref 70–99)
GLUCOSE SERPL-MCNC: 134 MG/DL (ref 70–99)
HCT VFR BLD AUTO: 39.2 % (ref 35–47)
HGB BLD-MCNC: 12.4 G/DL (ref 11.7–15.7)
LACTATE SERPL-SCNC: 0.9 MMOL/L (ref 0.7–2)
MCH RBC QN AUTO: 28.3 PG (ref 26.5–33)
MCHC RBC AUTO-ENTMCNC: 31.6 G/DL (ref 31.5–36.5)
MCV RBC AUTO: 90 FL (ref 78–100)
PLATELET # BLD AUTO: 195 10E3/UL (ref 150–450)
POTASSIUM SERPL-SCNC: 3.9 MMOL/L (ref 3.4–5.3)
PROCALCITONIN SERPL IA-MCNC: 0.46 NG/ML
RBC # BLD AUTO: 4.38 10E6/UL (ref 3.8–5.2)
SODIUM SERPL-SCNC: 133 MMOL/L (ref 135–145)
WBC # BLD AUTO: 13.9 10E3/UL (ref 4–11)

## 2024-06-02 PROCEDURE — 80048 BASIC METABOLIC PNL TOTAL CA: CPT | Performed by: STUDENT IN AN ORGANIZED HEALTH CARE EDUCATION/TRAINING PROGRAM

## 2024-06-02 PROCEDURE — 97530 THERAPEUTIC ACTIVITIES: CPT | Mod: GP

## 2024-06-02 PROCEDURE — 84145 PROCALCITONIN (PCT): CPT | Performed by: STUDENT IN AN ORGANIZED HEALTH CARE EDUCATION/TRAINING PROGRAM

## 2024-06-02 PROCEDURE — 71045 X-RAY EXAM CHEST 1 VIEW: CPT

## 2024-06-02 PROCEDURE — 250N000013 HC RX MED GY IP 250 OP 250 PS 637: Performed by: PODIATRIST

## 2024-06-02 PROCEDURE — 120N000001 HC R&B MED SURG/OB

## 2024-06-02 PROCEDURE — 97535 SELF CARE MNGMENT TRAINING: CPT | Mod: GO

## 2024-06-02 PROCEDURE — 36415 COLL VENOUS BLD VENIPUNCTURE: CPT | Performed by: STUDENT IN AN ORGANIZED HEALTH CARE EDUCATION/TRAINING PROGRAM

## 2024-06-02 PROCEDURE — 85027 COMPLETE CBC AUTOMATED: CPT | Performed by: INTERNAL MEDICINE

## 2024-06-02 PROCEDURE — 258N000003 HC RX IP 258 OP 636: Performed by: STUDENT IN AN ORGANIZED HEALTH CARE EDUCATION/TRAINING PROGRAM

## 2024-06-02 PROCEDURE — 250N000013 HC RX MED GY IP 250 OP 250 PS 637: Performed by: INTERNAL MEDICINE

## 2024-06-02 PROCEDURE — 99233 SBSQ HOSP IP/OBS HIGH 50: CPT | Performed by: STUDENT IN AN ORGANIZED HEALTH CARE EDUCATION/TRAINING PROGRAM

## 2024-06-02 PROCEDURE — 99207 PR NO BILLABLE SERVICE THIS VISIT: CPT | Performed by: INTERNAL MEDICINE

## 2024-06-02 PROCEDURE — 250N000011 HC RX IP 250 OP 636: Performed by: STUDENT IN AN ORGANIZED HEALTH CARE EDUCATION/TRAINING PROGRAM

## 2024-06-02 PROCEDURE — 250N000013 HC RX MED GY IP 250 OP 250 PS 637: Performed by: STUDENT IN AN ORGANIZED HEALTH CARE EDUCATION/TRAINING PROGRAM

## 2024-06-02 PROCEDURE — 83605 ASSAY OF LACTIC ACID: CPT | Performed by: STUDENT IN AN ORGANIZED HEALTH CARE EDUCATION/TRAINING PROGRAM

## 2024-06-02 PROCEDURE — 97167 OT EVAL HIGH COMPLEX 60 MIN: CPT | Mod: GO

## 2024-06-02 RX ORDER — SODIUM CHLORIDE 9 MG/ML
INJECTION, SOLUTION INTRAVENOUS CONTINUOUS
Status: DISCONTINUED | OUTPATIENT
Start: 2024-06-02 | End: 2024-06-02

## 2024-06-02 RX ORDER — VANCOMYCIN HYDROCHLORIDE 1 G/200ML
1000 INJECTION, SOLUTION INTRAVENOUS EVERY 24 HOURS
Status: DISCONTINUED | OUTPATIENT
Start: 2024-06-02 | End: 2024-06-03

## 2024-06-02 RX ORDER — PIPERACILLIN SODIUM, TAZOBACTAM SODIUM 3; .375 G/15ML; G/15ML
3.38 INJECTION, POWDER, LYOPHILIZED, FOR SOLUTION INTRAVENOUS EVERY 8 HOURS
Status: DISCONTINUED | OUTPATIENT
Start: 2024-06-02 | End: 2024-06-04 | Stop reason: HOSPADM

## 2024-06-02 RX ORDER — PIPERACILLIN SODIUM, TAZOBACTAM SODIUM 3; .375 G/15ML; G/15ML
3.38 INJECTION, POWDER, LYOPHILIZED, FOR SOLUTION INTRAVENOUS ONCE
Status: COMPLETED | OUTPATIENT
Start: 2024-06-02 | End: 2024-06-02

## 2024-06-02 RX ADMIN — ATORVASTATIN CALCIUM 20 MG: 10 TABLET, FILM COATED ORAL at 20:28

## 2024-06-02 RX ADMIN — DILTIAZEM HYDROCHLORIDE 240 MG: 120 CAPSULE, EXTENDED RELEASE ORAL at 20:28

## 2024-06-02 RX ADMIN — PANTOPRAZOLE SODIUM 40 MG: 40 TABLET, DELAYED RELEASE ORAL at 20:28

## 2024-06-02 RX ADMIN — PIPERACILLIN AND TAZOBACTAM 3.38 G: 3; .375 INJECTION, POWDER, FOR SOLUTION INTRAVENOUS at 20:30

## 2024-06-02 RX ADMIN — PANTOPRAZOLE SODIUM 40 MG: 40 TABLET, DELAYED RELEASE ORAL at 08:56

## 2024-06-02 RX ADMIN — APIXABAN 2.5 MG: 2.5 TABLET, FILM COATED ORAL at 08:56

## 2024-06-02 RX ADMIN — PIPERACILLIN AND TAZOBACTAM 3.38 G: 3; .375 INJECTION, POWDER, FOR SOLUTION INTRAVENOUS at 16:20

## 2024-06-02 RX ADMIN — APIXABAN 2.5 MG: 2.5 TABLET, FILM COATED ORAL at 20:31

## 2024-06-02 RX ADMIN — SODIUM CHLORIDE: 9 INJECTION, SOLUTION INTRAVENOUS at 12:06

## 2024-06-02 RX ADMIN — GABAPENTIN 100 MG: 100 CAPSULE ORAL at 20:28

## 2024-06-02 RX ADMIN — SERTRALINE 100 MG: 100 TABLET, FILM COATED ORAL at 20:28

## 2024-06-02 RX ADMIN — AMOXICILLIN AND CLAVULANATE POTASSIUM 1 TABLET: 500; 125 TABLET, FILM COATED ORAL at 08:56

## 2024-06-02 RX ADMIN — VANCOMYCIN HYDROCHLORIDE 1000 MG: 1 INJECTION, SOLUTION INTRAVENOUS at 16:20

## 2024-06-02 RX ADMIN — Medication 1 SPRAY: at 16:28

## 2024-06-02 RX ADMIN — FUROSEMIDE 40 MG: 40 TABLET ORAL at 08:56

## 2024-06-02 RX ADMIN — SENNOSIDES AND DOCUSATE SODIUM 1 TABLET: 50; 8.6 TABLET ORAL at 08:56

## 2024-06-02 RX ADMIN — POLYETHYLENE GLYCOL 3350 17 G: 17 POWDER, FOR SOLUTION ORAL at 08:56

## 2024-06-02 ASSESSMENT — ACTIVITIES OF DAILY LIVING (ADL)
ADLS_ACUITY_SCORE: 51
ADLS_ACUITY_SCORE: 51
ADLS_ACUITY_SCORE: 49
ADLS_ACUITY_SCORE: 53
PREVIOUS_RESPONSIBILITIES: MEAL PREP;HOUSEKEEPING;LAUNDRY
ADLS_ACUITY_SCORE: 51
ADLS_ACUITY_SCORE: 45
ADLS_ACUITY_SCORE: 49
ADLS_ACUITY_SCORE: 45
ADLS_ACUITY_SCORE: 44
ADLS_ACUITY_SCORE: 45
ADLS_ACUITY_SCORE: 51
ADLS_ACUITY_SCORE: 49
ADLS_ACUITY_SCORE: 44
ADLS_ACUITY_SCORE: 51
ADLS_ACUITY_SCORE: 47
ADLS_ACUITY_SCORE: 45
ADLS_ACUITY_SCORE: 45
ADLS_ACUITY_SCORE: 49
ADLS_ACUITY_SCORE: 47
ADLS_ACUITY_SCORE: 51
ADLS_ACUITY_SCORE: 47

## 2024-06-02 NOTE — PHARMACY-VANCOMYCIN DOSING SERVICE
"Pharmacy Vancomycin Initial Note  Date of Service 2024  Patient's  1935  88 year old, female    Indication: Osteomyelitis    Current estimated CrCl = Estimated Creatinine Clearance: 32.1 mL/min (A) (based on SCr of 1.19 mg/dL (H)).    Creatinine for last 3 days  2024:  7:35 AM Creatinine 1.40 mg/dL  2024:  4:22 AM Creatinine 1.53 mg/dL  2024:  7:35 AM Creatinine 1.19 mg/dL    Recent Vancomycin Level(s) for last 3 days  2024: 10:09 AM Vancomycin 12.6 ug/mL      Vancomycin IV Administrations (past 72 hours)                     vancomycin (VANCOCIN) 1,000 mg in NaCl 0.9% 200 mL intermittent infusion (mg) 1,000 mg Given 24     1,000 mg Given                      Nephrotoxins and other renal medications (From now, onward)      Start     Dose/Rate Route Frequency Ordered Stop    24 213  piperacillin-tazobactam (ZOSYN) 3.375 g vial to attach to  mL bag        Note to Pharmacy: For [facility], [facility] and [facility]: For Zosyn-naive patients, use the \"Zosyn initial dose + extended infusion\" order panel.    3.375 g  over 240 Minutes Intravenous EVERY 8 HOURS 24 1507      24 1530  piperacillin-tazobactam (ZOSYN) 3.375 g vial to attach to  mL bag         3.375 g  over 30 Minutes Intravenous ONCE 24 1515      24 1530  vancomycin (VANCOCIN) 1,000 mg in NaCl 0.9% 200 mL intermittent infusion         1,000 mg  over 60 Minutes Intravenous EVERY 24 HOURS 24 1518      24 1030  [Held by provider]  amoxicillin-clavulanate (AUGMENTIN) 500-125 MG per tablet 1 tablet        (On hold since today at 1507 until manually unheld; held by Jimbo Joseph MDHold Reason: Change in Vitals)    1 tablet Oral EVERY 12 HOURS SCHEDULED 24 1014      24 1930  furosemide (LASIX) tablet 40 mg        Note to Pharmacy: PTA Sig:TAKE 2 TABLETS BY MOUTH EVERY DAY      40 mg Oral DAILY 24              Contrast Orders - past 72 hours (72h ago, onward)      " None                  Plan:  Start vancomycin  1000 mg IV q24h.   Vancomycin monitoring method: AUC  Vancomycin therapeutic monitoring goal: 400-600 mg*h/L  Pharmacy will check vancomycin levels as appropriate in 1-3 Days.    Serum creatinine levels will be ordered daily for the first week of therapy and at least twice weekly for subsequent weeks.      Carlita Veras RPH

## 2024-06-02 NOTE — PLAN OF CARE
Goal Outcome Evaluation:    Patient is alert and oriented x4, forgetful and confused at times. Elevated Bps. Weaned patient O2 from 1.5 LPM to 0.5 LPM with saturations in low to mid 90s. Patient has not been out of bed. Repositioning Q2h with incontinence care and fluid encouragement. External catheter in place. Call light within reach.     Problem: Adult Inpatient Plan of Care  Goal: Absence of Hospital-Acquired Illness or Injury  Intervention: Identify and Manage Fall Risk  Recent Flowsheet Documentation  Taken 6/2/2024 0131 by Molly Salinas RN  Safety Promotion/Fall Prevention: safety round/check completed  Intervention: Prevent Skin Injury  Recent Flowsheet Documentation  Taken 6/2/2024 0131 by Molly Salinas RN  Body Position:   log-rolled   turned   right  Skin Protection:   adhesive use limited   transparent dressing maintained  Device Skin Pressure Protection:   tubing/devices free from skin contact   adhesive use limited   absorbent pad utilized/changed  Intervention: Prevent and Manage VTE (Venous Thromboembolism) Risk  Recent Flowsheet Documentation  Taken 6/2/2024 0131 by Molly Salinas RN  VTE Prevention/Management: SCDs (sequential compression devices) on  Intervention: Prevent Infection  Recent Flowsheet Documentation  Taken 6/2/2024 0131 by Molly Salinas RN  Infection Prevention:   cohorting utilized   environmental surveillance performed   equipment surfaces disinfected   hand hygiene promoted   personal protective equipment utilized   rest/sleep promoted   single patient room provided

## 2024-06-02 NOTE — PROGRESS NOTES
Care Management Follow Up    Length of Stay (days): 4    Expected Discharge Date: 06/03/2024     Concerns to be Addressed:       Patient plan of care discussed at interdisciplinary rounds: Yes    Anticipated Discharge Disposition: Transitional Care     Anticipated Discharge Services: None  Anticipated Discharge DME:  None    Patient/family educated on Medicare website which has current facility and service quality ratings:  (Family requested [facility] referrals)  Education Provided on the Discharge Plan: Yes  Patient/Family in Agreement with the Plan: yes    Referrals Placed by CM/SW: Post Acute Facilities  Private pay costs discussed: Not applicable    Additional Information:  LARRY spoke with Amara at [facility]. She will submit auth tomorrow morning and requested an updated PT note.    LARRY spoke with PT who has pt scheduled today.    LARRY did request OT consult to assist with auth.    Concha Gordon

## 2024-06-02 NOTE — PROGRESS NOTES
06/02/24 1300   Appointment Info   Signing Clinician's Name / Credentials (OT) Kathi Gonzalez OTR/L   Living Environment   People in Home alone   Current Living Arrangements independent living facility   Home Accessibility no concerns   Living Environment Comments ILF, WIS, RTS   Self-Care   Usual Activity Tolerance good   Current Activity Tolerance moderate   Equipment Currently Used at Home grab bar, toilet;walker, rolling   Activity/Exercise/Self-Care Comment unclear regarding PLOF. pt not an accurate historian   Instrumental Activities of Daily Living (IADL)   Previous Responsibilities meal prep;housekeeping;laundry   IADL Comments unclear regarding assist   General Information   Onset of Illness/Injury or Date of Surgery 05/29/24   Referring Physician Dr. Jimbo Joseph   Patient/Family Therapy Goal Statement (OT) feel better   Additional Occupational Profile Info/Pertinent History of Current Problem episodic confusion (and prior UTIs similarly) and s/p R 5th toe amputation.   Existing Precautions/Restrictions weight bearing   Right Lower Extremity (Weight-bearing Status) non weight-bearing (NWB)   Cognitive Status Examination   Orientation Status person   Affect/Mental Status (Cognitive) low arousal/lethargic   Cognitive Screens/Assessments   Cognitive Assessments Completed [facility] Mental Status Exam (UMS):  Total Score out of /30 13   [facility] Norms 1-20 equals dementia   SLUMS Domains assessed: orientation, memory, attention, executive functions   SLUMS Interpretation Pt displayed difficulty with memory, attention, visuospatial, and recall skills. She was able to answer all orientation questions appropriately. Rec 24/7 S for safety.   Sensory   Sensory Quick Adds sensation intact   Pain Assessment   Patient Currently in Pain No   Range of Motion Comprehensive   General Range of Motion bilateral upper extremity ROM WFL   Strength Comprehensive (MMT)   General Manual Muscle Testing (MMT)  Assessment upper extremity strength deficits identified   Upper Extremity (Manual Muscle Testing)   Comment, MMT: Upper Extremity 3/5 per clinical judgement   Muscle Tone Assessment   Muscle Tone Quick Adds No deficits were identified   Coordination   Upper Extremity Coordination No deficits were identified   Bed Mobility   Bed Mobility supine-sit   Supine-Sit [place] (Bed Mobility) maximum assist (25% patient effort);2 person assist   Comment (Bed Mobility) per clinical judgement   Transfers   Transfers sit-stand transfer   Sit-Stand Transfer   Sit-Stand [place] (Transfers) moderate assist (50% patient effort);2 person assist   Sit/Stand Transfer Comments per clinical judgement   Balance   Balance Assessment standing dynamic balance   Standing Balance: Dynamic moderate assist;2-person assist   Balance Comments per clinical judgement   Activities of Daily Living   BADL Assessment/Intervention lower body dressing   Lower Body Dressing Assessment/Training   Position (Lower Body Dressing) supported sitting   [place] Level (Lower Body Dressing) maximum assist (25% patient effort)   Clinical Impression   Criteria for Skilled Therapeutic Interventions Met (OT) Yes, treatment indicated   OT Diagnosis decreased fxl Ind   Influenced by the following impairments UTI/confusion and R toe amputation   OT Problem List-Impairments impacting ADL activity tolerance impaired;balance;cognition;hearing;mobility;strength;post-surgical precautions   Assessment of Occupational Performance 5 or more Performance Deficits   Identified Performance Deficits dressing, bathing, toileting, fxl txrs, fxl mob   Planned Therapy Interventions (OT) ADL retraining;bed mobility training;cognition;strengthening;transfer training   Clinical Decision Making Complexity (OT) comprehensive assessment/high complexity   Risk & Benefits of therapy have been explained evaluation/treatment results reviewed;participants voiced agreement with care  plan;participants included;patient   OT Total Evaluation Time   OT Eval, High Complexity Minutes (37188) 10   OT Goals   Therapy Frequency (OT) Daily   OT Predicted Duration/Target Date for Goal Attainment 06/07/24   OT Goals Lower Body Dressing;Transfers;Cognition   OT: Lower Body Dressing Minimal assist   OT: Transfer Minimal assist;within precautions   OT: Cognitive Patient/caregiver will verbalize understanding of cognitive assessment results/recommendations as needed for safe discharge planning   Interventions   Interventions Quick Adds Cognitive Retraining   Self-Care/Home Management   Self-Care/Home Mgmt/ADL, Compensatory, Meal Prep Minutes (85110) 9   Symptoms Noted During/After Treatment (Meal Preparation/Planning Training) other (see comments)  (Lethargy)   Treatment Detail/Skilled Intervention Pt greeted seated in recliner on room air. Pt completed LB dressing with MAX A while in supported sitting; required extended time d/t O2 sats dropping to 85% and improving to 90% at rest; RN aware. Pt left in recliner with all needs in reach and chair alarm on.   Cognitive Retraining   Cognitive Skills Intervention 1st 15 Minutes Timed (92716) 5   Symptoms Noted During/After Treatment None   Treatment Detail/Skilled Intervention OTR educated pt regarding Cog Ax results and safe fxl task strategies with pt's verbalized understanding; needs reinforcement.   OT Discharge Planning   OT Plan 6/7: fxl txrs, ADLs with AE, monitor cog   OT Discharge Recommendation (DC Rec) Transitional Care Facility   OT Rationale for DC Rec Pt is below fxl baseline of living Ind in ILF; unclear of support; Rec continued skilled OT to increase act sarai and safety.   OT Brief overview of current status 13/30 SLUMS; MOD-MAX Ax2   Total Session Time   Timed Code Treatment Minutes 14   Total Session Time (sum of timed and untimed services) 24

## 2024-06-02 NOTE — PROGRESS NOTES
[facility]    Medicine Progress Note - Hospitalist Service    Date of Admission:  5/29/2024    Assessment & Plan   Marva Gaines is a 88 year old female admitted on 5/29/2024. 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 Podiatry consulted. They will decide if also request vascular input. They discussed w/ ED team and broad empiric abx, zosyn and vancomycin (pharm dosing) which are continued on admission for both the cellulitis (and possible accompanying abscess) and UTI. Urine cx w/ pansensitive E.Coli.    MRI confirmed cellulitis but raised question of early osteomyelitis; reviewed with pt and appreciate Podiatry consult. They discussed findings and options for iv abx plus debridement versus definitive amputation of 5th digit and pt opted for latter.     S/p Amputation fifth digit right foot by Dr. Peres with EBL of 4ccs. ID consulted and switched abx to Augmentin twice daily x 7 days. Therapies recommend TCU. Discussed with SW. Apixaban resumed 5/31 w/ podiatric clearance.    Renal function also continues to worsen now 1.53, starting MIVFs gently x 24 hours and renal US. Was on room air, then 1-2L but no respiratory sx, titrate with 02 goals for 90% and above (do not keep too high at 99%). Now on 1/2L on 6/2; Renal function improving. Renal US no hydronephrosis. Cr 1.19; mild hyponatremia     Barriers: TCU placement    Addendum- discussed w/ bedside team- pt is more intermittently confused and hypoxic. Will order CXR, procal, lactate, neuro checks (if any neuro changes, new deficits, consider CT Head, if urgent change then call code stroke), and re-broadening antibiotics with pharmacy dosing  vancomycin and back go zosyn q8 hours. Updated RN.  Jmibo Joseph MD on 6/2/2024 at 3:09 PM      ------  Cellulitis of 5th toe; Osteomyelitis s/p amputation  Ashen appearing toe though with dopplerable pulses in ER  Concern for ischemic tissue but not critical limb ischemia  leukocytosis  A- as above. Appreciate podiatry and ID. Wbc up may be from surgery recently, no fevers  P:   - consult to podiatry, appreciate   - MRI per podiatry  -had been on zosyn (q8 hours) and vancomycin (pharm dosing)   - defer further modification to Infectious Disease   -Augmentin twice daily x 7 days.   -pain and nausea control  -pt and ot and sw     UTI, and prior hx  A/p- consistent clinically and objectively with episodic confusion and urinary urgency and prior similar episodes  -abx as above  -- follow the in-process cultures, follow speciation of organism(s) and subsequently their sensitivities and resistance (S&R) and narrow antibiotics as appropriately     -> pansensitive e.coli    Hypokalemia  A/p- k replacement     Hyponatremia  A/p- mild, likely from volume changes, suspect will improve    Acute on chronic Kidney injury, ckd 3b  A/p- hold losartan w/c for renal function; follow labs; monitor for progression, if does not improve, consider nephrology input  - MIVFs 75ccs/hr x24 hours and recheck labs  - US renal for obstruction   - losartan was resumed by a crosscover physician -- I have held it  - Cr starting to improve to 1.19 6/2/2024     afib on eliquis  A/p-  resume eliquis 5/31/2024, cleared by podiatry  - continue diltiazem w/ hold parameters    Htn  A/p- elevated but pain would exacerbate, no headaches or vision changes  - continue both diltiazem and losartan with hold parameters    Hld  A/p- hold fish oil, continue statin    Anxiety  A/p- stable, would continue zoloft     HFpEF  A/p- appears euvolemic, no rales. However was on room air now 1-2L but pt does not have symptoms-- per chart 02 is at 98 and 99%, can wean as  able. She was on RA last night.  -titrate with 02 goals for 90% and above (do not keep too high at 99%).  - resumed lasix  - for SHY, gentle MIVFs cautiously    prior hospitalization for GI bleed in January 2023, duodenal AVM  Reflux  A/p- stable, resume ppi             Diet: Advance Diet as Tolerated: Regular Diet Adult    DVT Prophylaxis: Pneumatic Compression Devices and given the ecchymoses around the 5th toe and prior hx of bleed and also possibly needing intervention pending podiatric input, hold the eliquis for now; pending Podiatry consult   Valadez Catheter: Not present  Lines: None     Cardiac Monitoring: None  Code Status: Full Code      Clinically Significant Risk Factors        # Hypokalemia: Lowest K = 3.1 mmol/L in last 2 days, will replace as needed           # Hypertension: Noted on problem list                   Disposition Plan    Barriers: worsening SHY, renal US, and TCU placement           Jimbo Joseph MD  Hospitalist Service  [facility]  Securely message with [employer] (more info)  Text page via [employer] Paging/Directory   ______________________________________________________________________    Interval History   Pt has no new pain or symptoms  She feels pain improves with current pain meds  Discussed her renal function  Discussed w/ sw   Answered all of pt's questions       Physical Exam   Vital Signs: Temp: 96.9  F (36.1  C) Temp src: Oral BP: (!) 151/65 Pulse: 61   Resp: 12 SpO2: 94 % O2 Device: Nasal cannula Oxygen Delivery: 1 LPM  Weight: 132 lbs 0 oz    General: alert, oriented, and in no acute distress  Pulmonary: clear to auscultation bilaterally, normal respiratory effort, on room air, no rales or wheezes or evidence of accessory muscle use  CVS: regular rate and rhythm, no murmurs, rubs, or gallops; no blatant jugular venous distention; no extremity edema and extremities are warm to the touch  GI: soft, nontender, BS+, no rebound or guarding, no conspicuous  organomegaly   Neuro: nonfocal, moves all extremities of own volition, oriented  Msk- right foot in stable dressing and c/d/I and in boot; other digits outside of 5th with good ROM and sensation, s/p amputation stable dressing  Psych: appropriate          Medical Decision Making       41 MINUTES SPENT BY ME on the date of service doing chart review, history, exam, documentation & further activities per the note.      Data   ------------------------- PAST 24 HR DATA REVIEWED -----------------------------------------------        Imaging results reviewed over the past 24 hrs:   Recent Results (from the past 24 hour(s))   US Renal Complete Non-Vascular    Narrative    EXAM: US RENAL COMPLETE NON-VASCULAR  LOCATION: [facility]  DATE: 6/1/2024    INDICATION: worsening yehuda, please assess for any obstruction, thank you  COMPARISON: None.  TECHNIQUE: Routine Bilateral Renal and Bladder Ultrasound.    FINDINGS:    RIGHT KIDNEY: 8.1 cm. Normal renal cortical thickness and echogenicity. No hydronephrosis. Multiple cysts are present, largest measures approximately one CM.     LEFT KIDNEY: 8.5 cm. Normal renal cortical thickness and echogenicity. No hydronephrosis. Small cysts present measuring approximately 0.5 cm.    BLADDER: Normal.      Impression    IMPRESSION:  1.  No hydronephrosis.

## 2024-06-02 NOTE — PLAN OF CARE
Problem: Pain Acute  Goal: Optimal Pain Control and Function  Intervention: Prevent or Manage Pain  Recent Flowsheet Documentation  Taken 6/2/2024 0900 by Leah Alves RN  Sensory Stimulation Regulation:   care clustered   lighting decreased   quiet environment promoted  Sleep/Rest Enhancement: consistent schedule promoted  Taken 6/2/2024 0840 by Leah Alves RN  Medication Review/Management: medications reviewed     Problem: Adult Inpatient Plan of Care  Goal: Absence of Hospital-Acquired Illness or Injury  Intervention: Prevent Skin Injury  Recent Flowsheet Documentation  Taken 6/2/2024 0935 by Leah Alves RN  Body Position:   turned   right   heels elevated  Taken 6/2/2024 0840 by Leah Alves RN  Skin Protection:   adhesive use limited   transparent dressing maintained  Device Skin Pressure Protection:   tubing/devices free from skin contact   adhesive use limited   absorbent pad utilized/changed   Goal Outcome Evaluation:       Able to answer orientation questions, however intermittent confusion. Denies pain, grimaces with movement of RLE. Refused any pain medications. Diarrhea x2 today. Daughter Veronica at bedside and concerned about fluctuating mentation. Calls appropriately sometimes. Bed alarm on. Continue plan of care.

## 2024-06-02 NOTE — PROGRESS NOTES
MD paged d/t family concerns of pt new garbled, incoherent speech. VSS-- temp slightly elevated. Neuros intact. Placed on 2L O2 via NC. MD at bedside. New orders pending.

## 2024-06-02 NOTE — PLAN OF CARE
Boot in place. Hearing aids in. Pt has been drowsy this shift. Given oral pain medications for pain in toe and sacrum (see MAR). Q2 turns continued. Barrier cream applied. Pt is forgetful and has been having auditory and visual hallucinations infrequently.    Pts breathing is shallow. On room air. On 1-2lo2nc overnight baseline.      Deisy Shirley RN on 6/1/2024 at 10:25 PM

## 2024-06-03 ENCOUNTER — APPOINTMENT (OUTPATIENT)
Dept: OCCUPATIONAL THERAPY | Facility: CLINIC | Age: 89
DRG: 503 | End: 2024-06-03
Payer: COMMERCIAL

## 2024-06-03 ENCOUNTER — APPOINTMENT (OUTPATIENT)
Dept: PHYSICAL THERAPY | Facility: CLINIC | Age: 89
DRG: 503 | End: 2024-06-03
Payer: COMMERCIAL

## 2024-06-03 LAB
ANION GAP SERPL CALCULATED.3IONS-SCNC: 11 MMOL/L (ref 7–15)
BUN SERPL-MCNC: 18.4 MG/DL (ref 8–23)
CALCIUM SERPL-MCNC: 9.8 MG/DL (ref 8.8–10.2)
CHLORIDE SERPL-SCNC: 99 MMOL/L (ref 98–107)
CREAT SERPL-MCNC: 1.06 MG/DL (ref 0.51–0.95)
DEPRECATED HCO3 PLAS-SCNC: 29 MMOL/L (ref 22–29)
EGFRCR SERPLBLD CKD-EPI 2021: 51 ML/MIN/1.73M2
GLUCOSE SERPL-MCNC: 131 MG/DL (ref 70–99)
POTASSIUM SERPL-SCNC: 3.8 MMOL/L (ref 3.4–5.3)
SODIUM SERPL-SCNC: 139 MMOL/L (ref 135–145)
VANCOMYCIN SERPL-MCNC: 9 UG/ML

## 2024-06-03 PROCEDURE — 250N000013 HC RX MED GY IP 250 OP 250 PS 637: Performed by: PODIATRIST

## 2024-06-03 PROCEDURE — 97530 THERAPEUTIC ACTIVITIES: CPT | Mod: GP

## 2024-06-03 PROCEDURE — 80048 BASIC METABOLIC PNL TOTAL CA: CPT | Performed by: STUDENT IN AN ORGANIZED HEALTH CARE EDUCATION/TRAINING PROGRAM

## 2024-06-03 PROCEDURE — 97535 SELF CARE MNGMENT TRAINING: CPT | Mod: GO

## 2024-06-03 PROCEDURE — 99233 SBSQ HOSP IP/OBS HIGH 50: CPT | Performed by: STUDENT IN AN ORGANIZED HEALTH CARE EDUCATION/TRAINING PROGRAM

## 2024-06-03 PROCEDURE — 250N000011 HC RX IP 250 OP 636: Performed by: STUDENT IN AN ORGANIZED HEALTH CARE EDUCATION/TRAINING PROGRAM

## 2024-06-03 PROCEDURE — 120N000001 HC R&B MED SURG/OB

## 2024-06-03 PROCEDURE — 36415 COLL VENOUS BLD VENIPUNCTURE: CPT | Performed by: STUDENT IN AN ORGANIZED HEALTH CARE EDUCATION/TRAINING PROGRAM

## 2024-06-03 PROCEDURE — 80202 ASSAY OF VANCOMYCIN: CPT | Performed by: STUDENT IN AN ORGANIZED HEALTH CARE EDUCATION/TRAINING PROGRAM

## 2024-06-03 PROCEDURE — 250N000013 HC RX MED GY IP 250 OP 250 PS 637: Performed by: STUDENT IN AN ORGANIZED HEALTH CARE EDUCATION/TRAINING PROGRAM

## 2024-06-03 RX ADMIN — FUROSEMIDE 40 MG: 40 TABLET ORAL at 08:35

## 2024-06-03 RX ADMIN — VANCOMYCIN HYDROCHLORIDE 1000 MG: 1 INJECTION, SOLUTION INTRAVENOUS at 15:28

## 2024-06-03 RX ADMIN — PANTOPRAZOLE SODIUM 40 MG: 40 TABLET, DELAYED RELEASE ORAL at 08:35

## 2024-06-03 RX ADMIN — PANTOPRAZOLE SODIUM 40 MG: 40 TABLET, DELAYED RELEASE ORAL at 20:06

## 2024-06-03 RX ADMIN — GABAPENTIN 100 MG: 100 CAPSULE ORAL at 21:39

## 2024-06-03 RX ADMIN — APIXABAN 2.5 MG: 2.5 TABLET, FILM COATED ORAL at 20:06

## 2024-06-03 RX ADMIN — DILTIAZEM HYDROCHLORIDE 240 MG: 120 CAPSULE, EXTENDED RELEASE ORAL at 20:06

## 2024-06-03 RX ADMIN — SERTRALINE 100 MG: 100 TABLET, FILM COATED ORAL at 20:06

## 2024-06-03 RX ADMIN — ATORVASTATIN CALCIUM 20 MG: 10 TABLET, FILM COATED ORAL at 21:39

## 2024-06-03 RX ADMIN — APIXABAN 2.5 MG: 2.5 TABLET, FILM COATED ORAL at 08:35

## 2024-06-03 RX ADMIN — PIPERACILLIN AND TAZOBACTAM 3.38 G: 3; .375 INJECTION, POWDER, FOR SOLUTION INTRAVENOUS at 13:49

## 2024-06-03 RX ADMIN — PIPERACILLIN AND TAZOBACTAM 3.38 G: 3; .375 INJECTION, POWDER, FOR SOLUTION INTRAVENOUS at 21:39

## 2024-06-03 RX ADMIN — PIPERACILLIN AND TAZOBACTAM 3.38 G: 3; .375 INJECTION, POWDER, FOR SOLUTION INTRAVENOUS at 05:41

## 2024-06-03 RX ADMIN — SENNOSIDES AND DOCUSATE SODIUM 1 TABLET: 50; 8.6 TABLET ORAL at 20:06

## 2024-06-03 RX ADMIN — Medication 1 SPRAY: at 08:35

## 2024-06-03 ASSESSMENT — ACTIVITIES OF DAILY LIVING (ADL)
ADLS_ACUITY_SCORE: 43
ADLS_ACUITY_SCORE: 43
ADLS_ACUITY_SCORE: 45
ADLS_ACUITY_SCORE: 43
ADLS_ACUITY_SCORE: 45
ADLS_ACUITY_SCORE: 43
ADLS_ACUITY_SCORE: 47
ADLS_ACUITY_SCORE: 43
ADLS_ACUITY_SCORE: 45

## 2024-06-03 NOTE — PROGRESS NOTES
Care Management Follow Up    Length of Stay (days): 5    Expected Discharge Date: 06/04/2024     Concerns to be Addressed:       Patient plan of care discussed at interdisciplinary rounds: Yes    Anticipated Discharge Disposition: Transitional Care     Anticipated Discharge Services: None  Anticipated Discharge DME:      Patient/family educated on Medicare website which has current facility and service quality ratings:  (Family requested [facility] and [facility] referrals)  Education Provided on the Discharge Plan: Yes  Patient/Family in Agreement with the Plan: yes    Referrals Placed by CM/SW: Post Acute Facilities  Private pay costs discussed: Not applicable at this time     Additional Information:    [facility] auth is approved for [facility].    1:24 PM  LALY met with Pt and daughter Veronica who was visiting. [facility] reviewed plan for Pt to go to [facility] when medically ready.  Pt will need [facility] transport as family unable to transport Pt safely.     JENNY Reddy

## 2024-06-03 NOTE — PHARMACY-VANCOMYCIN DOSING SERVICE
"Pharmacy Vancomycin Note  Date of Service Kasia 3, 2024  Patient's  1935   88 year old, female    Indication: Osteomyelitis and Skin and Soft Tissue Infection  Day of Therapy: 2  Current vancomycin regimen:  1000 mg IV q24h  Current vancomycin monitoring method: AUC  Current vancomycin therapeutic monitoring goal: 400-600 mg*h/L    InsightRX Prediction of Current Vancomycin Regimen  Loading dose: N/A  Regimen: 1000 mg IV every 24 hours.  Start time: 15:28 on 2024  Exposure target: AUC24 (range)400-600 mg/L.hr   AUC24,ss: 404 mg/L.hr  Probability of AUC24 > 400: 52 %  Ctrough,ss: 13.4 mg/L  Probability of Ctrough,ss > 20: 6 %  Probability of nephrotoxicity (Lodise CASIE ): 9 %      Current estimated CrCl = Estimated Creatinine Clearance: 36 mL/min (A) (based on SCr of 1.06 mg/dL (H)).    Creatinine for last 3 days  2024:  4:22 AM Creatinine 1.53 mg/dL  2024:  7:35 AM Creatinine 1.19 mg/dL  6/3/2024:  6:18 AM Creatinine 1.06 mg/dL    Recent Vancomycin Levels (past 3 days)  6/3/2024:  1:52 PM Vancomycin 9.0 ug/mL    Vancomycin IV Administrations (past 72 hours)                     vancomycin (VANCOCIN) 1,000 mg in NaCl 0.9% 200 mL intermittent infusion (mg) 1,000 mg Given 24 1528     1,000 mg Given 24 1620                    Nephrotoxins and other renal medications (From now, onward)      Start     Dose/Rate Route Frequency Ordered Stop    24 2130  piperacillin-tazobactam (ZOSYN) 3.375 g vial to attach to  mL bag        Note to Pharmacy: For [facility], [facility] and [facility]: For Zosyn-naive patients, use the \"Zosyn initial dose + extended infusion\" order panel.    3.375 g  over 240 Minutes Intravenous EVERY 8 HOURS 24 1507      24 1530  vancomycin (VANCOCIN) 1,000 mg in NaCl 0.9% 200 mL intermittent infusion         1,000 mg  over 60 Minutes Intravenous EVERY 24 HOURS 24 1518      24 1030  [Held by provider]  amoxicillin-clavulanate (AUGMENTIN) 500-125 MG per " tablet 1 tablet        (On hold since yesterday at 1507 until manually unheld; held by Jimbo Joseph MDHold Reason: Change in Vitals)    1 tablet Oral EVERY 12 HOURS SCHEDULED 05/31/24 1014      05/29/24 1930  furosemide (LASIX) tablet 40 mg        Note to Pharmacy: PTA Sig:TAKE 2 TABLETS BY MOUTH EVERY DAY      40 mg Oral DAILY 05/29/24 1911                 Contrast Orders - past 72 hours (72h ago, onward)      None            Interpretation of levels and current regimen:  Vancomycin level is reflective of therapeutic level, at 404 mg/L.hr but would like higher    Has serum creatinine changed greater than 50% in last 72 hours: No    Urine output:  good urine output    Renal Function: Improving    InsightRX Prediction of Planned New Vancomycin Regimen  Loading dose: N/A  Regimen: 1250 mg IV every 24 hours.  Start time: 15:28 on 06/04/2024  Exposure target: AUC24 (range)400-600 mg/L.hr   AUC24,ss: 499 mg/L.hr  Probability of AUC24 > 400: 93 %  Ctrough,ss: 16.6 mg/L  Probability of Ctrough,ss > 20: 23 %  Probability of nephrotoxicity (Lodise CASIE 2009): 12 %      Plan:  Increase Dose to 1250 mg IV q24  Vancomycin monitoring method: AUC  Vancomycin therapeutic monitoring goal: 400-600 mg*h/L  Pharmacy will check vancomycin levels as appropriate in 1-3 Days.  Serum creatinine levels will be ordered daily for the first week of therapy and at least twice weekly for subsequent weeks.    Brenda Argueta [facility]

## 2024-06-03 NOTE — PLAN OF CARE
Note from [id number]. Pt denied pain during shift thus far. No new skin issues noted. ACE is CDI, boot intact. Full sensation per pt. Heavy assist of 2 with walker for transferring to the commode. Saline locked between abx. Voiding adequately, purewick used intermittently. Education on medication administration and use of call-light to reduce risk for falls and injury. Alarms in place. No further issues noted. VSS with some noted HTN, denies shortness of breath.    Per daughter's request and Dr. Joseph, podiatry was paged to verify if pt is indeed NWB. Still awaiting call back. Daughter, Veronica, would also like a direct update from them.    Taylor R Schoenecker, RN

## 2024-06-03 NOTE — PROGRESS NOTES
[facility]    Medicine Progress Note - Hospitalist Service    Date of Admission:  5/29/2024    Assessment & Plan   Marva Gaines is a 88 year old female admitted on 5/29/2024. 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 Podiatry consulted. They will decide if also request vascular input. They discussed w/ ED team and broad empiric abx, zosyn and vancomycin (pharm dosing) which are continued on admission for both the cellulitis (and possible accompanying abscess) and UTI. Urine cx w/ pansensitive E.Coli. MRI confirmed cellulitis but raised question of early osteomyelitis; reviewed with pt and appreciate Podiatry consult. They discussed findings and options for iv abx plus debridement versus definitive amputation of 5th digit and pt opted for latter.     S/p Amputation fifth digit right foot by Dr. Peres with EBL of 4ccs. ID consulted and switched abx to Augmentin twice daily x 7 days. Therapies recommend TCU. Discussed with SW. Apixaban resumed 5/31 w/ podiatric clearance. Renal function also continues to worsen now 1.53, starting MIVFs gently x 24 hours and renal US. Was on room air, then 1-2L but no respiratory sx, titrate with 02 goals for 90% and above (do not keep too high at 99%). Now on 1/2L on 6/2; Renal function improving. Renal US no hydronephrosis. Cr 1.19 downtrending to 1.06; mild hyponatremia improved.     In the afternoon 6/2, However, patient developed confusion as well as reported speech changes which were noted to be transient.  No neurologic changes, labs were ordered and chest x-ray, and also broadened antibiotics back to vancomycin and Zosyn.  On 6/3, the patient is  oriented x 4 again, does not recall yesterday afternoon's events.  Discussed with patient's daughter Veronica and continuing IV antibiotics for now, may ask ID to return if pt does not further improve.  Asking podiatry to return regarding activity as patient and family expressed concerns about time in bed while not undergoing therapies.  Discussed with bedside nursing team and social work and PT.     Barriers: confusion (improving) and iv antibiotics; TCU placement    ------  Encephalopathy, suspect metabolic and infectious  Cellulitis of 5th toe; Osteomyelitis s/p amputation  Ashen appearing toe though with dopplerable pulses in ER  Concern for ischemic tissue but not critical limb ischemia  leukocytosis  A- as above. Wbc downtrending  P:  -continue the resumed vancomycin and zosyn  - consult to podiatry, appreciate   - MRI per podiatry   - request they return 6/3/2024 as pt/family hoping for updated activity orders  -had been on zosyn (q8 hours) and vancomycin (pharm dosing)   - defer further modification to Infectious Disease   -Augmentin twice daily x 7 days but declined 6/2 back on iv vanc and zosyn    - cultures without a culprit organism (staph epi on day 3)  -pain and nausea control  -pt and ot and sw     UTI, and prior hx  A/p- consistent clinically and objectively with episodic confusion and urinary urgency and prior similar episodes  -abx as above  -- follow the in-process cultures, follow speciation of organism(s) and subsequently their sensitivities and resistance (S&R) and narrow antibiotics as appropriately     -> pansensitive e.coli    Hypokalemia  A/p- k replacement     Hyponatremia, improved  A/p- mild, likely from volume changes, suspect will improve    Acute on chronic Kidney injury, ckd 3b  A/p- hold losartan w/c for renal function; follow labs; monitor for progression, if does not improve, consider nephrology input  - MIVFs 75ccs/hr x24 hours and recheck labs  - US renal for obstruction   -  losartan was resumed by a [facility] physician -- I have held it  - Cr starting to improve to 1.19 6/2/2024     afib on eliquis  A/p-  resume eliquis 5/31/2024, cleared by podiatry  - continue diltiazem w/ hold parameters    Htn  A/p- elevated but pain would exacerbate, no headaches or vision changes  - continue both diltiazem and losartan with hold parameters    Hld  A/p- hold fish oil, continue statin    Anxiety  A/p- stable, would continue zoloft     HFpEF  A/p- appears euvolemic, no rales. However was on room air now 1-2L but pt does not have symptoms-- per chart 02 is at 98 and 99%, can wean as able. She was on RA last night.  -titrate with 02 goals for 90% and above (do not keep too high at 99%).  - resumed lasix  - for SHY, gentle MIVFs cautiously    prior hospitalization for GI bleed in January 2023, duodenal AVM  Reflux  A/p- stable, resume ppi             Diet: Advance Diet as Tolerated: Regular Diet Adult    DVT Prophylaxis: Pneumatic Compression Devices and given the ecchymoses around the 5th toe and prior hx of bleed and also possibly needing intervention pending podiatric input, hold the eliquis for now; pending Podiatry consult   Valadez Catheter: Not present  Lines: None     Cardiac Monitoring: None  Code Status: Full Code      Clinically Significant Risk Factors                  # Hypertension: Noted on problem list                   Disposition Plan    Barriers: resolving shy, remains on restarted IV abx, and podiatry to return; possibly 1-2           Jimbo Joseph MD  Hospitalist Service  [facility]  Securely message with [employer] (more info)  Text page via [employer] Paging/Directory   ______________________________________________________________________    Interval History   -In the afternoon 6/2, However, patient developed confusion as well as reported speech changes which were noted to be transient.  No neurologic changes, labs were ordered and chest x-ray, and also  broadened antibiotics back to vancomycin and Zosyn.    -On 6/3, the patient is oriented x 4 again, does not recall yesterday afternoon's events.  -she currently has no new pain or symptoms  - reports foot pain is present but improves with pain meds  - called and updated patient's daughter Veronica and she was relieved to hear of current plans for continuing IV antibiotics for now, and that pt is clinically improving  - discussed we may ask ID to return if pt does not further improve.    - discussed Asking podiatry to return regarding activity as patient and family expressed concerns about time in bed while not undergoing therapies.    -Discussed with bedside nursing team and social work and PT.  -Answered all of pt's questions       Physical Exam   Vital Signs: Temp: 98.1  F (36.7  C) Temp src: Oral BP: (!) 172/72 Pulse: 64   Resp: 18 SpO2: 93 % O2 Device: None (Room air) Oxygen Delivery: 2 LPM  Weight: 132 lbs 0 oz    General: alert, oriented, and in no acute distress  Pulmonary: clear to auscultation bilaterally, normal respiratory effort, on room air, no rales or wheezes or evidence of accessory muscle use  CVS: regular rate and rhythm, no murmurs, rubs, or gallops; no blatant jugular venous distention; no extremity edema and extremities are warm to the touch  GI: soft, nontender, BS+, no rebound or guarding, no conspicuous organomegaly   Neuro: nonfocal, moves all extremities of own volition, oriented x4 AM on 6/3, strength intact outside of right lower limb in boot/dressing  Msk- right foot in stable dressing and c/d/I and in boot; other digits outside of 5th with good ROM and sensation, s/p amputation stable dressing  Psych: appropriate      Medical Decision Making       60 MINUTES SPENT BY ME on the date of service doing chart review, history, exam, documentation & further activities per the note.      Data   ------------------------- PAST 24 HR DATA REVIEWED -----------------------------------------------    I  have personally reviewed the following data over the past 24 hrs:    13.9 (H)  \   12.4   / 195     139 99 18.4 /  131 (H)   3.8 29 1.06 (H) \     Procal: 0.46 CRP: N/A Lactic Acid: 0.9         Imaging results reviewed over the past 24 hrs:   Recent Results (from the past 24 hour(s))   XR Chest Port 1 View    Narrative    EXAM: XR CHEST PORT 1 VIEW  LOCATION: [facility]  DATE: 6/2/2024    INDICATION: Postoperative hypoxia.  COMPARISON: None.      Impression    IMPRESSION:     No focal airspace disease. No pleural effusion or pneumothorax.    Stable cardiomegaly. Aortic calcifications.

## 2024-06-03 NOTE — PROGRESS NOTES
Cross cover note, was called to see the patient for intermittent confusion, speech changes. Patient seen and examined, she is alert, oriented x3 speech appeared normal, but intermittently saying words unrelated and slow to answer at times. Able to move extremities except right leg with recent surgery slow to lift it off against chair. Vitals labs reviewed. Daughter had questions about her WBC increasing, will recheck today, labs procal, lactate ordered, neurochecks, cxr, resumed IV antibiotics from orals. Primary team notified. Confusion most likely metabolic origin.     Zina Cason MD

## 2024-06-04 ENCOUNTER — APPOINTMENT (OUTPATIENT)
Dept: PHYSICAL THERAPY | Facility: CLINIC | Age: 89
DRG: 503 | End: 2024-06-04
Payer: COMMERCIAL

## 2024-06-04 ENCOUNTER — APPOINTMENT (OUTPATIENT)
Dept: OCCUPATIONAL THERAPY | Facility: CLINIC | Age: 89
DRG: 503 | End: 2024-06-04
Payer: COMMERCIAL

## 2024-06-04 VITALS
OXYGEN SATURATION: 94 % | HEART RATE: 71 BPM | DIASTOLIC BLOOD PRESSURE: 68 MMHG | BODY MASS INDEX: 24.29 KG/M2 | WEIGHT: 132 LBS | SYSTOLIC BLOOD PRESSURE: 142 MMHG | TEMPERATURE: 98.2 F | RESPIRATION RATE: 16 BRPM | HEIGHT: 62 IN

## 2024-06-04 LAB
ANION GAP SERPL CALCULATED.3IONS-SCNC: 8 MMOL/L (ref 7–15)
BACTERIA TISS BX CULT: ABNORMAL
BUN SERPL-MCNC: 17.1 MG/DL (ref 8–23)
CALCIUM SERPL-MCNC: 9.3 MG/DL (ref 8.8–10.2)
CHLORIDE SERPL-SCNC: 101 MMOL/L (ref 98–107)
CREAT SERPL-MCNC: 0.98 MG/DL (ref 0.51–0.95)
DEPRECATED HCO3 PLAS-SCNC: 29 MMOL/L (ref 22–29)
EGFRCR SERPLBLD CKD-EPI 2021: 56 ML/MIN/1.73M2
ERYTHROCYTE [DISTWIDTH] IN BLOOD BY AUTOMATED COUNT: 14.6 % (ref 10–15)
GLUCOSE SERPL-MCNC: 126 MG/DL (ref 70–99)
HCT VFR BLD AUTO: 32.7 % (ref 35–47)
HGB BLD-MCNC: 10.7 G/DL (ref 11.7–15.7)
HGB BLD-MCNC: 11.8 G/DL (ref 11.7–15.7)
MCH RBC QN AUTO: 28 PG (ref 26.5–33)
MCHC RBC AUTO-ENTMCNC: 32.7 G/DL (ref 31.5–36.5)
MCV RBC AUTO: 86 FL (ref 78–100)
PATH REPORT.COMMENTS IMP SPEC: NORMAL
PATH REPORT.COMMENTS IMP SPEC: NORMAL
PATH REPORT.FINAL DX SPEC: NORMAL
PATH REPORT.GROSS SPEC: NORMAL
PATH REPORT.MICROSCOPIC SPEC OTHER STN: NORMAL
PATH REPORT.RELEVANT HX SPEC: NORMAL
PHOTO IMAGE: NORMAL
PLATELET # BLD AUTO: 206 10E3/UL (ref 150–450)
POTASSIUM SERPL-SCNC: 2.9 MMOL/L (ref 3.4–5.3)
POTASSIUM SERPL-SCNC: 3.4 MMOL/L (ref 3.4–5.3)
RBC # BLD AUTO: 3.82 10E6/UL (ref 3.8–5.2)
SODIUM SERPL-SCNC: 138 MMOL/L (ref 135–145)
WBC # BLD AUTO: 9.5 10E3/UL (ref 4–11)

## 2024-06-04 PROCEDURE — 36415 COLL VENOUS BLD VENIPUNCTURE: CPT | Performed by: STUDENT IN AN ORGANIZED HEALTH CARE EDUCATION/TRAINING PROGRAM

## 2024-06-04 PROCEDURE — 250N000013 HC RX MED GY IP 250 OP 250 PS 637: Performed by: HOSPITALIST

## 2024-06-04 PROCEDURE — 85018 HEMOGLOBIN: CPT | Performed by: STUDENT IN AN ORGANIZED HEALTH CARE EDUCATION/TRAINING PROGRAM

## 2024-06-04 PROCEDURE — 88311 DECALCIFY TISSUE: CPT | Mod: 26 | Performed by: PATHOLOGY

## 2024-06-04 PROCEDURE — 80048 BASIC METABOLIC PNL TOTAL CA: CPT | Performed by: STUDENT IN AN ORGANIZED HEALTH CARE EDUCATION/TRAINING PROGRAM

## 2024-06-04 PROCEDURE — 97530 THERAPEUTIC ACTIVITIES: CPT | Mod: GP

## 2024-06-04 PROCEDURE — 97110 THERAPEUTIC EXERCISES: CPT | Mod: GO

## 2024-06-04 PROCEDURE — 250N000013 HC RX MED GY IP 250 OP 250 PS 637: Performed by: STUDENT IN AN ORGANIZED HEALTH CARE EDUCATION/TRAINING PROGRAM

## 2024-06-04 PROCEDURE — 250N000013 HC RX MED GY IP 250 OP 250 PS 637: Performed by: PODIATRIST

## 2024-06-04 PROCEDURE — 85027 COMPLETE CBC AUTOMATED: CPT | Performed by: STUDENT IN AN ORGANIZED HEALTH CARE EDUCATION/TRAINING PROGRAM

## 2024-06-04 PROCEDURE — 250N000011 HC RX IP 250 OP 636: Performed by: STUDENT IN AN ORGANIZED HEALTH CARE EDUCATION/TRAINING PROGRAM

## 2024-06-04 PROCEDURE — 99239 HOSP IP/OBS DSCHRG MGMT >30: CPT | Performed by: STUDENT IN AN ORGANIZED HEALTH CARE EDUCATION/TRAINING PROGRAM

## 2024-06-04 PROCEDURE — 84132 ASSAY OF SERUM POTASSIUM: CPT | Performed by: HOSPITALIST

## 2024-06-04 PROCEDURE — 258N000003 HC RX IP 258 OP 636: Performed by: STUDENT IN AN ORGANIZED HEALTH CARE EDUCATION/TRAINING PROGRAM

## 2024-06-04 PROCEDURE — 88305 TISSUE EXAM BY PATHOLOGIST: CPT | Mod: 26 | Performed by: PATHOLOGY

## 2024-06-04 RX ORDER — AMOXICILLIN AND CLAVULANATE POTASSIUM 500; 125 MG/1; MG/1
1 TABLET, FILM COATED ORAL EVERY 12 HOURS
Qty: 4 TABLET | Refills: 0 | Status: SHIPPED | OUTPATIENT
Start: 2024-06-04 | End: 2024-06-06

## 2024-06-04 RX ORDER — POTASSIUM CHLORIDE 1500 MG/1
40 TABLET, EXTENDED RELEASE ORAL ONCE
Status: COMPLETED | OUTPATIENT
Start: 2024-06-04 | End: 2024-06-04

## 2024-06-04 RX ORDER — LOSARTAN POTASSIUM 25 MG/1
25 TABLET ORAL DAILY
COMMUNITY
Start: 2024-06-04

## 2024-06-04 RX ORDER — POTASSIUM CHLORIDE 1500 MG/1
20 TABLET, EXTENDED RELEASE ORAL ONCE
Status: COMPLETED | OUTPATIENT
Start: 2024-06-04 | End: 2024-06-04

## 2024-06-04 RX ORDER — DOXYCYCLINE 100 MG/1
100 CAPSULE ORAL 2 TIMES DAILY
Qty: 4 CAPSULE | Refills: 0 | Status: SHIPPED | OUTPATIENT
Start: 2024-06-04 | End: 2024-06-06

## 2024-06-04 RX ADMIN — APIXABAN 2.5 MG: 2.5 TABLET, FILM COATED ORAL at 08:34

## 2024-06-04 RX ADMIN — POTASSIUM CHLORIDE 20 MEQ: 1500 TABLET, EXTENDED RELEASE ORAL at 08:34

## 2024-06-04 RX ADMIN — FUROSEMIDE 40 MG: 40 TABLET ORAL at 08:34

## 2024-06-04 RX ADMIN — POTASSIUM CHLORIDE 40 MEQ: 1500 TABLET, EXTENDED RELEASE ORAL at 06:42

## 2024-06-04 RX ADMIN — PIPERACILLIN AND TAZOBACTAM 3.38 G: 3; .375 INJECTION, POWDER, FOR SOLUTION INTRAVENOUS at 13:27

## 2024-06-04 RX ADMIN — VANCOMYCIN HYDROCHLORIDE 1250 MG: 5 INJECTION, POWDER, LYOPHILIZED, FOR SOLUTION INTRAVENOUS at 15:36

## 2024-06-04 RX ADMIN — PANTOPRAZOLE SODIUM 40 MG: 40 TABLET, DELAYED RELEASE ORAL at 08:34

## 2024-06-04 RX ADMIN — PIPERACILLIN AND TAZOBACTAM 3.38 G: 3; .375 INJECTION, POWDER, FOR SOLUTION INTRAVENOUS at 05:45

## 2024-06-04 ASSESSMENT — ACTIVITIES OF DAILY LIVING (ADL)
ADLS_ACUITY_SCORE: 43
ADLS_ACUITY_SCORE: 47
ADLS_ACUITY_SCORE: 43
ADLS_ACUITY_SCORE: 47
ADLS_ACUITY_SCORE: 43
ADLS_ACUITY_SCORE: 47
ADLS_ACUITY_SCORE: 43
ADLS_ACUITY_SCORE: 47

## 2024-06-04 NOTE — PLAN OF CARE
Goal Outcome Evaluation:  Problem: Adult Inpatient Plan of Care  Goal: Absence of Hospital-Acquired Illness or Injury  Intervention: Identify and Manage Fall Risk  Problem: Adult Inpatient Plan of Care  Goal: Absence of Hospital-Acquired Illness or Injury  Intervention: Prevent Skin Injury  Problem: Adult Inpatient Plan of Care  Goal: Optimal Comfort and Wellbeing  Intervention: Monitor Pain and Promote Comfort  Problem: Adult Inpatient Plan of Care  Goal: Optimal Comfort and Wellbeing  Intervention: Monitor Pain and Promote Comfort  A&Ox4. VSS, except elevated BP (not meeting parameter for interventions), on RA. CMS intact. Denied pain; repositioned and rest. Dressing clean, dry and intact. IV saline locked. Pt up with assist of 1 with gaitbelt and walker pivoting to bedside commode. Pt tolerated regular diet. Pt discharges to TCU this afternoon.

## 2024-06-04 NOTE — PLAN OF CARE
Patient vital signs are at baseline: Yes  Patient able to ambulate as they were prior to admission or with assist devices provided by therapies during their stay:  No,  Reason:  A2  Patient MUST void prior to discharge:  Yes  Patient able to tolerate oral intake:  Yes  Pain has adequate pain control using Oral analgesics:  Yes  Does patient have an identified :  Yes  Has goal D/C date and time been discussed with patient:  Yes      Pt is A&Ox4, answers questions correctly but can be confused and forgetful. BP elevated on RA. A2 with walker and gait belt to BSC. Voiding adequately. Dressing is CDI. Boot in place to RLE. CMS intact. Pt denied pain during shift.

## 2024-06-04 NOTE — DISCHARGE SUMMARY
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  The following labs/tests are   recommended: bmp and cbc.             Unresulted Labs Ordered in the Past 30 Days of this Admission       Date and Time Order Name Status Description    5/30/2024  6:55 PM Anaerobic Bacterial Culture Routine Preliminary         These results will be followed up by [facility]     Discharge Disposition   Discharged to TCU  Condition at discharge: [facility]    Hospital Course   Marva Gaines is a 88 year old female admitted on 5/29/2024. 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 Podiatry consulted. They will decide if also request vascular input. They discussed w/ ED team and broad empiric abx, zosyn and vancomycin (pharm dosing) which are continued on admission for both the cellulitis (and possible accompanying abscess) and UTI. Urine cx w/ pansensitive E.Coli. MRI confirmed cellulitis but raised question of early osteomyelitis; reviewed with pt and appreciate  Podiatry consult. They discussed findings and options for iv abx plus debridement versus definitive amputation of 5th digit and pt opted for latter.     S/p Amputation fifth digit right foot by Dr. Peres with EBL of 4ccs. ID consulted and switched abx to Augmentin twice daily x 7 days. Therapies recommend TCU. Discussed with SW. Apixaban resumed 5/31 w/ podiatric clearance. Renal function also continues to worsen now 1.53, starting MIVFs gently x 24 hours and renal US. Was on room air, then 1-2L but no respiratory sx, titrate with 02 goals for 90% and above (do not keep too high at 99%). Now on 1/2L on 6/2; Renal function improving. Renal US no hydronephrosis. Cr 1.19 downtrending to 1.06; mild hyponatremia improved.     In the afternoon 6/2, However, patient developed confusion as well as reported speech changes which were noted to be transient.  No neurologic changes, labs were ordered and chest x-ray, and also broadened antibiotics back to vancomycin and Zosyn.  On 6/3, the patient is oriented x 4 again. Nonremarkable workup.  Her encephalopathy had improved.      On 6/4, patient is discharged to TCU for further cares and rehabilitation, she remains hemodynamically and vitally stable and fully oriented.  She will be discharged with 2 more days of antibiotics including Augmentin twice daily and doxycycline twice daily, this was discussed with the family member, pt's daughter Veronica as well.  The family member also had a significant amount of podiatry-specific questions and all of these were addressed prior to discharge by podiatry.  Given her SHY which was resolving and was trending towards normalization, her losartan was held on discharge and should be followed up on with her outpatient provider.     Patient will be discharged to TCU for further cares.       Consultations This Hospital Stay   PHARMACY TO DOSE VANCO  PHARMACY TO DOSE VANCO  PODIATRY IP CONSULT  INFECTIOUS DISEASES IP CONSULT  PHYSICAL THERAPY ADULT  IP CONSULT  CARE MANAGEMENT / SOCIAL WORK IP CONSULT  OCCUPATIONAL THERAPY ADULT IP CONSULT  PHARMACY TO DOSE VANCO  PHYSICAL THERAPY ADULT IP CONSULT    Code Status   Full Code    Time Spent on this Encounter   I, Jimbo Joseph MD, personally saw the patient today and spent greater than 30 minutes discharging this patient.       Jimbo Joseph MD  [address] [address]  Phone: [phone]  Fax: [phone]  ______________________________________________________________________    Physical Exam   Vital Signs: Temp: 98.2  F (36.8  C) Temp src: Oral BP: (!) 142/68 Pulse: 71   Resp: 16 SpO2: 94 % O2 Device: None (Room air)    Weight: 132 lbs 0 oz    General: alert, oriented, and in no acute distress  Pulmonary: clear to auscultation bilaterally, normal respiratory effort, on room air, no rales or wheezes or evidence of accessory muscle use  CVS: regular rate and rhythm, no murmurs, rubs, or gallops; no blatant jugular venous distention; no extremity edema and extremities are warm to the touch  GI: soft, nontender, BS+, no rebound or guarding, no conspicuous organomegaly   Neuro: nonfocal, moves all extremities of own volition, oriented x4 AM on 6/3, strength intact outside of right lower limb in boot/dressing  Msk- right foot remains stable in current dressing and c/d/I and in boot; other digits outside of 5th with good ROM and sensation, s/p amputation stable dressing  Psych: appropriate       Primary Care Physician   Sabas Austin    Discharge Orders      General info for SNF    Length of Stay Estimate: Short Term Care: Estimated # of Days <30  Condition at Discharge: Improving  Level of care:skilled   Rehabilitation Potential: Fair  Admission H&P remains valid and up-to-date: Yes  Recent Chemotherapy: N/A  Use Nursing Home Standing Orders: Yes     Mantoux instructions    Give two-step Mantoux (PPD) Per Facility Policy Yes     Follow Up and  recommended labs and tests    Follow up with residential physician.  The following labs/tests are recommended: bmp and cbc.     Reason for your hospital stay    Osteomyelitis which was treated with 5th toe amputation and antibiotics. Continue taking 2 oral antibiotics for x2 more days and follow up with Primary Care Provider and also with Podiatry (within a week). Follow their activity instructions.     Activity - Up with nursing assistance     Weight bearing status    NWB with splint on right foot     Physical Therapy Adult Consult    Evaluate and treat as clinically indicated.    Reason:  s/p right 5th toe amputation     Fall precautions     Diet    Follow this diet upon discharge: Orders Placed This Encounter      Advance Diet as Tolerated: Regular Diet Adult       Significant Results and Procedures   Results for orders placed or performed during the hospital encounter of 05/29/24   MR Foot Right w/o & w Contrast    Narrative    EXAM: MR FOOT RIGHT W/O and W CONTRAST  LOCATION: [facility]  DATE: 5/29/2024    INDICATION: Right 5th toe redness, question purulence at the pulp; pain, eval for osteomyelitis, gas, etc.  COMPARISON: Right foot radiographic exam 5/26/2024  TECHNIQUE: Routine. Additional postgadolinium T1 sequences were obtained.  IV CONTRAST: 6 mL Gadavist given    FINDINGS:     JOINTS AND BONES:   -Apparent abnormal T2 hyperintense signal and enhancement within the proximal aspect of the ankylosed distalmost phalanx fifth toe. No definitive confluent T1 hypointense signal in the corresponding area. The fifth toe proximal phalanx is intact without   evidence for fracture or bone infection. The remaining toes demonstrate normal marrow signal intensity without fracture or acute osteomyelitis of the first through fourth toes. No metatarsal fracture or metatarsal stress fracture. Scattered degenerative   change in the right foot including at the first MTP and IP joints and in the  mid foot. Synovitis at the first MTP and IP joints. No sizable joint effusion.    TENDONS:   -No acute forefoot tendon injury or significant tenosynovitis. Distal peroneus longus longus intact. Distal anterior tibialis intact. No significant tendinopathy.     LIGAMENTS:   -Lisfranc ligament: Intact. No subluxation.    MUSCLES AND SOFT TISSUES:   -Largely preserved intrinsic muscle bulk. Some mild myositis is seen surrounding the fifth metatarsal. Lateral foot soft tissue swelling with likely cellulitis. Fifth toe cellulitis. No well-defined or drainable fluid collection is seen to suggest   abscess. Visualized plantar fascia is intact.      Impression    IMPRESSION:  1.  Fifth toe soft tissue swelling with likely cellulitis and probable shallow ulceration along the dorsum of the distal most ankylosed phalanx fifth toe particularly along the proximal margin.  2.  Abnormal edema like signal intensity and enhancement in the proximal aspect of the ankylosed distalmost phalanx fifth toe, which could represent early acute osteomyelitis or reactive osteitis. Can a probe contact bone?  3.  Additional incidental findings as detailed fully above.   US Lower Extremity Arterial Duplex Bilateral    Narrative    EXAM: US LOWER EXTREMITY ARTERIAL DUPLEX BILATERAL  LOCATION: [facility]  DATE: 5/29/2024    INDICATION: Discoloration and pain to right fifth digit.  COMPARISON: 7/21/2023.  TECHNIQUE: Duplex utilizing 2D gray-scale imaging, Doppler interrogation with color-flow and spectral waveform analysis.    FINDINGS: Patent arteries throughout the right and left lower extremities. In the right lower extremity, waveforms become monophasic in the mid SFA, suggesting stenosis that is not well visualized. In the left lower extremities, waveforms are multiphasic   throughout.    RIGHT LOWER EXTREMITY ARTERIAL ASSESSMENT:  External iliac artery 181 cm/s  Common femoral artery: 167 cm/s  Profunda femoris artery:  148 cm/s  SFA (proximal): 146 cm/s  SFA (mid): 136 cm/s  SFA (distal): 107 cm/s  Popliteal artery:  cm/s  Posterior tibial artery: 104 cm/s  Anterior tibial artery: 50 cm/s  Dorsalis pedis artery: 90 cm/s    LEFT LOWER EXTREMITY ARTERIAL ASSESSMENT:  External iliac artery 207 cm/s  Common femoral artery: 158 cm/s  Profunda femoris artery: 180 cm/s  SFA (proximal): 144 cm/s  SFA (mid): 192 cm/s  SFA (distal): 161 cm/s  Popliteal artery:  cm/s  Posterior tibial artery: 55 cm/s  Anterior tibial artery: 33 cm/s  Dorsalis pedis artery: 51 cm/s      Impression    IMPRESSION:  1.  Right lower extremity: Patent arteries throughout the right lower extremity. However, waveforms become monophasic at the level of the mid SFA, suggesting stenosis.  2.  Left lower extremity: Patent arteries throughout the left lower extremity. Waveforms are multiphasic throughout.   US LINDA with PPG wo Exercise    Narrative    EXAM: RESTING ANKLE-BRACHIAL INDICES (ABIs)  LOCATION: [facility]  DATE: 5/29/2024    INDICATION: PAD  COMPARISON: 7/21/2023.    LINDA FINDINGS:  RIGHT  Brachial: 215  Ankle (PT): Noncompressible  Ankle (DP): 179 Index: 0.83  Digit: 100 Index: 0.47    LEFT  Brachial: 183  Ankle (PT): Noncompressible  Ankle (DP): Noncompressible  Digit: 161 Index: 0.75    The right LINDA at rest is 0.83. The left LINDA at rest is 0.75.        Impression    IMPRESSION:  1.  RIGHT LOWER EXTREMITY: LINDA at rest shows moderate arterial insufficiency.  2.  LEFT LOWER EXTREMITY: LINDA at rest shows moderate arterial insufficiency   3.  Dampened digital waveforms bilaterally.   US Renal Complete Non-Vascular    Narrative    EXAM: US RENAL COMPLETE NON-VASCULAR  LOCATION: [facility]  DATE: 6/1/2024    INDICATION: worsening yehuda, please assess for any obstruction, thank you  COMPARISON: None.  TECHNIQUE: Routine Bilateral Renal and Bladder Ultrasound.    FINDINGS:    RIGHT KIDNEY: 8.1 cm. Normal renal  cortical thickness and echogenicity. No hydronephrosis. Multiple cysts are present, largest measures approximately one CM.     LEFT KIDNEY: 8.5 cm. Normal renal cortical thickness and echogenicity. No hydronephrosis. Small cysts present measuring approximately 0.5 cm.    BLADDER: Normal.      Impression    IMPRESSION:  1.  No hydronephrosis.   XR Chest Port 1 View    Narrative    EXAM: XR CHEST PORT 1 VIEW  LOCATION: [facility]  DATE: 6/2/2024    INDICATION: Postoperative hypoxia.  COMPARISON: None.      Impression    IMPRESSION:     No focal airspace disease. No pleural effusion or pneumothorax.    Stable cardiomegaly. Aortic calcifications.       Discharge Medications   Current Discharge Medication List        START taking these medications    Details   amoxicillin-clavulanate (AUGMENTIN) 500-125 MG tablet Take 1 tablet by mouth every 12 hours for 2 days  Qty: 4 tablet, Refills: 0    Comments: Completing course of antibiotics from hospitalization  Associated Diagnoses: Cellulitis of fifth toe of right foot; Osteomyelitis of right foot, unspecified type (H)      doxycycline hyclate (VIBRAMYCIN) 100 MG capsule Take 1 capsule (100 mg) by mouth 2 times daily for 2 days  Qty: 4 capsule, Refills: 0    Comments: Completing course of antibiotics from hospitalization  Associated Diagnoses: Cellulitis of fifth toe of right foot; Osteomyelitis of right foot, unspecified type (H)           CONTINUE these medications which have CHANGED    Details   losartan (COZAAR) 25 MG tablet HOLD this medication until primary care provider follow up    Associated Diagnoses: Essential hypertension           CONTINUE these medications which have NOT CHANGED    Details   acetaminophen (TYLENOL) 500 MG tablet Take 1,000 mg by mouth every 6 hours as needed for mild pain      apixaban ANTICOAGULANT (ELIQUIS ANTICOAGULANT) 2.5 MG tablet Take 1 tablet (2.5 mg) by mouth 2 times daily  Qty: 180 tablet, Refills: 3     Associated Diagnoses: Permanent atrial fibrillation (H)      atorvastatin (LIPITOR) 20 MG tablet TAKE 1 TABLET BY MOUTH EVERYDAY AT BEDTIME  Qty: 90 tablet, Refills: 1    Associated Diagnoses: Pure hypercholesterolemia      cholecalciferol, vitamin D3, (VITAMIN D3) 2,000 unit Tab [CHOLECALCIFEROL, VITAMIN D3, (VITAMIN D3) 2,000 UNIT TAB] Take 1 tablet by mouth daily.      diltiazem ER COATED BEADS (CARDIZEM CD/CARTIA XT) 240 MG 24 hr capsule Take 1 capsule (240 mg) by mouth daily  Qty: 90 capsule, Refills: 3    Associated Diagnoses: Permanent atrial fibrillation (H)      Ferrous Sulfate 324 (65 Fe) MG TBEC Take 1 tablet by mouth daily      fish oil-omega-3 fatty acids 500 MG capsule Take 1 capsule by mouth daily      furosemide (LASIX) 20 MG tablet TAKE 2 TABLETS BY MOUTH EVERY DAY  Qty: 180 tablet, Refills: 1    Associated Diagnoses: Edema      pantoprazole (PROTONIX) 40 MG EC tablet TAKE 1 TABLET BY MOUTH TWICE A DAY  Qty: 180 tablet, Refills: 3    Associated Diagnoses: Gastric AVM      sertraline (ZOLOFT) 100 MG tablet Take 1 tablet (100 mg) by mouth daily    Associated Diagnoses: Anxiety state           Allergies   Allergies   Allergen Reactions    Blood Transfusion Related (Informational Only) Other (See Comments)     Patient has a history of a clinically significant antibody against RBC antigens.  A delay in compatible RBCs may occur. 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    Hydrocodone-Acetaminophen Unknown

## 2024-06-04 NOTE — PROGRESS NOTES
[facility]    Medicine Progress Note - Hospitalist Service    Date of Admission:  5/29/2024    Assessment & Plan   Marva Gaines is a 88 year old female admitted on 5/29/2024. 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 Podiatry consulted. They will decide if also request vascular input. They discussed w/ ED team and broad empiric abx, zosyn and vancomycin (pharm dosing) which are continued on admission for both the cellulitis (and possible accompanying abscess) and UTI. Urine cx w/ pansensitive E.Coli. MRI confirmed cellulitis but raised question of early osteomyelitis; reviewed with pt and appreciate Podiatry consult. They discussed findings and options for iv abx plus debridement versus definitive amputation of 5th digit and pt opted for latter.     S/p Amputation fifth digit right foot by Dr. Peres with EBL of 4ccs. ID consulted and switched abx to Augmentin twice daily x 7 days. Therapies recommend TCU. Discussed with SW. Apixaban resumed 5/31 w/ podiatric clearance. Renal function also continues to worsen now 1.53, starting MIVFs gently x 24 hours and renal US. Was on room air, then 1-2L but no respiratory sx, titrate with 02 goals for 90% and above (do not keep too high at 99%). Now on 1/2L on 6/2; Renal function improving. Renal US no hydronephrosis. Cr 1.19 downtrending to 1.06; mild hyponatremia improved.     In the afternoon 6/2, However, patient developed confusion as well as reported speech changes which were noted to be transient.  No neurologic changes, labs were ordered and chest x-ray, and also broadened antibiotics back to vancomycin and Zosyn.  On 6/3, the patient is  "oriented x 4 again, does not recall yesterday afternoon's events.  Discussed with patient's daughter Veronica and continuing IV antibiotics for now, may ask ID to return if pt does not further improve.  Asking podiatry to return regarding activity as patient and family expressed concerns about time in bed while not undergoing therapies.  Discussed with bedside nursing team and social work and PT. Bedside team paged multiple times on 6/3.     On 6/4/2024, the pt is stable and medically cleared for discharge.  Discussed with  and there is an opening in the afternoon.  Discussed with the patient's daughter, Veronica, and she expressed marked concern about the \"activity orders\" from podiatry.  Reviewed with her that should the patient should have nonweightbearing on the right lower limb until follow-up with podiatry, and that pt may be nonweightbearing for 7 to 10 days or until follow up, as per their recommendation.  However, the patient's daughter raised her voice and expressed intent to challenge a discharge until she speaks with podiatry.  Writer informed her that she is raising her voice and that we do hear her concern and will address it before a discharge -- hence, now the plan is to have the house lead get involved to help navigate the current situation.  Discussed with  and updated her. I have also discussed w/ House Lead so they are aware.     ------  Encephalopathy, suspect metabolic and infectious  Cellulitis of 5th toe; Osteomyelitis s/p amputation  Ashen appearing toe though with dopplerable pulses in ER  Concern for ischemic tissue but not critical limb ischemia  leukocytosis  A- as above. Wbc downtrending and now normalized.   P:  -continue the resumed vancomycin and zosyn   - she needs x2 more days of abx, so could send out back on Augmentin as below and add doxycyline for empiric additional coverage  - consult to podiatry, appreciate   - MRI per podiatry   - request they return " 6/3/2024 as pt/family hoping for updated activity orders  -had been on zosyn (q8 hours) and vancomycin (pharm dosing)   - defer further modification to Infectious Disease   -Augmentin twice daily x 7 days but declined 6/2 back on iv vanc and zosyn    - cultures without a culprit organism (staph epi on day 3)   -- patient needs x2 more days of abx, so could send out back on Augmentin as below and add doxycyline for empiric additional coverage through 6/6  -pain and nausea control  -pt and ot and sw     UTI, and prior hx  A/p- consistent clinically and objectively with episodic confusion and urinary urgency and prior similar episodes  -abx as above  -- follow the in-process cultures, follow speciation of organism(s) and subsequently their sensitivities and resistance (S&R) and narrow antibiotics as appropriately     -> pansensitive e.coli   - covered by current regimen    Hypokalemia  A/p- k replacement     Hyponatremia, improved  A/p- mild, likely from volume changes, suspect will improve    Acute on chronic Kidney injury, ckd 3b  A/p- hold losartan w/c for renal function; follow labs; monitor for progression, if does not improve, consider nephrology input  - avoid nephrotoxic agents  - US renal for obstruction was negative  - losartan held  - improving renal function    afib on eliquis  A/p-  resume eliquis 5/31/2024, cleared by podiatry  - continue diltiazem w/ hold parameters    Htn  A/p- elevated but pain would exacerbate, no headaches or vision changes  - continue both diltiazem and losartan with hold parameters    Hld  A/p- hold fish oil, continue statin    Anxiety  A/p- stable, would continue zoloft     HFpEF  A/p- appears euvolemic, no rales. However was on room air now 1-2L but pt does not have symptoms-- per chart 02 is at 98 and 99%, can wean as able. She was on RA last night.  -titrate with 02 goals for 90% and above (do not keep too high at 99%).  - resumed lasix  -improving renal function    prior  "hospitalization for GI bleed in January 2023, duodenal AVM  Reflux  A/p- stable, resume ppi             Diet: Advance Diet as Tolerated: Regular Diet Adult    DVT Prophylaxis: Pneumatic Compression Devices and given the ecchymoses around the 5th toe and prior hx of bleed and also possibly needing intervention pending podiatric input, hold the eliquis for now; pending Podiatry consult   Valadez Catheter: Not present  Lines: None     Cardiac Monitoring: None  Code Status: Full Code      Clinically Significant Risk Factors        # Hypokalemia: Lowest K = 2.9 mmol/L in last 2 days, will replace as needed           # Hypertension: Noted on problem list                   Disposition Plan   See summary            Jimbo Joseph MD  Hospitalist Service  [facility]  Securely message with [employer] (more info)  Text page via [employer] Paging/Directory   ______________________________________________________________________    Interval History   -NAEO  - pt has no new pain or concerns today  - she is open to discharge   - discussed w/ SW  - subsequently called the daughter Veronica, and she expressed marked concern about the \"activity orders\" from podiatry.  Reviewed with her that should the patient should have nonweightbearing on the right lower limb until follow-up with podiatry, and that pt may be nonweightbearing for 7 to 10 days or until follow up, as per their recommendation.  However, the patient's daughter raised her voice and expressed intent to challenge a discharge until she speaks with podiatry.  Writer informed her that she is raising her voice and that we do hear her concern and will address it before a discharge -- hence, now the plan is to have the house lead get involved to help navigate the current situation.    -Discussed with  and updated her.    -discussed w/ house lead this afternoon  - called # but it was for vascular, will check w/ [facility] for podiatry contact information    "   Physical Exam   Vital Signs: Temp: 98.2  F (36.8  C) Temp src: Oral BP: (!) 142/68 Pulse: 71   Resp: 16 SpO2: 94 % O2 Device: None (Room air)    Weight: 132 lbs 0 oz    General: alert, oriented, and in no acute distress  Pulmonary: clear to auscultation bilaterally, normal respiratory effort, on room air, no rales or wheezes or evidence of accessory muscle use  CVS: regular rate and rhythm, no murmurs, rubs, or gallops; no blatant jugular venous distention; no extremity edema and extremities are warm to the touch  GI: soft, nontender, BS+, no rebound or guarding, no conspicuous organomegaly   Neuro: nonfocal, moves all extremities of own volition, oriented x4 AM on 6/3, strength intact outside of right lower limb in boot/dressing  Msk- right foot remains stable in current dressing and c/d/I and in boot; other digits outside of 5th with good ROM and sensation, s/p amputation stable dressing  Psych: appropriate      Medical Decision Making       65 MINUTES SPENT BY ME on the date of service doing chart review, history, exam, documentation & further activities per the note.      Data   ------------------------- PAST 24 HR DATA REVIEWED -----------------------------------------------    I have personally reviewed the following data over the past 24 hrs:    9.5  \   11.8   / 206     138 101 17.1 /  126 (H)   3.4 29 0.98 (H) \       Imaging results reviewed over the past 24 hrs:   No results found for this or any previous visit (from the past 24 hour(s)).

## 2024-06-04 NOTE — PROGRESS NOTES
Note from 1500 to discharge via transport. AVS sent with pt. IV access discontinued. No issues noted.    Taylor R Schoenecker, RN

## 2024-06-04 NOTE — PROGRESS NOTES
Care Management Discharge Note    Discharge Date: 06/04/2024       Discharge Disposition: Transitional Care    Discharge Services: None    Discharge DME:      Discharge Transportation: family or friend will provide    Private pay costs discussed: transportation costs    Does the patient's insurance plan have a 3 day qualifying hospital stay waiver?  No    PAS Confirmation Code: [id number]  Patient/family educated on Medicare website which has current facility and service quality ratings:  (Family requested [facility] referrals)    Education Provided on the Discharge Plan: Yes  Persons Notified of Discharge Plans: Pt, Dtr and TCU   Patient/Family in Agreement with the Plan: yes    Handoff Referral Completed: Yes    Additional Information:  Dtr was able to talk with Podiatry today.  [facility] talked with Dtr Veronica and updated that Pt is ready to discharge today and Veronica agrees.  Veronica feels that her Mother is doing better.  [facility] set up w/c ride with [employer] for 4:30 PM.  PAS completed. [facility] confirmed with Amara in admission that Pt can still admit today.     JENNY Reddy

## 2024-06-05 ENCOUNTER — TRANSITIONAL CARE UNIT VISIT (OUTPATIENT)
Dept: GERIATRICS | Facility: CLINIC | Age: 89
End: 2024-06-05
Payer: COMMERCIAL

## 2024-06-05 ENCOUNTER — DOCUMENTATION ONLY (OUTPATIENT)
Dept: GERIATRICS | Facility: CLINIC | Age: 89
End: 2024-06-05
Payer: COMMERCIAL

## 2024-06-05 VITALS
OXYGEN SATURATION: 94 % | HEIGHT: 62 IN | TEMPERATURE: 98.2 F | HEART RATE: 96 BPM | RESPIRATION RATE: 22 BRPM | BODY MASS INDEX: 24.29 KG/M2 | DIASTOLIC BLOOD PRESSURE: 86 MMHG | SYSTOLIC BLOOD PRESSURE: 164 MMHG | WEIGHT: 132 LBS

## 2024-06-05 DIAGNOSIS — K59.01 SLOW TRANSIT CONSTIPATION: ICD-10-CM

## 2024-06-05 DIAGNOSIS — L03.031 CELLULITIS OF FIFTH TOE OF RIGHT FOOT: Primary | ICD-10-CM

## 2024-06-05 DIAGNOSIS — I10 ESSENTIAL HYPERTENSION: ICD-10-CM

## 2024-06-05 DIAGNOSIS — F32.A DEPRESSIVE DISORDER: ICD-10-CM

## 2024-06-05 DIAGNOSIS — N30.00 ACUTE CYSTITIS WITHOUT HEMATURIA: ICD-10-CM

## 2024-06-05 DIAGNOSIS — I50.32 CHRONIC HEART FAILURE WITH PRESERVED EJECTION FRACTION (HFPEF) (H): ICD-10-CM

## 2024-06-05 DIAGNOSIS — I48.21 PERMANENT ATRIAL FIBRILLATION (H): ICD-10-CM

## 2024-06-05 PROBLEM — R61 EXCESSIVE SWEATING: Status: ACTIVE | Noted: 2024-06-05

## 2024-06-05 PROCEDURE — 99310 SBSQ NF CARE HIGH MDM 45: CPT | Performed by: NURSE PRACTITIONER

## 2024-06-05 NOTE — LETTER
6/5/2024      Marva Gaines  [address]   [address]  [facility] [address]        [facility] GERIATRICS    PRIMARY CARE PROVIDER AND CLINIC:  Sabas Austin MD, [address] / [facility] [id number]  Chief Complaint   Patient presents with     Hospital F/U      [facility] Medical Record Number:  [id number]  Place of Service where encounter took place:  [facility] (TCU) [[id number]]    Hospital Course  Marva Gaines is a 88 year old female admitted on 5/29/2024. 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 Podiatry consulted. They will decide if also request vascular input. They discussed w/ ED team and broad empiric abx, zosyn and vancomycin (pharm dosing) which are continued on admission for both the cellulitis (and possible accompanying abscess) and UTI. Urine cx w/ pansensitive E.Coli. MRI confirmed cellulitis but raised question of early osteomyelitis; reviewed with pt and appreciate Podiatry consult. They discussed findings and options for iv abx plus debridement versus definitive amputation of 5th digit and pt opted for latter.      S/p Amputation fifth digit right foot by Dr. Peres with EBL of 4ccs. ID consulted and switched abx to Augmentin twice daily x 7 days. Therapies recommend TCU. Discussed with SW. Apixaban resumed 5/31 w/ podiatric clearance. Renal function also continues to worsen now 1.53, starting MIVFs gently x 24 hours and renal US. Was on room air, then 1-2L but no respiratory sx, titrate with 02 goals for 90% and above (do not keep too high at 99%). Now on 1/2L on 6/2; Renal function improving. Renal US no hydronephrosis. Cr 1.19 downtrending to 1.06; mild hyponatremia  improved.      In the afternoon 6/2, However, patient developed confusion as well as reported speech changes which were noted to be transient.  No neurologic changes, labs were ordered and chest x-ray, and also broadened antibiotics back to vancomycin and Zosyn.  On 6/3, the patient is oriented x 4 again. Nonremarkable workup.  Her encephalopathy had improved.       On 6/4, patient is discharged to TCU for further cares and rehabilitation, she remains hemodynamically and vitally stable and fully oriented.  She will be discharged with 2 more days of antibiotics including Augmentin twice daily and doxycycline twice daily, this was discussed with the family member, pt's daughter Veronica as well.  The family member also had a significant amount of podiatry-specific questions and all of these were addressed prior to discharge by podiatry.  Given her SHY which was resolving and was trending towards normalization, her losartan was held on discharge and should be followed up on with her outpatient provider.        Marva Gaines  is a 88 year old  (1935), admitted to the above facility from  [facility]. Hospital stay 5/29/2024 through 6/4/2024..     Patient is being seen today for initial NP visit in TCU for ongoing medical conditions:   1. Cellulitis of fifth toe of right foot    2. Depressive disorder    3. Hypertension    4. Acute cystitis without hematuria    5. Chronic heart failure with preserved ejection fraction (HFpEF) (H)    6. Permanent atrial fibrillation (H)    7. Slow transit constipation        HPI:    Reports doing well since s/p 5th toe amputation. She is NWB with boot on. She is to follow up in 1 week with Dr Ladd. Denies pain. She does have oxycodone PRN. Continues on oral abx for UTI as well. Denies concerns with abx.   Denies HA, chest pain, palpitations, dizziness. HR controlled. BP slightly elevated today. She is on losartan.  No troubles breathing, no SOB, continues with  some burning with urination, no constipation. On [place]. Eating and drinking okay. Sleeping okay. Denies pain.   Lives alone at [place] in [facility], ambulates with walker, currently in .      CODE STATUS/ADVANCE DIRECTIVES DISCUSSION:  Full Code  CPR/Full code   ALLERGIES:   Allergies   Allergen Reactions     Blood Transfusion Related (Informational Only) Other (See Comments)     Patient has a history of a clinically significant antibody against RBC antigens.  A delay in compatible RBCs may occur. 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     Hydrocodone-Acetaminophen Unknown      PAST MEDICAL HISTORY: History reviewed. No pertinent past medical history.   PAST SURGICAL HISTORY:   has a past surgical history that includes Esophagoscopy, gastroscopy, duodenoscopy (EGD), combined (N/A, 12/30/2022); Colonoscopy (N/A, 12/30/2022); and Amputate toe(s) (Right, 5/30/2024).  FAMILY HISTORY: family history is not on file.  SOCIAL HISTORY:   reports that she has quit smoking. She has been exposed to tobacco smoke. She has never used smokeless tobacco.  Patient's living condition: lives alone    Post Discharge Medication Reconciliation Status:   MED REC REQUIRED  Post Medication Reconciliation Status: discharge medications reconciled, continue medications without change       Current Outpatient Medications   Medication Sig Dispense Refill     acetaminophen (TYLENOL) 500 MG tablet Take 1,000 mg by mouth every 6 hours as needed for mild pain       amoxicillin-clavulanate (AUGMENTIN) 500-125 MG tablet Take 1 tablet by mouth every 12 hours for 2 days 4 tablet 0     apixaban ANTICOAGULANT (ELIQUIS ANTICOAGULANT) 2.5 MG tablet Take 1 tablet (2.5 mg) by mouth 2 times daily 180 tablet 3     atorvastatin (LIPITOR) 20 MG tablet TAKE 1 TABLET BY MOUTH EVERYDAY AT BEDTIME 90 tablet 1     cholecalciferol, vitamin D3, (VITAMIN D3) 2,000 unit Tab [CHOLECALCIFEROL, VITAMIN D3, (VITAMIN D3) 2,000 UNIT TAB] Take 1 tablet by mouth daily.       diltiazem ER COATED BEADS  "(CARDIZEM CD/CARTIA XT) 240 MG 24 hr capsule Take 1 capsule (240 mg) by mouth daily 90 capsule 3     doxycycline hyclate (VIBRAMYCIN) 100 MG capsule Take 1 capsule (100 mg) by mouth 2 times daily for 2 days 4 capsule 0     Ferrous Sulfate 324 (65 Fe) MG TBEC Take 1 tablet by mouth daily       fish oil-omega-3 fatty acids 500 MG capsule Take 1 capsule by mouth daily       furosemide (LASIX) 20 MG tablet TAKE 2 TABLETS BY MOUTH EVERY  tablet 1     losartan (COZAAR) 25 MG tablet HOLD this medication until primary care provider follow up       pantoprazole (PROTONIX) 40 MG EC tablet TAKE 1 TABLET BY MOUTH TWICE A  tablet 3     sertraline (ZOLOFT) 100 MG tablet Take 1 tablet (100 mg) by mouth daily       No current facility-administered medications for this visit.       ROS:  10 point ROS of systems including Constitutional, Eyes, Respiratory, Cardiovascular, Gastroenterology, Genitourinary, Integumentary, Musculoskeletal, Psychiatric were all negative except for pertinent positives noted in my HPI.    Vitals:  BP (!) 164/86   Pulse 96   Temp 98.2  F (36.8  C)   Resp 22   Ht 1.575 m (5' 2\")   Wt 59.9 kg (132 lb)   LMP  (LMP Unknown)   SpO2 94%   BMI 24.14 kg/m    Exam:  GENERAL APPEARANCE:  Alert, in no distress, oriented, cooperative. Eating breakfast in TCU room.  ENT:  Mouth and posterior oropharynx normal, moist mucous membranes  EYES:  EOM, conjunctivae, lids, pupils and irises normal  NECK:  No adenopathy,masses or thyromegaly  RESP:  respiratory effort and palpation of chest normal, lungs clear to auscultation , no respiratory distress  CV:  Palpation and auscultation of heart done , regular rate and rhythm, no murmur, rub, or gallop, no edema to LE  GI/ABDOMEN:  normal bowel sounds, soft, nontender, no hepatosplenomegaly or other masses  M/S:   moves extremities spontaneously. Ambulation not tested   SKIN:  no rashes to exposed skin. No redness, warmth or drainage to incision site, right " foot wrapped. Boot on.   NEURO:   intact   PSYCH:  oriented X 3, normal insight, judgement and memory, affect and mood normal,       Lab/Diagnostic data:  Recent labs in [facility] reviewed by me today.   Last Comprehensive Metabolic Panel:  Lab Results   Component Value Date     06/04/2024    POTASSIUM 3.4 06/04/2024    CHLORIDE 101 06/04/2024    CO2 29 06/04/2024    ANIONGAP 8 06/04/2024     (H) 06/04/2024    BUN 17.1 06/04/2024    CR 0.98 (H) 06/04/2024    GFRESTIMATED 56 (L) 06/04/2024    FAROOQ 9.3 06/04/2024       Lab Results   Component Value Date    WBC 9.5 06/04/2024     Lab Results   Component Value Date    RBC 3.82 06/04/2024     Lab Results   Component Value Date    HGB 11.8 06/04/2024     Lab Results   Component Value Date    HCT 32.7 06/04/2024     Lab Results   Component Value Date    MCV 86 06/04/2024     Lab Results   Component Value Date    MCH 28.0 06/04/2024     Lab Results   Component Value Date    MCHC 32.7 06/04/2024     Lab Results   Component Value Date    RDW 14.6 06/04/2024     Lab Results   Component Value Date     06/04/2024         ASSESSMENT/PLAN:    (L03.031) Cellulitis of fifth toe of right foot  (primary encounter diagnosis)  Comment: s/p amputation of 5th toe with Dr Ladd. NWB, boot on. Toe dressed. Denies pain today. Has oxycodone and tylenol available for pain  Plan: Pt/OT, follow up with Podiatry in 1 week, continue oral abx. Labs on 6/10.     (F32.A) Depressive disorder  Comment: sertraline. Stable   Plan: continue     (I10) Hypertension  Comment: Losartan, /86.  Plan: Monitor, continue, recheck labs     (N30.00) Acute cystitis without hematuria  Comment: Completing oral abx. Continues with some urinary burning.   Plan: close monitor, complete course.     (I50.32) Chronic heart failure with preserved ejection fraction (HFpEF) (H)  Comment: lasix, now off K. No concerns with SOB, no LE edema.   Plan: daily weights, Recheck labs on 6/10, may need to restart  K    (I48.21) Permanent atrial fibrillation (H)  Comment: Eliquis, rate controlled   Plan: continue    (K59.01) Slow transit constipation  Comment: Mary. On Oxycodone PRN for pain.   Plan: Continue, monitor BM's         Electronically signed by:  Susan Green CNP   June 5, 2024      Total time greater than 47 minutes reviewing chart in EPIC and PCC, medications, labs, vitals. Discussing with social work, physical therapy, occupational therapy and nursing.                   Sincerely,        Susan Green CNP

## 2024-06-05 NOTE — PROGRESS NOTES
Occupational Therapy Discharge Summary    Reason for therapy discharge:    Discharged to transitional care facility.    Progress towards therapy goal(s). See goals on Care Plan in [facility] electronic health record for goal details.  Goals not met.  Barriers to achieving goals:   discharge from facility.    Therapy recommendation(s):    Continued therapy is recommended.  Rationale/Recommendations:  TCU for all ADLs.

## 2024-06-05 NOTE — PROGRESS NOTES
[facility] GERIATRICS    PRIMARY CARE PROVIDER AND CLINIC:  Sabas Austin MD, [address] [facility] / [facility] [id number]  Chief Complaint   Patient presents with    Hospital F/U      [facility] Medical Record Number:  [id number]  Place of Service where encounter took place:  [facility] (TCU) [[id number]]    Hospital Course  Marva Gaines is a 88 year old female admitted on 5/29/2024. 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 Podiatry consulted. They will decide if also request vascular input. They discussed w/ ED team and broad empiric abx, zosyn and vancomycin (pharm dosing) which are continued on admission for both the cellulitis (and possible accompanying abscess) and UTI. Urine cx w/ pansensitive E.Coli. MRI confirmed cellulitis but raised question of early osteomyelitis; reviewed with pt and appreciate Podiatry consult. They discussed findings and options for iv abx plus debridement versus definitive amputation of 5th digit and pt opted for latter.      S/p Amputation fifth digit right foot by Dr. Peres with EBL of 4ccs. ID consulted and switched abx to Augmentin twice daily x 7 days. Therapies recommend TCU. Discussed with SW. Apixaban resumed 5/31 w/ podiatric clearance. Renal function also continues to worsen now 1.53, starting MIVFs gently x 24 hours and renal US. Was on room air, then 1-2L but no respiratory sx, titrate with 02 goals for 90% and above (do not keep too high at 99%). Now on 1/2L on 6/2; Renal function improving. Renal US no hydronephrosis. Cr 1.19 downtrending to 1.06; mild hyponatremia improved.      In the afternoon 6/2, However, patient developed confusion as well as  reported speech changes which were noted to be transient.  No neurologic changes, labs were ordered and chest x-ray, and also broadened antibiotics back to vancomycin and Zosyn.  On 6/3, the patient is oriented x 4 again. Nonremarkable workup.  Her encephalopathy had improved.       On 6/4, patient is discharged to TCU for further cares and rehabilitation, she remains hemodynamically and vitally stable and fully oriented.  She will be discharged with 2 more days of antibiotics including Augmentin twice daily and doxycycline twice daily, this was discussed with the family member, pt's daughter Veronica as well.  The family member also had a significant amount of podiatry-specific questions and all of these were addressed prior to discharge by podiatry.  Given her SHY which was resolving and was trending towards normalization, her losartan was held on discharge and should be followed up on with her outpatient provider.        Marva Gaines  is a 88 year old  (1935), admitted to the above facility from  [facility]. Hospital stay 5/29/2024 through 6/4/2024..     Patient is being seen today for initial NP visit in TCU for ongoing medical conditions:   1. Cellulitis of fifth toe of right foot    2. Depressive disorder    3. Hypertension    4. Acute cystitis without hematuria    5. Chronic heart failure with preserved ejection fraction (HFpEF) (H)    6. Permanent atrial fibrillation (H)    7. Slow transit constipation        HPI:    Reports doing well since s/p 5th toe amputation. She is NWB with boot on. She is to follow up in 1 week with Dr Ladd. Denies pain. She does have oxycodone PRN. Continues on oral abx for UTI as well. Denies concerns with abx.   Denies HA, chest pain, palpitations, dizziness. HR controlled. BP slightly elevated today. She is on losartan.  No troubles breathing, no SOB, continues with some burning with urination, no constipation. On [place]. Eating and drinking okay.  Sleeping okay. Denies pain.   Lives alone at [place] in [facility], ambulates with walker, currently in .      CODE STATUS/ADVANCE DIRECTIVES DISCUSSION:  Full Code  CPR/Full code   ALLERGIES:   Allergies   Allergen Reactions    Blood Transfusion Related (Informational Only) Other (See Comments)     Patient has a history of a clinically significant antibody against RBC antigens.  A delay in compatible RBCs may occur. 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    Hydrocodone-Acetaminophen Unknown      PAST MEDICAL HISTORY: History reviewed. No pertinent past medical history.   PAST SURGICAL HISTORY:   has a past surgical history that includes Esophagoscopy, gastroscopy, duodenoscopy (EGD), combined (N/A, 12/30/2022); Colonoscopy (N/A, 12/30/2022); and Amputate toe(s) (Right, 5/30/2024).  FAMILY HISTORY: family history is not on file.  SOCIAL HISTORY:   reports that she has quit smoking. She has been exposed to tobacco smoke. She has never used smokeless tobacco.  Patient's living condition: lives alone    Post Discharge Medication Reconciliation Status:   MED REC REQUIRED  Post Medication Reconciliation Status: discharge medications reconciled, continue medications without change       Current Outpatient Medications   Medication Sig Dispense Refill    acetaminophen (TYLENOL) 500 MG tablet Take 1,000 mg by mouth every 6 hours as needed for mild pain      amoxicillin-clavulanate (AUGMENTIN) 500-125 MG tablet Take 1 tablet by mouth every 12 hours for 2 days 4 tablet 0    apixaban ANTICOAGULANT (ELIQUIS ANTICOAGULANT) 2.5 MG tablet Take 1 tablet (2.5 mg) by mouth 2 times daily 180 tablet 3    atorvastatin (LIPITOR) 20 MG tablet TAKE 1 TABLET BY MOUTH EVERYDAY AT BEDTIME 90 tablet 1    cholecalciferol, vitamin D3, (VITAMIN D3) 2,000 unit Tab [CHOLECALCIFEROL, VITAMIN D3, (VITAMIN D3) 2,000 UNIT TAB] Take 1 tablet by mouth daily.      diltiazem ER COATED BEADS (CARDIZEM CD/CARTIA XT) 240 MG 24 hr capsule Take 1 capsule (240 mg) by mouth daily 90  "capsule 3    doxycycline hyclate (VIBRAMYCIN) 100 MG capsule Take 1 capsule (100 mg) by mouth 2 times daily for 2 days 4 capsule 0    Ferrous Sulfate 324 (65 Fe) MG TBEC Take 1 tablet by mouth daily      fish oil-omega-3 fatty acids 500 MG capsule Take 1 capsule by mouth daily      furosemide (LASIX) 20 MG tablet TAKE 2 TABLETS BY MOUTH EVERY  tablet 1    losartan (COZAAR) 25 MG tablet HOLD this medication until primary care provider follow up      pantoprazole (PROTONIX) 40 MG EC tablet TAKE 1 TABLET BY MOUTH TWICE A  tablet 3    sertraline (ZOLOFT) 100 MG tablet Take 1 tablet (100 mg) by mouth daily       No current facility-administered medications for this visit.       ROS:  10 point ROS of systems including Constitutional, Eyes, Respiratory, Cardiovascular, Gastroenterology, Genitourinary, Integumentary, Musculoskeletal, Psychiatric were all negative except for pertinent positives noted in my HPI.    Vitals:  BP (!) 164/86   Pulse 96   Temp 98.2  F (36.8  C)   Resp 22   Ht 1.575 m (5' 2\")   Wt 59.9 kg (132 lb)   LMP  (LMP Unknown)   SpO2 94%   BMI 24.14 kg/m    Exam:  GENERAL APPEARANCE:  Alert, in no distress, oriented, cooperative. Eating breakfast in TCU room.  ENT:  Mouth and posterior oropharynx normal, moist mucous membranes  EYES:  EOM, conjunctivae, lids, pupils and irises normal  NECK:  No adenopathy,masses or thyromegaly  RESP:  respiratory effort and palpation of chest normal, lungs clear to auscultation , no respiratory distress  CV:  Palpation and auscultation of heart done , regular rate and rhythm, no murmur, rub, or gallop, no edema to LE  GI/ABDOMEN:  normal bowel sounds, soft, nontender, no hepatosplenomegaly or other masses  M/S:   moves extremities spontaneously. Ambulation not tested   SKIN:  no rashes to exposed skin. No redness, warmth or drainage to incision site, right foot wrapped. Boot on.   NEURO:   intact   PSYCH:  oriented X 3, normal insight, judgement and " memory, affect and mood normal,       Lab/Diagnostic data:  Recent labs in [facility] reviewed by me today.   Last Comprehensive Metabolic Panel:  Lab Results   Component Value Date     06/04/2024    POTASSIUM 3.4 06/04/2024    CHLORIDE 101 06/04/2024    CO2 29 06/04/2024    ANIONGAP 8 06/04/2024     (H) 06/04/2024    BUN 17.1 06/04/2024    CR 0.98 (H) 06/04/2024    GFRESTIMATED 56 (L) 06/04/2024    FAROOQ 9.3 06/04/2024       Lab Results   Component Value Date    WBC 9.5 06/04/2024     Lab Results   Component Value Date    RBC 3.82 06/04/2024     Lab Results   Component Value Date    HGB 11.8 06/04/2024     Lab Results   Component Value Date    HCT 32.7 06/04/2024     Lab Results   Component Value Date    MCV 86 06/04/2024     Lab Results   Component Value Date    MCH 28.0 06/04/2024     Lab Results   Component Value Date    MCHC 32.7 06/04/2024     Lab Results   Component Value Date    RDW 14.6 06/04/2024     Lab Results   Component Value Date     06/04/2024         ASSESSMENT/PLAN:    (L03.031) Cellulitis of fifth toe of right foot  (primary encounter diagnosis)  Comment: s/p amputation of 5th toe with Dr Ladd. NWB, boot on. Toe dressed. Denies pain today. Has oxycodone and tylenol available for pain  Plan: Pt/OT, follow up with Podiatry in 1 week, continue oral abx. Labs on 6/10.     (F32.A) Depressive disorder  Comment: sertraline. Stable   Plan: continue     (I10) Hypertension  Comment: Losartan, /86.  Plan: Monitor, continue, recheck labs     (N30.00) Acute cystitis without hematuria  Comment: Completing oral abx. Continues with some urinary burning.   Plan: close monitor, complete course.     (I50.32) Chronic heart failure with preserved ejection fraction (HFpEF) (H)  Comment: lasix, now off K. No concerns with SOB, no LE edema.   Plan: daily weights, Recheck labs on 6/10, may need to restart K    (I48.21) Permanent atrial fibrillation (H)  Comment: Eliquis, rate controlled   Plan:  continue    (K59.01) Slow transit constipation  Comment: Mary. On Oxycodone PRN for pain.   Plan: Continue, monitor BM's         Electronically signed by:  Susan Green CNP   June 5, 2024      Total time greater than 47 minutes reviewing chart in EPIC and PCC, medications, labs, vitals. Discussing with social work, physical therapy, occupational therapy and nursing.

## 2024-06-06 LAB — BACTERIA TISS BX CULT: NORMAL

## 2024-06-07 ENCOUNTER — LAB REQUISITION (OUTPATIENT)
Dept: LAB | Facility: CLINIC | Age: 89
End: 2024-06-07
Payer: COMMERCIAL

## 2024-06-07 DIAGNOSIS — N39.0 URINARY TRACT INFECTION, SITE NOT SPECIFIED: ICD-10-CM

## 2024-06-07 DIAGNOSIS — L03.111 CELLULITIS OF RIGHT AXILLA: ICD-10-CM

## 2024-06-10 ENCOUNTER — TRANSITIONAL CARE UNIT VISIT (OUTPATIENT)
Dept: GERIATRICS | Facility: CLINIC | Age: 89
End: 2024-06-10
Payer: COMMERCIAL

## 2024-06-10 VITALS
RESPIRATION RATE: 18 BRPM | WEIGHT: 132 LBS | OXYGEN SATURATION: 93 % | HEIGHT: 62 IN | DIASTOLIC BLOOD PRESSURE: 77 MMHG | HEART RATE: 84 BPM | BODY MASS INDEX: 24.29 KG/M2 | SYSTOLIC BLOOD PRESSURE: 163 MMHG | TEMPERATURE: 98.2 F

## 2024-06-10 DIAGNOSIS — F41.1 ANXIETY STATE: ICD-10-CM

## 2024-06-10 DIAGNOSIS — F32.A DEPRESSIVE DISORDER: ICD-10-CM

## 2024-06-10 DIAGNOSIS — L03.031 CELLULITIS OF FIFTH TOE OF RIGHT FOOT: Primary | ICD-10-CM

## 2024-06-10 DIAGNOSIS — I48.21 PERMANENT ATRIAL FIBRILLATION (H): ICD-10-CM

## 2024-06-10 DIAGNOSIS — I50.32 CHRONIC HEART FAILURE WITH PRESERVED EJECTION FRACTION (HFPEF) (H): ICD-10-CM

## 2024-06-10 DIAGNOSIS — I10 ESSENTIAL HYPERTENSION: ICD-10-CM

## 2024-06-10 LAB
ANION GAP SERPL CALCULATED.3IONS-SCNC: 12 MMOL/L (ref 7–15)
BUN SERPL-MCNC: 15.7 MG/DL (ref 8–23)
CALCIUM SERPL-MCNC: 9.2 MG/DL (ref 8.8–10.2)
CHLORIDE SERPL-SCNC: 103 MMOL/L (ref 98–107)
CREAT SERPL-MCNC: 1.16 MG/DL (ref 0.51–0.95)
DEPRECATED HCO3 PLAS-SCNC: 26 MMOL/L (ref 22–29)
EGFRCR SERPLBLD CKD-EPI 2021: 46 ML/MIN/1.73M2
ERYTHROCYTE [DISTWIDTH] IN BLOOD BY AUTOMATED COUNT: 14.5 % (ref 10–15)
GLUCOSE SERPL-MCNC: 121 MG/DL (ref 70–99)
HCT VFR BLD AUTO: 35.1 % (ref 35–47)
HGB BLD-MCNC: 11.1 G/DL (ref 11.7–15.7)
MCH RBC QN AUTO: 29.1 PG (ref 26.5–33)
MCHC RBC AUTO-ENTMCNC: 31.6 G/DL (ref 31.5–36.5)
MCV RBC AUTO: 92 FL (ref 78–100)
PLATELET # BLD AUTO: 289 10E3/UL (ref 150–450)
POTASSIUM SERPL-SCNC: 3.5 MMOL/L (ref 3.4–5.3)
RBC # BLD AUTO: 3.81 10E6/UL (ref 3.8–5.2)
SODIUM SERPL-SCNC: 141 MMOL/L (ref 135–145)
WBC # BLD AUTO: 9.5 10E3/UL (ref 4–11)

## 2024-06-10 PROCEDURE — 36415 COLL VENOUS BLD VENIPUNCTURE: CPT | Performed by: FAMILY MEDICINE

## 2024-06-10 PROCEDURE — 82565 ASSAY OF CREATININE: CPT | Performed by: FAMILY MEDICINE

## 2024-06-10 PROCEDURE — 85048 AUTOMATED LEUKOCYTE COUNT: CPT | Performed by: FAMILY MEDICINE

## 2024-06-10 PROCEDURE — 99305 1ST NF CARE MODERATE MDM 35: CPT | Performed by: FAMILY MEDICINE

## 2024-06-10 PROCEDURE — P9604 ONE-WAY ALLOW PRORATED TRIP: HCPCS | Performed by: FAMILY MEDICINE

## 2024-06-10 NOTE — PROGRESS NOTES
[facility] GERIATRIC SERVICES       Patient Marva Gaines  MRN: [id number]        Reason for Visit     Chief Complaint   Patient presents with    RECHECK     INITIAL       Code Status     CPR/Full code     Assessment /plan     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  Continue with incisional wound cares  Nonweightbearing right foot  Outpatient follow-up with podiatry as scheduled in am    Acute encephalopathy felt to be both metabolic and infectious  Apparently improved prior to discharge we will monitor cognitive status    Hyponatremia felt to be mild monitor BMPs  Recheck sodium today is 141    Hypokalemia recheck potassium today done is 3.5 which is normal    SHY/CKD 3B  Renal function ultrasound done in the hospital was negative  Losartan was held in the hospital  Continue to monitor kidney functions-recheck creatinine still slightly elevated at 1.1 we will continue to monitor    A-fib on Eliquis  Continue diltiazem for rate control    Hypertension  Currently on losartan and diltiazem for blood pressure management  Losartan has been held due to renal impairment monitor blood pressure trends    Hyperlipidemia continue statins    Congestive heart failure with preserved ejection fraction   currently euvolemic   monitor weights    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  Continue with her PPIs  Generalized weakness  Reporting difficulty with transfers as well as walking due to limited weightbearing status.  She is hoping that she can follow-up with podiatry and increased duration of her weightbearing and start ambulating        History     Patient is a very pleasant 88 year old female who is admitted to TCU  Patient admitted to the hospital with encephalopathy  She was noted to have infection of the fifth digit.  Imaging was consistent with osteomyelitis and podiatry recommended amputation of the fifth digit of the right foot  Discharge on p.o. antibiotics to  the TCU  She had some postoperative confusion which apparently has resolved.  Also had some postoperative hypoxia.  This appears to have resolved    Past Medical & Surgical History     PAST MEDICAL HISTORY:   A-fib on Eliquis  Anxiety disorder  PAST SURGICAL HISTORY:   has a past surgical history that includes Esophagoscopy, gastroscopy, duodenoscopy (EGD), combined (N/A, 12/30/2022); Colonoscopy (N/A, 12/30/2022); and Amputate toe(s) (Right, 5/30/2024).      Past Social History     Reviewed,  reports that she has quit smoking. She has been exposed to tobacco smoke. She has never used smokeless tobacco.    Family History     Reviewed, and family history is not on file.    Medication List     Current Outpatient Medications   Medication Sig Dispense Refill    acetaminophen (TYLENOL) 500 MG tablet Take 1,000 mg by mouth every 6 hours as needed for mild pain      apixaban ANTICOAGULANT (ELIQUIS ANTICOAGULANT) 2.5 MG tablet Take 1 tablet (2.5 mg) by mouth 2 times daily 180 tablet 3    atorvastatin (LIPITOR) 20 MG tablet TAKE 1 TABLET BY MOUTH EVERYDAY AT BEDTIME 90 tablet 1    cholecalciferol, vitamin D3, (VITAMIN D3) 2,000 unit Tab [CHOLECALCIFEROL, VITAMIN D3, (VITAMIN D3) 2,000 UNIT TAB] Take 1 tablet by mouth daily.      diltiazem ER COATED BEADS (CARDIZEM CD/CARTIA XT) 240 MG 24 hr capsule Take 1 capsule (240 mg) by mouth daily 90 capsule 3    Ferrous Sulfate 324 (65 Fe) MG TBEC Take 1 tablet by mouth daily      fish oil-omega-3 fatty acids 500 MG capsule Take 1 capsule by mouth daily      furosemide (LASIX) 20 MG tablet TAKE 2 TABLETS BY MOUTH EVERY  tablet 1    pantoprazole (PROTONIX) 40 MG EC tablet TAKE 1 TABLET BY MOUTH TWICE A  tablet 3    sertraline (ZOLOFT) 100 MG tablet Take 1 tablet (100 mg) by mouth daily      losartan (COZAAR) 25 MG tablet HOLD this medication until primary care provider follow up (Patient not taking: Reported on 6/10/2024)       No current facility-administered medications  "for this visit.      MED REC REQUIRED  Post Medication Reconciliation Status:  Discharge medications reconciled, continue medications without change       Allergies     Allergies   Allergen Reactions    Blood Transfusion Related (Informational Only) Other (See Comments)     Patient has a history of a clinically significant antibody against RBC antigens.  A delay in compatible RBCs may occur. 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    Hydrocodone-Acetaminophen Unknown       Review of Systems   A comprehensive review of 14 systems was done. Pertinent findings noted here and in history of present illness. All the rest negative.  Constitutional: Negative.  Negative for fever, chills, she has  activity change, appetite change and fatigue.   HENT: Negative for congestion and facial swelling.    Has significant hearing loss and uses hearing aids  Eyes: Negative for photophobia, redness and visual disturbance.   Has chronic dry eyes  Respiratory: Negative for cough and chest tightness.    Cardiovascular: Negative for chest pain, palpitations and leg swelling.   Gastrointestinal: Negative for nausea, diarrhea, constipation, blood in stool and abdominal distention.   Genitourinary: Negative.    Musculoskeletal: Negative.  Reporting some pain in her right foot and difficulty with weightbearing  Skin: Negative.    Neurological: Negative for dizziness, tremors, syncope, weakness, light-headedness and headaches.   Hematological: Does not bruise/bleed easily.   Psychiatric/Behavioral: Negative.        Physical Exam   BP (!) 163/77   Pulse 84   Temp 98.2  F (36.8  C)   Resp 18   Ht 1.575 m (5' 2\")   Wt 59.9 kg (132 lb)   LMP  (LMP Unknown)   SpO2 93%   BMI 24.14 kg/m       Constitutional: Oriented to person, place, and time and appears well-developed.   HEENT:  Normocephalic and atraumatic.  Eyes: Conjunctivae and EOM are normal. Pupils are equal, round, and reactive to light. No discharge.  No scleral icterus. Nose normal. Mouth/Throat: " Oropharynx is clear and moist. No oropharyngeal exudate.   Hard of hearing and uses hearing aid  Vision is impaired  NECK: Normal range of motion. Neck supple. No JVD present. No tracheal deviation present. No thyromegaly present.   CARDIOVASCULAR: Normal rate, regular rhythm and intact distal pulses.  Exam reveals no gallop and no friction rub.  Systolic murmur present.  PULMONARY: Effort normal and breath sounds normal. No respiratory distress.No Wheezing or rales.  ABDOMEN: Soft. Bowel sounds are normal. No distension and no mass.  There is no tenderness. There is no rebound and no guarding. No HSM.  MUSCULOSKELETAL: Normal range of motion. Mild kyphosis, no tenderness.  Right foot is missing of toe and is covered with an intact dressing.  She is in the surgical shoe also  LYMPH NODES: Has no cervical, supraclavicular, axillary and groin adenopathy.   NEUROLOGICAL: Alert and oriented to person, place, and time. No cranial nerve deficit.  Normal muscle tone. Coordination normal.   GENITOURINARY: Deferred exam.  SKIN: Skin is warm and dry. No rash noted. No erythema. No pallor.   EXTREMITIES: No cyanosis, no clubbing, no edema. No Deformity.  PSYCHIATRIC: Normal mood, affect and behavior.      Lab Results     Recent Results (from the past 240 hour(s))   Potassium    Collection Time: 05/31/24 10:02 PM   Result Value Ref Range    Potassium 3.4 3.4 - 5.3 mmol/L   Potassium    Collection Time: 06/01/24  4:22 AM   Result Value Ref Range    Potassium 3.9 3.4 - 5.3 mmol/L   CBC with platelets    Collection Time: 06/01/24  4:22 AM   Result Value Ref Range    WBC Count 14.8 (H) 4.0 - 11.0 10e3/uL    RBC Count 4.29 3.80 - 5.20 10e6/uL    Hemoglobin 12.1 11.7 - 15.7 g/dL    Hematocrit 37.3 35.0 - 47.0 %    MCV 87 78 - 100 fL    MCH 28.2 26.5 - 33.0 pg    MCHC 32.4 31.5 - 36.5 g/dL    RDW 14.9 10.0 - 15.0 %    Platelet Count 218 150 - 450 10e3/uL   Basic metabolic panel    Collection Time: 06/01/24  4:22 AM   Result Value Ref  Range    Sodium 135 135 - 145 mmol/L    Potassium 3.9 3.4 - 5.3 mmol/L    Chloride 100 98 - 107 mmol/L    Carbon Dioxide (CO2) 27 22 - 29 mmol/L    Anion Gap 8 7 - 15 mmol/L    Urea Nitrogen 26.6 (H) 8.0 - 23.0 mg/dL    Creatinine 1.53 (H) 0.51 - 0.95 mg/dL    GFR Estimate 33 (L) >60 mL/min/1.73m2    Calcium 9.2 8.8 - 10.2 mg/dL    Glucose 156 (H) 70 - 99 mg/dL   Glucose by meter    Collection Time: 06/01/24  7:06 AM   Result Value Ref Range    GLUCOSE BY METER POCT 135 (H) 70 - 99 mg/dL   Basic metabolic panel    Collection Time: 06/02/24  7:35 AM   Result Value Ref Range    Sodium 133 (L) 135 - 145 mmol/L    Potassium 3.9 3.4 - 5.3 mmol/L    Chloride 98 98 - 107 mmol/L    Carbon Dioxide (CO2) 26 22 - 29 mmol/L    Anion Gap 9 7 - 15 mmol/L    Urea Nitrogen 24.4 (H) 8.0 - 23.0 mg/dL    Creatinine 1.19 (H) 0.51 - 0.95 mg/dL    GFR Estimate 44 (L) >60 mL/min/1.73m2    Calcium 9.8 8.8 - 10.2 mg/dL    Glucose 134 (H) 70 - 99 mg/dL   Glucose by meter    Collection Time: 06/02/24  3:50 PM   Result Value Ref Range    GLUCOSE BY METER POCT 114 (H) 70 - 99 mg/dL   Procalcitonin    Collection Time: 06/02/24  4:12 PM   Result Value Ref Range    Procalcitonin 0.46 <0.50 ng/mL   Lactic acid whole blood    Collection Time: 06/02/24  4:12 PM   Result Value Ref Range    Lactic Acid 0.9 0.7 - 2.0 mmol/L   CBC with platelets    Collection Time: 06/02/24  4:13 PM   Result Value Ref Range    WBC Count 13.9 (H) 4.0 - 11.0 10e3/uL    RBC Count 4.38 3.80 - 5.20 10e6/uL    Hemoglobin 12.4 11.7 - 15.7 g/dL    Hematocrit 39.2 35.0 - 47.0 %    MCV 90 78 - 100 fL    MCH 28.3 26.5 - 33.0 pg    MCHC 31.6 31.5 - 36.5 g/dL    RDW 14.7 10.0 - 15.0 %    Platelet Count 195 150 - 450 10e3/uL   Basic metabolic panel    Collection Time: 06/03/24  6:18 AM   Result Value Ref Range    Sodium 139 135 - 145 mmol/L    Potassium 3.8 3.4 - 5.3 mmol/L    Chloride 99 98 - 107 mmol/L    Carbon Dioxide (CO2) 29 22 - 29 mmol/L    Anion Gap 11 7 - 15 mmol/L    Urea  Nitrogen 18.4 8.0 - 23.0 mg/dL    Creatinine 1.06 (H) 0.51 - 0.95 mg/dL    GFR Estimate 51 (L) >60 mL/min/1.73m2    Calcium 9.8 8.8 - 10.2 mg/dL    Glucose 131 (H) 70 - 99 mg/dL   Vancomycin level    Collection Time: 06/03/24  1:52 PM   Result Value Ref Range    Vancomycin 9.0   ug/mL   Basic metabolic panel    Collection Time: 06/04/24  5:57 AM   Result Value Ref Range    Sodium 138 135 - 145 mmol/L    Potassium 2.9 (L) 3.4 - 5.3 mmol/L    Chloride 101 98 - 107 mmol/L    Carbon Dioxide (CO2) 29 22 - 29 mmol/L    Anion Gap 8 7 - 15 mmol/L    Urea Nitrogen 17.1 8.0 - 23.0 mg/dL    Creatinine 0.98 (H) 0.51 - 0.95 mg/dL    GFR Estimate 56 (L) >60 mL/min/1.73m2    Calcium 9.3 8.8 - 10.2 mg/dL    Glucose 126 (H) 70 - 99 mg/dL   CBC with platelets    Collection Time: 06/04/24  5:57 AM   Result Value Ref Range    WBC Count 9.5 4.0 - 11.0 10e3/uL    RBC Count 3.82 3.80 - 5.20 10e6/uL    Hemoglobin 10.7 (L) 11.7 - 15.7 g/dL    Hematocrit 32.7 (L) 35.0 - 47.0 %    MCV 86 78 - 100 fL    MCH 28.0 26.5 - 33.0 pg    MCHC 32.7 31.5 - 36.5 g/dL    RDW 14.6 10.0 - 15.0 %    Platelet Count 206 150 - 450 10e3/uL   Potassium    Collection Time: 06/04/24 10:30 AM   Result Value Ref Range    Potassium 3.4 3.4 - 5.3 mmol/L   Hemoglobin    Collection Time: 06/04/24 10:30 AM   Result Value Ref Range    Hemoglobin 11.8 11.7 - 15.7 g/dL   Glucose (OUTREACH)    Collection Time: 06/10/24  9:48 AM   Result Value Ref Range    Glucose 121 (H) 70 - 99 mg/dL   Basic Metabolic Panel No Glucose (OUTREACH)    Collection Time: 06/10/24  9:48 AM   Result Value Ref Range    Sodium 141 135 - 145 mmol/L    Potassium 3.5 3.4 - 5.3 mmol/L    Chloride 103 98 - 107 mmol/L    Carbon Dioxide (CO2) 26 22 - 29 mmol/L    Anion Gap 12 7 - 15 mmol/L    Urea Nitrogen 15.7 8.0 - 23.0 mg/dL    Creatinine 1.16 (H) 0.51 - 0.95 mg/dL    GFR Estimate 46 (L) >60 mL/min/1.73m2    Calcium 9.2 8.8 - 10.2 mg/dL   CBC with platelets    Collection Time: 06/10/24  9:48 AM   Result  Value Ref Range    WBC Count 9.5 4.0 - 11.0 10e3/uL    RBC Count 3.81 3.80 - 5.20 10e6/uL    Hemoglobin 11.1 (L) 11.7 - 15.7 g/dL    Hematocrit 35.1 35.0 - 47.0 %    MCV 92 78 - 100 fL    MCH 29.1 26.5 - 33.0 pg    MCHC 31.6 31.5 - 36.5 g/dL    RDW 14.5 10.0 - 15.0 %    Platelet Count 289 150 - 450 10e3/uL                 Electronically signed by    Saloni Whitman MD

## 2024-06-10 NOTE — LETTER
6/10/2024      Marva Gaines  [address]   [id number]  [facility] [id number]        [facility]       Patient Marva Gaines  MRN: [id number]        Reason for Visit     Chief Complaint   Patient presents with     RECHECK     INITIAL       Code Status     CPR/Full code     Assessment /plan     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  Continue with incisional wound cares  Nonweightbearing right foot  Outpatient follow-up with podiatry as scheduled in am    Acute encephalopathy felt to be both metabolic and infectious  Apparently improved prior to discharge we will monitor cognitive status    Hyponatremia felt to be mild monitor BMPs  Recheck sodium today is 141    Hypokalemia recheck potassium today done is 3.5 which is normal    SHY/CKD 3B  Renal function ultrasound done in the hospital was negative  Losartan was held in the hospital  Continue to monitor kidney functions-recheck creatinine still slightly elevated at 1.1 we will continue to monitor    A-fib on Eliquis  Continue diltiazem for rate control    Hypertension  Currently on losartan and diltiazem for blood pressure management  Losartan has been held due to renal impairment monitor blood pressure trends    Hyperlipidemia continue statins    Congestive heart failure with preserved ejection fraction   currently euvolemic   monitor weights    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  Continue with her PPIs  Generalized weakness  Reporting difficulty with transfers as well as walking due to limited weightbearing status.  She is hoping that she can follow-up with podiatry and increased duration of her weightbearing and start ambulating        History     Patient is a very pleasant 88 year old female who is admitted to TCU  Patient admitted to the hospital with encephalopathy  She was noted to have infection of the fifth digit.  Imaging was consistent with osteomyelitis and podiatry  recommended amputation of the fifth digit of the right foot  Discharge on p.o. antibiotics to the TCU  She had some postoperative confusion which apparently has resolved.  Also had some postoperative hypoxia.  This appears to have resolved    Past Medical & Surgical History     PAST MEDICAL HISTORY:   A-fib on Eliquis  Anxiety disorder  PAST SURGICAL HISTORY:   has a past surgical history that includes Esophagoscopy, gastroscopy, duodenoscopy (EGD), combined (N/A, 12/30/2022); Colonoscopy (N/A, 12/30/2022); and Amputate toe(s) (Right, 5/30/2024).      Past Social History     Reviewed,  reports that she has quit smoking. She has been exposed to tobacco smoke. She has never used smokeless tobacco.    Family History     Reviewed, and family history is not on file.    Medication List     Current Outpatient Medications   Medication Sig Dispense Refill     acetaminophen (TYLENOL) 500 MG tablet Take 1,000 mg by mouth every 6 hours as needed for mild pain       apixaban ANTICOAGULANT (ELIQUIS ANTICOAGULANT) 2.5 MG tablet Take 1 tablet (2.5 mg) by mouth 2 times daily 180 tablet 3     atorvastatin (LIPITOR) 20 MG tablet TAKE 1 TABLET BY MOUTH EVERYDAY AT BEDTIME 90 tablet 1     cholecalciferol, vitamin D3, (VITAMIN D3) 2,000 unit Tab [CHOLECALCIFEROL, VITAMIN D3, (VITAMIN D3) 2,000 UNIT TAB] Take 1 tablet by mouth daily.       diltiazem ER COATED BEADS (CARDIZEM CD/CARTIA XT) 240 MG 24 hr capsule Take 1 capsule (240 mg) by mouth daily 90 capsule 3     Ferrous Sulfate 324 (65 Fe) MG TBEC Take 1 tablet by mouth daily       fish oil-omega-3 fatty acids 500 MG capsule Take 1 capsule by mouth daily       furosemide (LASIX) 20 MG tablet TAKE 2 TABLETS BY MOUTH EVERY  tablet 1     pantoprazole (PROTONIX) 40 MG EC tablet TAKE 1 TABLET BY MOUTH TWICE A  tablet 3     sertraline (ZOLOFT) 100 MG tablet Take 1 tablet (100 mg) by mouth daily       losartan (COZAAR) 25 MG tablet HOLD this medication until primary care provider  "follow up (Patient not taking: Reported on 6/10/2024)       No current facility-administered medications for this visit.      MED REC REQUIRED  Post Medication Reconciliation Status:  Discharge medications reconciled, continue medications without change       Allergies     Allergies   Allergen Reactions     Blood Transfusion Related (Informational Only) Other (See Comments)     Patient has a history of a clinically significant antibody against RBC antigens.  A delay in compatible RBCs may occur. 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     Hydrocodone-Acetaminophen Unknown       Review of Systems   A comprehensive review of 14 systems was done. Pertinent findings noted here and in history of present illness. All the rest negative.  Constitutional: Negative.  Negative for fever, chills, she has  activity change, appetite change and fatigue.   HENT: Negative for congestion and facial swelling.    Has significant hearing loss and uses hearing aids  Eyes: Negative for photophobia, redness and visual disturbance.   Has chronic dry eyes  Respiratory: Negative for cough and chest tightness.    Cardiovascular: Negative for chest pain, palpitations and leg swelling.   Gastrointestinal: Negative for nausea, diarrhea, constipation, blood in stool and abdominal distention.   Genitourinary: Negative.    Musculoskeletal: Negative.  Reporting some pain in her right foot and difficulty with weightbearing  Skin: Negative.    Neurological: Negative for dizziness, tremors, syncope, weakness, light-headedness and headaches.   Hematological: Does not bruise/bleed easily.   Psychiatric/Behavioral: Negative.        Physical Exam   BP (!) 163/77   Pulse 84   Temp 98.2  F (36.8  C)   Resp 18   Ht 1.575 m (5' 2\")   Wt 59.9 kg (132 lb)   LMP  (LMP Unknown)   SpO2 93%   BMI 24.14 kg/m       Constitutional: Oriented to person, place, and time and appears well-developed.   HEENT:  Normocephalic and atraumatic.  Eyes: Conjunctivae and EOM are normal. Pupils are " equal, round, and reactive to light. No discharge.  No scleral icterus. Nose normal. Mouth/Throat: Oropharynx is clear and moist. No oropharyngeal exudate.   Hard of hearing and uses hearing aid  Vision is impaired  NECK: Normal range of motion. Neck supple. No JVD present. No tracheal deviation present. No thyromegaly present.   CARDIOVASCULAR: Normal rate, regular rhythm and intact distal pulses.  Exam reveals no gallop and no friction rub.  Systolic murmur present.  PULMONARY: Effort normal and breath sounds normal. No respiratory distress.No Wheezing or rales.  ABDOMEN: Soft. Bowel sounds are normal. No distension and no mass.  There is no tenderness. There is no rebound and no guarding. No HSM.  MUSCULOSKELETAL: Normal range of motion. Mild kyphosis, no tenderness.  Right foot is missing of toe and is covered with an intact dressing.  She is in the surgical shoe also  LYMPH NODES: Has no cervical, supraclavicular, axillary and groin adenopathy.   NEUROLOGICAL: Alert and oriented to person, place, and time. No cranial nerve deficit.  Normal muscle tone. Coordination normal.   GENITOURINARY: Deferred exam.  SKIN: Skin is warm and dry. No rash noted. No erythema. No pallor.   EXTREMITIES: No cyanosis, no clubbing, no edema. No Deformity.  PSYCHIATRIC: Normal mood, affect and behavior.      Lab Results     Recent Results (from the past 240 hour(s))   Potassium    Collection Time: 05/31/24 10:02 PM   Result Value Ref Range    Potassium 3.4 3.4 - 5.3 mmol/L   Potassium    Collection Time: 06/01/24  4:22 AM   Result Value Ref Range    Potassium 3.9 3.4 - 5.3 mmol/L   CBC with platelets    Collection Time: 06/01/24  4:22 AM   Result Value Ref Range    WBC Count 14.8 (H) 4.0 - 11.0 10e3/uL    RBC Count 4.29 3.80 - 5.20 10e6/uL    Hemoglobin 12.1 11.7 - 15.7 g/dL    Hematocrit 37.3 35.0 - 47.0 %    MCV 87 78 - 100 fL    MCH 28.2 26.5 - 33.0 pg    MCHC 32.4 31.5 - 36.5 g/dL    RDW 14.9 10.0 - 15.0 %    Platelet Count 218  150 - 450 10e3/uL   Basic metabolic panel    Collection Time: 06/01/24  4:22 AM   Result Value Ref Range    Sodium 135 135 - 145 mmol/L    Potassium 3.9 3.4 - 5.3 mmol/L    Chloride 100 98 - 107 mmol/L    Carbon Dioxide (CO2) 27 22 - 29 mmol/L    Anion Gap 8 7 - 15 mmol/L    Urea Nitrogen 26.6 (H) 8.0 - 23.0 mg/dL    Creatinine 1.53 (H) 0.51 - 0.95 mg/dL    GFR Estimate 33 (L) >60 mL/min/1.73m2    Calcium 9.2 8.8 - 10.2 mg/dL    Glucose 156 (H) 70 - 99 mg/dL   Glucose by meter    Collection Time: 06/01/24  7:06 AM   Result Value Ref Range    GLUCOSE BY METER POCT 135 (H) 70 - 99 mg/dL   Basic metabolic panel    Collection Time: 06/02/24  7:35 AM   Result Value Ref Range    Sodium 133 (L) 135 - 145 mmol/L    Potassium 3.9 3.4 - 5.3 mmol/L    Chloride 98 98 - 107 mmol/L    Carbon Dioxide (CO2) 26 22 - 29 mmol/L    Anion Gap 9 7 - 15 mmol/L    Urea Nitrogen 24.4 (H) 8.0 - 23.0 mg/dL    Creatinine 1.19 (H) 0.51 - 0.95 mg/dL    GFR Estimate 44 (L) >60 mL/min/1.73m2    Calcium 9.8 8.8 - 10.2 mg/dL    Glucose 134 (H) 70 - 99 mg/dL   Glucose by meter    Collection Time: 06/02/24  3:50 PM   Result Value Ref Range    GLUCOSE BY METER POCT 114 (H) 70 - 99 mg/dL   Procalcitonin    Collection Time: 06/02/24  4:12 PM   Result Value Ref Range    Procalcitonin 0.46 <0.50 ng/mL   Lactic acid whole blood    Collection Time: 06/02/24  4:12 PM   Result Value Ref Range    Lactic Acid 0.9 0.7 - 2.0 mmol/L   CBC with platelets    Collection Time: 06/02/24  4:13 PM   Result Value Ref Range    WBC Count 13.9 (H) 4.0 - 11.0 10e3/uL    RBC Count 4.38 3.80 - 5.20 10e6/uL    Hemoglobin 12.4 11.7 - 15.7 g/dL    Hematocrit 39.2 35.0 - 47.0 %    MCV 90 78 - 100 fL    MCH 28.3 26.5 - 33.0 pg    MCHC 31.6 31.5 - 36.5 g/dL    RDW 14.7 10.0 - 15.0 %    Platelet Count 195 150 - 450 10e3/uL   Basic metabolic panel    Collection Time: 06/03/24  6:18 AM   Result Value Ref Range    Sodium 139 135 - 145 mmol/L    Potassium 3.8 3.4 - 5.3 mmol/L    Chloride  99 98 - 107 mmol/L    Carbon Dioxide (CO2) 29 22 - 29 mmol/L    Anion Gap 11 7 - 15 mmol/L    Urea Nitrogen 18.4 8.0 - 23.0 mg/dL    Creatinine 1.06 (H) 0.51 - 0.95 mg/dL    GFR Estimate 51 (L) >60 mL/min/1.73m2    Calcium 9.8 8.8 - 10.2 mg/dL    Glucose 131 (H) 70 - 99 mg/dL   Vancomycin level    Collection Time: 06/03/24  1:52 PM   Result Value Ref Range    Vancomycin 9.0   ug/mL   Basic metabolic panel    Collection Time: 06/04/24  5:57 AM   Result Value Ref Range    Sodium 138 135 - 145 mmol/L    Potassium 2.9 (L) 3.4 - 5.3 mmol/L    Chloride 101 98 - 107 mmol/L    Carbon Dioxide (CO2) 29 22 - 29 mmol/L    Anion Gap 8 7 - 15 mmol/L    Urea Nitrogen 17.1 8.0 - 23.0 mg/dL    Creatinine 0.98 (H) 0.51 - 0.95 mg/dL    GFR Estimate 56 (L) >60 mL/min/1.73m2    Calcium 9.3 8.8 - 10.2 mg/dL    Glucose 126 (H) 70 - 99 mg/dL   CBC with platelets    Collection Time: 06/04/24  5:57 AM   Result Value Ref Range    WBC Count 9.5 4.0 - 11.0 10e3/uL    RBC Count 3.82 3.80 - 5.20 10e6/uL    Hemoglobin 10.7 (L) 11.7 - 15.7 g/dL    Hematocrit 32.7 (L) 35.0 - 47.0 %    MCV 86 78 - 100 fL    MCH 28.0 26.5 - 33.0 pg    MCHC 32.7 31.5 - 36.5 g/dL    RDW 14.6 10.0 - 15.0 %    Platelet Count 206 150 - 450 10e3/uL   Potassium    Collection Time: 06/04/24 10:30 AM   Result Value Ref Range    Potassium 3.4 3.4 - 5.3 mmol/L   Hemoglobin    Collection Time: 06/04/24 10:30 AM   Result Value Ref Range    Hemoglobin 11.8 11.7 - 15.7 g/dL   Glucose (OUTREACH)    Collection Time: 06/10/24  9:48 AM   Result Value Ref Range    Glucose 121 (H) 70 - 99 mg/dL   Basic Metabolic Panel No Glucose (OUTREACH)    Collection Time: 06/10/24  9:48 AM   Result Value Ref Range    Sodium 141 135 - 145 mmol/L    Potassium 3.5 3.4 - 5.3 mmol/L    Chloride 103 98 - 107 mmol/L    Carbon Dioxide (CO2) 26 22 - 29 mmol/L    Anion Gap 12 7 - 15 mmol/L    Urea Nitrogen 15.7 8.0 - 23.0 mg/dL    Creatinine 1.16 (H) 0.51 - 0.95 mg/dL    GFR Estimate 46 (L) >60 mL/min/1.73m2     Calcium 9.2 8.8 - 10.2 mg/dL   CBC with platelets    Collection Time: 06/10/24  9:48 AM   Result Value Ref Range    WBC Count 9.5 4.0 - 11.0 10e3/uL    RBC Count 3.81 3.80 - 5.20 10e6/uL    Hemoglobin 11.1 (L) 11.7 - 15.7 g/dL    Hematocrit 35.1 35.0 - 47.0 %    MCV 92 78 - 100 fL    MCH 29.1 26.5 - 33.0 pg    MCHC 31.6 31.5 - 36.5 g/dL    RDW 14.5 10.0 - 15.0 %    Platelet Count 289 150 - 450 10e3/uL                 Electronically signed by    Saloni Whitman MD                            Sincerely,        HEYDI Corona

## 2024-06-11 ENCOUNTER — OFFICE VISIT (OUTPATIENT)
Dept: VASCULAR SURGERY | Facility: CLINIC | Age: 89
End: 2024-06-11
Attending: PODIATRIST
Payer: COMMERCIAL

## 2024-06-11 VITALS
RESPIRATION RATE: 16 BRPM | DIASTOLIC BLOOD PRESSURE: 70 MMHG | SYSTOLIC BLOOD PRESSURE: 143 MMHG | TEMPERATURE: 97.9 F | HEART RATE: 76 BPM

## 2024-06-11 DIAGNOSIS — M86.171 ACUTE OSTEOMYELITIS OF TOE, RIGHT (H): Primary | ICD-10-CM

## 2024-06-11 PROCEDURE — 99213 OFFICE O/P EST LOW 20 MIN: CPT | Performed by: PODIATRIST

## 2024-06-11 PROCEDURE — G0463 HOSPITAL OUTPT CLINIC VISIT: HCPCS | Performed by: PODIATRIST

## 2024-06-11 PROCEDURE — G2211 COMPLEX E/M VISIT ADD ON: HCPCS | Performed by: PODIATRIST

## 2024-06-11 RX ORDER — LACTOBACILLUS RHAMNOSUS GG 10B CELL
1 CAPSULE ORAL 2 TIMES DAILY
COMMUNITY

## 2024-06-11 ASSESSMENT — PAIN SCALES - GENERAL: PAINLEVEL: NO PAIN (0)

## 2024-06-11 NOTE — PATIENT INSTRUCTIONS
*Wheelchairs are never guaranteed at the front door, if you have your own wheel chair or knee walker, please bring that with you to your appointment. Thank you for your understanding!*    Wound care supplies were not ordered or needed today.    Important lnstructions      WEIGHT BEARING STATUS: You are to remain NON WEIGHT BEARING on your right foot. NON WEIGHT BEARING MEANS NO PRESSURE ON YOUR FOOT OR HEEL AT ANY TIME FOR ANY REASON!    2. OFFLOADING DEVICE: Must use a A WHEELCHAIR at all times! (do not use affected foot to push wheelchair)    3. STABILIZATION DEVICE: Use a PRAFO BOOT . You will need to WEAR THIS AT ALL TIMES EVEN WHILE IN BED.     4. ELEVATE: Elevating your leg means laying with your head on a pillow and your foot ABOVE YOUR HEART.     5. DO NOT MOVE YOUR FOOT.  There is a risk of worsening the wound or incision. To give yourself a higher chance of healing, please DO NOT swing foot back and forth and wiggle foot/toes especially when inside a stabilization device. Limited movement is allowable with therapy as recommended by the doctor.     6. TAKE A PROTEIN SHAKE TWICE A DAY.  (For ex: Boost, Ensure, Glucerna)    7. KEEP YOUR WOUND DRY AT ALL TIMES    Use a shower bag or a cast cover to keep your foot/leg dry during showers. These can be purchased on [employer] or any pharmacy.         Dressing Change lnstructions    KEEP DRESSING ON UNTIL YOUR FOLLOW UP APPOINTMENT.         SEEK MEDICAL CARE IF:  You have an increase in swelling, pain, or redness around the wound.  You have an increase in the amount of pus coming from the wound.  There is a bad smell coming from the wound.  The wound appears to be worsening/enlarging  You have a fever greater than 101.5 F      It is ok to continue current wound care treatment/products for the next 2-3 days until new wound care supplies are ordered and arrive. If longer than this please contact our office at [phone].      We want to hear from you!   In the next  few weeks, you should receive a call or email to complete a survey about your visit at [facility]. Please help us improve your appointment experience by letting us know how we did today. We strive to make your experience good and value any ways in which we could do better.      We value your input and suggestions.    Thank you for choosing the [facility]!

## 2024-06-11 NOTE — PROGRESS NOTES
Podiatry Progress Note        ASSESSMENT: S/P amputation fifth digit right foot      TREATMENT:  -Surgical site on the right is progressing well. Clean proximal margin per pathology report.     -I discussed with the patient and her daughter today that there is an area of skin injury along the plantar fifth metatarsal head on the right foot which is likely due to excessive weightbearing or excessive motion of the right foot.  I recommend continued use of the PRAFO boot for offloading and nonweightbearing at all times.  We will monitor for any skin breakdown at this location.    -Gauze dressing applied today which she will keep intact. Continue non-weight bearing on the right foot.     -She will follow-up with me in 2 weeks for suture removal.     Henry Peres DPM  [facility]le [facility]      HPI: Marva Gaines was seen again today s/p amputation fifth digit right foot.  Doing well today.  Patient's daughter is present for today's visit.  Patient does admit to excessive movement of her right foot and does use the bathroom at night and may have applied pressure to the right foot.    No past medical history on file.    Allergies   Allergen Reactions    Blood Transfusion Related (Informational Only) Other (See Comments)     Patient has a history of a clinically significant antibody against RBC antigens.  A delay in compatible RBCs may occur. 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    Hydrocodone-Acetaminophen Unknown         Current Outpatient Medications:     acetaminophen (TYLENOL) 500 MG tablet, Take 1,000 mg by mouth every 6 hours as needed for mild pain, Disp: , Rfl:     apixaban ANTICOAGULANT (ELIQUIS ANTICOAGULANT) 2.5 MG tablet, Take 1 tablet (2.5 mg) by mouth 2 times daily, Disp: 180 tablet, Rfl: 3    atorvastatin (LIPITOR) 20 MG tablet, TAKE 1 TABLET BY MOUTH EVERYDAY AT BEDTIME, Disp: 90 tablet, Rfl: 1    cholecalciferol, vitamin D3, (VITAMIN D3) 2,000 unit Tab, [CHOLECALCIFEROL, VITAMIN D3, (VITAMIN D3)  2,000 UNIT TAB] Take 1 tablet by mouth daily., Disp: , Rfl:     diltiazem ER COATED BEADS (CARDIZEM CD/CARTIA XT) 240 MG 24 hr capsule, Take 1 capsule (240 mg) by mouth daily, Disp: 90 capsule, Rfl: 3    Ferrous Sulfate 324 (65 Fe) MG TBEC, Take 1 tablet by mouth daily, Disp: , Rfl:     fish oil-omega-3 fatty acids 500 MG capsule, Take 1 capsule by mouth daily, Disp: , Rfl:     furosemide (LASIX) 20 MG tablet, TAKE 2 TABLETS BY MOUTH EVERY DAY, Disp: 180 tablet, Rfl: 1    lactobacillus rhamnosus, GG, (CULTURELL) capsule, Take 1 capsule by mouth 2 times daily, Disp: , Rfl:     pantoprazole (PROTONIX) 40 MG EC tablet, TAKE 1 TABLET BY MOUTH TWICE A DAY, Disp: 180 tablet, Rfl: 3    sertraline (ZOLOFT) 100 MG tablet, Take 1 tablet (100 mg) by mouth daily, Disp: , Rfl:     Tuberculin PPD (TUBERSOL ID), , Disp: , Rfl:     losartan (COZAAR) 25 MG tablet, HOLD this medication until primary care provider follow up (Patient not taking: Reported on 6/11/2024), Disp: , Rfl:     Review of Systems - Negative       OBJECTIVE:  Appearance: alert, well appearing, and in no distress.    BP (!) 143/70   Pulse 76   Temp 97.9  F (36.6  C)   Resp 16   LMP  (LMP Unknown)     @LDAVASC(10,16,17)@           Surgical site on the amputated fifth digit right foot has intact sutures with skin edges well approximated, no gapping noted.  Skin injury plantar fifth metatarsal head right foot.  No erythema right knee right foot. Neurovascular status unchanged right foot.

## 2024-06-12 ENCOUNTER — TRANSITIONAL CARE UNIT VISIT (OUTPATIENT)
Dept: GERIATRICS | Facility: CLINIC | Age: 89
End: 2024-06-12
Payer: COMMERCIAL

## 2024-06-12 VITALS
RESPIRATION RATE: 18 BRPM | HEIGHT: 62 IN | BODY MASS INDEX: 24.29 KG/M2 | TEMPERATURE: 97.5 F | DIASTOLIC BLOOD PRESSURE: 85 MMHG | WEIGHT: 132 LBS | SYSTOLIC BLOOD PRESSURE: 176 MMHG | HEART RATE: 87 BPM | OXYGEN SATURATION: 93 %

## 2024-06-12 DIAGNOSIS — I48.21 PERMANENT ATRIAL FIBRILLATION (H): ICD-10-CM

## 2024-06-12 DIAGNOSIS — I10 ESSENTIAL HYPERTENSION: ICD-10-CM

## 2024-06-12 DIAGNOSIS — L03.031 CELLULITIS OF FIFTH TOE OF RIGHT FOOT: Primary | ICD-10-CM

## 2024-06-12 DIAGNOSIS — F41.1 ANXIETY STATE: ICD-10-CM

## 2024-06-12 DIAGNOSIS — I50.32 CHRONIC HEART FAILURE WITH PRESERVED EJECTION FRACTION (HFPEF) (H): ICD-10-CM

## 2024-06-12 PROCEDURE — 99309 SBSQ NF CARE MODERATE MDM 30: CPT | Performed by: FAMILY MEDICINE

## 2024-06-12 NOTE — PROGRESS NOTES
[facility] GERIATRIC SERVICES       Patient Marva Gaines  MRN: [id number]        Reason for Visit     Chief Complaint   Patient presents with    Follow Up       Code Status     CPR/Full code     Assessment /plan     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  Continue with incisional wound cares  Nonweightbearing right foot  Outpatient follow-up with podiatry as scheduled was done and she has advised to continue nonweightbearing for additional 3 weeks.    Acute encephalopathy felt to be both metabolic and infectious  Apparently improved prior to discharge we will monitor cognitive status-mood is improved    Hyponatremia felt to be mild monitor BMPs  Recheck sodium  is 141 in the TCU    Hypokalemia recheck potassium in the TCU done is 3.5 which is normal    SHY/CKD 3B  Renal function ultrasound done in the hospital was negative  Losartan was held in the hospital  Continue to monitor kidney functions-recheck creatinine still slightly elevated at 1.1 we will continue to monitor  Will resume losartan today due to elevated blood pressures    A-fib on Eliquis  Continue diltiazem for rate control    Hypertension  Currently on losartan and diltiazem for blood pressure management  Losartan has been held due to renal impairment monitor blood pressure trends-noted to have elevated systolic so we will reintroduce losartan  Recheck BMP as needed closely    Hyperlipidemia continue statins    Congestive heart failure with preserved ejection fraction   currently euvolemic   monitor weights-stable with no evidence of volume overload    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  Continue with her PPIs  Recheck hemoglobin 11.1 in the TCU    Generalized weakness  Reporting difficulty with transfers as well as walking due to limited weightbearing status.  Somewhat disappointed today as her weightbearing status was not restored and understands she will be in the TCU till she can be  allowed to weight-bear as per her        History     Patient is a very pleasant 88 year old female who is admitted to TCU  Patient admitted to the hospital with encephalopathy  She was noted to have infection of the fifth digit.  Imaging was consistent with osteomyelitis and podiatry recommended amputation of the fifth digit of the right foot  Discharge on p.o. antibiotics to the TCU  She had some postoperative confusion which apparently has resolved.  Also had some postoperative hypoxia.  This appears to have resolved  Follow-up done with podiatry and advised to continue with her nonweightbearing  She is unable to maintain that and is currently wheelchair-bound    Past Medical & Surgical History     PAST MEDICAL HISTORY:   A-fib on Eliquis  Anxiety disorder  PAST SURGICAL HISTORY:   has a past surgical history that includes Esophagoscopy, gastroscopy, duodenoscopy (EGD), combined (N/A, 12/30/2022); Colonoscopy (N/A, 12/30/2022); and Amputate toe(s) (Right, 5/30/2024).      Past Social History     Reviewed,  reports that she has quit smoking. She has been exposed to tobacco smoke. She has never used smokeless tobacco.    Family History     Reviewed, and family history is not on file.    Medication List     Current Outpatient Medications   Medication Sig Dispense Refill    acetaminophen (TYLENOL) 500 MG tablet Take 1,000 mg by mouth every 6 hours as needed for mild pain      apixaban ANTICOAGULANT (ELIQUIS ANTICOAGULANT) 2.5 MG tablet Take 1 tablet (2.5 mg) by mouth 2 times daily 180 tablet 3    atorvastatin (LIPITOR) 20 MG tablet TAKE 1 TABLET BY MOUTH EVERYDAY AT BEDTIME 90 tablet 1    cholecalciferol, vitamin D3, (VITAMIN D3) 2,000 unit Tab [CHOLECALCIFEROL, VITAMIN D3, (VITAMIN D3) 2,000 UNIT TAB] Take 1 tablet by mouth daily.      diltiazem ER COATED BEADS (CARDIZEM CD/CARTIA XT) 240 MG 24 hr capsule Take 1 capsule (240 mg) by mouth daily 90 capsule 3    Ferrous Sulfate 324 (65 Fe) MG TBEC Take 1 tablet by mouth  daily      fish oil-omega-3 fatty acids 500 MG capsule Take 1 capsule by mouth daily      furosemide (LASIX) 20 MG tablet TAKE 2 TABLETS BY MOUTH EVERY  tablet 1    lactobacillus rhamnosus, GG, (CULTURELL) capsule Take 1 capsule by mouth 2 times daily      pantoprazole (PROTONIX) 40 MG EC tablet TAKE 1 TABLET BY MOUTH TWICE A  tablet 3    sertraline (ZOLOFT) 100 MG tablet Take 1 tablet (100 mg) by mouth daily      Tuberculin PPD (TUBERSOL ID)       losartan (COZAAR) 25 MG tablet HOLD this medication until primary care provider follow up (Patient not taking: Reported on 6/11/2024)       No current facility-administered medications for this visit.      MED REC REQUIRED  Post Medication Reconciliation Status:          Allergies     Allergies   Allergen Reactions    Blood Transfusion Related (Informational Only) Other (See Comments)     Patient has a history of a clinically significant antibody against RBC antigens.  A delay in compatible RBCs may occur. 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    Hydrocodone-Acetaminophen Unknown       Review of Systems   A comprehensive review of 14 systems was done. Pertinent findings noted here and in history of present illness. All the rest negative.  Constitutional: Negative.  Negative for fever, chills, she has  activity change, appetite change and fatigue.   HENT: Negative for congestion and facial swelling.    Has significant hearing loss and uses hearing aids  Eyes: Negative for photophobia, redness and visual disturbance.   Has chronic dry eyes  Respiratory: Negative for cough and chest tightness.    Cardiovascular: Negative for chest pain, palpitations and leg swelling.   Gastrointestinal: Negative for nausea, diarrhea, constipation, blood in stool and abdominal distention.   Genitourinary: Negative.    Musculoskeletal: Negative.  Reporting some pain in her right foot and difficulty with maintaining her nonweightbearing  Skin: Negative.    Neurological: Negative for dizziness, tremors,  "syncope, weakness, light-headedness and headaches.   Hematological: Does not bruise/bleed easily.   Psychiatric/Behavioral: Negative.        Physical Exam   BP (!) 176/85   Pulse 87   Temp 97.5  F (36.4  C)   Resp 18   Ht 1.575 m (5' 2\")   Wt 59.9 kg (132 lb)   LMP  (LMP Unknown)   SpO2 93%   BMI 24.14 kg/m       Constitutional: Oriented to person, place, and time and appears well-developed.   HEENT:  Normocephalic and atraumatic.  Eyes: Conjunctivae and EOM are normal. Pupils are equal, round, and reactive to light. No discharge.  No scleral icterus. Nose normal. Mouth/Throat: Oropharynx is clear and moist. No oropharyngeal exudate.   Hard of hearing and uses hearing aid  Vision is impaired  NECK: Normal range of motion. Neck supple. No JVD present. No tracheal deviation present. No thyromegaly present.   CARDIOVASCULAR: Normal rate, regular rhythm and intact distal pulses.  Exam reveals no gallop and no friction rub.  Systolic murmur present.  PULMONARY: Effort normal and breath sounds normal. No respiratory distress.No Wheezing or rales.  ABDOMEN: Soft. Bowel sounds are normal. No distension and no mass.  There is no tenderness. There is no rebound and no guarding. No HSM.  MUSCULOSKELETAL: Normal range of motion. Mild kyphosis, no tenderness.  Right foot is missing 5th toe and is covered with an intact dressing.  She also has a PRAFO  LYMPH NODES: Has no cervical, supraclavicular, axillary and groin adenopathy.   NEUROLOGICAL: Alert and oriented to person, place, and time. No cranial nerve deficit.  Normal muscle tone. Coordination normal.   GENITOURINARY: Deferred exam.  SKIN: Skin is warm and dry. No rash noted. No erythema. No pallor.   EXTREMITIES: No cyanosis, no clubbing, no edema. No Deformity.  PSYCHIATRIC: Normal mood, affect and behavior.      Lab Results     Recent Results (from the past 240 hour(s))   Glucose by meter    Collection Time: 06/02/24  3:50 PM   Result Value Ref Range    GLUCOSE " BY METER POCT 114 (H) 70 - 99 mg/dL   Procalcitonin    Collection Time: 06/02/24  4:12 PM   Result Value Ref Range    Procalcitonin 0.46 <0.50 ng/mL   Lactic acid whole blood    Collection Time: 06/02/24  4:12 PM   Result Value Ref Range    Lactic Acid 0.9 0.7 - 2.0 mmol/L   CBC with platelets    Collection Time: 06/02/24  4:13 PM   Result Value Ref Range    WBC Count 13.9 (H) 4.0 - 11.0 10e3/uL    RBC Count 4.38 3.80 - 5.20 10e6/uL    Hemoglobin 12.4 11.7 - 15.7 g/dL    Hematocrit 39.2 35.0 - 47.0 %    MCV 90 78 - 100 fL    MCH 28.3 26.5 - 33.0 pg    MCHC 31.6 31.5 - 36.5 g/dL    RDW 14.7 10.0 - 15.0 %    Platelet Count 195 150 - 450 10e3/uL   Basic metabolic panel    Collection Time: 06/03/24  6:18 AM   Result Value Ref Range    Sodium 139 135 - 145 mmol/L    Potassium 3.8 3.4 - 5.3 mmol/L    Chloride 99 98 - 107 mmol/L    Carbon Dioxide (CO2) 29 22 - 29 mmol/L    Anion Gap 11 7 - 15 mmol/L    Urea Nitrogen 18.4 8.0 - 23.0 mg/dL    Creatinine 1.06 (H) 0.51 - 0.95 mg/dL    GFR Estimate 51 (L) >60 mL/min/1.73m2    Calcium 9.8 8.8 - 10.2 mg/dL    Glucose 131 (H) 70 - 99 mg/dL   Vancomycin level    Collection Time: 06/03/24  1:52 PM   Result Value Ref Range    Vancomycin 9.0   ug/mL   Basic metabolic panel    Collection Time: 06/04/24  5:57 AM   Result Value Ref Range    Sodium 138 135 - 145 mmol/L    Potassium 2.9 (L) 3.4 - 5.3 mmol/L    Chloride 101 98 - 107 mmol/L    Carbon Dioxide (CO2) 29 22 - 29 mmol/L    Anion Gap 8 7 - 15 mmol/L    Urea Nitrogen 17.1 8.0 - 23.0 mg/dL    Creatinine 0.98 (H) 0.51 - 0.95 mg/dL    GFR Estimate 56 (L) >60 mL/min/1.73m2    Calcium 9.3 8.8 - 10.2 mg/dL    Glucose 126 (H) 70 - 99 mg/dL   CBC with platelets    Collection Time: 06/04/24  5:57 AM   Result Value Ref Range    WBC Count 9.5 4.0 - 11.0 10e3/uL    RBC Count 3.82 3.80 - 5.20 10e6/uL    Hemoglobin 10.7 (L) 11.7 - 15.7 g/dL    Hematocrit 32.7 (L) 35.0 - 47.0 %    MCV 86 78 - 100 fL    MCH 28.0 26.5 - 33.0 pg    MCHC 32.7 31.5 -  36.5 g/dL    RDW 14.6 10.0 - 15.0 %    Platelet Count 206 150 - 450 10e3/uL   Potassium    Collection Time: 06/04/24 10:30 AM   Result Value Ref Range    Potassium 3.4 3.4 - 5.3 mmol/L   Hemoglobin    Collection Time: 06/04/24 10:30 AM   Result Value Ref Range    Hemoglobin 11.8 11.7 - 15.7 g/dL   Glucose (OUTREACH)    Collection Time: 06/10/24  9:48 AM   Result Value Ref Range    Glucose 121 (H) 70 - 99 mg/dL   Basic Metabolic Panel No Glucose (OUTREACH)    Collection Time: 06/10/24  9:48 AM   Result Value Ref Range    Sodium 141 135 - 145 mmol/L    Potassium 3.5 3.4 - 5.3 mmol/L    Chloride 103 98 - 107 mmol/L    Carbon Dioxide (CO2) 26 22 - 29 mmol/L    Anion Gap 12 7 - 15 mmol/L    Urea Nitrogen 15.7 8.0 - 23.0 mg/dL    Creatinine 1.16 (H) 0.51 - 0.95 mg/dL    GFR Estimate 46 (L) >60 mL/min/1.73m2    Calcium 9.2 8.8 - 10.2 mg/dL   CBC with platelets    Collection Time: 06/10/24  9:48 AM   Result Value Ref Range    WBC Count 9.5 4.0 - 11.0 10e3/uL    RBC Count 3.81 3.80 - 5.20 10e6/uL    Hemoglobin 11.1 (L) 11.7 - 15.7 g/dL    Hematocrit 35.1 35.0 - 47.0 %    MCV 92 78 - 100 fL    MCH 29.1 26.5 - 33.0 pg    MCHC 31.6 31.5 - 36.5 g/dL    RDW 14.5 10.0 - 15.0 %    Platelet Count 289 150 - 450 10e3/uL         Electronically signed by    Saloni Whitman MD

## 2024-06-12 NOTE — LETTER
6/12/2024      Marva Gaines  [address]   [address]  [place] [id number]        [facility] GERIATRIC SERVICES       Patient Marva Gaines  MRN: [id number]        Reason for Visit     Chief Complaint   Patient presents with     Follow Up       Code Status     CPR/Full code     Assessment /plan     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  Continue with incisional wound cares  Nonweightbearing right foot  Outpatient follow-up with podiatry as scheduled was done and she has advised to continue nonweightbearing for additional 3 weeks.    Acute encephalopathy felt to be both metabolic and infectious  Apparently improved prior to discharge we will monitor cognitive status-mood is improved    Hyponatremia felt to be mild monitor BMPs  Recheck sodium  is 141 in the TCU    Hypokalemia recheck potassium in the TCU done is 3.5 which is normal    SHY/CKD 3B  Renal function ultrasound done in the hospital was negative  Losartan was held in the hospital  Continue to monitor kidney functions-recheck creatinine still slightly elevated at 1.1 we will continue to monitor  Will resume losartan today due to elevated blood pressures    A-fib on Eliquis  Continue diltiazem for rate control    Hypertension  Currently on losartan and diltiazem for blood pressure management  Losartan has been held due to renal impairment monitor blood pressure trends-noted to have elevated systolic so we will reintroduce losartan  Recheck BMP as needed closely    Hyperlipidemia continue statins    Congestive heart failure with preserved ejection fraction   currently euvolemic   monitor weights-stable with no evidence of volume overload    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  Continue with her PPIs  Recheck hemoglobin 11.1 in the TCU    Generalized weakness  Reporting difficulty with transfers as well as walking due to limited weightbearing status.  Somewhat disappointed today as her  weightbearing status was not restored and understands she will be in the TCU till she can be allowed to weight-bear as per her        History     Patient is a very pleasant 88 year old female who is admitted to TCU  Patient admitted to the hospital with encephalopathy  She was noted to have infection of the fifth digit.  Imaging was consistent with osteomyelitis and podiatry recommended amputation of the fifth digit of the right foot  Discharge on p.o. antibiotics to the TCU  She had some postoperative confusion which apparently has resolved.  Also had some postoperative hypoxia.  This appears to have resolved  Follow-up done with podiatry and advised to continue with her nonweightbearing  She is unable to maintain that and is currently wheelchair-bound    Past Medical & Surgical History     PAST MEDICAL HISTORY:   A-fib on Eliquis  Anxiety disorder  PAST SURGICAL HISTORY:   has a past surgical history that includes Esophagoscopy, gastroscopy, duodenoscopy (EGD), combined (N/A, 12/30/2022); Colonoscopy (N/A, 12/30/2022); and Amputate toe(s) (Right, 5/30/2024).      Past Social History     Reviewed,  reports that she has quit smoking. She has been exposed to tobacco smoke. She has never used smokeless tobacco.    Family History     Reviewed, and family history is not on file.    Medication List     Current Outpatient Medications   Medication Sig Dispense Refill     acetaminophen (TYLENOL) 500 MG tablet Take 1,000 mg by mouth every 6 hours as needed for mild pain       apixaban ANTICOAGULANT (ELIQUIS ANTICOAGULANT) 2.5 MG tablet Take 1 tablet (2.5 mg) by mouth 2 times daily 180 tablet 3     atorvastatin (LIPITOR) 20 MG tablet TAKE 1 TABLET BY MOUTH EVERYDAY AT BEDTIME 90 tablet 1     cholecalciferol, vitamin D3, (VITAMIN D3) 2,000 unit Tab [CHOLECALCIFEROL, VITAMIN D3, (VITAMIN D3) 2,000 UNIT TAB] Take 1 tablet by mouth daily.       diltiazem ER COATED BEADS (CARDIZEM CD/CARTIA XT) 240 MG 24 hr capsule Take 1 capsule  (240 mg) by mouth daily 90 capsule 3     Ferrous Sulfate 324 (65 Fe) MG TBEC Take 1 tablet by mouth daily       fish oil-omega-3 fatty acids 500 MG capsule Take 1 capsule by mouth daily       furosemide (LASIX) 20 MG tablet TAKE 2 TABLETS BY MOUTH EVERY  tablet 1     lactobacillus rhamnosus, GG, (CULTURELL) capsule Take 1 capsule by mouth 2 times daily       pantoprazole (PROTONIX) 40 MG EC tablet TAKE 1 TABLET BY MOUTH TWICE A  tablet 3     sertraline (ZOLOFT) 100 MG tablet Take 1 tablet (100 mg) by mouth daily       Tuberculin PPD (TUBERSOL ID)        losartan (COZAAR) 25 MG tablet HOLD this medication until primary care provider follow up (Patient not taking: Reported on 6/11/2024)       No current facility-administered medications for this visit.      MED REC REQUIRED  Post Medication Reconciliation Status:          Allergies     Allergies   Allergen Reactions     Blood Transfusion Related (Informational Only) Other (See Comments)     Patient has a history of a clinically significant antibody against RBC antigens.  A delay in compatible RBCs may occur. 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     Hydrocodone-Acetaminophen Unknown       Review of Systems   A comprehensive review of 14 systems was done. Pertinent findings noted here and in history of present illness. All the rest negative.  Constitutional: Negative.  Negative for fever, chills, she has  activity change, appetite change and fatigue.   HENT: Negative for congestion and facial swelling.    Has significant hearing loss and uses hearing aids  Eyes: Negative for photophobia, redness and visual disturbance.   Has chronic dry eyes  Respiratory: Negative for cough and chest tightness.    Cardiovascular: Negative for chest pain, palpitations and leg swelling.   Gastrointestinal: Negative for nausea, diarrhea, constipation, blood in stool and abdominal distention.   Genitourinary: Negative.    Musculoskeletal: Negative.  Reporting some pain in her right foot and  "difficulty with maintaining her nonweightbearing  Skin: Negative.    Neurological: Negative for dizziness, tremors, syncope, weakness, light-headedness and headaches.   Hematological: Does not bruise/bleed easily.   Psychiatric/Behavioral: Negative.        Physical Exam   BP (!) 176/85   Pulse 87   Temp 97.5  F (36.4  C)   Resp 18   Ht 1.575 m (5' 2\")   Wt 59.9 kg (132 lb)   LMP  (LMP Unknown)   SpO2 93%   BMI 24.14 kg/m       Constitutional: Oriented to person, place, and time and appears well-developed.   HEENT:  Normocephalic and atraumatic.  Eyes: Conjunctivae and EOM are normal. Pupils are equal, round, and reactive to light. No discharge.  No scleral icterus. Nose normal. Mouth/Throat: Oropharynx is clear and moist. No oropharyngeal exudate.   Hard of hearing and uses hearing aid  Vision is impaired  NECK: Normal range of motion. Neck supple. No JVD present. No tracheal deviation present. No thyromegaly present.   CARDIOVASCULAR: Normal rate, regular rhythm and intact distal pulses.  Exam reveals no gallop and no friction rub.  Systolic murmur present.  PULMONARY: Effort normal and breath sounds normal. No respiratory distress.No Wheezing or rales.  ABDOMEN: Soft. Bowel sounds are normal. No distension and no mass.  There is no tenderness. There is no rebound and no guarding. No HSM.  MUSCULOSKELETAL: Normal range of motion. Mild kyphosis, no tenderness.  Right foot is missing 5th toe and is covered with an intact dressing.  She also has a PRAFO  LYMPH NODES: Has no cervical, supraclavicular, axillary and groin adenopathy.   NEUROLOGICAL: Alert and oriented to person, place, and time. No cranial nerve deficit.  Normal muscle tone. Coordination normal.   GENITOURINARY: Deferred exam.  SKIN: Skin is warm and dry. No rash noted. No erythema. No pallor.   EXTREMITIES: No cyanosis, no clubbing, no edema. No Deformity.  PSYCHIATRIC: Normal mood, affect and behavior.      Lab Results     Recent Results (from " the past 240 hour(s))   Glucose by meter    Collection Time: 06/02/24  3:50 PM   Result Value Ref Range    GLUCOSE BY METER POCT 114 (H) 70 - 99 mg/dL   Procalcitonin    Collection Time: 06/02/24  4:12 PM   Result Value Ref Range    Procalcitonin 0.46 <0.50 ng/mL   Lactic acid whole blood    Collection Time: 06/02/24  4:12 PM   Result Value Ref Range    Lactic Acid 0.9 0.7 - 2.0 mmol/L   CBC with platelets    Collection Time: 06/02/24  4:13 PM   Result Value Ref Range    WBC Count 13.9 (H) 4.0 - 11.0 10e3/uL    RBC Count 4.38 3.80 - 5.20 10e6/uL    Hemoglobin 12.4 11.7 - 15.7 g/dL    Hematocrit 39.2 35.0 - 47.0 %    MCV 90 78 - 100 fL    MCH 28.3 26.5 - 33.0 pg    MCHC 31.6 31.5 - 36.5 g/dL    RDW 14.7 10.0 - 15.0 %    Platelet Count 195 150 - 450 10e3/uL   Basic metabolic panel    Collection Time: 06/03/24  6:18 AM   Result Value Ref Range    Sodium 139 135 - 145 mmol/L    Potassium 3.8 3.4 - 5.3 mmol/L    Chloride 99 98 - 107 mmol/L    Carbon Dioxide (CO2) 29 22 - 29 mmol/L    Anion Gap 11 7 - 15 mmol/L    Urea Nitrogen 18.4 8.0 - 23.0 mg/dL    Creatinine 1.06 (H) 0.51 - 0.95 mg/dL    GFR Estimate 51 (L) >60 mL/min/1.73m2    Calcium 9.8 8.8 - 10.2 mg/dL    Glucose 131 (H) 70 - 99 mg/dL   Vancomycin level    Collection Time: 06/03/24  1:52 PM   Result Value Ref Range    Vancomycin 9.0   ug/mL   Basic metabolic panel    Collection Time: 06/04/24  5:57 AM   Result Value Ref Range    Sodium 138 135 - 145 mmol/L    Potassium 2.9 (L) 3.4 - 5.3 mmol/L    Chloride 101 98 - 107 mmol/L    Carbon Dioxide (CO2) 29 22 - 29 mmol/L    Anion Gap 8 7 - 15 mmol/L    Urea Nitrogen 17.1 8.0 - 23.0 mg/dL    Creatinine 0.98 (H) 0.51 - 0.95 mg/dL    GFR Estimate 56 (L) >60 mL/min/1.73m2    Calcium 9.3 8.8 - 10.2 mg/dL    Glucose 126 (H) 70 - 99 mg/dL   CBC with platelets    Collection Time: 06/04/24  5:57 AM   Result Value Ref Range    WBC Count 9.5 4.0 - 11.0 10e3/uL    RBC Count 3.82 3.80 - 5.20 10e6/uL    Hemoglobin 10.7 (L) 11.7 -  15.7 g/dL    Hematocrit 32.7 (L) 35.0 - 47.0 %    MCV 86 78 - 100 fL    MCH 28.0 26.5 - 33.0 pg    MCHC 32.7 31.5 - 36.5 g/dL    RDW 14.6 10.0 - 15.0 %    Platelet Count 206 150 - 450 10e3/uL   Potassium    Collection Time: 06/04/24 10:30 AM   Result Value Ref Range    Potassium 3.4 3.4 - 5.3 mmol/L   Hemoglobin    Collection Time: 06/04/24 10:30 AM   Result Value Ref Range    Hemoglobin 11.8 11.7 - 15.7 g/dL   Glucose (OUTREACH)    Collection Time: 06/10/24  9:48 AM   Result Value Ref Range    Glucose 121 (H) 70 - 99 mg/dL   Basic Metabolic Panel No Glucose (OUTREACH)    Collection Time: 06/10/24  9:48 AM   Result Value Ref Range    Sodium 141 135 - 145 mmol/L    Potassium 3.5 3.4 - 5.3 mmol/L    Chloride 103 98 - 107 mmol/L    Carbon Dioxide (CO2) 26 22 - 29 mmol/L    Anion Gap 12 7 - 15 mmol/L    Urea Nitrogen 15.7 8.0 - 23.0 mg/dL    Creatinine 1.16 (H) 0.51 - 0.95 mg/dL    GFR Estimate 46 (L) >60 mL/min/1.73m2    Calcium 9.2 8.8 - 10.2 mg/dL   CBC with platelets    Collection Time: 06/10/24  9:48 AM   Result Value Ref Range    WBC Count 9.5 4.0 - 11.0 10e3/uL    RBC Count 3.81 3.80 - 5.20 10e6/uL    Hemoglobin 11.1 (L) 11.7 - 15.7 g/dL    Hematocrit 35.1 35.0 - 47.0 %    MCV 92 78 - 100 fL    MCH 29.1 26.5 - 33.0 pg    MCHC 31.6 31.5 - 36.5 g/dL    RDW 14.5 10.0 - 15.0 %    Platelet Count 289 150 - 450 10e3/uL         Electronically signed by    Saloni Whitman MD                            Sincerely,        HEYDI Corona

## 2024-06-17 ENCOUNTER — LAB REQUISITION (OUTPATIENT)
Dept: LAB | Facility: CLINIC | Age: 89
End: 2024-06-17
Payer: COMMERCIAL

## 2024-06-17 ENCOUNTER — TRANSITIONAL CARE UNIT VISIT (OUTPATIENT)
Dept: GERIATRICS | Facility: CLINIC | Age: 89
End: 2024-06-17
Payer: COMMERCIAL

## 2024-06-17 VITALS
WEIGHT: 131 LBS | DIASTOLIC BLOOD PRESSURE: 70 MMHG | OXYGEN SATURATION: 96 % | HEART RATE: 89 BPM | HEIGHT: 62 IN | BODY MASS INDEX: 24.11 KG/M2 | SYSTOLIC BLOOD PRESSURE: 147 MMHG | TEMPERATURE: 97.7 F | RESPIRATION RATE: 18 BRPM

## 2024-06-17 DIAGNOSIS — L03.031 CELLULITIS OF FIFTH TOE OF RIGHT FOOT: Primary | ICD-10-CM

## 2024-06-17 DIAGNOSIS — I10 ESSENTIAL (PRIMARY) HYPERTENSION: ICD-10-CM

## 2024-06-17 DIAGNOSIS — I50.32 CHRONIC HEART FAILURE WITH PRESERVED EJECTION FRACTION (HFPEF) (H): ICD-10-CM

## 2024-06-17 DIAGNOSIS — I48.21 PERMANENT ATRIAL FIBRILLATION (H): ICD-10-CM

## 2024-06-17 DIAGNOSIS — F32.A DEPRESSIVE DISORDER: ICD-10-CM

## 2024-06-17 DIAGNOSIS — I10 ESSENTIAL HYPERTENSION: ICD-10-CM

## 2024-06-17 DIAGNOSIS — F41.1 ANXIETY STATE: ICD-10-CM

## 2024-06-17 PROCEDURE — 99309 SBSQ NF CARE MODERATE MDM 30: CPT | Performed by: FAMILY MEDICINE

## 2024-06-17 NOTE — LETTER
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  Continue with her PPIs  Recheck hemoglobin 11.1 in the TCU    Generalized weakness   reports limited progress due to current nonweightbearing status hopeful that she will make rapid progress once her weightbearing status is resumed        History     Patient is a very pleasant 88 year old female who is admitted to TCU  Patient admitted to the hospital with encephalopathy  She was noted to have infection of the fifth digit.  Imaging was consistent with osteomyelitis and podiatry recommended amputation of  the fifth digit of the right foot  Discharge on p.o. antibiotics to the TCU  She had some postoperative confusion which apparently has resolved.  Also had some postoperative hypoxia.  This appears to have resolved  Follow-up done with podiatry and advised to continue with her nonweightbearing  She is unable to maintain that and is currently wheelchair-bound  Blood pressures improved and her recheck legs were better so losartan has been reintroduced    Past Medical & Surgical History     PAST MEDICAL HISTORY:   A-fib on Eliquis  Anxiety disorder  PAST SURGICAL HISTORY:   has a past surgical history that includes Esophagoscopy, gastroscopy, duodenoscopy (EGD), combined (N/A, 12/30/2022); Colonoscopy (N/A, 12/30/2022); and Amputate toe(s) (Right, 5/30/2024).      Past Social History     Reviewed,  reports that she has quit smoking. She has been exposed to tobacco smoke. She has never used smokeless tobacco.    Family History     Reviewed, and family history is not on file.    Medication List     Current Outpatient Medications   Medication Sig Dispense Refill     acetaminophen (TYLENOL) 500 MG tablet Take 1,000 mg by mouth every 6 hours as needed for mild pain       apixaban ANTICOAGULANT (ELIQUIS ANTICOAGULANT) 2.5 MG tablet Take 1 tablet (2.5 mg) by mouth 2 times daily 180 tablet 3     atorvastatin (LIPITOR) 20 MG tablet TAKE 1 TABLET BY MOUTH EVERYDAY AT BEDTIME 90 tablet 1     cholecalciferol, vitamin D3, (VITAMIN D3) 2,000 unit Tab [CHOLECALCIFEROL, VITAMIN D3, (VITAMIN D3) 2,000 UNIT TAB] Take 1 tablet by mouth daily.       diltiazem ER COATED BEADS (CARDIZEM CD/CARTIA XT) 240 MG 24 hr capsule Take 1 capsule (240 mg) by mouth daily 90 capsule 3     Ferrous Sulfate 324 (65 Fe) MG TBEC Take 1 tablet by mouth daily       fish oil-omega-3 fatty acids 500 MG capsule Take 1 capsule by mouth daily       furosemide (LASIX) 20 MG tablet TAKE 2 TABLETS BY MOUTH EVERY  tablet 1     lactobacillus rhamnosus, GG,  (CULTURELL) capsule Take 1 capsule by mouth 2 times daily       losartan (COZAAR) 25 MG tablet HOLD this medication until primary care provider follow up       pantoprazole (PROTONIX) 40 MG EC tablet TAKE 1 TABLET BY MOUTH TWICE A  tablet 3     sertraline (ZOLOFT) 100 MG tablet Take 1 tablet (100 mg) by mouth daily       Tuberculin PPD (TUBERSOL ID)        No current facility-administered medications for this visit.      MED REC REQUIRED  Post Medication Reconciliation Status:          Allergies     Allergies   Allergen Reactions     Blood Transfusion Related (Informational Only) Other (See Comments)     Patient has a history of a clinically significant antibody against RBC antigens.  A delay in compatible RBCs may occur. 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     Hydrocodone-Acetaminophen Unknown       Review of Systems   A comprehensive review of 14 systems was done. Pertinent findings noted here and in history of present illness. All the rest negative.  Constitutional: Negative.  Negative for fever, chills, she has  activity change, appetite change and fatigue.   HENT: Negative for congestion and facial swelling.    Has significant hearing loss and uses hearing aids  Eyes: Negative for photophobia, redness and visual disturbance.   Has chronic dry eyes  Respiratory: Negative for cough and chest tightness.    Cardiovascular: Negative for chest pain, palpitations and leg swelling.   Gastrointestinal: Negative for nausea, diarrhea, constipation, blood in stool and abdominal distention.   Genitourinary: Negative.    Musculoskeletal: Negative.  Reporting some pain in her right foot and difficulty with maintaining her nonweightbearing  Skin: Negative.    Neurological: Negative for dizziness, tremors, syncope, weakness, light-headedness and headaches.   Hematological: Does not bruise/bleed easily.   Psychiatric/Behavioral: Negative.        Physical Exam   BP (!) 147/70   Pulse 89   Temp 97.7  F (36.5  C)   Resp 18   Ht 1.575 m (5'  "2\")   Wt 59.4 kg (131 lb)   LMP  (LMP Unknown)   SpO2 96%   BMI 23.96 kg/m       Constitutional: Oriented to person, place, and time and appears well-developed.   HEENT:  Normocephalic and atraumatic.  Eyes: Conjunctivae and EOM are normal. Pupils are equal, round, and reactive to light. No discharge.  No scleral icterus. Nose normal. Mouth/Throat: Oropharynx is clear and moist. No oropharyngeal exudate.   Hard of hearing and uses hearing aid  Vision is impaired  NECK: Normal range of motion. Neck supple. No JVD present. No tracheal deviation present. No thyromegaly present.   CARDIOVASCULAR: Normal rate, regular rhythm and intact distal pulses.  Exam reveals no gallop and no friction rub.  Systolic murmur present.  PULMONARY: Effort normal and breath sounds normal. No respiratory distress.No Wheezing or rales.  ABDOMEN: Soft. Bowel sounds are normal. No distension and no mass.  There is no tenderness. There is no rebound and no guarding. No HSM.  MUSCULOSKELETAL: Normal range of motion. Mild kyphosis, no tenderness.  Right foot is missing 5th toe and is covered with an intact dressing.  She also has a PRAFO  LYMPH NODES: Has no cervical, supraclavicular, axillary and groin adenopathy.   NEUROLOGICAL: Alert and oriented to person, place, and time. No cranial nerve deficit.  Normal muscle tone. Coordination normal.   GENITOURINARY: Deferred exam.  SKIN: Skin is warm and dry. No rash noted. No erythema. No pallor.   EXTREMITIES: No cyanosis, no clubbing, no edema. No Deformity.  PSYCHIATRIC: Normal mood, affect and behavior.      Lab Results     Recent Results (from the past 240 hour(s))   Glucose (OUTREACH)    Collection Time: 06/10/24  9:48 AM   Result Value Ref Range    Glucose 121 (H) 70 - 99 mg/dL   Basic Metabolic Panel No Glucose (OUTREACH)    Collection Time: 06/10/24  9:48 AM   Result Value Ref Range    Sodium 141 135 - 145 mmol/L    Potassium 3.5 3.4 - 5.3 mmol/L    Chloride 103 98 - 107 mmol/L    Carbon " Dioxide (CO2) 26 22 - 29 mmol/L    Anion Gap 12 7 - 15 mmol/L    Urea Nitrogen 15.7 8.0 - 23.0 mg/dL    Creatinine 1.16 (H) 0.51 - 0.95 mg/dL    GFR Estimate 46 (L) >60 mL/min/1.73m2    Calcium 9.2 8.8 - 10.2 mg/dL   CBC with platelets    Collection Time: 06/10/24  9:48 AM   Result Value Ref Range    WBC Count 9.5 4.0 - 11.0 10e3/uL    RBC Count 3.81 3.80 - 5.20 10e6/uL    Hemoglobin 11.1 (L) 11.7 - 15.7 g/dL    Hematocrit 35.1 35.0 - 47.0 %    MCV 92 78 - 100 fL    MCH 29.1 26.5 - 33.0 pg    MCHC 31.6 31.5 - 36.5 g/dL    RDW 14.5 10.0 - 15.0 %    Platelet Count 289 150 - 450 10e3/uL         Electronically signed by    Saloni Whitman MD                            Sincerely,        HEYDI Corona

## 2024-06-17 NOTE — PROGRESS NOTES
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  Continue with her PPIs  Recheck hemoglobin 11.1 in the TCU    Generalized weakness   reports limited progress due to current nonweightbearing status hopeful that she will make rapid progress once her weightbearing status is resumed        History     Patient is a very pleasant 88 year old female who is admitted to TCU  Patient admitted to the hospital with encephalopathy  She was noted to have infection of the fifth digit.  Imaging was consistent with osteomyelitis and podiatry recommended amputation of the fifth digit of the right foot  Discharge on p.o. antibiotics to the TCU  She had  some postoperative confusion which apparently has resolved.  Also had some postoperative hypoxia.  This appears to have resolved  Follow-up done with podiatry and advised to continue with her nonweightbearing  She is unable to maintain that and is currently wheelchair-bound  Blood pressures improved and her recheck legs were better so losartan has been reintroduced    Past Medical & Surgical History     PAST MEDICAL HISTORY:   A-fib on Eliquis  Anxiety disorder  PAST SURGICAL HISTORY:   has a past surgical history that includes Esophagoscopy, gastroscopy, duodenoscopy (EGD), combined (N/A, 12/30/2022); Colonoscopy (N/A, 12/30/2022); and Amputate toe(s) (Right, 5/30/2024).      Past Social History     Reviewed,  reports that she has quit smoking. She has been exposed to tobacco smoke. She has never used smokeless tobacco.    Family History     Reviewed, and family history is not on file.    Medication List     Current Outpatient Medications   Medication Sig Dispense Refill    acetaminophen (TYLENOL) 500 MG tablet Take 1,000 mg by mouth every 6 hours as needed for mild pain      apixaban ANTICOAGULANT (ELIQUIS ANTICOAGULANT) 2.5 MG tablet Take 1 tablet (2.5 mg) by mouth 2 times daily 180 tablet 3    atorvastatin (LIPITOR) 20 MG tablet TAKE 1 TABLET BY MOUTH EVERYDAY AT BEDTIME 90 tablet 1    cholecalciferol, vitamin D3, (VITAMIN D3) 2,000 unit Tab [CHOLECALCIFEROL, VITAMIN D3, (VITAMIN D3) 2,000 UNIT TAB] Take 1 tablet by mouth daily.      diltiazem ER COATED BEADS (CARDIZEM CD/CARTIA XT) 240 MG 24 hr capsule Take 1 capsule (240 mg) by mouth daily 90 capsule 3    Ferrous Sulfate 324 (65 Fe) MG TBEC Take 1 tablet by mouth daily      fish oil-omega-3 fatty acids 500 MG capsule Take 1 capsule by mouth daily      furosemide (LASIX) 20 MG tablet TAKE 2 TABLETS BY MOUTH EVERY  tablet 1    lactobacillus rhamnosus, GG, (CULTURELL) capsule Take 1 capsule by mouth 2 times daily      losartan (COZAAR) 25 MG tablet HOLD  "this medication until primary care provider follow up      pantoprazole (PROTONIX) 40 MG EC tablet TAKE 1 TABLET BY MOUTH TWICE A  tablet 3    sertraline (ZOLOFT) 100 MG tablet Take 1 tablet (100 mg) by mouth daily      Tuberculin PPD (TUBERSOL ID)        No current facility-administered medications for this visit.      MED REC REQUIRED  Post Medication Reconciliation Status:          Allergies     Allergies   Allergen Reactions    Blood Transfusion Related (Informational Only) Other (See Comments)     Patient has a history of a clinically significant antibody against RBC antigens.  A delay in compatible RBCs may occur. 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    Hydrocodone-Acetaminophen Unknown       Review of Systems   A comprehensive review of 14 systems was done. Pertinent findings noted here and in history of present illness. All the rest negative.  Constitutional: Negative.  Negative for fever, chills, she has  activity change, appetite change and fatigue.   HENT: Negative for congestion and facial swelling.    Has significant hearing loss and uses hearing aids  Eyes: Negative for photophobia, redness and visual disturbance.   Has chronic dry eyes  Respiratory: Negative for cough and chest tightness.    Cardiovascular: Negative for chest pain, palpitations and leg swelling.   Gastrointestinal: Negative for nausea, diarrhea, constipation, blood in stool and abdominal distention.   Genitourinary: Negative.    Musculoskeletal: Negative.  Reporting some pain in her right foot and difficulty with maintaining her nonweightbearing  Skin: Negative.    Neurological: Negative for dizziness, tremors, syncope, weakness, light-headedness and headaches.   Hematological: Does not bruise/bleed easily.   Psychiatric/Behavioral: Negative.        Physical Exam   BP (!) 147/70   Pulse 89   Temp 97.7  F (36.5  C)   Resp 18   Ht 1.575 m (5' 2\")   Wt 59.4 kg (131 lb)   LMP  (LMP Unknown)   SpO2 96%   BMI 23.96 kg/m       Constitutional: " Oriented to person, place, and time and appears well-developed.   HEENT:  Normocephalic and atraumatic.  Eyes: Conjunctivae and EOM are normal. Pupils are equal, round, and reactive to light. No discharge.  No scleral icterus. Nose normal. Mouth/Throat: Oropharynx is clear and moist. No oropharyngeal exudate.   Hard of hearing and uses hearing aid  Vision is impaired  NECK: Normal range of motion. Neck supple. No JVD present. No tracheal deviation present. No thyromegaly present.   CARDIOVASCULAR: Normal rate, regular rhythm and intact distal pulses.  Exam reveals no gallop and no friction rub.  Systolic murmur present.  PULMONARY: Effort normal and breath sounds normal. No respiratory distress.No Wheezing or rales.  ABDOMEN: Soft. Bowel sounds are normal. No distension and no mass.  There is no tenderness. There is no rebound and no guarding. No HSM.  MUSCULOSKELETAL: Normal range of motion. Mild kyphosis, no tenderness.  Right foot is missing 5th toe and is covered with an intact dressing.  She also has a PRAFO  LYMPH NODES: Has no cervical, supraclavicular, axillary and groin adenopathy.   NEUROLOGICAL: Alert and oriented to person, place, and time. No cranial nerve deficit.  Normal muscle tone. Coordination normal.   GENITOURINARY: Deferred exam.  SKIN: Skin is warm and dry. No rash noted. No erythema. No pallor.   EXTREMITIES: No cyanosis, no clubbing, no edema. No Deformity.  PSYCHIATRIC: Normal mood, affect and behavior.      Lab Results     Recent Results (from the past 240 hour(s))   Glucose (OUTREACH)    Collection Time: 06/10/24  9:48 AM   Result Value Ref Range    Glucose 121 (H) 70 - 99 mg/dL   Basic Metabolic Panel No Glucose (OUTREACH)    Collection Time: 06/10/24  9:48 AM   Result Value Ref Range    Sodium 141 135 - 145 mmol/L    Potassium 3.5 3.4 - 5.3 mmol/L    Chloride 103 98 - 107 mmol/L    Carbon Dioxide (CO2) 26 22 - 29 mmol/L    Anion Gap 12 7 - 15 mmol/L    Urea Nitrogen 15.7 8.0 - 23.0 mg/dL     Creatinine 1.16 (H) 0.51 - 0.95 mg/dL    GFR Estimate 46 (L) >60 mL/min/1.73m2    Calcium 9.2 8.8 - 10.2 mg/dL   CBC with platelets    Collection Time: 06/10/24  9:48 AM   Result Value Ref Range    WBC Count 9.5 4.0 - 11.0 10e3/uL    RBC Count 3.81 3.80 - 5.20 10e6/uL    Hemoglobin 11.1 (L) 11.7 - 15.7 g/dL    Hematocrit 35.1 35.0 - 47.0 %    MCV 92 78 - 100 fL    MCH 29.1 26.5 - 33.0 pg    MCHC 31.6 31.5 - 36.5 g/dL    RDW 14.5 10.0 - 15.0 %    Platelet Count 289 150 - 450 10e3/uL         Electronically signed by    Saloni Whitman MD

## 2024-06-19 LAB
ANION GAP SERPL CALCULATED.3IONS-SCNC: 12 MMOL/L (ref 7–15)
BUN SERPL-MCNC: 19.9 MG/DL (ref 8–23)
CALCIUM SERPL-MCNC: 9.4 MG/DL (ref 8.8–10.2)
CHLORIDE SERPL-SCNC: 104 MMOL/L (ref 98–107)
CREAT SERPL-MCNC: 0.99 MG/DL (ref 0.51–0.95)
DEPRECATED HCO3 PLAS-SCNC: 24 MMOL/L (ref 22–29)
EGFRCR SERPLBLD CKD-EPI 2021: 55 ML/MIN/1.73M2
GLUCOSE SERPL-MCNC: 148 MG/DL (ref 70–99)
POTASSIUM SERPL-SCNC: 3.6 MMOL/L (ref 3.4–5.3)
SODIUM SERPL-SCNC: 140 MMOL/L (ref 135–145)

## 2024-06-19 PROCEDURE — P9604 ONE-WAY ALLOW PRORATED TRIP: HCPCS | Performed by: FAMILY MEDICINE

## 2024-06-19 PROCEDURE — 82565 ASSAY OF CREATININE: CPT | Performed by: FAMILY MEDICINE

## 2024-06-19 PROCEDURE — 36415 COLL VENOUS BLD VENIPUNCTURE: CPT | Performed by: FAMILY MEDICINE

## 2024-06-20 ENCOUNTER — LAB REQUISITION (OUTPATIENT)
Dept: LAB | Facility: CLINIC | Age: 89
End: 2024-06-20
Payer: COMMERCIAL

## 2024-06-20 ENCOUNTER — LAB REQUISITION (OUTPATIENT)
Dept: LAB | Facility: CLINIC | Age: 89
End: 2024-06-20

## 2024-06-20 DIAGNOSIS — I10 ESSENTIAL (PRIMARY) HYPERTENSION: ICD-10-CM

## 2024-06-20 LAB
ANION GAP SERPL CALCULATED.3IONS-SCNC: 12 MMOL/L (ref 7–15)
BUN SERPL-MCNC: 20.3 MG/DL (ref 8–23)
CALCIUM SERPL-MCNC: 9.2 MG/DL (ref 8.8–10.2)
CHLORIDE SERPL-SCNC: 106 MMOL/L (ref 98–107)
CREAT SERPL-MCNC: 1.04 MG/DL (ref 0.51–0.95)
DEPRECATED HCO3 PLAS-SCNC: 24 MMOL/L (ref 22–29)
EGFRCR SERPLBLD CKD-EPI 2021: 52 ML/MIN/1.73M2
GLUCOSE SERPL-MCNC: 106 MG/DL (ref 70–99)
GLUCOSE SERPL-MCNC: 89 MG/DL (ref 70–99)
POTASSIUM SERPL-SCNC: 3.8 MMOL/L (ref 3.4–5.3)
SODIUM SERPL-SCNC: 142 MMOL/L (ref 135–145)

## 2024-06-20 PROCEDURE — P9603 ONE-WAY ALLOW PRORATED MILES: HCPCS | Performed by: FAMILY MEDICINE

## 2024-06-20 PROCEDURE — 80048 BASIC METABOLIC PNL TOTAL CA: CPT | Performed by: FAMILY MEDICINE

## 2024-06-20 PROCEDURE — 36415 COLL VENOUS BLD VENIPUNCTURE: CPT | Performed by: FAMILY MEDICINE

## 2024-06-20 PROCEDURE — 82947 ASSAY GLUCOSE BLOOD QUANT: CPT | Performed by: FAMILY MEDICINE

## 2024-06-20 PROCEDURE — P9604 ONE-WAY ALLOW PRORATED TRIP: HCPCS | Performed by: FAMILY MEDICINE

## 2024-06-24 ENCOUNTER — TRANSITIONAL CARE UNIT VISIT (OUTPATIENT)
Dept: GERIATRICS | Facility: CLINIC | Age: 89
End: 2024-06-24
Payer: COMMERCIAL

## 2024-06-24 VITALS
SYSTOLIC BLOOD PRESSURE: 159 MMHG | BODY MASS INDEX: 24.55 KG/M2 | WEIGHT: 133.4 LBS | OXYGEN SATURATION: 96 % | HEART RATE: 78 BPM | DIASTOLIC BLOOD PRESSURE: 72 MMHG | RESPIRATION RATE: 18 BRPM | HEIGHT: 62 IN | TEMPERATURE: 97.9 F

## 2024-06-24 DIAGNOSIS — I50.32 CHRONIC HEART FAILURE WITH PRESERVED EJECTION FRACTION (HFPEF) (H): ICD-10-CM

## 2024-06-24 DIAGNOSIS — I48.21 PERMANENT ATRIAL FIBRILLATION (H): ICD-10-CM

## 2024-06-24 DIAGNOSIS — I10 ESSENTIAL HYPERTENSION: ICD-10-CM

## 2024-06-24 DIAGNOSIS — F41.1 ANXIETY STATE: ICD-10-CM

## 2024-06-24 DIAGNOSIS — L03.031 CELLULITIS OF FIFTH TOE OF RIGHT FOOT: Primary | ICD-10-CM

## 2024-06-24 PROCEDURE — 99309 SBSQ NF CARE MODERATE MDM 30: CPT | Performed by: FAMILY MEDICINE

## 2024-06-24 NOTE — LETTER
6/24/2024      Marva Gaines  [address]   [address]  [facility] [id number]        [facility]       Patient Marva Gaines  MRN: [id number]        Reason for Visit     Chief Complaint   Patient presents with     RECHECK       Code Status     CPR/Full code     Assessment /plan     Concerns about right second toe.  Patient has noticed that the toe is somewhat dusky in appearance.  Not painful  Not tender to touch with movement.  Blanchable and warm to touch.  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  Continue with her PPIs  Recheck hemoglobin 11.1 in the TCU    Generalized weakness   reports limited progress due  to current nonweightbearing status hopeful that she will make rapid progress once her weightbearing status is resumed  Follow-up with podiatry scheduled in am-await recommnedations      History     Patient is a very pleasant 88 year old female who is admitted to TCU  Patient admitted to the hospital with encephalopathy  She was noted to have infection of the fifth digit.  Imaging was consistent with osteomyelitis and podiatry recommended amputation of the fifth digit of the right foot  Discharge on p.o. antibiotics to the TCU  She had some postoperative confusion which apparently has resolved.  Also had some postoperative hypoxia.  This appears to have resolved  Follow-up done with podiatry and advised to continue with her nonweightbearing  She is unable to maintain that and is currently wheelchair-bound  Blood pressures improved with resumption of losartan.    Today she is concerned about dusky appearance of her right second toe and is worried if something is going on      Past Medical & Surgical History     PAST MEDICAL HISTORY:   A-fib on Eliquis  Anxiety disorder  PAST SURGICAL HISTORY:   has a past surgical history that includes Esophagoscopy, gastroscopy, duodenoscopy (EGD), combined (N/A, 12/30/2022); Colonoscopy (N/A, 12/30/2022); and Amputate toe(s) (Right, 5/30/2024).      Past Social History     Reviewed,  reports that she has quit smoking. She has been exposed to tobacco smoke. She has never used smokeless tobacco.    Family History     Reviewed, and family history is not on file.    Medication List     Current Outpatient Medications   Medication Sig Dispense Refill     acetaminophen (TYLENOL) 500 MG tablet Take 1,000 mg by mouth every 6 hours as needed for mild pain       apixaban ANTICOAGULANT (ELIQUIS ANTICOAGULANT) 2.5 MG tablet Take 1 tablet (2.5 mg) by mouth 2 times daily 180 tablet 3     atorvastatin (LIPITOR) 20 MG tablet TAKE 1 TABLET BY MOUTH EVERYDAY AT BEDTIME 90 tablet 1     cholecalciferol,  vitamin D3, (VITAMIN D3) 2,000 unit Tab [CHOLECALCIFEROL, VITAMIN D3, (VITAMIN D3) 2,000 UNIT TAB] Take 1 tablet by mouth daily.       diltiazem ER COATED BEADS (CARDIZEM CD/CARTIA XT) 240 MG 24 hr capsule Take 1 capsule (240 mg) by mouth daily 90 capsule 3     Ferrous Sulfate 324 (65 Fe) MG TBEC Take 1 tablet by mouth daily       fish oil-omega-3 fatty acids 500 MG capsule Take 1 capsule by mouth daily       furosemide (LASIX) 20 MG tablet TAKE 2 TABLETS BY MOUTH EVERY  tablet 1     lactobacillus rhamnosus, GG, (CULTURELL) capsule Take 1 capsule by mouth 2 times daily       losartan (COZAAR) 25 MG tablet HOLD this medication until primary care provider follow up       pantoprazole (PROTONIX) 40 MG EC tablet TAKE 1 TABLET BY MOUTH TWICE A  tablet 3     sertraline (ZOLOFT) 100 MG tablet Take 1 tablet (100 mg) by mouth daily       Tuberculin PPD (TUBERSOL ID)        No current facility-administered medications for this visit.      MED REC REQUIRED  Post Medication Reconciliation Status:          Allergies     Allergies   Allergen Reactions     Blood Transfusion Related (Informational Only) Other (See Comments)     Patient has a history of a clinically significant antibody against RBC antigens.  A delay in compatible RBCs may occur. 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     Hydrocodone-Acetaminophen Unknown       Review of Systems   A comprehensive review of 14 systems was done. Pertinent findings noted here and in history of present illness. All the rest negative.  Constitutional: Negative.  Negative for fever, chills, she has  activity change, appetite change and fatigue.   HENT: Negative for congestion and facial swelling.    Has significant hearing loss and uses hearing aids  Eyes: Negative for photophobia, redness and visual disturbance.   Has chronic dry eyes  Respiratory: Negative for cough and chest tightness.    Cardiovascular: Negative for chest pain, palpitations and leg swelling.   Gastrointestinal: Negative for  "nausea, diarrhea, constipation, blood in stool and abdominal distention.   Genitourinary: Negative.    Musculoskeletal: Negative.  Reporting some pain in her right foot and difficulty with maintaining her nonweightbearing  Skin: Negative.    Neurological: Negative for dizziness, tremors, syncope, weakness, light-headedness and headaches.   Hematological: Does not bruise/bleed easily.   Psychiatric/Behavioral: Negative.        Physical Exam   BP (!) 159/72   Pulse 78   Temp 97.9  F (36.6  C)   Resp 18   Ht 1.575 m (5' 2\")   Wt 60.5 kg (133 lb 6.4 oz)   LMP  (LMP Unknown)   SpO2 96%   BMI 24.40 kg/m       Constitutional: Oriented to person, place, and time and appears well-developed.   HEENT:  Normocephalic and atraumatic.  Eyes: Conjunctivae and EOM are normal. Pupils are equal, round, and reactive to light. No discharge.  No scleral icterus. Nose normal. Mouth/Throat: Oropharynx is clear and moist. No oropharyngeal exudate.   Hard of hearing and uses hearing aid  Vision is impaired  NECK: Normal range of motion. Neck supple. No JVD present. No tracheal deviation present. No thyromegaly present.   CARDIOVASCULAR: Normal rate, regular rhythm and intact distal pulses.  Exam reveals no gallop and no friction rub.  Systolic murmur present.  PULMONARY: Effort normal and breath sounds normal. No respiratory distress.No Wheezing or rales.  ABDOMEN: Soft. Bowel sounds are normal. No distension and no mass.  There is no tenderness. There is no rebound and no guarding. No HSM.  MUSCULOSKELETAL: Normal range of motion. Mild kyphosis, no tenderness.  Right foot is missing 5th toe and is covered with an intact dressing.  She also has a PRAFO  LYMPH NODES: Has no cervical, supraclavicular, axillary and groin adenopathy.   NEUROLOGICAL: Alert and oriented to person, place, and time. No cranial nerve deficit.  Normal muscle tone. Coordination normal.   GENITOURINARY: Deferred exam.  SKIN: Skin is warm and dry. No rash noted. " No erythema. No pallor.   EXTREMITIES: No cyanosis, no clubbing, no edema. No Deformity.  Rt second toe distal end is dusky but warm and nontender  PSYCHIATRIC: Normal mood, affect and behavior.      Lab Results     Recent Results (from the past 240 hour(s))   Glucose (OUTREACH)    Collection Time: 06/19/24  8:27 AM   Result Value Ref Range    Glucose 148 (H) 70 - 99 mg/dL   Basic Metabolic Panel No Glucose (OUTREACH)    Collection Time: 06/19/24  8:27 AM   Result Value Ref Range    Sodium 140 135 - 145 mmol/L    Potassium 3.6 3.4 - 5.3 mmol/L    Chloride 104 98 - 107 mmol/L    Carbon Dioxide (CO2) 24 22 - 29 mmol/L    Anion Gap 12 7 - 15 mmol/L    Urea Nitrogen 19.9 8.0 - 23.0 mg/dL    Creatinine 0.99 (H) 0.51 - 0.95 mg/dL    GFR Estimate 55 (L) >60 mL/min/1.73m2    Calcium 9.4 8.8 - 10.2 mg/dL   Glucose (OUTREACH)    Collection Time: 06/20/24  6:16 AM   Result Value Ref Range    Glucose 106 (H) 70 - 99 mg/dL   Basic metabolic panel    Collection Time: 06/20/24  6:16 AM   Result Value Ref Range    Sodium 142 135 - 145 mmol/L    Potassium 3.8 3.4 - 5.3 mmol/L    Chloride 106 98 - 107 mmol/L    Carbon Dioxide (CO2) 24 22 - 29 mmol/L    Anion Gap 12 7 - 15 mmol/L    Urea Nitrogen 20.3 8.0 - 23.0 mg/dL    Creatinine 1.04 (H) 0.51 - 0.95 mg/dL    GFR Estimate 52 (L) >60 mL/min/1.73m2    Calcium 9.2 8.8 - 10.2 mg/dL    Glucose 89 70 - 99 mg/dL         Electronically signed by    Saloni Whitman MD                            Sincerely,        HEYDI Corona

## 2024-06-24 NOTE — PROGRESS NOTES
[facility]       Patient Marva Gaines  MRN: [id number]        Reason for Visit     Chief Complaint   Patient presents with    RECHECK       Code Status     CPR/Full code     Assessment /plan     Concerns about right second toe.  Patient has noticed that the toe is somewhat dusky in appearance.  Not painful  Not tender to touch with movement.  Blanchable and warm to touch.  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  Continue with her PPIs  Recheck hemoglobin 11.1 in the TCU    Generalized weakness   reports limited progress due to current nonweightbearing status hopeful that she will make rapid progress once  her weightbearing status is resumed  Follow-up with podiatry scheduled in am-await recommnedations      History     Patient is a very pleasant 88 year old female who is admitted to TCU  Patient admitted to the hospital with encephalopathy  She was noted to have infection of the fifth digit.  Imaging was consistent with osteomyelitis and podiatry recommended amputation of the fifth digit of the right foot  Discharge on p.o. antibiotics to the TCU  She had some postoperative confusion which apparently has resolved.  Also had some postoperative hypoxia.  This appears to have resolved  Follow-up done with podiatry and advised to continue with her nonweightbearing  She is unable to maintain that and is currently wheelchair-bound  Blood pressures improved with resumption of losartan.    Today she is concerned about dusky appearance of her right second toe and is worried if something is going on      Past Medical & Surgical History     PAST MEDICAL HISTORY:   A-kristen on Eliquis  Anxiety disorder  PAST SURGICAL HISTORY:   has a past surgical history that includes Esophagoscopy, gastroscopy, duodenoscopy (EGD), combined (N/A, 12/30/2022); Colonoscopy (N/A, 12/30/2022); and Amputate toe(s) (Right, 5/30/2024).      Past Social History     Reviewed,  reports that she has quit smoking. She has been exposed to tobacco smoke. She has never used smokeless tobacco.    Family History     Reviewed, and family history is not on file.    Medication List     Current Outpatient Medications   Medication Sig Dispense Refill    acetaminophen (TYLENOL) 500 MG tablet Take 1,000 mg by mouth every 6 hours as needed for mild pain      apixaban ANTICOAGULANT (ELIQUIS ANTICOAGULANT) 2.5 MG tablet Take 1 tablet (2.5 mg) by mouth 2 times daily 180 tablet 3    atorvastatin (LIPITOR) 20 MG tablet TAKE 1 TABLET BY MOUTH EVERYDAY AT BEDTIME 90 tablet 1    cholecalciferol, vitamin D3, (VITAMIN D3) 2,000 unit Tab [CHOLECALCIFEROL, VITAMIN D3, (VITAMIN D3)  2,000 UNIT TAB] Take 1 tablet by mouth daily.      diltiazem ER COATED BEADS (CARDIZEM CD/CARTIA XT) 240 MG 24 hr capsule Take 1 capsule (240 mg) by mouth daily 90 capsule 3    Ferrous Sulfate 324 (65 Fe) MG TBEC Take 1 tablet by mouth daily      fish oil-omega-3 fatty acids 500 MG capsule Take 1 capsule by mouth daily      furosemide (LASIX) 20 MG tablet TAKE 2 TABLETS BY MOUTH EVERY  tablet 1    lactobacillus rhamnosus, GG, (CULTURELL) capsule Take 1 capsule by mouth 2 times daily      losartan (COZAAR) 25 MG tablet HOLD this medication until primary care provider follow up      pantoprazole (PROTONIX) 40 MG EC tablet TAKE 1 TABLET BY MOUTH TWICE A  tablet 3    sertraline (ZOLOFT) 100 MG tablet Take 1 tablet (100 mg) by mouth daily      Tuberculin PPD (TUBERSOL ID)        No current facility-administered medications for this visit.      MED REC REQUIRED  Post Medication Reconciliation Status:          Allergies     Allergies   Allergen Reactions    Blood Transfusion Related (Informational Only) Other (See Comments)     Patient has a history of a clinically significant antibody against RBC antigens.  A delay in compatible RBCs may occur. 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    Hydrocodone-Acetaminophen Unknown       Review of Systems   A comprehensive review of 14 systems was done. Pertinent findings noted here and in history of present illness. All the rest negative.  Constitutional: Negative.  Negative for fever, chills, she has  activity change, appetite change and fatigue.   HENT: Negative for congestion and facial swelling.    Has significant hearing loss and uses hearing aids  Eyes: Negative for photophobia, redness and visual disturbance.   Has chronic dry eyes  Respiratory: Negative for cough and chest tightness.    Cardiovascular: Negative for chest pain, palpitations and leg swelling.   Gastrointestinal: Negative for nausea, diarrhea, constipation, blood in stool and abdominal distention.   Genitourinary:  "Negative.    Musculoskeletal: Negative.  Reporting some pain in her right foot and difficulty with maintaining her nonweightbearing  Skin: Negative.    Neurological: Negative for dizziness, tremors, syncope, weakness, light-headedness and headaches.   Hematological: Does not bruise/bleed easily.   Psychiatric/Behavioral: Negative.        Physical Exam   BP (!) 159/72   Pulse 78   Temp 97.9  F (36.6  C)   Resp 18   Ht 1.575 m (5' 2\")   Wt 60.5 kg (133 lb 6.4 oz)   LMP  (LMP Unknown)   SpO2 96%   BMI 24.40 kg/m       Constitutional: Oriented to person, place, and time and appears well-developed.   HEENT:  Normocephalic and atraumatic.  Eyes: Conjunctivae and EOM are normal. Pupils are equal, round, and reactive to light. No discharge.  No scleral icterus. Nose normal. Mouth/Throat: Oropharynx is clear and moist. No oropharyngeal exudate.   Hard of hearing and uses hearing aid  Vision is impaired  NECK: Normal range of motion. Neck supple. No JVD present. No tracheal deviation present. No thyromegaly present.   CARDIOVASCULAR: Normal rate, regular rhythm and intact distal pulses.  Exam reveals no gallop and no friction rub.  Systolic murmur present.  PULMONARY: Effort normal and breath sounds normal. No respiratory distress.No Wheezing or rales.  ABDOMEN: Soft. Bowel sounds are normal. No distension and no mass.  There is no tenderness. There is no rebound and no guarding. No HSM.  MUSCULOSKELETAL: Normal range of motion. Mild kyphosis, no tenderness.  Right foot is missing 5th toe and is covered with an intact dressing.  She also has a PRAFO  LYMPH NODES: Has no cervical, supraclavicular, axillary and groin adenopathy.   NEUROLOGICAL: Alert and oriented to person, place, and time. No cranial nerve deficit.  Normal muscle tone. Coordination normal.   GENITOURINARY: Deferred exam.  SKIN: Skin is warm and dry. No rash noted. No erythema. No pallor.   EXTREMITIES: No cyanosis, no clubbing, no edema. No " Deformity.  Rt second toe distal end is dusky but warm and nontender  PSYCHIATRIC: Normal mood, affect and behavior.      Lab Results     Recent Results (from the past 240 hour(s))   Glucose (OUTREACH)    Collection Time: 06/19/24  8:27 AM   Result Value Ref Range    Glucose 148 (H) 70 - 99 mg/dL   Basic Metabolic Panel No Glucose (OUTREACH)    Collection Time: 06/19/24  8:27 AM   Result Value Ref Range    Sodium 140 135 - 145 mmol/L    Potassium 3.6 3.4 - 5.3 mmol/L    Chloride 104 98 - 107 mmol/L    Carbon Dioxide (CO2) 24 22 - 29 mmol/L    Anion Gap 12 7 - 15 mmol/L    Urea Nitrogen 19.9 8.0 - 23.0 mg/dL    Creatinine 0.99 (H) 0.51 - 0.95 mg/dL    GFR Estimate 55 (L) >60 mL/min/1.73m2    Calcium 9.4 8.8 - 10.2 mg/dL   Glucose (OUTREACH)    Collection Time: 06/20/24  6:16 AM   Result Value Ref Range    Glucose 106 (H) 70 - 99 mg/dL   Basic metabolic panel    Collection Time: 06/20/24  6:16 AM   Result Value Ref Range    Sodium 142 135 - 145 mmol/L    Potassium 3.8 3.4 - 5.3 mmol/L    Chloride 106 98 - 107 mmol/L    Carbon Dioxide (CO2) 24 22 - 29 mmol/L    Anion Gap 12 7 - 15 mmol/L    Urea Nitrogen 20.3 8.0 - 23.0 mg/dL    Creatinine 1.04 (H) 0.51 - 0.95 mg/dL    GFR Estimate 52 (L) >60 mL/min/1.73m2    Calcium 9.2 8.8 - 10.2 mg/dL    Glucose 89 70 - 99 mg/dL         Electronically signed by    Saloni Whitman MD

## 2024-06-25 ENCOUNTER — OFFICE VISIT (OUTPATIENT)
Dept: VASCULAR SURGERY | Facility: CLINIC | Age: 89
End: 2024-06-25
Attending: PODIATRIST
Payer: COMMERCIAL

## 2024-06-25 VITALS
RESPIRATION RATE: 12 BRPM | TEMPERATURE: 97.7 F | HEART RATE: 67 BPM | SYSTOLIC BLOOD PRESSURE: 145 MMHG | DIASTOLIC BLOOD PRESSURE: 70 MMHG | OXYGEN SATURATION: 97 %

## 2024-06-25 DIAGNOSIS — I73.9 PAD (PERIPHERAL ARTERY DISEASE) (H): ICD-10-CM

## 2024-06-25 DIAGNOSIS — I50.32 CHRONIC HEART FAILURE WITH PRESERVED EJECTION FRACTION (HFPEF) (H): ICD-10-CM

## 2024-06-25 DIAGNOSIS — K31.819 GASTRIC AVM: ICD-10-CM

## 2024-06-25 DIAGNOSIS — I10 ESSENTIAL HYPERTENSION: ICD-10-CM

## 2024-06-25 DIAGNOSIS — I25.10 CORONARY ARTERY DISEASE INVOLVING NATIVE CORONARY ARTERY OF NATIVE HEART WITHOUT ANGINA PECTORIS: ICD-10-CM

## 2024-06-25 DIAGNOSIS — I48.21 PERMANENT ATRIAL FIBRILLATION (H): ICD-10-CM

## 2024-06-25 DIAGNOSIS — M86.171 ACUTE OSTEOMYELITIS OF TOE, RIGHT (H): Primary | ICD-10-CM

## 2024-06-25 PROCEDURE — 99213 OFFICE O/P EST LOW 20 MIN: CPT | Performed by: PODIATRIST

## 2024-06-25 PROCEDURE — G2211 COMPLEX E/M VISIT ADD ON: HCPCS | Performed by: PODIATRIST

## 2024-06-25 PROCEDURE — G0463 HOSPITAL OUTPT CLINIC VISIT: HCPCS | Performed by: PODIATRIST

## 2024-06-25 ASSESSMENT — PAIN SCALES - GENERAL: PAINLEVEL: NO PAIN (0)

## 2024-06-25 NOTE — PATIENT INSTRUCTIONS
Important lnstructions      WEIGHT BEARING STATUS: You are to remain limited weight bearing ONLY during physical therapy and for transfers. When you are not in PT or transferring to and from  you should be non weight bearing.  on your right foot. IF YOU ARE AT A CARE FACILITY NEEDING PT/OT:  During your therapy session, you may apply light pressure on the affected foot for either 50 feet OR 15 minutes, whichever comes first. This is the total time or distance allowed within 1 day otherwise NO weightbearing on affected foot at any other time.      2. OFFLOADING DEVICE: Must use a A WHEELCHAIR at all times! (do not use affected foot to push wheelchair)    3. STABILIZATION DEVICE: Use a PRAFO BOOT . You will need to WEAR THIS AT ALL TIMES EVEN WHILE IN BED.     4. ELEVATE: Elevating your leg means laying with your head on a pillow and your foot ABOVE YOUR HEART.     5. DO NOT MOVE YOUR FOOT.  There is a risk of worsening the wound or incision. To give yourself a higher chance of healing, please DO NOT swing foot back and forth and wiggle foot/toes especially when inside a stabilization device. Limited movement is allowable with therapy as recommended by the doctor.     6. TAKE A PROTEIN SHAKE TWICE A DAY.  (For ex: Boost, Ensure, Glucerna)    7. KEEP YOUR WOUND DRY AT ALL TIMES    Use a shower bag or a cast cover to keep your foot/leg dry during showers. These can be purchased on [employer] or any pharmacy.         Dressing Change lnstructions    No Dressing is needed.     Leave Steri strips in place until they fall off on their own.         SEEK MEDICAL CARE IF:  You have an increase in swelling, pain, or redness around the wound.  You have an increase in the amount of pus coming from the wound.  There is a bad smell coming from the wound.  The wound appears to be worsening/enlarging  You have a fever greater than 101.5 F      It is ok to continue current wound care treatment/products for the next 2-3 days until new  wound care supplies are ordered and arrive. If longer than this please contact our office at [phone].      We want to hear from you!   In the next few weeks, you should receive a call or email to complete a survey about your visit at [facility]. Please help us improve your appointment experience by letting us know how we did today. We strive to make your experience good and value any ways in which we could do better.      We value your input and suggestions.    Thank you for choosing the [facility]!

## 2024-06-25 NOTE — PROGRESS NOTES
Podiatry Progress Note        ASSESSMENT: S/P amputation fifth digit right foot      TREATMENT:  -Surgical site on the right is progressing well.  Sutures removed today, Steri-Strips applied.    -I discussed with the patient and her daughter today that the area of skin injury along the plantar fifth metatarsal head is similar to the previous visit.  I recommend continued use of the PRAFO boot for offloading and nonweightbearing at all times.  We will monitor for any skin breakdown at this location.    -We also discussed that she does have purple discoloration along the distal second digit on the right foot.  No evidence of infection.  This discoloration does not appear to be dusky in nature.  I have messaged vascular surgery to determine if they would like to see her.  Most recent ABIs indicate a right TBI of 100 mmHg.    -Continue non-weight bearing on the right foot.     -She will follow-up with me in 3 to 4 weeks for follow-up pressure injury plantar fifth metatarsal head right foot    Henry Peres DPM  [facility] Podiat[facility]/Foot & Ankle Surgery      HPI: Marva Gaines was seen again today s/p amputation fifth digit right foot.  Doing well today.  Patient's daughter is present for today's visit and is concerned about purple discoloration on the distal 2nd digit right foot.     No past medical history on file.    Allergies   Allergen Reactions    Blood Transfusion Related (Informational Only) Other (See Comments)     Patient has a history of a clinically significant antibody against RBC antigens.  A delay in compatible RBCs may occur. 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    Hydrocodone-Acetaminophen Unknown         Current Outpatient Medications:     acetaminophen (TYLENOL) 500 MG tablet, Take 1,000 mg by mouth every 6 hours as needed for mild pain, Disp: , Rfl:     apixaban ANTICOAGULANT (ELIQUIS ANTICOAGULANT) 2.5 MG tablet, Take 1 tablet (2.5 mg) by mouth 2 times daily, Disp: 180 tablet, Rfl: 3    atorvastatin (LIPITOR)  20 MG tablet, TAKE 1 TABLET BY MOUTH EVERYDAY AT BEDTIME, Disp: 90 tablet, Rfl: 1    cholecalciferol, vitamin D3, (VITAMIN D3) 2,000 unit Tab, [CHOLECALCIFEROL, VITAMIN D3, (VITAMIN D3) 2,000 UNIT TAB] Take 1 tablet by mouth daily., Disp: , Rfl:     diltiazem ER COATED BEADS (CARDIZEM CD/CARTIA XT) 240 MG 24 hr capsule, Take 1 capsule (240 mg) by mouth daily, Disp: 90 capsule, Rfl: 3    Ferrous Sulfate 324 (65 Fe) MG TBEC, Take 1 tablet by mouth daily, Disp: , Rfl:     fish oil-omega-3 fatty acids 500 MG capsule, Take 1 capsule by mouth daily, Disp: , Rfl:     furosemide (LASIX) 20 MG tablet, TAKE 2 TABLETS BY MOUTH EVERY DAY, Disp: 180 tablet, Rfl: 1    lactobacillus rhamnosus, GG, (CULTURELL) capsule, Take 1 capsule by mouth 2 times daily, Disp: , Rfl:     losartan (COZAAR) 25 MG tablet, HOLD this medication until primary care provider follow up, Disp: , Rfl:     pantoprazole (PROTONIX) 40 MG EC tablet, TAKE 1 TABLET BY MOUTH TWICE A DAY, Disp: 180 tablet, Rfl: 3    sertraline (ZOLOFT) 100 MG tablet, Take 1 tablet (100 mg) by mouth daily, Disp: , Rfl:     Tuberculin PPD (TUBERSOL ID), , Disp: , Rfl:     Review of Systems - Negative       OBJECTIVE:  Appearance: alert, well appearing, and in no distress.    BP (!) 145/70   Pulse 67   Temp 97.7  F (36.5  C) (Oral)   Resp 12   LMP  (LMP Unknown)   SpO2 97%     Surgical site on the amputated fifth digit right foot has intact sutures with skin edges well coapted, no gapping noted.  Skin injury plantar fifth metatarsal head right foot.  No erythema right knee right foot. Neurovascular status unchanged right foot. Purple discoloration distal 2nd digit right foot, no dusky appearance.

## 2024-06-26 ENCOUNTER — TRANSITIONAL CARE UNIT VISIT (OUTPATIENT)
Dept: GERIATRICS | Facility: CLINIC | Age: 89
End: 2024-06-26
Payer: COMMERCIAL

## 2024-06-26 ENCOUNTER — TELEPHONE (OUTPATIENT)
Dept: VASCULAR SURGERY | Facility: CLINIC | Age: 89
End: 2024-06-26
Payer: COMMERCIAL

## 2024-06-26 VITALS
OXYGEN SATURATION: 92 % | DIASTOLIC BLOOD PRESSURE: 83 MMHG | RESPIRATION RATE: 18 BRPM | WEIGHT: 133.4 LBS | BODY MASS INDEX: 24.55 KG/M2 | TEMPERATURE: 97.9 F | SYSTOLIC BLOOD PRESSURE: 172 MMHG | HEART RATE: 92 BPM | HEIGHT: 62 IN

## 2024-06-26 DIAGNOSIS — L03.031 CELLULITIS OF FIFTH TOE OF RIGHT FOOT: ICD-10-CM

## 2024-06-26 DIAGNOSIS — F32.A DEPRESSIVE DISORDER: ICD-10-CM

## 2024-06-26 DIAGNOSIS — I10 ESSENTIAL HYPERTENSION: ICD-10-CM

## 2024-06-26 DIAGNOSIS — I50.32 CHRONIC HEART FAILURE WITH PRESERVED EJECTION FRACTION (HFPEF) (H): Primary | ICD-10-CM

## 2024-06-26 DIAGNOSIS — F41.1 ANXIETY STATE: ICD-10-CM

## 2024-06-26 DIAGNOSIS — I48.21 PERMANENT ATRIAL FIBRILLATION (H): ICD-10-CM

## 2024-06-26 PROCEDURE — 99309 SBSQ NF CARE MODERATE MDM 30: CPT | Performed by: FAMILY MEDICINE

## 2024-06-26 NOTE — LETTER
6/26/2024      Marva Gaines  [address]   [address]  [place] [facility] [id number]        [facility]       Patient Marva Gaines  MRN: [id number]        Reason for Visit     Chief Complaint   Patient presents with     RECHECK       Code Status     CPR/Full code     Assessment /plan     Concerns about right second toediscoloration now extending to the first great toe as well as the third toe.  Podiatry follow-up done for potential embolism.  Referral made to vascular surgery.  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  Continue with incisional wound cares  Nonweightbearing right foot  Outpatient follow-up with podiatry scheduled in the morning  Cont NWB RLE-per patient recommendation made that she could use pivot transferring with therapy only    Acute encephalopathy felt to be both metabolic and infectious-mood is improved    Hyponatremia felt to be mild monitor BMPs  Recheck sodium  is 141 in the TCU    Hypokalemia recheck potassium in the TCU done is 3.8 which is normal    SHY/CKD 3B  Renal function ultrasound done in the hospital was negative  Losartan was held in the hospital and resumed in the TCU due to stable kidney functions and elevated blood pressures.  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   Continue with her PPIs  Recheck hemoglobin 11.1 in the TCU    Generalized weakness  Limited progress which frustrates patient because of her limited weightbearing ability.  Excited now that she can put some pivot transferring and he replaced weight with therapy.    Face-to-face for wheelchair done on 6/26/2024.  Patient is 62 inches tall weight is 135 pounds  She requires a standard wheelchair with seat cushion with standard bilateral foot rest as well as height adjustable arms and anti tippers  Patient has a mobility limitation that significantly impairs her ability to perform in 1 or more mobility related activities of daily living such as toileting, feeding, dressing, grooming and bathing in customary locations in her home.  Patient's home provides adequate access between rooms, maneuvering space, surface for the use of her manual wheelchair.  The use of a manual wheelchair will improve the patient's ability to participate in MR ADLs.  Patient is willing to use it on a regular basis.  She has sufficient upper body strength and physical and mental capabilities to safely self propel the manual wheelchair on a daily basis but will also have caregiver assistance if needed.      History     Patient is a very pleasant 88 year old female who is admitted to TCU  Patient admitted to the hospital with encephalopathy  She was noted to have infection of the fifth digit.  Imaging was consistent with osteomyelitis and podiatry recommended amputation of the fifth digit of the right foot  Discharge on p.o. antibiotics to the TCU  She had some postoperative confusion which apparently has resolved.  Also had some postoperative hypoxia.  This appears to have resolved  Follow-up done with podiatry and advised to continue with her nonweightbearing  She is unable to maintain that and is currently wheelchair-bound  Blood pressures improved with resumption of losartan.  Podiatry follow-up done and she continues to be nonweightbearing  however by patient reports she has been given heel based weightbearing and pivot transferring with therapy only.  She also has duskiness in her toes noted now with no pain or discomfort and follow-up done with podiatry with referral given for more imaging and vascular surgery follow-up      Past Medical & Surgical History     PAST MEDICAL HISTORY:   A-fib on Eliquis  Anxiety disorder  PAST SURGICAL HISTORY:   has a past surgical history that includes Esophagoscopy, gastroscopy, duodenoscopy (EGD), combined (N/A, 12/30/2022); Colonoscopy (N/A, 12/30/2022); and Amputate toe(s) (Right, 5/30/2024).      Past Social History     Reviewed,  reports that she has quit smoking. She has been exposed to tobacco smoke. She has never used smokeless tobacco.    Family History     Reviewed, and family history is not on file.    Medication List     Current Outpatient Medications   Medication Sig Dispense Refill     acetaminophen (TYLENOL) 500 MG tablet Take 1,000 mg by mouth every 6 hours as needed for mild pain       apixaban ANTICOAGULANT (ELIQUIS ANTICOAGULANT) 2.5 MG tablet Take 1 tablet (2.5 mg) by mouth 2 times daily 180 tablet 3     atorvastatin (LIPITOR) 20 MG tablet TAKE 1 TABLET BY MOUTH EVERYDAY AT BEDTIME 90 tablet 1     cholecalciferol, vitamin D3, (VITAMIN D3) 2,000 unit Tab [CHOLECALCIFEROL, VITAMIN D3, (VITAMIN D3) 2,000 UNIT TAB] Take 1 tablet by mouth daily.       diltiazem ER COATED BEADS (CARDIZEM CD/CARTIA XT) 240 MG 24 hr capsule Take 1 capsule (240 mg) by mouth daily 90 capsule 3     Ferrous Sulfate 324 (65 Fe) MG TBEC Take 1 tablet by mouth daily       fish oil-omega-3 fatty acids 500 MG capsule Take 1 capsule by mouth daily       furosemide (LASIX) 20 MG tablet TAKE 2 TABLETS BY MOUTH EVERY  tablet 1     lactobacillus rhamnosus, GG, (CULTURELL) capsule Take 1 capsule by mouth 2 times daily       losartan (COZAAR) 25 MG tablet HOLD this medication until primary care provider follow up        "pantoprazole (PROTONIX) 40 MG EC tablet TAKE 1 TABLET BY MOUTH TWICE A  tablet 3     sertraline (ZOLOFT) 100 MG tablet Take 1 tablet (100 mg) by mouth daily       Tuberculin PPD (TUBERSOL ID)        No current facility-administered medications for this visit.      MED REC REQUIRED  Post Medication Reconciliation Status:          Allergies     Allergies   Allergen Reactions     Blood Transfusion Related (Informational Only) Other (See Comments)     Patient has a history of a clinically significant antibody against RBC antigens.  A delay in compatible RBCs may occur. 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     Hydrocodone-Acetaminophen Unknown       Review of Systems   A comprehensive review of 14 systems was done. Pertinent findings noted here and in history of present illness. All the rest negative.  Constitutional: Negative.  Negative for fever, chills, she has  activity change, appetite change and fatigue.   HENT: Negative for congestion and facial swelling.    Has significant hearing loss and uses hearing aids  Eyes: Negative for photophobia, redness and visual disturbance.   Has chronic dry eyes  Respiratory: Negative for cough and chest tightness.    Cardiovascular: Negative for chest pain, palpitations and leg swelling.   Gastrointestinal: Negative for nausea, diarrhea, constipation, blood in stool and abdominal distention.   Genitourinary: Negative.    Musculoskeletal: Negative.  Reporting some pain in her right foot and difficulty with maintaining her nonweightbearing  Skin: Negative.    Neurological: Negative for dizziness, tremors, syncope, weakness, light-headedness and headaches.   Hematological: Does not bruise/bleed easily.   Psychiatric/Behavioral: Negative.        Physical Exam   BP (!) 172/83   Pulse 92   Temp 97.9  F (36.6  C)   Resp 18   Ht 1.575 m (5' 2\")   Wt 60.5 kg (133 lb 6.4 oz)   LMP  (LMP Unknown)   SpO2 92%   BMI 24.40 kg/m       Constitutional: Oriented to person, place, and time and appears " well-developed.   HEENT:  Normocephalic and atraumatic.  Eyes: Conjunctivae and EOM are normal. Pupils are equal, round, and reactive to light. No discharge.  No scleral icterus. Nose normal. Mouth/Throat: Oropharynx is clear and moist. No oropharyngeal exudate.   Hard of hearing and uses hearing aid  Vision is impaired  NECK: Normal range of motion. Neck supple. No JVD present. No tracheal deviation present. No thyromegaly present.   CARDIOVASCULAR: Normal rate, regular rhythm and intact distal pulses.  Exam reveals no gallop and no friction rub.  Systolic murmur present.  PULMONARY: Effort normal and breath sounds normal. No respiratory distress.No Wheezing or rales.  ABDOMEN: Soft. Bowel sounds are normal. No distension and no mass.  There is no tenderness. There is no rebound and no guarding. No HSM.  MUSCULOSKELETAL: Normal range of motion. Mild kyphosis, no tenderness.  Right foot is missing 5th toe and is covered with an intact dressing.  She also has a PRAFO  LYMPH NODES: Has no cervical, supraclavicular, axillary and groin adenopathy.   NEUROLOGICAL: Alert and oriented to person, place, and time. No cranial nerve deficit.  Normal muscle tone. Coordination normal.   GENITOURINARY: Deferred exam.  SKIN: Skin is warm and dry. No rash noted. No erythema. No pallor.   EXTREMITIES: Dusky cyanosis of right first second and third toe noted.  Blanching also noted with pressure, no clubbing, no edema. No Deformity.  Foot is warm and nontender  PSYCHIATRIC: Normal mood, affect and behavior.      Lab Results     Recent Results (from the past 240 hour(s))   Glucose (OUTREACH)    Collection Time: 06/19/24  8:27 AM   Result Value Ref Range    Glucose 148 (H) 70 - 99 mg/dL   Basic Metabolic Panel No Glucose (OUTREACH)    Collection Time: 06/19/24  8:27 AM   Result Value Ref Range    Sodium 140 135 - 145 mmol/L    Potassium 3.6 3.4 - 5.3 mmol/L    Chloride 104 98 - 107 mmol/L    Carbon Dioxide (CO2) 24 22 - 29 mmol/L     Anion Gap 12 7 - 15 mmol/L    Urea Nitrogen 19.9 8.0 - 23.0 mg/dL    Creatinine 0.99 (H) 0.51 - 0.95 mg/dL    GFR Estimate 55 (L) >60 mL/min/1.73m2    Calcium 9.4 8.8 - 10.2 mg/dL   Glucose (OUTREACH)    Collection Time: 06/20/24  6:16 AM   Result Value Ref Range    Glucose 106 (H) 70 - 99 mg/dL   Basic metabolic panel    Collection Time: 06/20/24  6:16 AM   Result Value Ref Range    Sodium 142 135 - 145 mmol/L    Potassium 3.8 3.4 - 5.3 mmol/L    Chloride 106 98 - 107 mmol/L    Carbon Dioxide (CO2) 24 22 - 29 mmol/L    Anion Gap 12 7 - 15 mmol/L    Urea Nitrogen 20.3 8.0 - 23.0 mg/dL    Creatinine 1.04 (H) 0.51 - 0.95 mg/dL    GFR Estimate 52 (L) >60 mL/min/1.73m2    Calcium 9.2 8.8 - 10.2 mg/dL    Glucose 89 70 - 99 mg/dL         Electronically signed by    Saloni Whitman MD                            Sincerely,        HEYDI Corona

## 2024-06-26 NOTE — PROCEDURES
Orders placed based on the following:    Elaine Cantu MD Simone, Vincent Allen, DPM; P [facility] Support Pool  Concern for potential embolism. Would recommend CTA chest, abd with run off, and ECHO. I am adding our vasc pool in this msg     ----- Message -----  From: Henry Peres DPM  Sent: 6/25/2024   3:26 PM CDT  To: Elaine Cantu MD    I'm seeing this patient today s/p amputation 5th digit right foot. The surgical site looks good, but she now has purple discoloration along the distal 2nd digit on the right foot. Doesn't look dusky to me. Recent right TBI of 100 mmHg. You want to see her or just have her monitor? Thanks

## 2024-06-26 NOTE — TELEPHONE ENCOUNTER
[facility] to schedule CTA and Echo, per Dr. Cantu's recommendation after review as requested by Dr. Peres.   [phone] to call our clinic with questions  [phone] for imaging.      Page Johnson RN  [facility]  Orders entered. Please call and schedule for CTA. Note pt also is scheduled for ECHO.    Thank you!          Previous Messages       ----- Message -----  From: Elaine Cantu MD  Sent: 6/25/2024   4:36 PM CDT  To: Henry Peres DPM; *    Concern for potential embolism. Would recommend CTA chest, abd with run off, and ECHO. I am adding our vasc pool in this msg  ----- Message -----  From: Henry Peres DPM  Sent: 6/25/2024   3:26 PM CDT  To: Elaine Cantu MD    I'm seeing this patient today s/p amputation 5th digit right foot. The surgical site looks good, but she now has purple discoloration along the distal 2nd digit on the right foot. Doesn't look dusky to me. Recent right TBI of 100 mmHg. You want to see her or just have her monitor? Thanks

## 2024-06-26 NOTE — PROGRESS NOTES
[facility] GERIATRIC SERVICES       Patient Marva Gaines  MRN: [id number]        Reason for Visit     Chief Complaint   Patient presents with    RECHECK       Code Status     CPR/Full code     Assessment /plan     Concerns about right second toediscoloration now extending to the first great toe as well as the third toe.  Podiatry follow-up done for potential embolism.  Referral made to vascular surgery.  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  Continue with incisional wound cares  Nonweightbearing right foot  Outpatient follow-up with podiatry scheduled in the morning  Cont NWB RLE-per patient recommendation made that she could use pivot transferring with therapy only    Acute encephalopathy felt to be both metabolic and infectious-mood is improved    Hyponatremia felt to be mild monitor BMPs  Recheck sodium  is 141 in the TCU    Hypokalemia recheck potassium in the TCU done is 3.8 which is normal    SHY/CKD 3B  Renal function ultrasound done in the hospital was negative  Losartan was held in the hospital and resumed in the TCU due to stable kidney functions and elevated blood pressures.  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  Continue with her PPIs  Recheck hemoglobin 11.1 in the TCU    Generalized  weakness  Limited progress which frustrates patient because of her limited weightbearing ability.  Excited now that she can put some pivot transferring and he replaced weight with therapy.    Face-to-face for wheelchair done on 6/26/2024.  Patient is 62 inches tall weight is 135 pounds  She requires a standard wheelchair with seat cushion with standard bilateral foot rest as well as height adjustable arms and anti tippers  Patient has a mobility limitation that significantly impairs her ability to perform in 1 or more mobility related activities of daily living such as toileting, feeding, dressing, grooming and bathing in customary locations in her home.  Patient's home provides adequate access between rooms, maneuvering space, surface for the use of her manual wheelchair.  The use of a manual wheelchair will improve the patient's ability to participate in MR ADLs.  Patient is willing to use it on a regular basis.  She has sufficient upper body strength and physical and mental capabilities to safely self propel the manual wheelchair on a daily basis but will also have caregiver assistance if needed.      History     Patient is a very pleasant 88 year old female who is admitted to TCU  Patient admitted to the hospital with encephalopathy  She was noted to have infection of the fifth digit.  Imaging was consistent with osteomyelitis and podiatry recommended amputation of the fifth digit of the right foot  Discharge on p.o. antibiotics to the TCU  She had some postoperative confusion which apparently has resolved.  Also had some postoperative hypoxia.  This appears to have resolved  Follow-up done with podiatry and advised to continue with her nonweightbearing  She is unable to maintain that and is currently wheelchair-bound  Blood pressures improved with resumption of losartan.  Podiatry follow-up done and she continues to be nonweightbearing however by patient reports she has been given heel based weightbearing and  pivot transferring with therapy only.  She also has duskiness in her toes noted now with no pain or discomfort and follow-up done with podiatry with referral given for more imaging and vascular surgery follow-up      Past Medical & Surgical History     PAST MEDICAL HISTORY:   A-fib on Eliquis  Anxiety disorder  PAST SURGICAL HISTORY:   has a past surgical history that includes Esophagoscopy, gastroscopy, duodenoscopy (EGD), combined (N/A, 12/30/2022); Colonoscopy (N/A, 12/30/2022); and Amputate toe(s) (Right, 5/30/2024).      Past Social History     Reviewed,  reports that she has quit smoking. She has been exposed to tobacco smoke. She has never used smokeless tobacco.    Family History     Reviewed, and family history is not on file.    Medication List     Current Outpatient Medications   Medication Sig Dispense Refill    acetaminophen (TYLENOL) 500 MG tablet Take 1,000 mg by mouth every 6 hours as needed for mild pain      apixaban ANTICOAGULANT (ELIQUIS ANTICOAGULANT) 2.5 MG tablet Take 1 tablet (2.5 mg) by mouth 2 times daily 180 tablet 3    atorvastatin (LIPITOR) 20 MG tablet TAKE 1 TABLET BY MOUTH EVERYDAY AT BEDTIME 90 tablet 1    cholecalciferol, vitamin D3, (VITAMIN D3) 2,000 unit Tab [CHOLECALCIFEROL, VITAMIN D3, (VITAMIN D3) 2,000 UNIT TAB] Take 1 tablet by mouth daily.      diltiazem ER COATED BEADS (CARDIZEM CD/CARTIA XT) 240 MG 24 hr capsule Take 1 capsule (240 mg) by mouth daily 90 capsule 3    Ferrous Sulfate 324 (65 Fe) MG TBEC Take 1 tablet by mouth daily      fish oil-omega-3 fatty acids 500 MG capsule Take 1 capsule by mouth daily      furosemide (LASIX) 20 MG tablet TAKE 2 TABLETS BY MOUTH EVERY  tablet 1    lactobacillus rhamnosus, GG, (CULTURELL) capsule Take 1 capsule by mouth 2 times daily      losartan (COZAAR) 25 MG tablet HOLD this medication until primary care provider follow up      pantoprazole (PROTONIX) 40 MG EC tablet TAKE 1 TABLET BY MOUTH TWICE A  tablet 3     "sertraline (ZOLOFT) 100 MG tablet Take 1 tablet (100 mg) by mouth daily      Tuberculin PPD (TUBERSOL ID)        No current facility-administered medications for this visit.      MED REC REQUIRED  Post Medication Reconciliation Status:          Allergies     Allergies   Allergen Reactions    Blood Transfusion Related (Informational Only) Other (See Comments)     Patient has a history of a clinically significant antibody against RBC antigens.  A delay in compatible RBCs may occur. 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    Hydrocodone-Acetaminophen Unknown       Review of Systems   A comprehensive review of 14 systems was done. Pertinent findings noted here and in history of present illness. All the rest negative.  Constitutional: Negative.  Negative for fever, chills, she has  activity change, appetite change and fatigue.   HENT: Negative for congestion and facial swelling.    Has significant hearing loss and uses hearing aids  Eyes: Negative for photophobia, redness and visual disturbance.   Has chronic dry eyes  Respiratory: Negative for cough and chest tightness.    Cardiovascular: Negative for chest pain, palpitations and leg swelling.   Gastrointestinal: Negative for nausea, diarrhea, constipation, blood in stool and abdominal distention.   Genitourinary: Negative.    Musculoskeletal: Negative.  Reporting some pain in her right foot and difficulty with maintaining her nonweightbearing  Skin: Negative.    Neurological: Negative for dizziness, tremors, syncope, weakness, light-headedness and headaches.   Hematological: Does not bruise/bleed easily.   Psychiatric/Behavioral: Negative.        Physical Exam   BP (!) 172/83   Pulse 92   Temp 97.9  F (36.6  C)   Resp 18   Ht 1.575 m (5' 2\")   Wt 60.5 kg (133 lb 6.4 oz)   LMP  (LMP Unknown)   SpO2 92%   BMI 24.40 kg/m       Constitutional: Oriented to person, place, and time and appears well-developed.   HEENT:  Normocephalic and atraumatic.  Eyes: Conjunctivae and EOM are normal. " Pupils are equal, round, and reactive to light. No discharge.  No scleral icterus. Nose normal. Mouth/Throat: Oropharynx is clear and moist. No oropharyngeal exudate.   Hard of hearing and uses hearing aid  Vision is impaired  NECK: Normal range of motion. Neck supple. No JVD present. No tracheal deviation present. No thyromegaly present.   CARDIOVASCULAR: Normal rate, regular rhythm and intact distal pulses.  Exam reveals no gallop and no friction rub.  Systolic murmur present.  PULMONARY: Effort normal and breath sounds normal. No respiratory distress.No Wheezing or rales.  ABDOMEN: Soft. Bowel sounds are normal. No distension and no mass.  There is no tenderness. There is no rebound and no guarding. No HSM.  MUSCULOSKELETAL: Normal range of motion. Mild kyphosis, no tenderness.  Right foot is missing 5th toe and is covered with an intact dressing.  She also has a PRAFO  LYMPH NODES: Has no cervical, supraclavicular, axillary and groin adenopathy.   NEUROLOGICAL: Alert and oriented to person, place, and time. No cranial nerve deficit.  Normal muscle tone. Coordination normal.   GENITOURINARY: Deferred exam.  SKIN: Skin is warm and dry. No rash noted. No erythema. No pallor.   EXTREMITIES: Dusky cyanosis of right first second and third toe noted.  Blanching also noted with pressure, no clubbing, no edema. No Deformity.  Foot is warm and nontender  PSYCHIATRIC: Normal mood, affect and behavior.      Lab Results     Recent Results (from the past 240 hour(s))   Glucose (OUTREACH)    Collection Time: 06/19/24  8:27 AM   Result Value Ref Range    Glucose 148 (H) 70 - 99 mg/dL   Basic Metabolic Panel No Glucose (OUTREACH)    Collection Time: 06/19/24  8:27 AM   Result Value Ref Range    Sodium 140 135 - 145 mmol/L    Potassium 3.6 3.4 - 5.3 mmol/L    Chloride 104 98 - 107 mmol/L    Carbon Dioxide (CO2) 24 22 - 29 mmol/L    Anion Gap 12 7 - 15 mmol/L    Urea Nitrogen 19.9 8.0 - 23.0 mg/dL    Creatinine 0.99 (H) 0.51 -  0.95 mg/dL    GFR Estimate 55 (L) >60 mL/min/1.73m2    Calcium 9.4 8.8 - 10.2 mg/dL   Glucose (OUTREACH)    Collection Time: 06/20/24  6:16 AM   Result Value Ref Range    Glucose 106 (H) 70 - 99 mg/dL   Basic metabolic panel    Collection Time: 06/20/24  6:16 AM   Result Value Ref Range    Sodium 142 135 - 145 mmol/L    Potassium 3.8 3.4 - 5.3 mmol/L    Chloride 106 98 - 107 mmol/L    Carbon Dioxide (CO2) 24 22 - 29 mmol/L    Anion Gap 12 7 - 15 mmol/L    Urea Nitrogen 20.3 8.0 - 23.0 mg/dL    Creatinine 1.04 (H) 0.51 - 0.95 mg/dL    GFR Estimate 52 (L) >60 mL/min/1.73m2    Calcium 9.2 8.8 - 10.2 mg/dL    Glucose 89 70 - 99 mg/dL         Electronically signed by    Saloni Whitman MD

## 2024-06-28 NOTE — TELEPHONE ENCOUNTER
Daughter called back.  Scheduled for CTA in 1 week.  Gave number and transferred to schedule the Echo. [phone]

## 2024-06-28 NOTE — TELEPHONE ENCOUNTER
[facility] to schedule CTA and Echo.  Gave numbers for both cardiology and imaging to get those scheduled. [phone] to schedule the Echo, [phone] to schedule the CTA.

## 2024-07-02 ENCOUNTER — TRANSITIONAL CARE UNIT VISIT (OUTPATIENT)
Dept: GERIATRICS | Facility: CLINIC | Age: 89
End: 2024-07-02
Payer: COMMERCIAL

## 2024-07-02 VITALS
OXYGEN SATURATION: 96 % | BODY MASS INDEX: 24.48 KG/M2 | HEART RATE: 79 BPM | HEIGHT: 62 IN | WEIGHT: 133 LBS | TEMPERATURE: 98.4 F | RESPIRATION RATE: 18 BRPM | DIASTOLIC BLOOD PRESSURE: 65 MMHG | SYSTOLIC BLOOD PRESSURE: 146 MMHG

## 2024-07-02 DIAGNOSIS — I48.21 PERMANENT ATRIAL FIBRILLATION (H): ICD-10-CM

## 2024-07-02 DIAGNOSIS — F41.1 ANXIETY STATE: ICD-10-CM

## 2024-07-02 DIAGNOSIS — L03.031 CELLULITIS OF FIFTH TOE OF RIGHT FOOT: ICD-10-CM

## 2024-07-02 DIAGNOSIS — I10 ESSENTIAL HYPERTENSION: ICD-10-CM

## 2024-07-02 DIAGNOSIS — I50.32 CHRONIC HEART FAILURE WITH PRESERVED EJECTION FRACTION (HFPEF) (H): Primary | ICD-10-CM

## 2024-07-02 PROCEDURE — 99309 SBSQ NF CARE MODERATE MDM 30: CPT | Performed by: FAMILY MEDICINE

## 2024-07-02 NOTE — LETTER
7/2/2024      Marva Gaines  [address]   [address]  [place] [id number]        [facility] GERIATRIC SERVICES       Patient Marva Gaines  MRN: [id number]        Reason for Visit     Chief Complaint   Patient presents with     RECHECK       Code Status     CPR/Full code     Assessment /plan     Concerns about right second toediscoloration now extending to the first great toe as well as the third toe.  Podiatry follow-up done for potential embolism.  Referral made to vascular surgery.  Now scheduled for echocardiogram as well as CTA by podiatry.  Follow-up on results.    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  Continue with incisional wound cares  Nonweightbearing right foot  Outpatient follow-up with podiatry as scheduled  Cont NWB RLE-per patient recommendation made that she could use pivot transferring with therapy only  Remains compliant with wearing her boot    Acute encephalopathy felt to be both metabolic and infectious-mood is improved and currently back to baseline    Hyponatremia felt to be mild monitor BMPs  Recheck sodium  is 142 at last check    Hypokalemia recheck potassium in the TCU done is 3.8 which is normal    SHY/CKD 3B  Renal function ultrasound done in the hospital was negative  Losartan was held in the hospital and resumed in the TCU due to stable kidney functions and elevated blood pressures.  Last creatinine check was 1  Overall improved    A-fib on Eliquis  Continue diltiazem for rate control  Heart rates are stable    Hypertension  Currently on losartan and diltiazem for blood pressure management  Losartan has been reintroduced with improvement   monitor BMPs as scheduled-last cr 1.0  Blood pressures improved with addition of losartan  However she has some elevated systolics we will continue to monitor    Hyperlipidemia continue statins    Congestive heart failure with preserved ejection fraction   currently euvolemic   monitor weights-stable with no evidence  of volume overload    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  Continue with her PPIs  Recheck hemoglobin 11.1 in the TCU    Generalized weakness  Hoping to get full weightbearing prior to discharge home currently working on pivot transfers    Face-to-face for wheelchair done on 6/26/2024.  Patient is 62 inches tall weight is 135 pounds  She requires a standard wheelchair with seat cushion with standard bilateral foot rest as well as height adjustable arms and anti tippers  Patient has a mobility limitation that significantly impairs her ability to perform in 1 or more mobility related activities of daily living such as toileting, feeding, dressing, grooming and bathing in customary locations in her home.  Patient's home provides adequate access between rooms, maneuvering space, surface for the use of her manual wheelchair.  The use of a manual wheelchair will improve the patient's ability to participate in MR ADLs.  Patient is willing to use it on a regular basis.  She has sufficient upper body strength and physical and mental capabilities to safely self propel the manual wheelchair on a daily basis but will also have caregiver assistance if needed.      History     Patient is a very pleasant 88 year old female who is admitted to TCU  Patient admitted to the hospital with encephalopathy  She was noted to have infection of the fifth digit.  Imaging was consistent with osteomyelitis and podiatry recommended amputation of the fifth digit of the right foot  Discharge on p.o. antibiotics to the TCU which she is currently done with  She had some postoperative confusion which apparently has resolved.  Cognitively back to baseline  Also had some postoperative hypoxia.  This appears to have resolved  She is currently wheelchair-bound  Blood pressures improved with resumption of losartan.  Podiatry follow-up done and she continues to be nonweightbearing however by patient reports she has been  given heel based weightbearing and pivot transferring with therapy only.  She also has duskiness in her toes noted now with no pain or discomfort and follow-up done with podiatry with referral given for more imaging and vascular surgery follow-up  She is scheduled for imaging soon      Past Medical & Surgical History     PAST MEDICAL HISTORY:   A-fib on Eliquis  Anxiety disorder  PAST SURGICAL HISTORY:   has a past surgical history that includes Esophagoscopy, gastroscopy, duodenoscopy (EGD), combined (N/A, 12/30/2022); Colonoscopy (N/A, 12/30/2022); and Amputate toe(s) (Right, 5/30/2024).      Past Social History     Reviewed,  reports that she has quit smoking. She has been exposed to tobacco smoke. She has never used smokeless tobacco.    Family History     Reviewed, and family history is not on file.    Medication List     Current Outpatient Medications   Medication Sig Dispense Refill     acetaminophen (TYLENOL) 500 MG tablet Take 1,000 mg by mouth every 6 hours as needed for mild pain       apixaban ANTICOAGULANT (ELIQUIS ANTICOAGULANT) 2.5 MG tablet Take 1 tablet (2.5 mg) by mouth 2 times daily 180 tablet 3     atorvastatin (LIPITOR) 20 MG tablet TAKE 1 TABLET BY MOUTH EVERYDAY AT BEDTIME 90 tablet 1     cholecalciferol, vitamin D3, (VITAMIN D3) 2,000 unit Tab [CHOLECALCIFEROL, VITAMIN D3, (VITAMIN D3) 2,000 UNIT TAB] Take 1 tablet by mouth daily.       diltiazem ER COATED BEADS (CARDIZEM CD/CARTIA XT) 240 MG 24 hr capsule Take 1 capsule (240 mg) by mouth daily 90 capsule 3     Ferrous Sulfate 324 (65 Fe) MG TBEC Take 1 tablet by mouth daily       fish oil-omega-3 fatty acids 500 MG capsule Take 1 capsule by mouth daily       furosemide (LASIX) 20 MG tablet TAKE 2 TABLETS BY MOUTH EVERY  tablet 1     lactobacillus rhamnosus, GG, (CULTURELL) capsule Take 1 capsule by mouth 2 times daily       losartan (COZAAR) 25 MG tablet HOLD this medication until primary care provider follow up       pantoprazole  "(PROTONIX) 40 MG EC tablet TAKE 1 TABLET BY MOUTH TWICE A  tablet 3     sertraline (ZOLOFT) 100 MG tablet Take 1 tablet (100 mg) by mouth daily       Tuberculin PPD (TUBERSOL ID)        No current facility-administered medications for this visit.      MED REC REQUIRED  Post Medication Reconciliation Status:          Allergies     Allergies   Allergen Reactions     Blood Transfusion Related (Informational Only) Other (See Comments)     Patient has a history of a clinically significant antibody against RBC antigens.  A delay in compatible RBCs may occur. 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     Hydrocodone-Acetaminophen Unknown       Review of Systems   A comprehensive review of 14 systems was done. Pertinent findings noted here and in history of present illness. All the rest negative.  Constitutional: Negative.  Negative for fever, chills, she has  activity change, appetite change and fatigue.   HENT: Negative for congestion and facial swelling.    Has significant hearing loss and uses hearing aids  Eyes: Negative for photophobia, redness and visual disturbance.   Has chronic dry eyes  Respiratory: Negative for cough and chest tightness.    Cardiovascular: Negative for chest pain, palpitations and leg swelling.   Gastrointestinal: Negative for nausea, diarrhea, constipation, blood in stool and abdominal distention.   Genitourinary: Negative.    Musculoskeletal: Negative.  Reporting some pain in her right foot and difficulty with maintaining her nonweightbearing  However no worsening in the discoloration of her toes noted  Foot is warm  Skin: Negative.    Neurological: Negative for dizziness, tremors, syncope, weakness, light-headedness and headaches.   Hematological: Does not bruise/bleed easily.   Psychiatric/Behavioral: Negative.  Anxious about toes      Physical Exam   BP (!) 146/65   Pulse 79   Temp 98.4  F (36.9  C)   Resp 18   Ht 1.575 m (5' 2\")   Wt 60.3 kg (133 lb)   LMP  (LMP Unknown)   SpO2 96%   BMI 24.33 kg/m   "     Constitutional: Oriented to person, place, and time and appears well-developed.   HEENT:  Normocephalic and atraumatic.  Eyes: Conjunctivae and EOM are normal. Pupils are equal, round, and reactive to light. No discharge.  No scleral icterus. Nose normal. Mouth/Throat: Oropharynx is clear and moist. No oropharyngeal exudate.   Hard of hearing and uses hearing aid  Vision is impaired  NECK: Normal range of motion. Neck supple. No JVD present. No tracheal deviation present. No thyromegaly present.   CARDIOVASCULAR: Normal rate, regular rhythm and intact distal pulses.  Exam reveals no gallop and no friction rub.  Systolic murmur present.  PULMONARY: Effort normal and breath sounds normal. No respiratory distress.No Wheezing or rales.  ABDOMEN: Soft. Bowel sounds are normal. No distension and no mass.  There is no tenderness. There is no rebound and no guarding. No HSM.  MUSCULOSKELETAL: Normal range of motion. Mild kyphosis, no tenderness.  Right foot is missing 5th toe and is covered with an intact dressing.  She also has a PRAFO  LYMPH NODES: Has no cervical, supraclavicular, axillary and groin adenopathy.   NEUROLOGICAL: Alert and oriented to person, place, and time. No cranial nerve deficit.  Normal muscle tone. Coordination normal.   GENITOURINARY: Deferred exam.  SKIN: Skin is warm and dry. No rash noted. No erythema. No pallor.   EXTREMITIES: Dusky cyanosis of right first second and third toe noted.  Blanching also noted with pressure, no clubbing, no edema. No Deformity.  Foot is warm and nontender  PSYCHIATRIC: Normal mood, affect and behavior.      Lab Results     Last Comprehensive Metabolic Panel:  Lab Results   Component Value Date     06/20/2024    POTASSIUM 3.8 06/20/2024    CHLORIDE 106 06/20/2024    CO2 24 06/20/2024    ANIONGAP 12 06/20/2024     (H) 06/20/2024    GLC 89 06/20/2024    BUN 20.3 06/20/2024    CR 1.04 (H) 06/20/2024    GFRESTIMATED 52 (L) 06/20/2024    FAROOQ 9.2 06/20/2024              Electronically signed by    Saloni Whitman MD                            Sincerely,        HEYDI Corona

## 2024-07-02 NOTE — PROGRESS NOTES
[facility] GERIATRIC SERVICES       Patient Marva Gaines  MRN: [id number]        Reason for Visit     Chief Complaint   Patient presents with    RECHECK       Code Status     CPR/Full code     Assessment /plan     Concerns about right second toediscoloration now extending to the first great toe as well as the third toe.  Podiatry follow-up done for potential embolism.  Referral made to vascular surgery.  Now scheduled for echocardiogram as well as CTA by podiatry.  Follow-up on results.    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  Continue with incisional wound cares  Nonweightbearing right foot  Outpatient follow-up with podiatry as scheduled  Cont NWB RLE-per patient recommendation made that she could use pivot transferring with therapy only  Remains compliant with wearing her boot    Acute encephalopathy felt to be both metabolic and infectious-mood is improved and currently back to baseline    Hyponatremia felt to be mild monitor BMPs  Recheck sodium  is 142 at last check    Hypokalemia recheck potassium in the TCU done is 3.8 which is normal    SHY/CKD 3B  Renal function ultrasound done in the hospital was negative  Losartan was held in the hospital and resumed in the TCU due to stable kidney functions and elevated blood pressures.  Last creatinine check was 1  Overall improved    A-fib on Eliquis  Continue diltiazem for rate control  Heart rates are stable    Hypertension  Currently on losartan and diltiazem for blood pressure management  Losartan has been reintroduced with improvement   monitor BMPs as scheduled-last cr 1.0  Blood pressures improved with addition of losartan  However she has some elevated systolics we will continue to monitor    Hyperlipidemia continue statins    Congestive heart failure with preserved ejection fraction   currently euvolemic   monitor weights-stable with no evidence of volume overload    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  Continue with her PPIs  Recheck hemoglobin 11.1 in the TCU    Generalized weakness  Hoping to get full weightbearing prior to discharge home currently working on pivot transfers    Face-to-face for wheelchair done on 6/26/2024.  Patient is 62 inches tall weight is 135 pounds  She requires a standard wheelchair with seat cushion with standard bilateral foot rest as well as height adjustable arms and anti tippers  Patient has a mobility limitation that significantly impairs her ability to perform in 1 or more mobility related activities of daily living such as toileting, feeding, dressing, grooming and bathing in customary locations in her home.  Patient's home provides adequate access between rooms, maneuvering space, surface for the use of her manual wheelchair.  The use of a manual wheelchair will improve the patient's ability to participate in MR ADLs.  Patient is willing to use it on a regular basis.  She has sufficient upper body strength and physical and mental capabilities to safely self propel the manual wheelchair on a daily basis but will also have caregiver assistance if needed.      History     Patient is a very pleasant 88 year old female who is admitted to TCU  Patient admitted to the hospital with encephalopathy  She was noted to have infection of the fifth digit.  Imaging was consistent with osteomyelitis and podiatry recommended amputation of the fifth digit of the right foot  Discharge on p.o. antibiotics to the TCU which she is currently done with  She had some postoperative confusion which apparently has resolved.  Cognitively back to baseline  Also had some postoperative hypoxia.  This appears to have resolved  She is currently wheelchair-bound  Blood pressures improved with resumption of losartan.  Podiatry follow-up done and she continues to be nonweightbearing however by patient reports she has been given heel based weightbearing and pivot transferring with therapy only.  She  also has duskiness in her toes noted now with no pain or discomfort and follow-up done with podiatry with referral given for more imaging and vascular surgery follow-up  She is scheduled for imaging soon      Past Medical & Surgical History     PAST MEDICAL HISTORY:   A-fib on Eliquis  Anxiety disorder  PAST SURGICAL HISTORY:   has a past surgical history that includes Esophagoscopy, gastroscopy, duodenoscopy (EGD), combined (N/A, 12/30/2022); Colonoscopy (N/A, 12/30/2022); and Amputate toe(s) (Right, 5/30/2024).      Past Social History     Reviewed,  reports that she has quit smoking. She has been exposed to tobacco smoke. She has never used smokeless tobacco.    Family History     Reviewed, and family history is not on file.    Medication List     Current Outpatient Medications   Medication Sig Dispense Refill    acetaminophen (TYLENOL) 500 MG tablet Take 1,000 mg by mouth every 6 hours as needed for mild pain      apixaban ANTICOAGULANT (ELIQUIS ANTICOAGULANT) 2.5 MG tablet Take 1 tablet (2.5 mg) by mouth 2 times daily 180 tablet 3    atorvastatin (LIPITOR) 20 MG tablet TAKE 1 TABLET BY MOUTH EVERYDAY AT BEDTIME 90 tablet 1    cholecalciferol, vitamin D3, (VITAMIN D3) 2,000 unit Tab [CHOLECALCIFEROL, VITAMIN D3, (VITAMIN D3) 2,000 UNIT TAB] Take 1 tablet by mouth daily.      diltiazem ER COATED BEADS (CARDIZEM CD/CARTIA XT) 240 MG 24 hr capsule Take 1 capsule (240 mg) by mouth daily 90 capsule 3    Ferrous Sulfate 324 (65 Fe) MG TBEC Take 1 tablet by mouth daily      fish oil-omega-3 fatty acids 500 MG capsule Take 1 capsule by mouth daily      furosemide (LASIX) 20 MG tablet TAKE 2 TABLETS BY MOUTH EVERY  tablet 1    lactobacillus rhamnosus, GG, (CULTURELL) capsule Take 1 capsule by mouth 2 times daily      losartan (COZAAR) 25 MG tablet HOLD this medication until primary care provider follow up      pantoprazole (PROTONIX) 40 MG EC tablet TAKE 1 TABLET BY MOUTH TWICE A  tablet 3    sertraline  "(ZOLOFT) 100 MG tablet Take 1 tablet (100 mg) by mouth daily      Tuberculin PPD (TUBERSOL ID)        No current facility-administered medications for this visit.      MED REC REQUIRED  Post Medication Reconciliation Status:          Allergies     Allergies   Allergen Reactions    Blood Transfusion Related (Informational Only) Other (See Comments)     Patient has a history of a clinically significant antibody against RBC antigens.  A delay in compatible RBCs may occur. 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    Hydrocodone-Acetaminophen Unknown       Review of Systems   A comprehensive review of 14 systems was done. Pertinent findings noted here and in history of present illness. All the rest negative.  Constitutional: Negative.  Negative for fever, chills, she has  activity change, appetite change and fatigue.   HENT: Negative for congestion and facial swelling.    Has significant hearing loss and uses hearing aids  Eyes: Negative for photophobia, redness and visual disturbance.   Has chronic dry eyes  Respiratory: Negative for cough and chest tightness.    Cardiovascular: Negative for chest pain, palpitations and leg swelling.   Gastrointestinal: Negative for nausea, diarrhea, constipation, blood in stool and abdominal distention.   Genitourinary: Negative.    Musculoskeletal: Negative.  Reporting some pain in her right foot and difficulty with maintaining her nonweightbearing  However no worsening in the discoloration of her toes noted  Foot is warm  Skin: Negative.    Neurological: Negative for dizziness, tremors, syncope, weakness, light-headedness and headaches.   Hematological: Does not bruise/bleed easily.   Psychiatric/Behavioral: Negative.  Anxious about toes      Physical Exam   BP (!) 146/65   Pulse 79   Temp 98.4  F (36.9  C)   Resp 18   Ht 1.575 m (5' 2\")   Wt 60.3 kg (133 lb)   LMP  (LMP Unknown)   SpO2 96%   BMI 24.33 kg/m       Constitutional: Oriented to person, place, and time and appears well-developed.   HEENT: "  Normocephalic and atraumatic.  Eyes: Conjunctivae and EOM are normal. Pupils are equal, round, and reactive to light. No discharge.  No scleral icterus. Nose normal. Mouth/Throat: Oropharynx is clear and moist. No oropharyngeal exudate.   Hard of hearing and uses hearing aid  Vision is impaired  NECK: Normal range of motion. Neck supple. No JVD present. No tracheal deviation present. No thyromegaly present.   CARDIOVASCULAR: Normal rate, regular rhythm and intact distal pulses.  Exam reveals no gallop and no friction rub.  Systolic murmur present.  PULMONARY: Effort normal and breath sounds normal. No respiratory distress.No Wheezing or rales.  ABDOMEN: Soft. Bowel sounds are normal. No distension and no mass.  There is no tenderness. There is no rebound and no guarding. No HSM.  MUSCULOSKELETAL: Normal range of motion. Mild kyphosis, no tenderness.  Right foot is missing 5th toe and is covered with an intact dressing.  She also has a PRAFO  LYMPH NODES: Has no cervical, supraclavicular, axillary and groin adenopathy.   NEUROLOGICAL: Alert and oriented to person, place, and time. No cranial nerve deficit.  Normal muscle tone. Coordination normal.   GENITOURINARY: Deferred exam.  SKIN: Skin is warm and dry. No rash noted. No erythema. No pallor.   EXTREMITIES: Dusky cyanosis of right first second and third toe noted.  Blanching also noted with pressure, no clubbing, no edema. No Deformity.  Foot is warm and nontender  PSYCHIATRIC: Normal mood, affect and behavior.      Lab Results     Last Comprehensive Metabolic Panel:  Lab Results   Component Value Date     06/20/2024    POTASSIUM 3.8 06/20/2024    CHLORIDE 106 06/20/2024    CO2 24 06/20/2024    ANIONGAP 12 06/20/2024     (H) 06/20/2024    GLC 89 06/20/2024    BUN 20.3 06/20/2024    CR 1.04 (H) 06/20/2024    GFRESTIMATED 52 (L) 06/20/2024    FAROOQ 9.2 06/20/2024             Electronically signed by    Saloni Whitman MD

## 2024-07-03 ENCOUNTER — DISCHARGE SUMMARY NURSING HOME (OUTPATIENT)
Dept: GERIATRICS | Facility: CLINIC | Age: 89
End: 2024-07-03
Payer: COMMERCIAL

## 2024-07-03 VITALS
HEART RATE: 81 BPM | SYSTOLIC BLOOD PRESSURE: 132 MMHG | OXYGEN SATURATION: 96 % | TEMPERATURE: 97.2 F | WEIGHT: 133 LBS | DIASTOLIC BLOOD PRESSURE: 70 MMHG | RESPIRATION RATE: 18 BRPM | BODY MASS INDEX: 24.48 KG/M2 | HEIGHT: 62 IN

## 2024-07-03 DIAGNOSIS — I48.21 PERMANENT ATRIAL FIBRILLATION (H): ICD-10-CM

## 2024-07-03 DIAGNOSIS — I10 ESSENTIAL HYPERTENSION: ICD-10-CM

## 2024-07-03 DIAGNOSIS — I50.32 CHRONIC HEART FAILURE WITH PRESERVED EJECTION FRACTION (HFPEF) (H): ICD-10-CM

## 2024-07-03 DIAGNOSIS — L03.031 CELLULITIS OF FIFTH TOE OF RIGHT FOOT: Primary | ICD-10-CM

## 2024-07-03 DIAGNOSIS — F41.1 ANXIETY STATE: ICD-10-CM

## 2024-07-03 PROCEDURE — 99315 NF DSCHRG MGMT 30 MIN/LESS: CPT | Performed by: FAMILY MEDICINE

## 2024-07-03 NOTE — PROGRESS NOTES
[facility] GERIATRIC SERVICES       Patient Marva Gaines  MRN: [id number]        Reason for Visit     Chief Complaint   Patient presents with    Discharge Summary Nursing Home       Code Status     CPR/Full code     Assessment /plan     Concerns about right second toediscoloration now extending to the first great toe as well as the third toe.  Podiatry follow-up done for potential embolism.  Referral made to vascular surgery.  Now scheduled for echocardiogram as well as CTA by podiatry.  Follow-up on results.  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  Continue with incisional wound cares  Nonweightbearing right foot  Outpatient follow-up with podiatry as scheduled  Cont NWB RLE-per patient recommendation made that she could use pivot transferring with therapy only  Remains compliant with wearing her boot  However she has not received new weightbearing orders which are for 15 minutes or 50 feet which ever is more  Therapy will resume some weightbearing with her    Acute encephalopathy felt to be both metabolic and infectious-mood is improved and currently back to baseline    Hyponatremia felt to be mild monitor BMPs  Recheck sodium  is 142 at last check    Hypokalemia recheck potassium in the TCU done is 3.8 which is normal    SHY/CKD 3B  Renal function ultrasound done in the hospital was negative  Losartan was held in the hospital and resumed in the TCU due to stable kidney functions and elevated blood pressures.  Last creatinine check was 1  Overall improved and stable    A-fib on Eliquis  Continue diltiazem for rate control  Heart rates are stable    Hypertension  Currently on losartan and diltiazem for blood pressure management  Losartan has been reintroduced with improvement   monitor BMPs as scheduled-last cr 1.0  Blood pressures improved with addition of losartan  However she has some elevated systolics we will continue to monitor    Hyperlipidemia continue  statins    Congestive heart failure with preserved ejection fraction   currently euvolemic   monitor weights-stable with no evidence of volume overload    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  Continue with her PPIs  Recheck hemoglobin 11.1 in the TCU    Generalized weakness  Continue with her limited weightbearing the elective plan is to discharge home next week to an assisted living facility    Face-to-face for wheelchair done on 6/26/2024.  Patient is 62 inches tall weight is 135 pounds  She requires a standard wheelchair with seat cushion with standard bilateral foot rest as well as height adjustable arms and anti tippers  Patient has a mobility limitation that significantly impairs her ability to perform in 1 or more mobility related activities of daily living such as toileting, feeding, dressing, grooming and bathing in customary locations in her home.  Patient's home provides adequate access between rooms, maneuvering space, surface for the use of her manual wheelchair.  The use of a manual wheelchair will improve the patient's ability to participate in MR ADLs.  Patient is willing to use it on a regular basis.  She has sufficient upper body strength and physical and mental capabilities to safely self propel the manual wheelchair on a daily basis but will also have caregiver assistance if needed.      History     Patient is a very pleasant 88 year old female who is admitted to TCU  Patient admitted to the hospital with encephalopathy  She was noted to have infection of the fifth digit.  Imaging was consistent with osteomyelitis and podiatry recommended amputation of the fifth digit of the right foot  Discharge on p.o. antibiotics to the TCU which she is currently done with  She had some postoperative confusion which apparently has resolved.  Cognitively back to baseline  Also had some postoperative hypoxia.  This appears to have resolved  She is currently  wheelchair-bound  Blood pressures improved with resumption of losartan.  Podiatry follow-up done and she continues to be nonweightbearing however by patient reports she has been given heel based weightbearing and pivot transferring with therapy only.  She also has duskiness in her toes noted now with no pain or discomfort and follow-up done with podiatry with referral given for more imaging and vascular surgery follow-up  She is scheduled for imaging soon  She also has contacted podiatry and has received a very limited weightbearing order only with therapy    Past Medical & Surgical History     PAST MEDICAL HISTORY:   A-fib on Eliquis  Anxiety disorder  PAST SURGICAL HISTORY:   has a past surgical history that includes Esophagoscopy, gastroscopy, duodenoscopy (EGD), combined (N/A, 12/30/2022); Colonoscopy (N/A, 12/30/2022); and Amputate toe(s) (Right, 5/30/2024).      Past Social History     Reviewed,  reports that she has quit smoking. She has been exposed to tobacco smoke. She has never used smokeless tobacco.    Family History     Reviewed, and family history is not on file.    Medication List     Current Outpatient Medications   Medication Sig Dispense Refill    acetaminophen (TYLENOL) 500 MG tablet Take 1,000 mg by mouth every 6 hours as needed for mild pain      apixaban ANTICOAGULANT (ELIQUIS ANTICOAGULANT) 2.5 MG tablet Take 1 tablet (2.5 mg) by mouth 2 times daily 180 tablet 3    atorvastatin (LIPITOR) 20 MG tablet TAKE 1 TABLET BY MOUTH EVERYDAY AT BEDTIME 90 tablet 1    cholecalciferol, vitamin D3, (VITAMIN D3) 2,000 unit Tab [CHOLECALCIFEROL, VITAMIN D3, (VITAMIN D3) 2,000 UNIT TAB] Take 1 tablet by mouth daily.      diltiazem ER COATED BEADS (CARDIZEM CD/CARTIA XT) 240 MG 24 hr capsule Take 1 capsule (240 mg) by mouth daily 90 capsule 3    Ferrous Sulfate 324 (65 Fe) MG TBEC Take 1 tablet by mouth daily      fish oil-omega-3 fatty acids 500 MG capsule Take 1 capsule by mouth daily      furosemide  (LASIX) 20 MG tablet TAKE 2 TABLETS BY MOUTH EVERY  tablet 1    lactobacillus rhamnosus, GG, (CULTURELL) capsule Take 1 capsule by mouth 2 times daily      losartan (COZAAR) 25 MG tablet HOLD this medication until primary care provider follow up      pantoprazole (PROTONIX) 40 MG EC tablet TAKE 1 TABLET BY MOUTH TWICE A  tablet 3    sertraline (ZOLOFT) 100 MG tablet Take 1 tablet (100 mg) by mouth daily      Tuberculin PPD (TUBERSOL ID)        No current facility-administered medications for this visit.      MED REC REQUIRED  Post Medication Reconciliation Status:          Allergies     Allergies   Allergen Reactions    Blood Transfusion Related (Informational Only) Other (See Comments)     Patient has a history of a clinically significant antibody against RBC antigens.  A delay in compatible RBCs may occur. 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    Hydrocodone-Acetaminophen Unknown       Review of Systems   A comprehensive review of 14 systems was done. Pertinent findings noted here and in history of present illness. All the rest negative.  Constitutional: Negative.  Negative for fever, chills, she has  activity change, appetite change and fatigue.   HENT: Negative for congestion and facial swelling.    Has significant hearing loss and uses hearing aids  Eyes: Negative for photophobia, redness and visual disturbance.   Has chronic dry eyes  Respiratory: Negative for cough and chest tightness.    Cardiovascular: Negative for chest pain, palpitations and leg swelling.   Gastrointestinal: Negative for nausea, diarrhea, constipation, blood in stool and abdominal distention.   Genitourinary: Negative.    Musculoskeletal: Negative.  Reporting some pain in her right foot and difficulty with maintaining her nonweightbearing  However no worsening in the discoloration of her toes noted  Foot is warm  Skin: Negative.    Neurological: Negative for dizziness, tremors, syncope, weakness, light-headedness and headaches.   Hematological:  "Does not bruise/bleed easily.   Psychiatric/Behavioral: Negative.  Anxious about toes      Physical Exam   /70   Pulse 81   Temp 97.2  F (36.2  C)   Resp 18   Ht 1.575 m (5' 2\")   Wt 60.3 kg (133 lb)   LMP  (LMP Unknown)   SpO2 96%   BMI 24.33 kg/m       Constitutional: Oriented to person, place, and time and appears well-developed.   HEENT:  Normocephalic and atraumatic.  Eyes: Conjunctivae and EOM are normal. Pupils are equal, round, and reactive to light. No discharge.  No scleral icterus. Nose normal. Mouth/Throat: Oropharynx is clear and moist. No oropharyngeal exudate.   Hard of hearing and uses hearing aid  Vision is impaired  NECK: Normal range of motion. Neck supple. No JVD present. No tracheal deviation present. No thyromegaly present.   CARDIOVASCULAR: Normal rate, regular rhythm and intact distal pulses.  Exam reveals no gallop and no friction rub.  Systolic murmur present.  PULMONARY: Effort normal and breath sounds normal. No respiratory distress.No Wheezing or rales.  ABDOMEN: Soft. Bowel sounds are normal. No distension and no mass.  There is no tenderness. There is no rebound and no guarding. No HSM.  MUSCULOSKELETAL: Normal range of motion. Mild kyphosis, no tenderness.  Right foot is missing 5th toe and is covered with an intact dressing.  She also has a PRAFO  LYMPH NODES: Has no cervical, supraclavicular, axillary and groin adenopathy.   NEUROLOGICAL: Alert and oriented to person, place, and time. No cranial nerve deficit.  Normal muscle tone. Coordination normal.   GENITOURINARY: Deferred exam.  SKIN: Skin is warm and dry. No rash noted. No erythema. No pallor.   EXTREMITIES: Dusky cyanosis of right first second and third toe noted.  Blanching also noted with pressure, no clubbing, no edema. No Deformity.  Foot is warm and nontender  PSYCHIATRIC: Normal mood, affect and behavior.      Lab Results     Last Comprehensive Metabolic Panel:  Lab Results   Component Value Date     " 06/20/2024    POTASSIUM 3.8 06/20/2024    CHLORIDE 106 06/20/2024    CO2 24 06/20/2024    ANIONGAP 12 06/20/2024     (H) 06/20/2024    GLC 89 06/20/2024    BUN 20.3 06/20/2024    CR 1.04 (H) 06/20/2024    GFRESTIMATED 52 (L) 06/20/2024    FAROOQ 9.2 06/20/2024       MEDICAL EQUIPMENT NEEDS:  Wheelchair     DISCHARGE PLAN/FACE TO FACE:  I certify that services are/were furnished while this patient was under the care of a physician and that a physician or an allowed non-physician practitioner (NPP), had a face-to-face encounter that meets the physician face-to-face encounter requirements. The encounter was in whole, or in part, related to the primary reason for home health. The patient is confined to his/her home and needs intermittent skilled nursing, physical therapy, speech-language pathology, or the continued need for occupational therapy. A plan of care has been established by a physician and is periodically reviewed by a physician.  Date of Face-to-Face Encounter: 7/3/2024     I certify that, based on my findings, the following services are medically necessary home health services: [facility] HHA/RN and PT/OT to evaluate and treat    My clinical findings support the need for the above skilled services because: patient will be discharging to home. Patient will need assistance with medication management and performing IADLs and ADLs effectively and safely.     Patient to re-establish plan of care with their PCP within 7 days after leaving TCU.        Electronically signed by    Saloni Whitman MD

## 2024-07-03 NOTE — LETTER
7/3/2024      Marva Gaines  [address]   [id number]  [facility] [address]        [facility]       Patient Marva Gaines  MRN: [id number]        Reason for Visit     Chief Complaint   Patient presents with     Discharge Summary Nursing Home       Code Status     CPR/Full code     Assessment /plan     Concerns about right second toediscoloration now extending to the first great toe as well as the third toe.  Podiatry follow-up done for potential embolism.  Referral made to vascular surgery.  Now scheduled for echocardiogram as well as CTA by podiatry.  Follow-up on results.  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  Continue with incisional wound cares  Nonweightbearing right foot  Outpatient follow-up with podiatry as scheduled  Cont NWB RLE-per patient recommendation made that she could use pivot transferring with therapy only  Remains compliant with wearing her boot  However she has not received new weightbearing orders which are for 15 minutes or 50 feet which ever is more  Therapy will resume some weightbearing with her    Acute encephalopathy felt to be both metabolic and infectious-mood is improved and currently back to baseline    Hyponatremia felt to be mild monitor BMPs  Recheck sodium  is 142 at last check    Hypokalemia recheck potassium in the TCU done is 3.8 which is normal    SHY/CKD 3B  Renal function ultrasound done in the hospital was negative  Losartan was held in the hospital and resumed in the TCU due to stable kidney functions and elevated blood pressures.  Last creatinine check was 1  Overall improved and stable    A-fib on Eliquis  Continue diltiazem for rate control  Heart rates are stable    Hypertension  Currently on losartan and diltiazem for blood pressure management  Losartan has been reintroduced with improvement   monitor BMPs as scheduled-last cr 1.0  Blood pressures improved with addition of losartan  However she has  some elevated systolics we will continue to monitor    Hyperlipidemia continue statins    Congestive heart failure with preserved ejection fraction   currently euvolemic   monitor weights-stable with no evidence of volume overload    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  Continue with her PPIs  Recheck hemoglobin 11.1 in the TCU    Generalized weakness  Continue with her limited weightbearing the elective plan is to discharge home next week to an assisted living facility    Face-to-face for wheelchair done on 6/26/2024.  Patient is 62 inches tall weight is 135 pounds  She requires a standard wheelchair with seat cushion with standard bilateral foot rest as well as height adjustable arms and anti tippers  Patient has a mobility limitation that significantly impairs her ability to perform in 1 or more mobility related activities of daily living such as toileting, feeding, dressing, grooming and bathing in customary locations in her home.  Patient's home provides adequate access between rooms, maneuvering space, surface for the use of her manual wheelchair.  The use of a manual wheelchair will improve the patient's ability to participate in MR ADLs.  Patient is willing to use it on a regular basis.  She has sufficient upper body strength and physical and mental capabilities to safely self propel the manual wheelchair on a daily basis but will also have caregiver assistance if needed.      History     Patient is a very pleasant 88 year old female who is admitted to TCU  Patient admitted to the hospital with encephalopathy  She was noted to have infection of the fifth digit.  Imaging was consistent with osteomyelitis and podiatry recommended amputation of the fifth digit of the right foot  Discharge on p.o. antibiotics to the TCU which she is currently done with  She had some postoperative confusion which apparently has resolved.  Cognitively back to baseline  Also had some postoperative  hypoxia.  This appears to have resolved  She is currently wheelchair-bound  Blood pressures improved with resumption of losartan.  Podiatry follow-up done and she continues to be nonweightbearing however by patient reports she has been given heel based weightbearing and pivot transferring with therapy only.  She also has duskiness in her toes noted now with no pain or discomfort and follow-up done with podiatry with referral given for more imaging and vascular surgery follow-up  She is scheduled for imaging soon  She also has contacted podiatry and has received a very limited weightbearing order only with therapy    Past Medical & Surgical History     PAST MEDICAL HISTORY:   A-fib on Eliquis  Anxiety disorder  PAST SURGICAL HISTORY:   has a past surgical history that includes Esophagoscopy, gastroscopy, duodenoscopy (EGD), combined (N/A, 12/30/2022); Colonoscopy (N/A, 12/30/2022); and Amputate toe(s) (Right, 5/30/2024).      Past Social History     Reviewed,  reports that she has quit smoking. She has been exposed to tobacco smoke. She has never used smokeless tobacco.    Family History     Reviewed, and family history is not on file.    Medication List     Current Outpatient Medications   Medication Sig Dispense Refill     acetaminophen (TYLENOL) 500 MG tablet Take 1,000 mg by mouth every 6 hours as needed for mild pain       apixaban ANTICOAGULANT (ELIQUIS ANTICOAGULANT) 2.5 MG tablet Take 1 tablet (2.5 mg) by mouth 2 times daily 180 tablet 3     atorvastatin (LIPITOR) 20 MG tablet TAKE 1 TABLET BY MOUTH EVERYDAY AT BEDTIME 90 tablet 1     cholecalciferol, vitamin D3, (VITAMIN D3) 2,000 unit Tab [CHOLECALCIFEROL, VITAMIN D3, (VITAMIN D3) 2,000 UNIT TAB] Take 1 tablet by mouth daily.       diltiazem ER COATED BEADS (CARDIZEM CD/CARTIA XT) 240 MG 24 hr capsule Take 1 capsule (240 mg) by mouth daily 90 capsule 3     Ferrous Sulfate 324 (65 Fe) MG TBEC Take 1 tablet by mouth daily       fish oil-omega-3 fatty acids  500 MG capsule Take 1 capsule by mouth daily       furosemide (LASIX) 20 MG tablet TAKE 2 TABLETS BY MOUTH EVERY  tablet 1     lactobacillus rhamnosus, GG, (CULTURELL) capsule Take 1 capsule by mouth 2 times daily       losartan (COZAAR) 25 MG tablet HOLD this medication until primary care provider follow up       pantoprazole (PROTONIX) 40 MG EC tablet TAKE 1 TABLET BY MOUTH TWICE A  tablet 3     sertraline (ZOLOFT) 100 MG tablet Take 1 tablet (100 mg) by mouth daily       Tuberculin PPD (TUBERSOL ID)        No current facility-administered medications for this visit.      MED REC REQUIRED  Post Medication Reconciliation Status:          Allergies     Allergies   Allergen Reactions     Blood Transfusion Related (Informational Only) Other (See Comments)     Patient has a history of a clinically significant antibody against RBC antigens.  A delay in compatible RBCs may occur. 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     Hydrocodone-Acetaminophen Unknown       Review of Systems   A comprehensive review of 14 systems was done. Pertinent findings noted here and in history of present illness. All the rest negative.  Constitutional: Negative.  Negative for fever, chills, she has  activity change, appetite change and fatigue.   HENT: Negative for congestion and facial swelling.    Has significant hearing loss and uses hearing aids  Eyes: Negative for photophobia, redness and visual disturbance.   Has chronic dry eyes  Respiratory: Negative for cough and chest tightness.    Cardiovascular: Negative for chest pain, palpitations and leg swelling.   Gastrointestinal: Negative for nausea, diarrhea, constipation, blood in stool and abdominal distention.   Genitourinary: Negative.    Musculoskeletal: Negative.  Reporting some pain in her right foot and difficulty with maintaining her nonweightbearing  However no worsening in the discoloration of her toes noted  Foot is warm  Skin: Negative.    Neurological: Negative for dizziness, tremors,  "syncope, weakness, light-headedness and headaches.   Hematological: Does not bruise/bleed easily.   Psychiatric/Behavioral: Negative.  Anxious about toes      Physical Exam   /70   Pulse 81   Temp 97.2  F (36.2  C)   Resp 18   Ht 1.575 m (5' 2\")   Wt 60.3 kg (133 lb)   LMP  (LMP Unknown)   SpO2 96%   BMI 24.33 kg/m       Constitutional: Oriented to person, place, and time and appears well-developed.   HEENT:  Normocephalic and atraumatic.  Eyes: Conjunctivae and EOM are normal. Pupils are equal, round, and reactive to light. No discharge.  No scleral icterus. Nose normal. Mouth/Throat: Oropharynx is clear and moist. No oropharyngeal exudate.   Hard of hearing and uses hearing aid  Vision is impaired  NECK: Normal range of motion. Neck supple. No JVD present. No tracheal deviation present. No thyromegaly present.   CARDIOVASCULAR: Normal rate, regular rhythm and intact distal pulses.  Exam reveals no gallop and no friction rub.  Systolic murmur present.  PULMONARY: Effort normal and breath sounds normal. No respiratory distress.No Wheezing or rales.  ABDOMEN: Soft. Bowel sounds are normal. No distension and no mass.  There is no tenderness. There is no rebound and no guarding. No HSM.  MUSCULOSKELETAL: Normal range of motion. Mild kyphosis, no tenderness.  Right foot is missing 5th toe and is covered with an intact dressing.  She also has a PRAFO  LYMPH NODES: Has no cervical, supraclavicular, axillary and groin adenopathy.   NEUROLOGICAL: Alert and oriented to person, place, and time. No cranial nerve deficit.  Normal muscle tone. Coordination normal.   GENITOURINARY: Deferred exam.  SKIN: Skin is warm and dry. No rash noted. No erythema. No pallor.   EXTREMITIES: Dusky cyanosis of right first second and third toe noted.  Blanching also noted with pressure, no clubbing, no edema. No Deformity.  Foot is warm and nontender  PSYCHIATRIC: Normal mood, affect and behavior.      Lab Results     Last " Comprehensive Metabolic Panel:  Lab Results   Component Value Date     06/20/2024    POTASSIUM 3.8 06/20/2024    CHLORIDE 106 06/20/2024    CO2 24 06/20/2024    ANIONGAP 12 06/20/2024     (H) 06/20/2024    GLC 89 06/20/2024    BUN 20.3 06/20/2024    CR 1.04 (H) 06/20/2024    GFRESTIMATED 52 (L) 06/20/2024    FAROOQ 9.2 06/20/2024       MEDICAL EQUIPMENT NEEDS:  Wheelchair     DISCHARGE PLAN/FACE TO FACE:  I certify that services are/were furnished while this patient was under the care of a physician and that a physician or an allowed non-physician practitioner (NPP), had a face-to-face encounter that meets the physician face-to-face encounter requirements. The encounter was in whole, or in part, related to the primary reason for home health. The patient is confined to his/her home and needs intermittent skilled nursing, physical therapy, speech-language pathology, or the continued need for occupational therapy. A plan of care has been established by a physician and is periodically reviewed by a physician.  Date of Face-to-Face Encounter: 7/3/2024     I certify that, based on my findings, the following services are medically necessary home health services: HHC HHA/RN and PT/OT to evaluate and treat    My clinical findings support the need for the above skilled services because: patient will be discharging to home. Patient will need assistance with medication management and performing IADLs and ADLs effectively and safely.     Patient to re-establish plan of care with their PCP within 7 days after leaving TCU.        Electronically signed by    Saloni Whitman MD                            Sincerely,        HEYDI Corona

## 2024-07-05 ENCOUNTER — HOSPITAL ENCOUNTER (OUTPATIENT)
Dept: CT IMAGING | Facility: HOSPITAL | Age: 89
Discharge: HOME | End: 2024-07-05
Attending: SURGERY | Admitting: SURGERY
Payer: COMMERCIAL

## 2024-07-05 DIAGNOSIS — M86.171 ACUTE OSTEOMYELITIS OF TOE, RIGHT (H): ICD-10-CM

## 2024-07-05 DIAGNOSIS — I73.9 PAD (PERIPHERAL ARTERY DISEASE) (H): ICD-10-CM

## 2024-07-05 LAB
CREAT BLD-MCNC: 1.2 MG/DL (ref 0.6–1.1)
EGFRCR SERPLBLD CKD-EPI 2021: 44 ML/MIN/1.73M2

## 2024-07-05 PROCEDURE — 75635 CT ANGIO ABDOMINAL ARTERIES: CPT

## 2024-07-05 PROCEDURE — 82565 ASSAY OF CREATININE: CPT

## 2024-07-05 PROCEDURE — 250N000011 HC RX IP 250 OP 636: Performed by: SURGERY

## 2024-07-05 RX ORDER — IOPAMIDOL 755 MG/ML
75 INJECTION, SOLUTION INTRAVASCULAR ONCE
Status: COMPLETED | OUTPATIENT
Start: 2024-07-05 | End: 2024-07-05

## 2024-07-05 RX ADMIN — IOPAMIDOL 75 ML: 755 INJECTION, SOLUTION INTRAVENOUS at 19:12

## 2024-07-09 ENCOUNTER — MEDICAL CORRESPONDENCE (OUTPATIENT)
Dept: HEALTH INFORMATION MANAGEMENT | Facility: CLINIC | Age: 89
End: 2024-07-09

## 2024-07-09 ENCOUNTER — HOSPITAL ENCOUNTER (OUTPATIENT)
Dept: CARDIOLOGY | Facility: CLINIC | Age: 89
Discharge: HOME | End: 2024-07-09
Attending: SURGERY | Admitting: SURGERY
Payer: COMMERCIAL

## 2024-07-09 DIAGNOSIS — I50.32 CHRONIC HEART FAILURE WITH PRESERVED EJECTION FRACTION (HFPEF) (H): ICD-10-CM

## 2024-07-09 DIAGNOSIS — I25.10 CORONARY ARTERY DISEASE INVOLVING NATIVE CORONARY ARTERY OF NATIVE HEART WITHOUT ANGINA PECTORIS: ICD-10-CM

## 2024-07-09 DIAGNOSIS — I73.9 PAD (PERIPHERAL ARTERY DISEASE) (H): ICD-10-CM

## 2024-07-09 DIAGNOSIS — K31.819 GASTRIC AVM: ICD-10-CM

## 2024-07-09 DIAGNOSIS — I48.21 PERMANENT ATRIAL FIBRILLATION (H): ICD-10-CM

## 2024-07-09 DIAGNOSIS — I10 ESSENTIAL HYPERTENSION: ICD-10-CM

## 2024-07-09 LAB — LVEF ECHO: NORMAL

## 2024-07-09 PROCEDURE — 93306 TTE W/DOPPLER COMPLETE: CPT | Mod: 26 | Performed by: INTERNAL MEDICINE

## 2024-07-09 PROCEDURE — 93306 TTE W/DOPPLER COMPLETE: CPT

## 2024-07-09 NOTE — TELEPHONE ENCOUNTER
Daughter, Veronica, came into the [place] office while the patient was in to get the Echo.  She is asking for the results to be reviewed right away and for a call back if possible. She states her mom's foot is progressively getting darker and darker purple and she doesn't feel she can wait until she sees Dr. Peres next week.  Daughter Anabel is on the consent to communicate form.  Veronica agreed okay to call Anabel and she will ask the patient to fill out a new form at her next visits to update to include both daughters.     [phone] Anabel's mobile number.

## 2024-07-09 NOTE — TELEPHONE ENCOUNTER
Spoke with daughter and relayed below message. She states that it is quite difficult to schedule appointments at this time as they are in the process of moving her to an assisted living facility and it is difficult to transport patient at this time. Daughter states they cannot come for an appointment next week and they will need to push the appointment off for now. Patient will see Dr. Peres next week and will discuss with him how urgent it is that patient be seen and/or if he is able to relay CTA results at that appointment.

## 2024-07-11 ENCOUNTER — TELEPHONE (OUTPATIENT)
Dept: INTERNAL MEDICINE | Facility: CLINIC | Age: 89
End: 2024-07-11
Payer: COMMERCIAL

## 2024-07-11 ENCOUNTER — TELEPHONE (OUTPATIENT)
Dept: VASCULAR SURGERY | Facility: CLINIC | Age: 89
End: 2024-07-11
Payer: COMMERCIAL

## 2024-07-11 ENCOUNTER — MEDICAL CORRESPONDENCE (OUTPATIENT)
Dept: HEALTH INFORMATION MANAGEMENT | Facility: CLINIC | Age: 89
End: 2024-07-11

## 2024-07-11 NOTE — TELEPHONE ENCOUNTER
Caller: KARLEY Tabor [facility]     Provider: RODY Peres    Detailed reason for call: Would like to know what patient's weight bearing status is/what restrictions they have.    Best phone number to contact: [phone]    Best time to contact: Any time    Ok to leave a detailed message: Yes    Ok to speak to authorized person if needed: No      (Noted to patient if reason is related to wound or incision, to please send a photo via email or [place].)

## 2024-07-11 NOTE — TELEPHONE ENCOUNTER
Spoke with Sharona in PT. Reviewed weight bearing restrictions and also faxed them to her at [phone].

## 2024-07-11 NOTE — TELEPHONE ENCOUNTER
Called to Stella CRANDALL, relayed provider's message in detail.     No further questions at this time.    Edwar GILLIAM RN

## 2024-07-11 NOTE — TELEPHONE ENCOUNTER
Incoming call from [facility] [phone]  Secure       Home Care is calling regarding an established patient with [facility].       Requesting orders from: Sabas Austin  Provider is following patient: Yes  Is this a 60-day recertification request?  No    Orders Requested    Physical Therapy  Request for initial certification (first set of orders)   Frequency: 2x/week for 2 weeks   1x/week for 6 week    Occupational Therapy  Request for initial evaluation and treatment (one time)     Verbal orders given for OT.  Information was gathered for PT.  Provider review needed.  RN will contact Home Care with information after provider review.  Confirmed ok to leave a detailed message with call back.  Contact information confirmed and updated as needed.    Edwar Del Castillo RN

## 2024-07-16 ENCOUNTER — TELEPHONE (OUTPATIENT)
Dept: INTERNAL MEDICINE | Facility: CLINIC | Age: 89
End: 2024-07-16

## 2024-07-16 ENCOUNTER — OFFICE VISIT (OUTPATIENT)
Dept: VASCULAR SURGERY | Facility: CLINIC | Age: 89
End: 2024-07-16
Attending: PODIATRIST
Payer: COMMERCIAL

## 2024-07-16 VITALS — HEART RATE: 86 BPM | OXYGEN SATURATION: 92 % | SYSTOLIC BLOOD PRESSURE: 126 MMHG | DIASTOLIC BLOOD PRESSURE: 64 MMHG

## 2024-07-16 DIAGNOSIS — M86.171 ACUTE OSTEOMYELITIS OF TOE, RIGHT (H): ICD-10-CM

## 2024-07-16 DIAGNOSIS — I73.9 PAD (PERIPHERAL ARTERY DISEASE) (H): Primary | ICD-10-CM

## 2024-07-16 PROCEDURE — 99213 OFFICE O/P EST LOW 20 MIN: CPT | Performed by: PODIATRIST

## 2024-07-16 PROCEDURE — G2211 COMPLEX E/M VISIT ADD ON: HCPCS | Performed by: PODIATRIST

## 2024-07-16 PROCEDURE — G0463 HOSPITAL OUTPT CLINIC VISIT: HCPCS | Performed by: PODIATRIST

## 2024-07-16 ASSESSMENT — PAIN SCALES - GENERAL: PAINLEVEL: NO PAIN (0)

## 2024-07-16 NOTE — TELEPHONE ENCOUNTER
Pt saw Dr. Peres today per note below.     Does she need to see vascular?  ASSESSMENT: S/P amputation fifth digit right foot        TREATMENT:    -We also discussed that she does have purple discoloration along the distal second digit on the right foot.  Patient continues to have discomfort along the distal digits 1, 2 on the right foot and I will ask vascular surgery would like to see her for follow-up.  Most recent ABIs indicate a right TBI of 100 mmHg.

## 2024-07-16 NOTE — PATIENT INSTRUCTIONS
Keep Steri Strips in place until they fall off on their own. DO NOT APPLY DRY GAUZE.   Remain non weight bearing on your Right foot at all times.     Dr. Peres would like you to get a PREVALON BOOT.

## 2024-07-16 NOTE — TELEPHONE ENCOUNTER
Anticoagulation Summary  As of 2024      INR goal:  2.0-3.0   TTR:  86.0 % (3.5 y)   INR used for dosin.30 (2024)   Warfarin maintenance plan:  5 mg (5 mg x 1) every Wed; 10 mg (5 mg x 2) all other days   Weekly warfarin total:  65 mg   Plan last modified:  Renny Christina PharmD (2022)   Next INR check:  2024   Target end date:  Indefinite    Indications    Paroxysmal atrial fibrillation (HCC) [I48.0]  Long term (current) use of anticoagulants [Z79.01] [Z79.01]  History of ischemic stroke [Z86.73]                 Anticoagulation Episode Summary       INR check location:  Anticoagulation Clinic    Preferred lab:      Send INR reminders to:      Comments:    Only call if out of range          Anticoagulation Care Providers       Provider Role Specialty Phone number    MILO Padilla  Nurse Practitioner Family [phone]          Anticoagulation Patient Findings    INR therapeutic at 2.3.  Per chart notes, patient requests contact only when out of range.  Pt is to continue with current warfarin dosing regimen.  Follow up in 2 weeks, to reduce risk of adverse events related to this high risk medication,  Warfarin.    Nestor Burch, PharmD, BCACP       Outgoing call to daughter, Anabel. No answer, LVMTCB.     Outgoing call to Liz at [facility] who has just stepped away. Relayed message to someone else regarding the medication changes. They do not do the meds for patient but will touch base with family.                  Statement Selected

## 2024-07-16 NOTE — TELEPHONE ENCOUNTER
"Liz from [facility] living calls to report that patient's daughter Anabel requested facility nurse to assess patient today for \"fluid on the lungs.\"     Liz reports crackles to left lungs. VSS. No other symptoms reported but nurse noticed patient would have to pause for long sentences.     Patient reports she hadn't been taking diuretic as scheduled because it was making her go to the bathroom too much. Patient doesn't know how many doses were missed.         Home care nurse educated patient to re-start medication but is asking if PCP would like patient to take a higher dose diuretic to \"catch up\" from missed doses.     Routed to PCP for review and recommendation.    Please call daughter Anabel with any changes in plan of care. (consent to communicate on file). Phone number is in contacts.           "

## 2024-07-16 NOTE — TELEPHONE ENCOUNTER
Her furosemide dose is 20 mg - 2 pills daily,  She can take 3 pills daily for 3 days, then 2 pills daily after.

## 2024-07-16 NOTE — PROGRESS NOTES
Podiatry Progress Note        ASSESSMENT: S/P amputation fifth digit right foot      TREATMENT:  -I discussed with the patient and her daughter today that there is partial-thickness gapping along the central incision.  Steri-Strips applied today.  We discussed continued nonweightbearing on the right foot.    -Pressure injury along the plantar fifth metatarsal head on the right foot is stable although we again reviewed that this may result in an open full-thickness ulceration that we will treat appropriately.    -We also discussed that she does have purple discoloration along the distal second digit on the right foot.  Patient continues to have discomfort along the distal digits 1, 2 on the right foot and I will ask vascular surgery would like to see her for follow-up.  Most recent ABIs indicate a right TBI of 100 mmHg.    -Patient is having discomfort using the PRAFO boot.  I have referred her for a Prevalon boot to be used at all times including overnight.    -Continue non-weight bearing on the right foot.     -She will follow-up with me in 3 to 4 weeks      Henry Peres DPM  [facility]      HPI: Marva Gaines was seen again today s/p amputation fifth digit right foot.  Patient complains of discomfort when wearing the proper boot on the right foot.  Patient's daughter is present for today's visit and is concerned about purple discoloration on the distal 2nd digit right foot.     No past medical history on file.    Allergies   Allergen Reactions    Blood Transfusion Related (Informational Only) Other (See Comments)     Patient has a history of a clinically significant antibody against RBC antigens.  A delay in compatible RBCs may occur. 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    Hydrocodone-Acetaminophen Unknown         Current Outpatient Medications:     acetaminophen (TYLENOL) 500 MG tablet, Take 1,000 mg by mouth every 6 hours as needed for mild pain, Disp: , Rfl:     apixaban ANTICOAGULANT  (ELIQUIS ANTICOAGULANT) 2.5 MG tablet, Take 1 tablet (2.5 mg) by mouth 2 times daily, Disp: 180 tablet, Rfl: 3    atorvastatin (LIPITOR) 20 MG tablet, TAKE 1 TABLET BY MOUTH EVERYDAY AT BEDTIME, Disp: 90 tablet, Rfl: 1    cholecalciferol, vitamin D3, (VITAMIN D3) 2,000 unit Tab, [CHOLECALCIFEROL, VITAMIN D3, (VITAMIN D3) 2,000 UNIT TAB] Take 1 tablet by mouth daily., Disp: , Rfl:     diltiazem ER COATED BEADS (CARDIZEM CD/CARTIA XT) 240 MG 24 hr capsule, Take 1 capsule (240 mg) by mouth daily, Disp: 90 capsule, Rfl: 3    Ferrous Sulfate 324 (65 Fe) MG TBEC, Take 1 tablet by mouth daily, Disp: , Rfl:     fish oil-omega-3 fatty acids 500 MG capsule, Take 1 capsule by mouth daily, Disp: , Rfl:     furosemide (LASIX) 20 MG tablet, TAKE 2 TABLETS BY MOUTH EVERY DAY, Disp: 180 tablet, Rfl: 1    lactobacillus rhamnosus, GG, (CULTURELL) capsule, Take 1 capsule by mouth 2 times daily, Disp: , Rfl:     losartan (COZAAR) 25 MG tablet, HOLD this medication until primary care provider follow up, Disp: , Rfl:     pantoprazole (PROTONIX) 40 MG EC tablet, TAKE 1 TABLET BY MOUTH TWICE A DAY, Disp: 180 tablet, Rfl: 3    sertraline (ZOLOFT) 100 MG tablet, Take 1 tablet (100 mg) by mouth daily, Disp: , Rfl:     Tuberculin PPD (TUBERSOL ID), , Disp: , Rfl:     Review of Systems - Negative       OBJECTIVE:  Appearance: alert, well appearing, and in no distress.    /64   Pulse 86   LMP  (LMP Unknown)   SpO2 92%     Surgical site on the amputated fifth digit right foot has partial-thickness gapping along the central incision, no erythema.  Skin injury plantar fifth metatarsal head right foot. Neurovascular status unchanged right foot. Purple discoloration distal 2nd digit right foot, no dusky appearance.

## 2024-07-17 ENCOUNTER — TELEPHONE (OUTPATIENT)
Dept: INTERNAL MEDICINE | Facility: CLINIC | Age: 89
End: 2024-07-17
Payer: COMMERCIAL

## 2024-07-17 ENCOUNTER — TELEPHONE (OUTPATIENT)
Dept: VASCULAR SURGERY | Facility: CLINIC | Age: 89
End: 2024-07-17
Payer: COMMERCIAL

## 2024-07-17 DIAGNOSIS — I73.9 PAD (PERIPHERAL ARTERY DISEASE) (H): Primary | ICD-10-CM

## 2024-07-17 NOTE — TELEPHONE ENCOUNTER
General Call    Contacts       Contact Date/Time Type Contact Phone/Fax    07/16/2024 03:09 PM CDT Phone (Incoming) Liz (Home Care) [phone]    07/16/2024 03:36 PM CDT Phone (Outgoing) Anabel Maza (Emergency Contact) [phone] (M)    07/16/2024 03:36 PM CDT Phone (Outgoing) Anabel Maza (Emergency Contact) [phone] (M)    Left Message     07/17/2024 11:25 AM CDT Phone (Outgoing) Anabel Maza (Emergency Contact) [phone] (M)    Talked with Family Member     07/17/2024 01:58 PM CDT Phone (Incoming) Anabel Maza (Emergency Contact) [phone] (M)          Reason for Call:  Veronica, daughter is calling to get an appointment with Dr Austin. Told her Dr Austin has a FULL schedule, no openings at all. Wanted me to get a message in to Dr Austin. Can you fit pt in anywhere or will they have to wait until July 25th? Doesn't want to see anyone else.    Okay to leave a detailed message?: No at Other phone number:  [phone]

## 2024-07-17 NOTE — TELEPHONE ENCOUNTER
Home Care is calling regarding an established patient with [facility].       Requesting orders from: Sabas Austin  Provider is following patient: Yes  Is this a 60-day recertification request?  No    Orders Requested    Occupational Therapy  Request for initial certification (first set of orders)   Frequency: 1 visit every other week for 3 visits. For ADLs and upper body strengthening    Information was gathered and will be sent to provider for review.  RN will contact Home Care with information after provider review.  Confirmed ok to leave a detailed message with call back.  [facility] - [phone] Secure   Contact information confirmed and updated as needed.    Finn Restrepo RN

## 2024-07-17 NOTE — TELEPHONE ENCOUNTER
Home Care is calling regarding an established patient with [facility].    Requesting orders from: Sabas Austin  Provider is following patient: Yes  Is this a 60-day recertification request?  No    Orders Requested    Skilled Nursing  Request for initial evaluation and treatment (one time)     Information was gathered and verbal orders were given for eval and treat.      Confirmed ok to leave a detailed message with call back.  Contact information confirmed and updated as needed.    Mag Montgomery RN

## 2024-07-17 NOTE — TELEPHONE ENCOUNTER
Caller: Veronica    Provider: RODY Peres    Detailed reason for call: Veronica is on be half of her mom stating yesterday at the apt with VS they were told to see ET ASAP. Please review and advise whom and how soon Kiah should be seen with vascular    Best phone number to contact: [phone]    Best time to contact: any    Ok to leave a detailed message: Yes    Ok to speak to authorized person if needed: No      (Noted to patient if reason is related to wound or incision, to please send a photo via email or [employer].)

## 2024-07-18 ENCOUNTER — MEDICAL CORRESPONDENCE (OUTPATIENT)
Dept: HEALTH INFORMATION MANAGEMENT | Facility: CLINIC | Age: 89
End: 2024-07-18

## 2024-07-18 DIAGNOSIS — K31.819 GASTRIC AVM: ICD-10-CM

## 2024-07-18 DIAGNOSIS — I73.9 PAD (PERIPHERAL ARTERY DISEASE) (H): Primary | ICD-10-CM

## 2024-07-18 DIAGNOSIS — Z53.9 DIAGNOSIS NOT YET DEFINED: Primary | ICD-10-CM

## 2024-07-18 PROCEDURE — G0180 MD CERTIFICATION HHA PATIENT: HCPCS | Performed by: INTERNAL MEDICINE

## 2024-07-18 RX ORDER — PANTOPRAZOLE SODIUM 40 MG/1
TABLET, DELAYED RELEASE ORAL
Qty: 180 TABLET | Refills: 3 | Status: SHIPPED | OUTPATIENT
Start: 2024-07-18

## 2024-07-18 NOTE — TELEPHONE ENCOUNTER
Spoke with daughter- she will continue to monitor the edema since there has been some improvement and follow the recommendations. If edema is severe and uncontrolled patient will go to ED/UC.

## 2024-07-18 NOTE — TELEPHONE ENCOUNTER
Sharona called back to clarify weight bearing orders after 7/16/24 visit. Updated her that per Dr. Peres's nurse, patient should be non weight bearing on the right foot except may bear weight for PT and transfers, 15 min max at time.

## 2024-07-18 NOTE — TELEPHONE ENCOUNTER
Patient's daughter called back to follow up.     Her main question is pcp is ok with waiting to see patient until OV on 7/25.     They have started the increase in Lasix as directed by pcp but are still concerned with uncontrolled edema. Home care was there yesterday and elevated legs- daughter thinks this has helped some.     There are no visits with pcp available until 7/25 and non with other provider until next week.    Routing to pcp to review if ok to wait to see patient until then. When we call daughter back we will reviewed red flag symptoms and when to call clinic

## 2024-07-18 NOTE — TELEPHONE ENCOUNTER
If she has severe uncontrolled edema - UC or ER.  If is getting better with Lasix, continue ACE wrap or stockings, furosemide, leg elevation and to see me next week.

## 2024-07-18 NOTE — TELEPHONE ENCOUNTER
Left message for Veronica that writer has been unable to speak to Dr. Cantu today because he has been in emergency surgery all day and his clinic this afternoon was cancelled.    Let her know that if writer does not hear back from him today, writer will asking nursing supervisor Lloyd to address with him tomorrow.

## 2024-07-19 ENCOUNTER — ANCILLARY PROCEDURE (OUTPATIENT)
Dept: VASCULAR ULTRASOUND | Facility: CLINIC | Age: 89
End: 2024-07-19
Attending: SURGERY
Payer: COMMERCIAL

## 2024-07-19 DIAGNOSIS — I73.9 PAD (PERIPHERAL ARTERY DISEASE) (H): ICD-10-CM

## 2024-07-19 PROCEDURE — 93923 UPR/LXTR ART STDY 3+ LVLS: CPT

## 2024-07-19 NOTE — TELEPHONE ENCOUNTER
Scheduled for ultrasound on 7/19/24 and to see Dr. Wilson on 7/22/24.  Appointment scheduled by daughter, Yoko

## 2024-07-22 ENCOUNTER — TELEPHONE (OUTPATIENT)
Dept: VASCULAR SURGERY | Facility: CLINIC | Age: 89
End: 2024-07-22

## 2024-07-22 ENCOUNTER — DOCUMENTATION ONLY (OUTPATIENT)
Dept: VASCULAR SURGERY | Facility: CLINIC | Age: 89
End: 2024-07-22
Payer: COMMERCIAL

## 2024-07-22 ENCOUNTER — OFFICE VISIT (OUTPATIENT)
Dept: VASCULAR SURGERY | Facility: CLINIC | Age: 89
End: 2024-07-22
Attending: SURGERY
Payer: COMMERCIAL

## 2024-07-22 VITALS
DIASTOLIC BLOOD PRESSURE: 72 MMHG | TEMPERATURE: 97.6 F | SYSTOLIC BLOOD PRESSURE: 125 MMHG | HEART RATE: 72 BPM | WEIGHT: 133 LBS | OXYGEN SATURATION: 97 % | HEIGHT: 62 IN | BODY MASS INDEX: 24.48 KG/M2 | RESPIRATION RATE: 20 BRPM

## 2024-07-22 DIAGNOSIS — I70.218 ATHEROSCLEROSIS OF NATIVE ARTERIES OF EXTREMITIES WITH INTERMITTENT CLAUDICATION, OTHER EXTREMITY (H): ICD-10-CM

## 2024-07-22 DIAGNOSIS — I73.9 PAD (PERIPHERAL ARTERY DISEASE) (H): Primary | ICD-10-CM

## 2024-07-22 PROCEDURE — G0463 HOSPITAL OUTPT CLINIC VISIT: HCPCS | Performed by: SURGERY

## 2024-07-22 PROCEDURE — G2211 COMPLEX E/M VISIT ADD ON: HCPCS | Performed by: SURGERY

## 2024-07-22 PROCEDURE — 99215 OFFICE O/P EST HI 40 MIN: CPT | Performed by: SURGERY

## 2024-07-22 ASSESSMENT — PAIN SCALES - GENERAL: PAINLEVEL: NO PAIN (0)

## 2024-07-22 NOTE — PATIENT INSTRUCTIONS
Marva Gaines,    Your visit to [facility] for your procedure is coming soon and we look forward to seeing you! This friendly reminder and pre-procedure checklist will help to ensure your procedure goes smoothly and meets your expectations. At [facility], our goal is to provide you with a great patient experience and to deliver genuine, professional care to every patient.     Please complete all the steps in advance of your visit. If you have any questions about the items listed below, please give our office a call. We can be reached at [phone] or visit our website at [email or web address] for more information.     Procedure: Right  Leg  Angiogram     Procedure Date :  7/25/24    Procedure Time :  0800    Arrival Time: 0700    2 week Post Procedure Appointment with  KYLEE Mackenzie and also ultrasound: TBD    6 weeks appt with  and repeat ultrasound: TBD    Admission Type: Outpatient    Surgeon:     Procedure Location: [facility]:  [address] [place] [address] (phone: [phone], Fax: [phone])      If you take blood thinners: SEE SPECIFIC INSTRUCTIONS BELOW   PLEASE DO NOT STOP YOUR ASPIRIN OR PLAVIX UNLESS SPECIFICALLY DIRECTED BY THE VASCULAR SURGEON TO STOP!  - In most cases Vascular providers want you to continue these. This is different from most NON vascular surgeries and may not be well known by your Primary Care Provider Eliquis: Hold 3 days before the procedure    If you take these diabetic medications, please discuss with your primary doctor and follow the hold instructions:   Hold seven (7) days prior for once weekly injectable doses [semaglutide (Ozempic, Wegovy), dulaglutide  (Trulicity), exenatide ER (Bydureon), tirzepatide (Mounjaro)]  Hold the day before and day of for once daily injectable GLP-1 agonists [exenatide (Byetta), liraglutide  (Saxenda,Victoza)]  Hold seven (7) days for oral semaglutide (Rybelsus)    Prepare for the peripheral angiography as follows:  Do not eat 8 hours before your procedure. You may have clear liquids up to 1 hour before surgery.  Tell your healthcare provider about all medicines you take and any allergies you may have.  Arrange for a family member or friend to drive you home.  If you are on Metformin, please HOLD for 48 hours after the procedure.  Please be sure to address your diabetic medications with your Primary Care Physician prior to your angiogram.    Peripheral Angiography    Peripheral angiography is an outpatient procedure that makes a  map  of the vessels (arteries) in your lower body, legs, and arms, using X-ray and dye.This map can show where blood flow may be blocked.    An angiogram is commonly performed under sedation with the use of local anesthesia.    The procedure usually starts with a needle put into the femoral (groin) artery. From one treatment site, areas all over the body can be treated.  After access is established, catheters (thin tubes) and wires are threaded through the arterial system to a specific area of interest or throughout the entire body.  As a contrast agent (iodine dye) is injected, X-ray images are taken to let your provider view the flow of the dye and identify blockages. The surgeon can then choose the best mode of therapy for you - whether during or following the angiogram. This decision depends on your symptoms and the severity and characteristics of the blockages.  Two common therapies that can be provided during the angiogram are balloon angioplasty and stent placement.                   Angioplasty can be used to open arterial blockages. Guided by X-ray, your provider navigates through the blockage with a wire and introduces a special device equipped with an inflatable balloon. After positioning the balloon device across the blocked portion of the artery, the provider  inflates the balloon to expand the artery and compress the blockage. The balloon is then deflated and removed while keeping the wire in place across the area that has been treated. Next, contrast dye is injected to assess the result. Treatment is considered a success if blood flow is improved and less than 30% of the blockage remains. If the vessel is still considerably narrowed, placing a stent may be the next step.    Stents are used to prop open an artery at the site of a narrowing. Stents are generally placed after balloon angioplasty when there is residual narrowing or insufficient blood flow in a treated vessel. Stents are considered a permanent implant and cannot be used if you have a metal allergy. Stents that are used in the leg are constructed of a nickel-titanium alloy (Nitinol), a memory-shaped metal. This alloy has a predetermined size and shape at body temperature and expands to this size and shape after being introduced through a catheter. These stents resist kinking and are flexible so that damage from activities that involve your legs is minimized.  Your procedure may require other techniques to address the problem or plaque.     If surgery is felt to be a better option, your vascular provider will obtain any additional X-ray images needed to plan a surgical bypass of the blocked vessel/s and will then conclude the angiogram.      During the procedure  Here is what to expect:  You may get medicine through an IV (intravenous) line to relax you. You re given an injection to numb the insertion site. Then, a tiny skin cut (incision) is made near an artery in your groin.  Your provider inserts a thin tube (catheter) through the incision. He or she then threads the catheter into an artery while looking at a video monitor.  Contrast  dye  is injected into the catheter to confirm position. You may feel warmth or pressure in your legs and back. You lie still as X-rays are taken. The catheter is then taken  out.  After the procedure  You ll be taken to a recovery area. A healthcare provider will apply pressure to the site for about 10 minutes. Your healthcare provider will tell you how long to lie down and keep the insertion site still. Your healthcare provider will discuss the results with you soon after the procedure.      Angiogram Procedure Discharge Instructions:     1. If you received sedation for your procedure: Do not drive or operate heavy machinery for the rest of the day.  2. Avoid strenuous activity for 72 hours (3 days):                        - Do not lift greater than 10 pounds.                        - Excessive exercise                        - Straining                        - Return to your normal activities as you tolerate after the 3 days restriction  3. Avoid tub baths, Jacuzzis, hot tubs and pools for 72 hours (3 days) or until puncture site is will healed.  4. You may shower beginning tomorrow. Do not scrub puncture site(s) until well healed, pat dry.  5. You can expect to return to work 1-2 days after your procedure - depending on the nature of your profession.  6. It is normal to have some tenderness and minimal swelling at puncture site. A small area of discoloration may be present. Tenderness typically subsides in 24-48 hours. A small knot may also be present at puncture site for 6-8 weeks, this can be a normal part of the healing process.       After the angiogram If you:      1. Experience any bleeding or active swelling from puncture site: Lie down, firmly apply pressure to puncture site and CALL 9-1-1  2. Fever greater than 101 degrees Fahrenheit.  3. Redness, swelling, warmth to touch, or purulent (yellow/green/foul smelling) drainage from the puncture site.  4. Increasing pain, tenderness or swelling at puncture site OR of arm/leg near puncture site.  5. Feeling weak or faint.  6. Change in color, temperature, or sensation of arm/leg where puncture was made.  7. You can t feel your  "thrill (pulse at your dialysis fistula site) or it feels weak (If you had fistulogram done).     Call us with any other questions or concerns after your procedure: [phone]      All invasive procedures can have complications. While the risk of an angiogram is low it is not zero. The most common complications are related to the arterial access site.       Risks/ Complications   Bruising is Common  You will likely have bruising (ecchymosis) where the artery was entered.    Pain and Bleeding  Less commonly, patients experience pain and bleeding that may include blood collecting under the skin (hematoma).    Blockage and Leakage   In rare cases, the access artery can become blocked. Infrequently, patients experience persistent leakage of blood where the artery was entered, which can result in the formation of a pseudoaneurysm--a blood-filled sac--that may require further treatment.  Other complications related to an angiogram include:   Allergic reaction to the iodine contrast dye, which can lead to the development of kidney failure.  Very rarely during balloon angioplasty and/or stent placement, part of the arterial blockage can break off (embolism) and travel to more distant arteries. This can worsen blood flow.    Pre-Procedure checklist:    [] A Pre-op physical within 30 days of the procedure is required. You will need to set up an appointment with your primary care provider.  [] Contact your insurance regarding coverage  If you would like a Good [place] Estimate for your upcoming procedure at [facility] Location, contact Cost of Care Estimates   Advocates are available Monday through Friday 8am - 5pm     You may also submit a request online at [email or web address]  - Complete the secure online form found under \"Services and Procedure Pricing\"   If your procedure is at [facility], please contact the numbers below for Cost of Care Estimates.   - Facility Charge: [phone]    " Anesthesiology charge:  [phone]   [] DO NOT BRING FMLA WITH TO SURGERY.  These should be sent to the provider's office by fax to .     [] Day of Surgery  Medications - Take as indicated with sips of water.   Wear comfortable loose-fitting clothes. Wear your glasses-Not contacts. Do not wear jewelry and remove body piercings. Surgery may be cancelled if they are not removed.   You may have 1 family member wait in the lobby during your surgery. Visitor restrictions are subject to change. Please verify with the surgery nurse when they call.   If same day surgery-Have a someone come with you to surgery that can help you understand the surgeon's instructions, drive you home and stay with you overnight the first night.    [] You will receive a call from a nurse 1-3 days prior to the procedure. They will go over more details with you    Notify our office right away, if you have any changes in your health status, or if you develop a cold, flu, diarrhea, infection, fever or sore throat before your scheduled surgery date. We can be reached at  [phone] Monday-Friday 8 am-4:30 pm if you have any questions.   Thank you for choosing [facility]    Please scan QR codes to watch videos about Angioplasty for Peripheral Artery Disease. There are links provided if you are unable to use the code.                                                                                                                                       [email or web address]                    [email or web address]  Angioplasty for Peripheral Artery Disease: Before Your Procedure                                               Angioplasty for Peripheral Artery Disease: Returning Home

## 2024-07-22 NOTE — PROGRESS NOTES
"        [facility]        Patient is here for a consult to discuss Peripheral artery disease (PAD). Symptoms include skin discoloration and painfun to touch in right lower extremity.    Pt is currently taking Statin and Eliquis.    /72 (BP Location: Left arm, Patient Position: Sitting, Cuff Size: Adult Regular)   Pulse 72   Temp 97.6  F (36.4  C) (Oral)   Resp 20   Ht 5' 2\" (1.575 m)   Wt 133 lb (60.3 kg)   LMP  (LMP Unknown)   SpO2 97%   BMI 24.33 kg/m      The provider has been notified that the patient has concerns of discoloration on right foot toe, and why her wound is not healing.     Questions patient would like addressed today are: no.    Refills are needed: No    Has homecare services and agency name:  Pt is senior living facility          "

## 2024-07-22 NOTE — Clinical Note
Angio scheduled 7/25. Need 2 and 6 week follow-ups scheduled. Macy said Claudia schedule would be opening more. Please add follow-ups for her.

## 2024-07-22 NOTE — PROGRESS NOTES
VASCULAR SURGERY OUTPATIENT CONSULT OR VISIT   VASCULAR SURGEON: Sara Wilson MD    LOCATION:  [facility]    Marva Gaines   Medical Record #:  [id number]  YOB: 1935  Age:  88 year old     Date of Service: 7/22/2024    PRIMARY CARE PROVIDER: Sabas Austin      Reason for consultation: Evaluation for right foot ischemia    IMPRESSION: 88-year-old female with known atrial fibrillation on Eliquis who has had ischemic changes of her right foot toes for a couple of months.  Eventually required fifth toe amputation with Dr. Peres which is healing but slowly.  While CTA appeared to demonstrate no important occlusions toe pressures demonstrated toe pressures of 0 in the right foot with normal toe pressures on the left foot.  Patient had had to stop her Eliquis a couple of months ago when admitted to the hospital with profound anemia.  Since then back on her Eliquis.    RECOMMENDATION: While the CTA appears to be nearly normal it is worthwhile getting an angiogram to explain the discrepancy between the CT and the noninvasive studies.  It is also important to diffs during which between the CT of near normality in the context of ischemic toes.  We may find that the patient has had digital emboli which cannot treat but if we could find a distal pedal arch vessel that could be reopened we might be able to also improve the patient's outcome.  Last creatinine is 1.04.  Low risk of procedure overall.  There is a fairly low risk of benefit but important chance of explaining the patient's presentation.    HPI:  Marva Gaines is a 88 year old female who was seen today for evaluation of ischemic right foot digits.  This is a woman who has known A-fib on Eliquis.    [facility]:  History reviewed. No pertinent past medical history.     Past Surgical History:   Procedure Laterality Date    AMPUTATE TOE(S) Right 5/30/2024    Procedure: AMPUTATION, FIFTH DIGIT RIGHT FOOT;  Surgeon: Henry Peres DPM;   Location: [facility] OR    COLONOSCOPY N/A 12/30/2022    Procedure: COLONOSCOPY with argon plasma coagulation;  Surgeon: Steven Driscoll MD;  Location: [facility] OR    ESOPHAGOSCOPY, GASTROSCOPY, DUODENOSCOPY (EGD), COMBINED N/A 12/30/2022    Procedure: ESOPHAGOGASTRODUODENOSCOPY (EGD) with argon plasma coagulation;  Surgeon: Steven Driscoll MD;  Location: [facility] OR       ALLERGIES:  Blood transfusion related (informational only) and Hydrocodone-acetaminophen    MEDS:    Current Outpatient Medications:     acetaminophen (TYLENOL) 500 MG tablet, Take 1,000 mg by mouth every 6 hours as needed for mild pain, Disp: , Rfl:     apixaban ANTICOAGULANT (ELIQUIS ANTICOAGULANT) 2.5 MG tablet, Take 1 tablet (2.5 mg) by mouth 2 times daily, Disp: 180 tablet, Rfl: 3    atorvastatin (LIPITOR) 20 MG tablet, TAKE 1 TABLET BY MOUTH EVERYDAY AT BEDTIME, Disp: 90 tablet, Rfl: 1    cholecalciferol, vitamin D3, (VITAMIN D3) 2,000 unit Tab, [CHOLECALCIFEROL, VITAMIN D3, (VITAMIN D3) 2,000 UNIT TAB] Take 1 tablet by mouth daily., Disp: , Rfl:     diltiazem ER COATED BEADS (CARDIZEM CD/CARTIA XT) 240 MG 24 hr capsule, Take 1 capsule (240 mg) by mouth daily, Disp: 90 capsule, Rfl: 3    Ferrous Sulfate 324 (65 Fe) MG TBEC, Take 1 tablet by mouth daily, Disp: , Rfl:     fish oil-omega-3 fatty acids 500 MG capsule, Take 1 capsule by mouth daily, Disp: , Rfl:     furosemide (LASIX) 20 MG tablet, TAKE 2 TABLETS BY MOUTH EVERY DAY, Disp: 180 tablet, Rfl: 1    lactobacillus rhamnosus, GG, (CULTURELL) capsule, Take 1 capsule by mouth 2 times daily, Disp: , Rfl:     losartan (COZAAR) 25 MG tablet, HOLD this medication until primary care provider follow up, Disp: , Rfl:     pantoprazole (PROTONIX) 40 MG EC tablet, TAKE 1 TABLET BY MOUTH TWICE A DAY, Disp: 180 tablet, Rfl: 3    sertraline (ZOLOFT) 100 MG tablet, Take 1 tablet (100 mg) by mouth daily, Disp: , Rfl:     Tuberculin PPD (TUBERSOL ID), , Disp: , Rfl:     SOCIAL HABITS:  "   History   Smoking Status    Former   Smokeless Tobacco    Never     Social History    Substance and Sexual Activity      Alcohol use: Not on file      History   Drug Use Not on file       FAMILY HISTORY:  History reviewed. No pertinent family history.    REVIEW OF SYSTEMS:    A 12 point ROS was reviewed and except for what is listed in the HPI above, all others are negative    PE:  /72 (BP Location: Left arm, Patient Position: Sitting, Cuff Size: Adult Regular)   Pulse 72   Temp 97.6  F (36.4  C) (Oral)   Resp 20   Ht 5' 2\" (1.575 m)   Wt 133 lb (60.3 kg)   LMP  (LMP Unknown)   SpO2 97%   BMI 24.33 kg/m    Wt Readings from Last 1 Encounters:   07/22/24 133 lb (60.3 kg)     Body mass index is 24.33 kg/m .    EXAM:  GENERAL: This is a well-developed 88 year old female who appears her stated age  EYES: Grossly normal.  MOUTH: Buccal mucosa normal   MUSCULOSKELETAL: Grossly normal and both lower extremities are intact.  HEME/LYMPH: No lymphedema  NEUROLOGIC: Focally intact, Alert and oriented x 3.   PSYCH: appropriate affect  INTEGUMENT: No open lesions or ulcers  Pulse Exam:   Nonpalpable left pedal pulses.  Rubor with quick cap refill.  Right fifth toe amputation site clean and well opposed      DIAGNOSTIC STUDIES:     Images:  US Low Ext Arterial Dop Seg Pres w/o Exercise    Result Date: 7/19/2024  Table formatting from the original result was not included. BILATERAL RESTING ANKLE-BRACHIAL INDICES (LINDA'S) (Date: 07/19/24)  Indication: Surveillance LINDA's: PAD, Painful Right 1st/2nd Toes Purplish Discolor post Right 5th Toe Amputation done 5/30/2024. Decreased Pedal Pulse and Pain. Previous: 5/29/2024 History: Previous Smoker, Hypertension, Hyperlipidemia, PAD, Vascular Surgery, Surgical Follow-up, Rest Pain, and Skin Color Change, Afib  Resting LINDA's         Right:  mmHg  Index    Brachial: 174   Ankle-(PT): >254 NC Ankle-(DP): 133 0.76     1st Digit: 0 0          2nd Digit: 0 0    3rd Digit: 0 0    " 4th Digit: 0 0    5th Digit: AMP N/A              Left: mmHg Index    Brachial: 168  Ankle (PT): >254 NC Ankle (DP): 187 1.07     1st Digit: 131 0.75    2nd Digit: 115 0.66    3rd Digit: 141 0.81    4th Digit: 163 0.94    5th Digit: 149 0.86 Resting ankle-brachial index of 0.76 on the right. Toe Pressures of 0 mmHg and TBI of 0 Resting ankle-brachial index of 1.07 on the left. Toe Pressures of 131 mmHg and TBI of 0.75  VPR WAVEFORMS: The right volume plethysmography waveforms are mildly abnormal at the lower thigh level, moderately abnormal at the upper calf level and mildly abnormal at the ankle. The left volume plethysmography waveforms are mildly abnormal at the lower thigh level, mildly abnormal at the upper calf level and mildly abnormal at the ankle. Comments: No Pulses detected in Right Toes 1-4. Only 1st/2nd Discolored.  Impression:  1. RIGHT LOWER EXTREMITY: LINDA is Abnormal with an LINDA of 0.76 indicating single level disease. Absent first through fourth digit pressures and waveforms. 2. LEFT LOWER EXTREMITY: LINDA is Normal with multiphasic waveforms with an LINDA of 1.07. Toe Pressures are Normal and adequate for wound healing with toe pressures of 131 mmHg. Reference: Wound classification Grade LINDA Ankle Systolic Pressure Toe Pressures 0 > 0.80 > 100 mmHg > 60 mmHg 1 0.6 - 0.79 70 - 100 mmHg 40 - 59 mmHg 2 0.4 - 0.59 50-70 mmHg 30 - 39 mmHg 3 < 0.39 < 50 mmHg < 30 mmHg Digit Pressures DBI Disease Category > 0.70 Normal < 0.70 Abnormal > 30 mmHg Potential wound healing < 30 mmHg Impaired wound healing Ankle Brachial Pressures LINDA Disease Category > 1.3  Likely vessel calcification with monophasic waveforms, non-diagnostic 0.95-1.30 Normal with multiphasic waveforms 0.50-0.95 Single level disease 0.30-0.50 Multilevel disease < 0.30 Critical limb ischema Volume Plethysmography Recording (VPR) at all levels Normal Sharp systolic peak, fast upstroke, prominent dicrotic notch in wave Mild Sharp systolic peak, fast  upstroke, absent dicrotic notch in wave Moderate Flattened systolic peak, slowed upstroke, absent dicrotic notch inwave Severe amplitude wave with = upslope and down slope Occluded Flat Line      Echocardiogram Complete    Result Date: 2024  [id number]^LIANE^RIMA^[place] [address]  Name: ALFONSO JIMENEZ MRN: [id number] : 1935 Study Date: 2024 01:09 PM Age: 88 yrs Gender: Female Patient Location: [facility] Reason For Study: PAD (peripheral artery disease) (H24), Gastric AVM, Chronic hear Ordering Physician: RIMA ROB Referring Physician: RIMA ROB Performed By: HONEY  BSA: 1.6 m2 Height: 62 in Weight: 133 lb HR: 61 BP: 119/61 mmHg ______________________________________________________________________________ Procedure Complete Echo Adult. ______________________________________________________________________________ Interpretation Summary  Left ventricular size, wall motion and function are normal. The ejection fraction is 55-60% Normal right ventricle size and systolic function. The left atrium is moderate to severely dilated. The right atrium is severely dilated. Mild (35-45mmHg) pulmonary hypertension is present. IVC diameter <2.1 cm collapsing >50% with sniff suggests a normal RA pressure of 3 mmHg.  ______________________________________________________________________________ Left Ventricle Left ventricular size, wall motion and function are normal. The ejection fraction is 55-60%. There is normal left ventricular wall thickness. Left ventricular diastolic function is normal. No regional wall motion abnormalities noted.  Right Ventricle Normal right ventricle size and systolic function.  Atria The left atrium is moderate to severely dilated. The right atrium is severely dilated. There is no color Doppler evidence of an atrial shunt.  Mitral Valve Mitral valve leaflets appear normal.  There is mild (1+) mitral regurgitation.  Tricuspid Valve Tricuspid valve leaflets appear normal. There is mild (1+) tricuspid regurgitation. The right ventricular systolic pressure is approximated at 34.6 mmHg plus the right atrial pressure. Mild (35-45mmHg) pulmonary hypertension is present.  Aortic Valve There is mild trileaflet aortic sclerosis.  Pulmonic Valve The pulmonic valve is not well seen, but is grossly normal. This degree of valvular regurgitation is within normal limits.  Vessels The aorta root is normal. Normal size ascending aorta. IVC diameter <2.1 cm collapsing >50% with sniff suggests a normal RA pressure of 3 mmHg.  Pericardium There is no pericardial effusion.  ______________________________________________________________________________ MMode/2D Measurements & Calculations IVSd: 1.1 cm LVIDd: 4.4 cm LVIDs: 2.9 cm LVPWd: 0.73 cm FS: 34.0 % LV mass(C)d: 130.2 grams LV mass(C)dI: 81.0 grams/m2  Ao root diam: 2.8 cm LA dimension: 4.6 cm asc Aorta Diam: 3.5 cm LA/Ao: 1.6 LVOT diam: 1.9 cm LVOT area: 2.8 cm2 Ao root diam index Ht(cm/m): 1.8 Ao root diam index BSA (cm/m2): 1.7 Asc Ao diam index BSA (cm/m2): 2.2 Asc Ao diam index Ht(cm/m): 2.2 EF Biplane: 56.7 % LA Volume (BP): 75.5 ml LA Volume Index (BP): 46.9 ml/m2  LA Volume Indexed (AL/bp): 49.8 ml/m2 RWT: 0.33 TAPSE: 2.3 cm  Time Measurements MM HR: 61.0 BPM  Doppler Measurements & Calculations MV E max slim: 120.0 cm/sec MV A max slim: 40.4 cm/sec MV E/A: 3.0  MV dec slope: 601.0 cm/sec2 MV dec time: 0.20 sec Ao V2 max: 155.5 cm/sec Ao max PG: 10.0 mmHg Ao V2 mean: 107.0 cm/sec Ao mean P.0 mmHg Ao V2 VTI: 33.8 cm LARA(I,D): 2.1 cm2 LARA(V,D): 2.0 cm2 LV V1 max P.9 mmHg LV V1 max: 110.3 cm/sec LV V1 VTI: 25.1 cm SV(LVOT): 71.1 ml SI(LVOT): 44.2 ml/m2 PA acc time: 0.10 sec TR max slim: 294.0 cm/sec TR max P.6 mmHg AV Slim Ratio (DI): 0.71 LARA Index (cm2/m2): 1.3 E/E': 8.6 E/E' av.6 Lateral E/e': 8.6 Medial E/e': 10.5 Peak E' Slim: 13.9  cm/sec RV S Slim: 9.8 cm/sec  ______________________________________________________________________________ Report approved by: Venancio Ladd 07/09/2024 04:30 PM       CTA Chest Abdomen Pelvis Runoff w Contrast    Result Date: 7/5/2024  EXAM: CTA CHEST ABDOMEN PELVIS RUNOFF W CONTRAST LOCATION: [facility] DATE/TIME: 7/5/2024 7:46 PM CDT INDICATION: Acute osteomyelitis of toe, right (H), PAD (peripheral artery disease) (H24) COMPARISON: None. TECHNIQUE: Helical acquisition through the chest, abdomen, pelvis, and bilateral lower extremities was performed during the arterial phase of contrast enhancement. Second phase arterial imaging was performed through the bilateral lower extremities. 2D and 3D reconstructions performed by the CT technologist. Dose reduction techniques were used. CONTRAST: 75 mL Isovue 370 FINDINGS: AORTA: Ascending thoracic aorta unremarkable. Conventional anatomy of the great vessels. Minor calcified plaque descending thoracic aorta. Proximal occlusion of the celiac artery. Calcified/soft atheromatous plaque proximal SMA. Replaced right hepatic artery. LILLIAN patent. Calcified plaque infrarenal abdominal aorta. No evidence of abdominal aortic aneurysm. Calcified plaque common iliac arteries bilaterally. No associated stenosis. Calcified plaque at the origins of the internal iliac arteries. External iliac arteries patent bilaterally. RIGHT LEG: Calcified plaque right common femoral artery. Right profunda femoral artery patent. Mild diffuse atherosclerotic disease right superficial femoral artery. Right popliteal artery patent. Three-vessel runoff into the right foot. LEFT LEG: Calcified plaque left common femoral artery. Left profundofemoral artery patent. Mild diffuse atherosclerotic disease left superficial femoral artery. Left popliteal artery patent. Three-vessel runoff into the left foot. NONVASCULAR FINDINGS: MEDIASTINUM: 1.9 cm left thyroid nodule with  associated calcifications. Cardiomegaly. No mediastinal or hilar adenopathy. LUNGS/PLEURA: Normal ABDOMEN: Cholecystectomy. The liver, pancreas and adrenal glands are unremarkable. Multiple small hypodense splenic lesions unchanged in appearance in comparison to previous study. Multiple renal cysts right kidney. Hyper attenuating cyst lower pole right kidney. PELVIS: Sigmoid diverticulosis. MUSCULOSKELETAL: Bilateral hip prosthesis.     IMPRESSION: 1.  Proximal occlusion of the celiac artery. 2.  Diffuse bilateral superficial femoral artery disease. 3.  Three-vessel runoff bilaterally. 4.  1.9 cm left thyroid nodule with associated calcification.Recommend further evaluation with thyroid ultrasound       I personally reviewed the images and my interpretation is that her CTA is essentially normal with three-vessel tibial runoff bilaterally.  The vessels within the foot are not very clearly delineated however.  Importantly right foot toe pressures are 0 in all toes and normal on the left foot..    LABS:      Sodium   Date Value Ref Range Status   06/20/2024 142 135 - 145 mmol/L Final     Comment:     Reference intervals for this test were updated on 09/26/2023 to more accurately reflect our healthy population. There may be differences in the flagging of prior results with similar values performed with this method. Interpretation of those prior results can be made in the context of the updated reference intervals.    06/19/2024 140 135 - 145 mmol/L Final     Comment:     Reference intervals for this test were updated on 09/26/2023 to more accurately reflect our healthy population. There may be differences in the flagging of prior results with similar values performed with this method. Interpretation of those prior results can be made in the context of the updated reference intervals.    06/10/2024 141 135 - 145 mmol/L Final     Comment:     Reference intervals for this test were updated on 09/26/2023 to more accurately  reflect our healthy population. There may be differences in the flagging of prior results with similar values performed with this method. Interpretation of those prior results can be made in the context of the updated reference intervals.      Urea Nitrogen   Date Value Ref Range Status   06/20/2024 20.3 8.0 - 23.0 mg/dL Final   06/19/2024 19.9 8.0 - 23.0 mg/dL Final   06/10/2024 15.7 8.0 - 23.0 mg/dL Final   03/10/2023 26 8 - 28 mg/dL Final   12/30/2022 20 8 - 28 mg/dL Final   12/29/2022 24 8 - 28 mg/dL Final     Hemoglobin   Date Value Ref Range Status   06/10/2024 11.1 (L) 11.7 - 15.7 g/dL Final   06/04/2024 11.8 11.7 - 15.7 g/dL Final   06/04/2024 10.7 (L) 11.7 - 15.7 g/dL Final     Platelet Count   Date Value Ref Range Status   06/10/2024 289 150 - 450 10e3/uL Final   06/04/2024 206 150 - 450 10e3/uL Final   06/02/2024 195 150 - 450 10e3/uL Final     BNP   Date Value Ref Range Status   12/28/2022 576 (H) 0 - 167 pg/mL Final     INR   Date Value Ref Range Status   03/10/2023 1.68 (H) 0.85 - 1.15 Final   09/01/2021 1.9 (H) 0.9 - 1.1 Final   07/19/2021 2.5 (H) 0.9 - 1.1 Final   06/28/2021 1.90 (H) 0.90 - 1.10 Final   05/25/2021 2.00 (H) 0.90 - 1.10 Final       45 minutes spent on the day of encounter doing chart review, history and exam, documentation, and further activities as noted.       Sara Wilson MD, MD  VASCULAR SURGERY

## 2024-07-22 NOTE — PROGRESS NOTES
Procedure: Right  Leg  Angiogram     Procedure Date :  7/25/24    Procedure Time :  8:00 AM    Arrival Time: 7:00 AM    Admission Type: Outpatient    Surgeon:     Procedure Location: [facility]:  [address] [place] [address] (phone: [phone], Fax: [phone])    Consent Completed:No, Scanned: No    Scheduled with: Emily in IR scheduling    Anesthesia Needed: no IF Yes Please clarify that the CRNA, anesthesia and DUNCAN/PACU resources are being added at scheduling    Blood Thinners Address: yes, by nursing while in clinic.    2 week Post Procedure Appointment with  KYLEE Mackenzie and also ultrasound: MARYA

## 2024-07-23 ENCOUNTER — TELEPHONE (OUTPATIENT)
Dept: VASCULAR SURGERY | Facility: CLINIC | Age: 89
End: 2024-07-23
Payer: COMMERCIAL

## 2024-07-23 NOTE — TELEPHONE ENCOUNTER
Spoke with Kiah, she requested I call Veronica to schedule. I spoke Veronica and she is asking for a call back to schedule these on Thursday.        Reshma Cohen, RN  [facility]  Angio scheduled 7/25. Need 2 and 6 week follow-ups scheduled. Macy said Claudia schedule would be opening more. Please add follow-ups for her.

## 2024-07-24 ENCOUNTER — TELEPHONE (OUTPATIENT)
Dept: VASCULAR SURGERY | Facility: CLINIC | Age: 89
End: 2024-07-24
Payer: COMMERCIAL

## 2024-07-24 NOTE — TELEPHONE ENCOUNTER
Jennifer calling about angiogram with Dr. Wilson due to no labs being ordered or pre-op physical. Dr. Wilson was updated and he stated that he will listen to the patient tomorrow before the angiogram so no pre-op is needed. He was updated that IR needs the standard roll over lab orders to perform the procedure in the AM.

## 2024-07-25 ENCOUNTER — HOSPITAL ENCOUNTER (OUTPATIENT)
Dept: INTERVENTIONAL RADIOLOGY/VASCULAR | Facility: HOSPITAL | Age: 89
Discharge: HOME | End: 2024-07-25
Attending: SURGERY | Admitting: SURGERY
Payer: COMMERCIAL

## 2024-07-25 VITALS
RESPIRATION RATE: 22 BRPM | DIASTOLIC BLOOD PRESSURE: 65 MMHG | SYSTOLIC BLOOD PRESSURE: 149 MMHG | OXYGEN SATURATION: 94 % | TEMPERATURE: 97.6 F | HEART RATE: 66 BPM

## 2024-07-25 DIAGNOSIS — L97.909 ATHEROSCLEROSIS OF ARTERY OF EXTREMITY WITH ULCERATION (H): Primary | ICD-10-CM

## 2024-07-25 DIAGNOSIS — I70.218 ATHEROSCLEROSIS OF NATIVE ARTERIES OF EXTREMITIES WITH INTERMITTENT CLAUDICATION, OTHER EXTREMITY (H): ICD-10-CM

## 2024-07-25 DIAGNOSIS — I73.9 PAD (PERIPHERAL ARTERY DISEASE) (H): ICD-10-CM

## 2024-07-25 DIAGNOSIS — I70.299 ATHEROSCLEROSIS OF ARTERY OF EXTREMITY WITH ULCERATION (H): Primary | ICD-10-CM

## 2024-07-25 PROCEDURE — 37228 IR LOWER EXTREMITY ANGIOGRAM RIGHT: CPT | Mod: RT | Performed by: SURGERY

## 2024-07-25 PROCEDURE — 250N000013 HC RX MED GY IP 250 OP 250 PS 637: Performed by: SURGERY

## 2024-07-25 PROCEDURE — 99152 MOD SED SAME PHYS/QHP 5/>YRS: CPT | Performed by: SURGERY

## 2024-07-25 PROCEDURE — 272N000500 HC NEEDLE CR2

## 2024-07-25 PROCEDURE — C1769 GUIDE WIRE: HCPCS

## 2024-07-25 PROCEDURE — 85347 COAGULATION TIME ACTIVATED: CPT

## 2024-07-25 PROCEDURE — 272N000124 HC CATH CR11

## 2024-07-25 PROCEDURE — 75710 ARTERY X-RAYS ARM/LEG: CPT | Mod: 26 | Performed by: SURGERY

## 2024-07-25 PROCEDURE — 272N000121 HC CATH CR6

## 2024-07-25 PROCEDURE — C1725 CATH, TRANSLUMIN NON-LASER: HCPCS

## 2024-07-25 PROCEDURE — 37224 PR REVASCULARIZE FEM/POP ARTERY, ANGIOPLASTY: CPT | Mod: RT | Performed by: SURGERY

## 2024-07-25 PROCEDURE — C1760 CLOSURE DEV, VASC: HCPCS

## 2024-07-25 PROCEDURE — 250N000011 HC RX IP 250 OP 636: Performed by: SURGERY

## 2024-07-25 PROCEDURE — 37224 HC REVASCULARIZE FEM-POP ARTERY ANGIOPLASTY: CPT

## 2024-07-25 PROCEDURE — 75710 ARTERY X-RAYS ARM/LEG: CPT | Mod: XU

## 2024-07-25 PROCEDURE — C1887 CATHETER, GUIDING: HCPCS

## 2024-07-25 PROCEDURE — 272N000570 HC SHEATH CR7

## 2024-07-25 PROCEDURE — 99152 MOD SED SAME PHYS/QHP 5/>YRS: CPT

## 2024-07-25 PROCEDURE — 255N000002 HC RX 255 OP 636: Performed by: SURGERY

## 2024-07-25 PROCEDURE — 272N000194 HC ACCESSORY CR3

## 2024-07-25 PROCEDURE — 76937 US GUIDE VASCULAR ACCESS: CPT | Mod: 26 | Performed by: SURGERY

## 2024-07-25 PROCEDURE — 272N000302 HC DEVICE INFLATION CR5

## 2024-07-25 PROCEDURE — 37232 HC REVASC TIB-PERON ARTANGIOPLASTY EA ADL VESSEL: CPT | Mod: RT

## 2024-07-25 RX ORDER — SODIUM CHLORIDE 9 MG/ML
INJECTION, SOLUTION INTRAVENOUS CONTINUOUS
OUTPATIENT
Start: 2024-07-25

## 2024-07-25 RX ORDER — NALOXONE HYDROCHLORIDE 0.4 MG/ML
0.4 INJECTION, SOLUTION INTRAMUSCULAR; INTRAVENOUS; SUBCUTANEOUS
Status: DISCONTINUED | OUTPATIENT
Start: 2024-07-25 | End: 2024-07-26 | Stop reason: HOSPADM

## 2024-07-25 RX ORDER — HEPARIN SODIUM 1000 [USP'U]/ML
2000 INJECTION, SOLUTION INTRAVENOUS; SUBCUTANEOUS ONCE
Status: COMPLETED | OUTPATIENT
Start: 2024-07-25 | End: 2024-07-25

## 2024-07-25 RX ORDER — NALOXONE HYDROCHLORIDE 0.4 MG/ML
0.2 INJECTION, SOLUTION INTRAMUSCULAR; INTRAVENOUS; SUBCUTANEOUS
Status: DISCONTINUED | OUTPATIENT
Start: 2024-07-25 | End: 2024-07-26 | Stop reason: HOSPADM

## 2024-07-25 RX ORDER — FLUMAZENIL 0.1 MG/ML
0.2 INJECTION, SOLUTION INTRAVENOUS
Status: DISCONTINUED | OUTPATIENT
Start: 2024-07-25 | End: 2024-07-26 | Stop reason: HOSPADM

## 2024-07-25 RX ORDER — SODIUM CHLORIDE 9 MG/ML
INJECTION, SOLUTION INTRAVENOUS CONTINUOUS
Status: DISCONTINUED | OUTPATIENT
Start: 2024-07-25 | End: 2024-07-26 | Stop reason: HOSPADM

## 2024-07-25 RX ORDER — LIDOCAINE 40 MG/G
CREAM TOPICAL
OUTPATIENT
Start: 2024-07-25

## 2024-07-25 RX ORDER — ACETAMINOPHEN 325 MG/1
650 TABLET ORAL EVERY 6 HOURS PRN
Status: DISCONTINUED | OUTPATIENT
Start: 2024-07-25 | End: 2024-07-26 | Stop reason: HOSPADM

## 2024-07-25 RX ORDER — CLOPIDOGREL BISULFATE 75 MG/1
75 TABLET ORAL DAILY
Qty: 30 TABLET | Refills: 2 | Status: SHIPPED | OUTPATIENT
Start: 2024-07-25 | End: 2024-10-23

## 2024-07-25 RX ORDER — HEPARIN SODIUM 1000 [USP'U]/ML
5000 INJECTION, SOLUTION INTRAVENOUS; SUBCUTANEOUS ONCE
Status: COMPLETED | OUTPATIENT
Start: 2024-07-25 | End: 2024-07-25

## 2024-07-25 RX ORDER — HEPARIN SODIUM 200 [USP'U]/100ML
1 INJECTION, SOLUTION INTRAVENOUS CONTINUOUS PRN
Status: DISCONTINUED | OUTPATIENT
Start: 2024-07-25 | End: 2024-07-26 | Stop reason: HOSPADM

## 2024-07-25 RX ORDER — ONDANSETRON 2 MG/ML
4 INJECTION INTRAMUSCULAR; INTRAVENOUS
Status: DISCONTINUED | OUTPATIENT
Start: 2024-07-25 | End: 2024-07-26 | Stop reason: HOSPADM

## 2024-07-25 RX ORDER — CLOPIDOGREL BISULFATE 75 MG/1
300 TABLET ORAL DAILY
Status: DISCONTINUED | OUTPATIENT
Start: 2024-07-25 | End: 2024-07-26 | Stop reason: HOSPADM

## 2024-07-25 RX ORDER — FENTANYL CITRATE 50 UG/ML
25-50 INJECTION, SOLUTION INTRAMUSCULAR; INTRAVENOUS EVERY 5 MIN PRN
Status: DISCONTINUED | OUTPATIENT
Start: 2024-07-25 | End: 2024-07-26 | Stop reason: HOSPADM

## 2024-07-25 RX ORDER — IODIXANOL 320 MG/ML
200 INJECTION, SOLUTION INTRAVASCULAR ONCE
Status: COMPLETED | OUTPATIENT
Start: 2024-07-25 | End: 2024-07-25

## 2024-07-25 RX ORDER — NITROGLYCERIN 5 MG/ML
200 VIAL (ML) INTRAVENOUS ONCE
Status: COMPLETED | OUTPATIENT
Start: 2024-07-25 | End: 2024-07-25

## 2024-07-25 RX ADMIN — ACETAMINOPHEN 650 MG: 325 TABLET ORAL at 12:12

## 2024-07-25 RX ADMIN — FENTANYL CITRATE 25 MCG: 50 INJECTION, SOLUTION INTRAMUSCULAR; INTRAVENOUS at 08:42

## 2024-07-25 RX ADMIN — MIDAZOLAM HYDROCHLORIDE 0.5 MG: 1 INJECTION, SOLUTION INTRAMUSCULAR; INTRAVENOUS at 09:10

## 2024-07-25 RX ADMIN — FENTANYL CITRATE 25 MCG: 50 INJECTION, SOLUTION INTRAMUSCULAR; INTRAVENOUS at 09:41

## 2024-07-25 RX ADMIN — HEPARIN SODIUM 5000 UNITS: 1000 INJECTION INTRAVENOUS; SUBCUTANEOUS at 08:46

## 2024-07-25 RX ADMIN — NITROGLYCERIN 200 MCG: 10 INJECTION INTRAVENOUS at 09:28

## 2024-07-25 RX ADMIN — HEPARIN SODIUM 4 BAG: 200 INJECTION, SOLUTION INTRAVENOUS at 08:39

## 2024-07-25 RX ADMIN — MIDAZOLAM HYDROCHLORIDE 1 MG: 1 INJECTION, SOLUTION INTRAMUSCULAR; INTRAVENOUS at 08:17

## 2024-07-25 RX ADMIN — MIDAZOLAM HYDROCHLORIDE 1 MG: 1 INJECTION, SOLUTION INTRAMUSCULAR; INTRAVENOUS at 08:33

## 2024-07-25 RX ADMIN — FENTANYL CITRATE 25 MCG: 50 INJECTION, SOLUTION INTRAMUSCULAR; INTRAVENOUS at 09:47

## 2024-07-25 RX ADMIN — FENTANYL CITRATE 50 MCG: 50 INJECTION, SOLUTION INTRAMUSCULAR; INTRAVENOUS at 08:25

## 2024-07-25 RX ADMIN — CLOPIDOGREL BISULFATE 300 MG: 75 TABLET ORAL at 12:13

## 2024-07-25 RX ADMIN — FENTANYL CITRATE 25 MCG: 50 INJECTION, SOLUTION INTRAMUSCULAR; INTRAVENOUS at 09:19

## 2024-07-25 RX ADMIN — IODIXANOL 80 ML: 320 INJECTION, SOLUTION INTRAVASCULAR at 09:59

## 2024-07-25 RX ADMIN — MIDAZOLAM HYDROCHLORIDE 0.5 MG: 1 INJECTION, SOLUTION INTRAMUSCULAR; INTRAVENOUS at 09:04

## 2024-07-25 RX ADMIN — FENTANYL CITRATE 25 MCG: 50 INJECTION, SOLUTION INTRAMUSCULAR; INTRAVENOUS at 09:07

## 2024-07-25 RX ADMIN — MIDAZOLAM HYDROCHLORIDE 0.5 MG: 1 INJECTION, SOLUTION INTRAMUSCULAR; INTRAVENOUS at 09:50

## 2024-07-25 RX ADMIN — MIDAZOLAM HYDROCHLORIDE 0.5 MG: 1 INJECTION, SOLUTION INTRAMUSCULAR; INTRAVENOUS at 09:32

## 2024-07-25 RX ADMIN — HEPARIN SODIUM 2000 UNITS: 1000 INJECTION INTRAVENOUS; SUBCUTANEOUS at 09:03

## 2024-07-25 NOTE — PRE-PROCEDURE
GENERAL PRE-PROCEDURE:   Procedure:  Right leg angiogram, possible intervention  Date/Time:  7/25/2024 8:01 AM    Verbal consent obtained?: Yes    Written consent obtained?: Yes    Risks and benefits: Risks, benefits and alternatives were discussed    DC Plan: Appropriate discharge home plan in place for patients who are going home after procedure   Consent given by:  Patient  Patient states understanding of procedure being performed: Yes    Patient's understanding of procedure matches consent: Yes    Procedure consent matches procedure scheduled: Yes    Expected level of sedation:  Moderate  Appropriately NPO:  Yes  ASA Class:  2  Mallampati  :  Grade 1- soft palate, uvula, tonsillar pillars, and posterior pharyngeal wall visible  Lungs:  Lungs clear with good breath sounds bilaterally  Heart:  Normal heart sounds and rate  History & Physical reviewed:  History and physical reviewed and no updates needed  Statement of review:  I have reviewed the lab findings, diagnostic data, medications, and the plan for sedation

## 2024-07-25 NOTE — DISCHARGE INSTRUCTIONS
Angiogram Discharge Instructions:    You had an angiogram procedure. An angiogram is a procedure thatuses x-rays to take pictures of your blood vessels. A long, flexible tube or catheter is inserted through the blood stream (through the procedure site) to help deliver contrast (dye) into the arteries so they can be visibleon the x-ray. Angiograms are used to evaluate and treat possible blockages or other disease in the arterial system. Please follow the below instructions after your angiogram, including monitoring of your procedure site.    Care instructions after angiogram procedure:    - If you received sedation for your procedure, do not drive or operate heavy machinery for the rest of the day.    - Do not lift objects greater than 10 poundsfor 2 days following angiogram procedure.    - Avoid excessive exercise and straining for 2 days.    - Avoid tub baths, pools, hot tubs and Jacuzzis for 3 days or until procedure site is well healed.    - Youmay shower beginning tomorrow. Do not scrub procedure site until well healed; pat dry.    - Return to your normal activities as you tolerate after the 2 day restriction.    - You can expect to return to work 1-2days after your procedure - depending on the nature of your profession.    - It is normal to have some tenderness and minimal swelling at procedure puncture site. A small area of discoloration may be present.Tenderness typically subsides in 1-2 days. A small knot may also be present at puncture site for 6-8 weeks. This can be a normal part of the healing process.    Medications and other post-procedure care:    -If you had a blockage opened please make sure to fill your prescription for any new medication, such as Plavix, that you may have been prescribed and take it everyday    - If you have kidney function issues, please makesure to hydrate yourself well for the next two days by drinking lots of fluids to help clear your body of the dye used. If you have heart  problems such as heart failure, this may have to be moderated.    - If you are onMetformin, please do not resume it for 48hrs.    Follow up:    - Follow up with your vascular surgery team at the [facility] vascular center A follow up appointment should already be arranged for you.If you are unsure of your appointment or do not have a follow up appointment in the next 2-3 weeks, please call our office at [phone]. You will need to have an ultrasound 2-3 weeks after your angiogram and should bescheduled at the time of your follow up appt.    Further follow up will be based on ultrasound results. Typical follow up is every 3 months for the first year, then every 6 months to one year thereafter.    seek medical evaluation for:    - If you develop fevers (greater than 101 F (38.3C)).    - If you develop increasing pain, redness, purulent drainage, tenderness, or swelling at procedure site.    If you experience any bleeding from procedure/puncture site: lie down, firmly apply pressure to puncture site and call 911.    - Seek emergent evaluation if you experience any new leg/arm pain, discoloration ornumbness.    Call the vascular center at [phone] with questions/concerns or if you have any of the above symptoms.      No

## 2024-07-25 NOTE — INTERVAL H&P NOTE
I have reviewed the surgical (or preoperative) H&P that is linked to this encounter, and examined the patient. There are no significant changes    Of note physical exam repeated showing:  CHEST: Clear lung fields bilaterally  CARDIAC: Normal S1-S2 no murmurs  EXT: Nonpalpable right foot pedal pulses with fifth toe amputation and clean amputation site.  Second toe with blue discoloration.    Clinical Conditions Present on Arrival:  Clinically Significant Risk Factors Present on Admission                # Drug Induced Coagulation Defect: home medication list includes an anticoagulant medication

## 2024-07-25 NOTE — TELEPHONE ENCOUNTER
Message sent to Dr. Wilson to discuss with patient and family today why follow-ups are recommended.

## 2024-07-25 NOTE — OP NOTE
Vascular surgery interventional procedure note    Date: 07/25/24     Procedures Performed:    Ir Lower Extremity Angiogram Right  from the aorta level via left groin approach  Ultrasound-guided vascular access  Angioplasty right superficial femoral artery and popliteal artery  Angioplasty right anterior tibial artery  Angioplasty right dorsalis pedis artery  .  Provider:  Sara Wilson MD     Indication:  This is an 88-year-old female who required amputation of her fifth toe for gangrenous changes and who has an ischemic second toe.  CTA appear to demonstrate no blood flow occlusions all the way to the ankle.  Toe pressures however were 0 in that limb and were normal in the contralateral limb.  Plan for angiogram to look for distal disease and possibly treat.  Concern is poor wound healing of her fifth toe amputation site.    Sedation:    The procedure was performed with administration of intravenous conscious sedation with appropriate preoperative, intraoperative, and postoperative evaluation.  81 minutes of supervised face to face intraservice time was provided by a radiology nurse under my direct supervision.  Versed 4 mg Fentanyl 175 mcg given.    ANTIBIOTICS: None    FLUOROSCOPIC TIME:   Minutes    RADIATION DOSE: 107 mGy ; 13.8 Minutes     CONTRAST: 80 cc Visipaque      Procedure:  Ultrasound was used to evaluate the left groin.  The selected vessel was  widely patent.  It was the common femoral artery. Ultrasound was then used for real-time ultrasound guided needle entry of the common femoral artery. Permanent images were recorded and saved to the patient's medical record.  Micropuncture technique was used to deliver a 4 Finnish sheath.  Omni Flush catheter was advanced to the low abdominal aorta and advanced over the bifurcation down to the right proximal SFA level.  Angiography was performed from this level down the leg  Findings: Buck's canal critical stenosis with collaterals around it and limited flow  through this channel.  Beyond that the vessel we opacifies.  Difficult to visualize tibial vessels with patient movement.  At this point we heparinized patient with 5000's of heparin IV and over a stiff wire exchanged and a 6 [place] Ansell sheath.  As we advance the Ansell sheath we performed an angiogram at the aortic bifurcation  Findings: Widely patent aortic bifurcation and iliac arteries bilaterally with patent common, external, internal vessels.  With the sheath in the mid SFA we exchanged and a Rincon cross catheter and wire and were able to cross the critical stenosis at Buck's canal into the above-knee popliteal artery.  An angiogram was performed from this level with a catheter  Findings: Widely patent popliteal artery.  We are intraluminal in this location.  All 3 tibial vessels have patent origins.  The anterior tibial artery appears to have slow flow and appears to abruptly occluded at the ankle.  The anterior artery is irregular and has some narrowings throughout its course.  There is no pedal arch and the posterior tibial artery and peroneal artery provide collaterals into the foot.  At this point we selected the proximal anterior tibial artery and performed further angiography to better delineate this vessel.  We were able to exchanged and an 014 wire down the anterior tibial artery and onto the dorsalis pedis artery dissection that was clearly occluded.  We now exchanged and a 1.5 mm x 150 cm angioplasty balloon and performed angioplasty from the dorsalis pedis artery up with a second inflation to treat the anterior tibial artery all the way up.  We removed the balloon and performed an angiogram from the sheath  Findings: Persistent occlusion of the dorsalis pedis artery the anterior tibial artery remains patent.  We exchanged and a 2 mm balloon and performed balloon angioplasty of the remainder of the anterotibial artery to improve the flow channel to this vessel.  We once again performed an  angiogram  Some improvement in flow through the anterotibial artery but persistent abrupt occlusion with only a small collateral at the end of the vessel leading onto the foot  At this point I exchanged and an 018 and Rincon cross catheter down to the dorsalis pedis artery and performed an angiogram from the distal dorsalis pedis  Findings: True lumen location of her catheter with filling of the distal dorsalis pedis artery and first digital vessel  With this finding and with the patient fully heparinized we exchanged back in the 2 mm diameter balloon and performed balloon angioplasty of the dorsalis pedis artery with this balloon gently to 6 wil of pressure.  With the balloon held up for a minute we then took it down and removed it.  Angiogram was once again performed from the sheath  Findings: No improvement in the dorsalis pedis.    This makes me think that this is likely chronic thrombus but we do not have the ability to try to clear this at this point and I think we have significantly improved the inflow by opening up the critical Buck's canal lesion.  At this point 6 Sri Lankan sheath was removed and a 6 Sri [place]ankan Angio-Seal was deployed in the left groin.  Completion duplex showed good position of the Angio-Seal plug in the anterior of the artery with good flow through the vessel and beyond.        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  This looked like plaque but also may have had important thrombus and may have been material that large there from a cardioembolic event or may have developed spontaneously there and then lysed down to the foot.  The foot also clearly demonstrates larks of pedal arch and likely is undergone trash embolization at some point explaining the toe pressures of 0 and gangrenous changes.  I think this patient at this point may be able to heal her wounds.  Failing that she will have an excellent chance of healing a transmetatarsal  amputation.    Plan:  Load on Plavix and have her continue Plavix in addition to Eliquis for at least a month and maybe 3 months  Continue with aggressive local wound care and offloading to help heal her fifth toe amputation site  Follow-up visit in 2 weeks with repeat ABIs and toe pressures and possible ETCO2 to look at the healing potential of the foot with what has been performed.  I do not think there is much more that can be done to improve blood flow so I would not subject her to another angiogram.  If all of this fails she should be considered for transmetatarsal amputation.      Sara Wilson MD  [facility] Vascular Surgery

## 2024-07-25 NOTE — SEDATION DOCUMENTATION
Patient Name: Marva Gaines  Medical Record Number: [id number]  Today's Date: 7/25/2024    Procedure right leg angiogram  Proceduralist: Dr. Wilson    Procedure Start: 0832  Procedure end: 0953  Sedation medications administered: 4 mg midazolam and 175 mcg fentanyl   Sedation time: 81 minutes    Other Notes: Pt arrived to IR room 1 from  Pre/post [place] 5 . Consent reviewed. Pt denies any questions or concerns regarding procedure. Pt positioned Supine and monitored per protocol. Pt tolerated procedure without any noted complications. VSS on Transfer. Pt transferred back to  Pre/post [place] 5 .

## 2024-07-25 NOTE — TELEPHONE ENCOUNTER
Spoke with Veronica, she states she told us that she will call us when she is ready to schedule and does not want us to call and bother her again. Deferring out and will send letter next week if she does not call back.  (Forwarding to clinical team as FYI)

## 2024-07-26 ENCOUNTER — TELEPHONE (OUTPATIENT)
Dept: INTERNAL MEDICINE | Facility: CLINIC | Age: 89
End: 2024-07-26
Payer: COMMERCIAL

## 2024-07-26 ENCOUNTER — TELEPHONE (OUTPATIENT)
Dept: VASCULAR SURGERY | Facility: CLINIC | Age: 89
End: 2024-07-26
Payer: COMMERCIAL

## 2024-07-26 ENCOUNTER — MEDICAL CORRESPONDENCE (OUTPATIENT)
Dept: HEALTH INFORMATION MANAGEMENT | Facility: CLINIC | Age: 89
End: 2024-07-26

## 2024-07-26 DIAGNOSIS — I48.21 PERMANENT ATRIAL FIBRILLATION (H): ICD-10-CM

## 2024-07-26 LAB
ACT BLD: 220 SECONDS (ref 74–150)
ACT BLD: 232 SECONDS (ref 74–150)

## 2024-07-26 NOTE — TELEPHONE ENCOUNTER
Outgoing call to Sharona, relayed provider's approval of requested home care orders via secured/confidential VM.

## 2024-07-26 NOTE — TELEPHONE ENCOUNTER
Caller: Anabel, daughter     Provider: RODY Peres    Detailed reason for call: Anabel would like to know if it is necessary to keep patient's appointment with Dr. Peres on 8/6 or if it is OK to push out a couple more weeks. There has been a lot of changes with patient's care and she has numerous other appointments scheduled in the next couple of weeks; she would like to get a better handle on patient's other medical needs first.     Best phone number to contact: [phone]    Best time to contact: Any     Ok to leave a detailed message: Yes    Ok to speak to authorized person if needed: Yes: Anabel      (Noted to patient if reason is related to wound or incision, to please send a photo via email or [employer].)

## 2024-07-26 NOTE — TELEPHONE ENCOUNTER
[facility]    Provider: Katie    Detailed reason for call: Requesting more details regarding patient's angiogram as patient reports a potential blood clot. She is wondering if patient has any restrictions and/or what they should avoid as far as PT.     Best phone number to contact: [phone]    Best time to contact: Any time    Ok to leave a detailed message: Yes    Ok to speak to authorized person if needed: No      (Noted to patient if reason is related to wound or incision, to please send a photo via email or [employer]

## 2024-07-26 NOTE — TELEPHONE ENCOUNTER
Outgoing call to patient and then called daughter regarding Eliquis and plavix medication.     She is suppose to be on both. Huddled with Dr. Austin, to clarify okay being on both. We reviewed vascular note who wanted on both.     Gricel GORDON RN

## 2024-07-26 NOTE — TELEPHONE ENCOUNTER
Spoke with daughter Anabel.  Patient should keep her appointment.  Dr. Peres needs to evaluate how her healing is doing as she had gapping at the incision site at the last appointment.

## 2024-07-26 NOTE — TELEPHONE ENCOUNTER
Home Care is calling regarding an established patient with [facility].       Requesting orders from: Sabas Austin  Provider is following patient: Yes  Is this a 60-day recertification request?  No    Orders Requested    Physical Therapy  Request for delay in care, service is not able to be provided within same scheduled day.   Patient prefers to be seen for PT next week and not this week      Information was gathered and will be sent to provider for review.  RN will contact Home Care with information after provider review.  Confirmed ok to leave a detailed message with call back.  Contact information confirmed and updated as needed.    Gricel Shin RN

## 2024-07-26 NOTE — TELEPHONE ENCOUNTER
Saniya was updated below-    Per Dr. Wilson patient has no PT restrictions from a vascular standpoint. The blood clot will not move out of her foot.     Informed her per Dr. Peres- Continue non-weight bearing on the right foot.     She had no further questions.

## 2024-07-30 ENCOUNTER — LAB REQUISITION (OUTPATIENT)
Dept: LAB | Facility: CLINIC | Age: 89
End: 2024-07-30
Payer: COMMERCIAL

## 2024-07-30 ENCOUNTER — MEDICAL CORRESPONDENCE (OUTPATIENT)
Dept: HEALTH INFORMATION MANAGEMENT | Facility: CLINIC | Age: 89
End: 2024-07-30

## 2024-07-30 ENCOUNTER — TELEPHONE (OUTPATIENT)
Dept: INTERNAL MEDICINE | Facility: CLINIC | Age: 89
End: 2024-07-30
Payer: COMMERCIAL

## 2024-07-30 DIAGNOSIS — N39.0 URINARY TRACT INFECTION, SITE NOT SPECIFIED: ICD-10-CM

## 2024-07-30 DIAGNOSIS — M10.9 GOUT, UNSPECIFIED: ICD-10-CM

## 2024-07-30 DIAGNOSIS — M10.9 ACUTE GOUT OF HAND, UNSPECIFIED CAUSE, UNSPECIFIED LATERALITY: Primary | ICD-10-CM

## 2024-07-30 DIAGNOSIS — R39.15 URINARY URGENCY: ICD-10-CM

## 2024-07-30 LAB
ALBUMIN UR-MCNC: NEGATIVE MG/DL
APPEARANCE UR: CLEAR
BILIRUB UR QL STRIP: NEGATIVE
COLOR UR AUTO: ABNORMAL
GLUCOSE UR STRIP-MCNC: NEGATIVE MG/DL
HGB UR QL STRIP: NEGATIVE
KETONES UR STRIP-MCNC: NEGATIVE MG/DL
LEUKOCYTE ESTERASE UR QL STRIP: ABNORMAL
NITRATE UR QL: NEGATIVE
PH UR STRIP: 5.5 [PH] (ref 5–7)
RBC URINE: 0 /HPF
SP GR UR STRIP: 1.01 (ref 1–1.03)
UROBILINOGEN UR STRIP-MCNC: NORMAL MG/DL
WBC URINE: 3 /HPF

## 2024-07-30 PROCEDURE — 81001 URINALYSIS AUTO W/SCOPE: CPT | Mod: ORL | Performed by: INTERNAL MEDICINE

## 2024-07-30 RX ORDER — COLCHICINE 0.6 MG/1
TABLET ORAL
Qty: 60 TABLET | Refills: 0 | Status: SHIPPED | OUTPATIENT
Start: 2024-07-30 | End: 2024-08-02

## 2024-07-30 NOTE — TELEPHONE ENCOUNTER
Faxed lab orders.     I called [facility] to let them know I faxed them. Per [facility] RN, patient's daughter is requesting a UA/UC order as well.   This is due to patients increased confusion for about a week. Patient also has some urinary urgency. Denied fever, dysuria, hematuria, flank pain, frequency, foul smelling urine. Patient has a hx of UTI with presentation of confusion.     I told [facility] RN I would ask pcp if appropriate to order. UA/UC pended

## 2024-07-30 NOTE — TELEPHONE ENCOUNTER
FYI - Status Update    Who is Calling: nurseCarlota     Update: Noticed last night that patient's right thumb is very swollen and red, warm to touch. Hx of rheumatoid arthritis. Nurse states that it looks like gout. Kept her up most of the night. She does have upcoming appointment with PCP on 8/2/24 but they would like to know if Dr. Austin would like to address this in the meantime.     They do have lab day Wednesday, so they could do a lab draw tomorrow if Dr. Austin wanted. Orders can be faxed to [phone].    Does caller want a call/response back: Yes     Okay to leave a detailed message?: Yes at Other phone number Call Carlota back at [phone]    Routing to provider to advise.

## 2024-07-31 ENCOUNTER — TELEPHONE (OUTPATIENT)
Dept: INTERNAL MEDICINE | Facility: CLINIC | Age: 89
End: 2024-07-31
Payer: COMMERCIAL

## 2024-07-31 LAB
BASOPHILS # BLD AUTO: 0.1 10E3/UL (ref 0–0.2)
BASOPHILS NFR BLD AUTO: 1 %
EOSINOPHIL # BLD AUTO: 0.1 10E3/UL (ref 0–0.7)
EOSINOPHIL NFR BLD AUTO: 1 %
ERYTHROCYTE [DISTWIDTH] IN BLOOD BY AUTOMATED COUNT: 15.1 % (ref 10–15)
HCT VFR BLD AUTO: 35.5 % (ref 35–47)
HGB BLD-MCNC: 10.5 G/DL (ref 11.7–15.7)
IMM GRANULOCYTES # BLD: 0 10E3/UL
IMM GRANULOCYTES NFR BLD: 0 %
LYMPHOCYTES # BLD AUTO: 0.7 10E3/UL (ref 0.8–5.3)
LYMPHOCYTES NFR BLD AUTO: 9 %
MCH RBC QN AUTO: 26.1 PG (ref 26.5–33)
MCHC RBC AUTO-ENTMCNC: 29.6 G/DL (ref 31.5–36.5)
MCV RBC AUTO: 88 FL (ref 78–100)
MONOCYTES # BLD AUTO: 0.7 10E3/UL (ref 0–1.3)
MONOCYTES NFR BLD AUTO: 10 %
NEUTROPHILS # BLD AUTO: 6.1 10E3/UL (ref 1.6–8.3)
NEUTROPHILS NFR BLD AUTO: 79 %
NRBC # BLD AUTO: 0 10E3/UL
NRBC BLD AUTO-RTO: 0 /100
PLATELET # BLD AUTO: 338 10E3/UL (ref 150–450)
RBC # BLD AUTO: 4.02 10E6/UL (ref 3.8–5.2)
WBC # BLD AUTO: 7.7 10E3/UL (ref 4–11)

## 2024-07-31 PROCEDURE — 85025 COMPLETE CBC W/AUTO DIFF WBC: CPT | Mod: ORL | Performed by: INTERNAL MEDICINE

## 2024-07-31 PROCEDURE — 36415 COLL VENOUS BLD VENIPUNCTURE: CPT | Mod: ORL | Performed by: INTERNAL MEDICINE

## 2024-07-31 PROCEDURE — P9603 ONE-WAY ALLOW PRORATED MILES: HCPCS | Mod: ORL | Performed by: INTERNAL MEDICINE

## 2024-07-31 PROCEDURE — 84550 ASSAY OF BLOOD/URIC ACID: CPT | Mod: ORL | Performed by: INTERNAL MEDICINE

## 2024-07-31 PROCEDURE — 86140 C-REACTIVE PROTEIN: CPT | Mod: ORL | Performed by: INTERNAL MEDICINE

## 2024-07-31 NOTE — TELEPHONE ENCOUNTER
Called and relayed message from provider in detail. No further questions at this time.    Topical Steroids Applications Pregnancy And Lactation Text: Most topical steroids are considered safe to use during pregnancy and lactation.  Any topical steroid applied to the breast or nipple should be washed off before breastfeeding.

## 2024-07-31 NOTE — TELEPHONE ENCOUNTER
----- Message from Sabas Austin sent at 7/30/2024 10:11 PM CDT -----  Inform the pt. Urine is clean.

## 2024-08-01 ENCOUNTER — TELEPHONE (OUTPATIENT)
Dept: INTERNAL MEDICINE | Facility: CLINIC | Age: 89
End: 2024-08-01
Payer: COMMERCIAL

## 2024-08-01 LAB
CRP SERPL-MCNC: 13.8 MG/L
URATE SERPL-MCNC: 7.8 MG/DL (ref 2.4–5.7)

## 2024-08-01 NOTE — TELEPHONE ENCOUNTER
Outgoing call to patient. Relayed PCP's detailed message.  Pain is at 4/10 right now. States it is a lot better, very mild swelling, not warm to touch. She would like to discuss with PCP in tomorrow's appointment (8/2) if there is another treatment / medication alternative as she said Colchicine is very expensive.

## 2024-08-01 NOTE — TELEPHONE ENCOUNTER
Letter sent today.  I deferred the orders instead of canceling them.  Maybe try to call again in 1 week even the the daughter states she will call us when ready?

## 2024-08-01 NOTE — TELEPHONE ENCOUNTER
----- Message from Sabas Austin sent at 8/1/2024  5:04 PM CDT -----  Call the pt and check on her. Hemoglobin is stable. Lab results positive for gout. I sent the Rx for colchicine on 7/30/24.

## 2024-08-02 ENCOUNTER — OFFICE VISIT (OUTPATIENT)
Dept: INTERNAL MEDICINE | Facility: CLINIC | Age: 89
End: 2024-08-02
Payer: COMMERCIAL

## 2024-08-02 VITALS
RESPIRATION RATE: 16 BRPM | SYSTOLIC BLOOD PRESSURE: 118 MMHG | TEMPERATURE: 97 F | OXYGEN SATURATION: 98 % | HEART RATE: 74 BPM | DIASTOLIC BLOOD PRESSURE: 64 MMHG

## 2024-08-02 DIAGNOSIS — I10 ESSENTIAL HYPERTENSION: ICD-10-CM

## 2024-08-02 DIAGNOSIS — K55.20 COLON ARTERIOVENOUS MALFORMATION: ICD-10-CM

## 2024-08-02 DIAGNOSIS — I48.21 PERMANENT ATRIAL FIBRILLATION (H): ICD-10-CM

## 2024-08-02 DIAGNOSIS — I50.32 CHRONIC HEART FAILURE WITH PRESERVED EJECTION FRACTION (HFPEF) (H): ICD-10-CM

## 2024-08-02 DIAGNOSIS — F41.1 ANXIETY STATE: ICD-10-CM

## 2024-08-02 DIAGNOSIS — M10.041 ACUTE IDIOPATHIC GOUT OF RIGHT HAND: Primary | ICD-10-CM

## 2024-08-02 DIAGNOSIS — I99.8 ISCHEMIC TOE: ICD-10-CM

## 2024-08-02 PROBLEM — R61 EXCESSIVE SWEATING: Status: RESOLVED | Noted: 2024-06-05 | Resolved: 2024-08-02

## 2024-08-02 PROBLEM — K92.1 HEMATOCHEZIA: Status: RESOLVED | Noted: 2022-12-28 | Resolved: 2024-08-02

## 2024-08-02 PROCEDURE — G2211 COMPLEX E/M VISIT ADD ON: HCPCS | Performed by: INTERNAL MEDICINE

## 2024-08-02 PROCEDURE — 99214 OFFICE O/P EST MOD 30 MIN: CPT | Performed by: INTERNAL MEDICINE

## 2024-08-02 NOTE — PROGRESS NOTES
Assessment & Plan     Acute idiopathic gout of right hand  Patient called on 7/30/24 with concern about swelling, redness, warmness and pain in the right thumb. Nurse in the assisted living said that it looks like gout. Kept her up most of the night.   She came for the lab only appointment and uric acid was 7.8 and CRP 13.8, WBC normal.  I sent the Rx for colchicine that same day, but she did not take it. It was expensive and not covered.  Today, she reports that all symptoms subsided and it's not painful anymore, and not red, just slightly swollen.  It is possible that it was pseudogout or arthritis and since it's much better today, they will not  the colchicine.    Permanent atrial fibrillation (H)  with controlled ventricular response with diltiazem,  on Eliquis.     Ischemic toe  7/25 /24 had Vascular procedure - Ir Lower Extremity Angiogram Right  from the aorta level via left groin approach  Ultrasound-guided vascular access  Angioplasty right superficial femoral artery and popliteal artery  Angioplasty right anterior tibial artery  Angioplasty right dorsalis pedis artery  Plan:  Load on Plavix and have her continue Plavix in addition to Eliquis for at least a month and maybe 3 months  Continue with aggressive local wound care and offloading to help heal her fifth toe amputation site  Follow-up visit in 2 weeks with repeat ABIs and toe pressures and possible ETCO2 to look at the healing potential of the foot with what has been performed.  I do not think there is much more that can be done to improve blood flow so I would not subject her to another angiogram.  If all of this fails she should be considered for transmetatarsal amputation.    Colon arteriovenous malformation  No additional GI bleed since Dec 2022.Hgb stays stable, taking one iron pill daily. She refused pill cam.     Chronic heart failure with preserved ejection fraction (HFpEF) (H)  On furosemide 20 mg bid, stable.     Anxiety  Stable on  Zoloft 100 mg daily.    Hypertension  Stable on losartan, diltiazem        The longitudinal plan of care for the diagnosis(es)/condition(s) as documented were addressed during this visit. Due to the added complexity in care, I will continue to support Kiah in the subsequent management and with ongoing continuity of care.        Zenaida Dupont is a 88 year old, presenting for the following health issues:  Follow Up (Gout F/U?)    HPI                   Objective    /64   Pulse 74   Temp 97  F (36.1  C)   Resp 16   LMP  (LMP Unknown)   SpO2 98%   There is no height or weight on file to calculate BMI.  Physical Exam               Signed Electronically by: Sabas Austin MD

## 2024-08-02 NOTE — TELEPHONE ENCOUNTER
Veronica called today to schedule the post ops for her mom, writer spoke with RN Reshma (due to no availability with the provider) she said to schedule the two week studies and the other post ops as normal.  Kiah will be seen 8/8 for post op studies and then 5 weeks later for the second set and seeing Dr. Wilson the following Monday 9/16.

## 2024-08-05 NOTE — TELEPHONE ENCOUNTER
Patient's daughter, Anabel, would like a call back to clarify the need for both LINDA and arterial duplex on Thursday. Daughter is also wondering if imaging will be of the entire leg or if there is an area of focus (she states patient had a blood clot in the thigh at some point). She is also wondering if patient will receive 2 separate charges for the ultrasounds or if the clinic knows how this is billed since she is having 2 different studies. PH: [phone]

## 2024-08-05 NOTE — TELEPHONE ENCOUNTER
Left message updating that both ultrasounds are needed to see if artrial blood flow as improved since her angiogram. Per Dr. Wilson angiogram op note below.     Will not see previous blood clot due because ultrasound will look at the arteries not the vein.     They can contact cost care care for further billing questions.     Follow-up visit in 2 weeks with repeat ABIs and toe pressures and possible ETCO2 to look at the healing potential of the foot with what has been performed.

## 2024-08-06 ENCOUNTER — TELEPHONE (OUTPATIENT)
Dept: INTERNAL MEDICINE | Facility: CLINIC | Age: 89
End: 2024-08-06
Payer: COMMERCIAL

## 2024-08-06 NOTE — TELEPHONE ENCOUNTER
She did not have any of these symptoms when I saw her last week. If she has severe pain, not able to walk, put her weight on - has to be seen.  If not, they can give her Tylenol 500 mg - 2 pills every 8h as needed for pain. They can also try topical Lidocaine, Voltaren gel, Asper cream and other OTC topical treatments.

## 2024-08-06 NOTE — TELEPHONE ENCOUNTER
[facility]- Assisted Living RN.  Call back number: [phone]   Fax if provider wanted to do any orders: [phone]     Pt self administers medication.    RN called daughter (Anabel) to update with provider notes.  RN sent information via My Chart as second communication.

## 2024-08-06 NOTE — TELEPHONE ENCOUNTER
S: Incoming call from Carlos - RN at [facility], requesting pcp to review reported symptoms (back, hip, and mid buttock pain) and provide a next step direction.     B: LOV 8/2/24     Acute idiopathic gout of right hand  Patient called on 7/30/24 with concern about swelling, redness, warmness and pain in the right thumb. Nurse in the assisted living said that it looks like gout. Kept her up most of the night.   She came for the lab only appointment and uric acid was 7.8 and CRP 13.8, WBC normal.  I sent the Rx for colchicine that same day, but she did not take it. It was expensive and not covered.  Today, she reports that all symptoms subsided and it's not painful anymore, and not red, just slightly swollen.  It is possible that it was pseudogout or arthritis and since it's much better today, they will not  the colchicine    A: Per assisted RN, Pt reporting new lower back pain, sharp right hip pain at night when trying to reposition.     Also reported Mid left buttock pain like a sore spot.   Denies any skin break down, bruising, sore or skin issues.   Denies any recent falls or anything that will cause the pain.     Writer attempted to triage patient, [facility] RN repeatedly ask to just pass on message to PCP for a next step direction.   Writer informed her that if this is new symptoms, normally provider would like to seen them first before ordering tests.     Since patient was just seen, she is hoping that pt doesn't have to come back for an evaluation.   [facility] wanting PCP to determine if reported symptoms are new or just ongoing since pt also have arthritis.     Wondering if pt can get an x-ray order or medications to help with pain.     Call back number: [phone]   Fax if provider wanted to do any orders: [phone]     R: routing to pcp to review and advise.     Edwar GILLIAM RN

## 2024-08-08 ENCOUNTER — ANCILLARY PROCEDURE (OUTPATIENT)
Dept: VASCULAR ULTRASOUND | Facility: CLINIC | Age: 89
End: 2024-08-08
Attending: SURGERY
Payer: COMMERCIAL

## 2024-08-08 DIAGNOSIS — I73.9 PAD (PERIPHERAL ARTERY DISEASE) (H): ICD-10-CM

## 2024-08-08 PROCEDURE — 93923 UPR/LXTR ART STDY 3+ LVLS: CPT

## 2024-08-08 PROCEDURE — 93926 LOWER EXTREMITY STUDY: CPT | Mod: 26 | Performed by: SURGERY

## 2024-08-08 PROCEDURE — 93923 UPR/LXTR ART STDY 3+ LVLS: CPT | Mod: 26 | Performed by: SURGERY

## 2024-08-08 PROCEDURE — 93926 LOWER EXTREMITY STUDY: CPT | Mod: RT

## 2024-08-13 ENCOUNTER — OFFICE VISIT (OUTPATIENT)
Dept: VASCULAR SURGERY | Facility: CLINIC | Age: 89
End: 2024-08-13
Attending: PODIATRIST
Payer: COMMERCIAL

## 2024-08-13 VITALS
DIASTOLIC BLOOD PRESSURE: 70 MMHG | SYSTOLIC BLOOD PRESSURE: 126 MMHG | HEART RATE: 72 BPM | OXYGEN SATURATION: 99 % | RESPIRATION RATE: 18 BRPM | TEMPERATURE: 97.8 F

## 2024-08-13 DIAGNOSIS — L97.511 ULCER OF RIGHT FOOT LIMITED TO BREAKDOWN OF SKIN (H): Primary | ICD-10-CM

## 2024-08-13 PROCEDURE — 11042 DBRDMT SUBQ TIS 1ST 20SQCM/<: CPT | Performed by: PODIATRIST

## 2024-08-13 ASSESSMENT — PAIN SCALES - GENERAL: PAINLEVEL: MODERATE PAIN (5)

## 2024-08-13 NOTE — PATIENT INSTRUCTIONS
*Wheelchairs are never guaranteed at the front door, if you have your own wheel chair or knee walker, please bring that with you to your appointment. Thank you for your understanding!*    Wound care supplies were ordered today through [employer] and if you are not receiving your supplies or have a question on your bill please contact Tasia Rangel at [phone]. Please allow 2-5 business days for delivery of supplies. You may get a call from a [phone] if there are additional information [facility] needs. It is important to  or return their call. PLEASE NOTE: If you need to return your supplies, you MUST call customer service within 15 days of delivery date.     Important lnstructions      WEIGHT BEARING STATUS: You are to remain NON WEIGHT BEARING on your right foot. NON WEIGHT BEARING MEANS NO PRESSURE ON YOUR FOOT OR HEEL AT ANY TIME FOR ANY REASON!    2. OFFLOADING DEVICE: Must use a A WHEELCHAIR at all times! (do not use affected foot to push wheelchair)    3. STABILIZATION DEVICE: Use a  prevalon  . You will need to WEAR THIS AT ALL TIMES EVEN WHILE IN BED.     4. ELEVATE: Elevating your leg means laying with your head on a pillow and your foot ABOVE YOUR HEART.     5. DO NOT MOVE YOUR FOOT.  There is a risk of worsening the wound or incision. To give yourself a higher chance of healing, please DO NOT swing foot back and forth and wiggle foot/toes especially when inside a stabilization device. Limited movement is allowable with therapy as recommended by the doctor.     6. TAKE A PROTEIN SHAKE TWICE A DAY.  (For ex: Boost, Ensure, Glucerna)    7. KEEP YOUR WOUND DRY AT ALL TIMES    Use a shower bag or a cast cover to keep your foot/leg dry during showers. These can be purchased on [employer] or any pharmacy.         Dressing Change lnstructions    Change Every other day to your Right Lateral plantar (bottom of foot)    Primary Dressing: Apply iodine and dry gauze into/onto the wounds    Secure with non-sterile  "roll gauze (4\" x 75\" roll) and tape (1\" roll tape) as needed; avoid adhesive directly on the skin      EVERY OTHER DAY and as needed, Cleanse your fTop of Right lateral foot wound(s) with Normal saline.    Pat Dry with non-sterile gauze    Primary Dressing: Apply Medihoney or Manuka honey into/onto the wounds    Secondary dressing: Cover with dry gauze    Secure with non-sterile roll gauze (4\" x 75\" roll) and tape (1\" roll tape) as needed; avoid adhesive directly on the skin         SEEK MEDICAL CARE IF:  You have an increase in swelling, pain, or redness around the wound.  You have an increase in the amount of pus coming from the wound.  There is a bad smell coming from the wound.  The wound appears to be worsening/enlarging  You have a fever greater than 101.5 F      It is ok to continue current wound care treatment/products for the next 2-3 days until new wound care supplies are ordered and arrive. If longer than this please contact our office at [phone].      We want to hear from you!   In the next few weeks, you should receive a call or email to complete a survey about your visit at [facility]. Please help us improve your appointment experience by letting us know how we did today. We strive to make your experience good and value any ways in which we could do better.      We value your input and suggestions.    Thank you for choosing the [facility]!    "

## 2024-08-13 NOTE — LETTER
2024             [facility]             Wound Dressing Rx and Order Form             Order Status:New Order             Verbal: Mehreen ALEX  [facility]   Account # [id number]  Fax: [phone]  Customer Service: [phone]          Patient Info:  Name: Marva Gaines  : 1935  Address:   [address] [place] [address] [address]  [facility] [address]  Phone: [phone]      Insurance Info:  PRIMARY INSURANCE: Payor: [employer] / Plan: [employer] MEDICARE ADVANTAGE / Product Type: HMO /   Primary Policy ID#: [id number]  SECONDARY INSURANCE:  N/A   Secondary Policy ID#: N/A    Physician Info:   Name:  Henry Peres DPM   Dept Address/Phones:   [address] [place] [id number] [phone]  Dept: [phone]  Fax: [phone]        Wound info:  Encounter Diagnosis   Name Primary?    Ulcer of right foot limited to breakdown of skin (H) Yes     VASC Wound R lateral foot (Active)   Pre Size Length 1.5 24 1300   Pre Size Width 0.5 24 1300   Pre Size Depth 0.4 24 1300   Pre Total Sq cm 0.75 24 1300       VASC Wound R 5th toe amp site (Active)   Pre Size Length 0.7 24 1300   Pre Size Width 0.3 24 1300   Pre Size Depth 0.3 24 1300   Pre Total Sq cm 0.21 24 1300       Incision/Surgical Site 24 Right;Lateral Foot (Active)     Drainage: moderate  Thickness:  Full  Duration of Need: 60  Days Supply: 30  Start Date: 2024  Starter Kit: Ancillary Kit (saline, gloves, gauze)  Qualifying wound/Debridement: Yes     Dressing Type Brand Size Days Supply  15/30 Quantity  changes/week   Primary Manuka honey HD Supra lite   4''x5'' 30 Every other day   Secondary Square gauze   4''x4'' 30 Every other day    Sof form roll gauze   4''x75'' 30 Every other day   Tape Papertape  1in 30 Every other day         No substitutions preferred. Call [phone].    OK to forward  to covered supplier.    Electronically Signed Physician: Henry Peres, RODY Date: 8/13/2024

## 2024-08-13 NOTE — PROGRESS NOTES
FOOT AND ANKLE SURGERY/PODIATRY Progress Note      ASSESSMENT:   Ulceration right foot subcutaneous tissue  Eschar right foot      TREATMENT:  -I discussed with the patient and her daughter today that the new ulceration on the dorsal lateral aspect of the right foot has fibrotic tissue and I recommend we begin use of Medihoney at this time.    -TBI has improved on the right foot to 45 mmHg after recent angiogram.  We again discussed that we will proceed with attempted healing of the ulcerative sites were also reviewed that more proximal foot amputation informed the transmetatarsal amputation to be necessary in the future.    -There is a stable eschar along the plantar lateral aspect of the fifth metatarsal head.  Iodine applied today which we reapplied every other day with a gauze dressing.    -Continue nonweightbearing right foot.  Prevalon boot for offloading.    -After discussion of risk factors, nursing staff removed dressing, cleansed wound and consent obtained 2% Lidocaine HCL jelly was applied, under clean conditions, the right foot ulceration(s) were debrided using curette.  Devitalized and nonviable tissue, along with any fibrin and slough, was removed to improve granulation tissue formation, stimulate wound healing, decrease overall bacteria load, disrupt biofilm formation and decrease edge senescence. Wound drainage was scant No. Total excisional debridement was 0.75 sq cm into the subcutaneous tissue with a depth of 0.3 cm.   Ulcers were improved afterwards and .  Measures were as noted on the flow sheet.  Medihoney with gauze dressing applied today which she will reapply earlier today with a gauze dressing.    -She will follow-up in 3 to 4 weeks    Henry Peres DPM  [facility] [place]      HPI: Marva Gaines was seen again today status post amputation fifth digit right foot.  Patient's daughter is present for today's visit.  She is remained mostly nonweightbearing on the right  foot.  Currently using a Prevalon boot.  Since her previous visit with me she did complete an angio with Dr. Wilson.     Past Medical History:   Diagnosis Date    Arthritis     Chronic atrial fibrillation (H)     Depressive disorder     History of blood transfusion     Antibodies    Hypertension        Past Surgical History:   Procedure Laterality Date    AMPUTATE TOE(S) Right 05/30/2024    Procedure: AMPUTATION, FIFTH DIGIT RIGHT FOOT;  Surgeon: Henry Peres DPM;  Location: [facility] Main OR    CATARACT EXTRACTION Bilateral     CHOLECYSTECTOMY      COLONOSCOPY N/A 12/30/2022    Procedure: COLONOSCOPY with argon plasma coagulation;  Surgeon: Steven Driscoll MD;  Location: [facility] OR    ESOPHAGOSCOPY, GASTROSCOPY, DUODENOSCOPY (EGD), COMBINED N/A 12/30/2022    Procedure: ESOPHAGOGASTRODUODENOSCOPY (EGD) with argon plasma coagulation;  Surgeon: Steven Driscoll MD;  Location: [facility] OR    HRW ANES TOTAL HIP REPLACEMENT, MDA 1 Bilateral     IR LOWER EXTREMITY ANGIOGRAM RIGHT  7/25/2024       Allergies   Allergen Reactions    Blood Transfusion Related (Informational Only) Other (See Comments)     Patient has a history of a clinically significant antibody against RBC antigens.  A delay in compatible RBCs may occur. 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    Hydrocodone-Acetaminophen Unknown         Current Outpatient Medications:     acetaminophen (TYLENOL) 500 MG tablet, Take 1,000 mg by mouth every 6 hours as needed for mild pain, Disp: , Rfl:     apixaban ANTICOAGULANT (ELIQUIS ANTICOAGULANT) 2.5 MG tablet, Take 1 tablet (2.5 mg) by mouth 2 times daily, Disp: 180 tablet, Rfl: 3    atorvastatin (LIPITOR) 20 MG tablet, TAKE 1 TABLET BY MOUTH EVERYDAY AT BEDTIME, Disp: 90 tablet, Rfl: 1    cholecalciferol, vitamin D3, (VITAMIN D3) 2,000 unit Tab, [CHOLECALCIFEROL, VITAMIN D3, (VITAMIN D3) 2,000 UNIT TAB] Take 1 tablet by mouth daily., Disp: , Rfl:     clopidogrel (PLAVIX) 75 MG tablet, Take 1 tablet (75 mg) by mouth  daily for 90 days, Disp: 30 tablet, Rfl: 2    diltiazem ER COATED BEADS (CARDIZEM CD/CARTIA XT) 240 MG 24 hr capsule, Take 1 capsule (240 mg) by mouth daily, Disp: 90 capsule, Rfl: 3    Ferrous Sulfate 324 (65 Fe) MG TBEC, Take 1 tablet by mouth daily, Disp: , Rfl:     fish oil-omega-3 fatty acids 500 MG capsule, Take 1 capsule by mouth daily, Disp: , Rfl:     furosemide (LASIX) 20 MG tablet, TAKE 2 TABLETS BY MOUTH EVERY DAY, Disp: 180 tablet, Rfl: 1    lactobacillus rhamnosus, GG, (CULTURELL) capsule, Take 1 capsule by mouth 2 times daily, Disp: , Rfl:     losartan (COZAAR) 25 MG tablet, Take 25 mg by mouth daily HOLD this medication until primary care provider follow up, Disp: , Rfl:     pantoprazole (PROTONIX) 40 MG EC tablet, TAKE 1 TABLET BY MOUTH TWICE A DAY, Disp: 180 tablet, Rfl: 3    sertraline (ZOLOFT) 100 MG tablet, Take 1 tablet (100 mg) by mouth daily, Disp: , Rfl:     Tuberculin PPD (TUBERSOL ID), , Disp: , Rfl:     Review of Systems - 10 point Review of Systems is negative except for right foot ulcer which is noted in HPI.      OBJECTIVE:  /70   Pulse 72   Temp 97.8  F (36.6  C)   Resp 18   LMP  (LMP Unknown)   SpO2 99%   General appearance: Patient is alert and fully cooperative with history & exam.  No sign of distress is noted during the visit.    Vascular: Dorsalis pedis non-palpableRight.  Dermatologic:    VASC Wound R lateral foot (Active)   Pre Size Length 1.5 08/13/24 1300   Pre Size Width 0.5 08/13/24 1300   Pre Size Depth 0.4 08/13/24 1300   Pre Total Sq cm 0.75 08/13/24 1300       VASC Wound R 5th toe amp site (Active)   Pre Size Length 0.7 08/13/24 1300   Pre Size Width 0.3 08/13/24 1300   Pre Size Depth 0.3 08/13/24 1300   Pre Total Sq cm 0.21 08/13/24 1300       Incision/Surgical Site 05/30/24 Right;Lateral Foot (Active)   Fibrotic tissue along dorsal lateral right foot ulceration.  Stable eschar plantar lateral aspect of fifth metatarsal head right foot.  No erythema right  foot.  Neurologic: Diminished to light touch Right.  Musculoskeletal: Contracted digits noted Right.      Picture:

## 2024-08-16 ENCOUNTER — TELEPHONE (OUTPATIENT)
Dept: INTERNAL MEDICINE | Facility: CLINIC | Age: 89
End: 2024-08-16
Payer: COMMERCIAL

## 2024-08-16 NOTE — TELEPHONE ENCOUNTER
Home Care is calling regarding an established patient with [facility].       Requesting orders from: Sabas Austin  Provider is following patient: Yes  Is this a 60-day recertification request?  No    Verbal Orders Requested    Home Visit by Wound Care Specialist (WOC)    3 x week/ 4 weeks      Confirmed ok to leave a detailed message with call back.  Contact information confirmed and updated as needed.    [place], [facility] [phone]    Becky Alvarado RN

## 2024-09-03 ENCOUNTER — OFFICE VISIT (OUTPATIENT)
Dept: VASCULAR SURGERY | Facility: CLINIC | Age: 89
End: 2024-09-03
Attending: PODIATRIST
Payer: COMMERCIAL

## 2024-09-03 VITALS
RESPIRATION RATE: 12 BRPM | HEART RATE: 81 BPM | OXYGEN SATURATION: 98 % | DIASTOLIC BLOOD PRESSURE: 84 MMHG | TEMPERATURE: 97.7 F | SYSTOLIC BLOOD PRESSURE: 162 MMHG

## 2024-09-03 DIAGNOSIS — I73.9 PAD (PERIPHERAL ARTERY DISEASE) (H): ICD-10-CM

## 2024-09-03 DIAGNOSIS — L97.511 ULCER OF RIGHT FOOT LIMITED TO BREAKDOWN OF SKIN (H): Primary | ICD-10-CM

## 2024-09-03 PROCEDURE — 11042 DBRDMT SUBQ TIS 1ST 20SQCM/<: CPT | Performed by: PODIATRIST

## 2024-09-03 ASSESSMENT — PAIN SCALES - GENERAL: PAINLEVEL: NO PAIN (0)

## 2024-09-03 NOTE — PATIENT INSTRUCTIONS
"    Important lnstructions      WEIGHT BEARING STATUS: You are to remain NON WEIGHT BEARING on your right foot. NON WEIGHT BEARING MEANS NO PRESSURE ON YOUR FOOT OR HEEL AT ANY TIME FOR ANY REASON!    2. OFFLOADING DEVICE: Must use a A WHEELCHAIR at all times! (do not use affected foot to push wheelchair)    3. STABILIZATION DEVICE: Use a  prevalon  . You will need to WEAR THIS AT ALL TIMES EVEN WHILE IN BED.     4. ELEVATE: Elevating your leg means laying with your head on a pillow and your foot ABOVE YOUR HEART.     5. DO NOT MOVE YOUR FOOT.  There is a risk of worsening the wound or incision. To give yourself a higher chance of healing, please DO NOT swing foot back and forth and wiggle foot/toes especially when inside a stabilization device. Limited movement is allowable with therapy as recommended by the doctor.     6. TAKE A PROTEIN SHAKE TWICE A DAY.  (For ex: Boost, Ensure, Glucerna)    7. KEEP YOUR WOUND DRY AT ALL TIMES    Use a shower bag or a cast cover to keep your foot/leg dry during showers. These can be purchased on [employer] or any pharmacy.         Dressing Change lnstructions    EVERY OTHER DAY and as needed, Cleanse your Right lateral foot wound(s) with Normal saline.    Pat Dry with non-sterile gauze    Primary Dressing: Apply Medihoney or Manuka honey into/onto the wounds    Secondary dressing: Cover with dry gauze    Secure with non-sterile roll gauze (4\" x 75\" roll) and tape (1\" roll tape) as needed; avoid adhesive directly on the skin     Right 2nd toe: apply iodine daily.         SEEK MEDICAL CARE IF:  You have an increase in swelling, pain, or redness around the wound.  You have an increase in the amount of pus coming from the wound.  There is a bad smell coming from the wound.  The wound appears to be worsening/enlarging  You have a fever greater than 101.5 F      It is ok to continue current wound care treatment/products for the next 2-3 days until new wound care supplies are ordered and " arrive. If longer than this please contact our office at [phone].      We want to hear from you!   In the next few weeks, you should receive a call or email to complete a survey about your visit at [facility]. Please help us improve your appointment experience by letting us know how we did today. We strive to make your experience good and value any ways in which we could do better.      We value your input and suggestions.    Thank you for choosing the [facility]!   [NL] : normotonic [Playful] : playful [Supple] : supple [Soft] : soft [Tender] : nontender [Distended] : nondistended [Normal Bowel Sounds] : normal bowel sounds [Moves All Extremities x 4] : moves all extremities x4 [Warm, Well Perfused x4] : warm, well perfused x4 [FreeTextEntry2] : AFOF [de-identified] : Mild erythema on cheeks, mild erythematous patches in the scalp, areas of hypopigmentation on the b/l upper and lower extremities.

## 2024-09-03 NOTE — PROGRESS NOTES
FOOT AND ANKLE SURGERY/PODIATRY Progress Note      ASSESSMENT:   Ulceration right foot subcutaneous tissue   Eschar second digit right foot        TREATMENT:  -I discussed with the patient and her daughter today that the ulceration along the lateral right foot is improved in the previous visit.  The base of the ulceration is fibrotic tissue and I recommend use of Medihoney every other day.    -There continues to be ischemic changes along the distal second digit on the right foot which is our last in the previous visit.  She also has appears to have a small eschar at this location.  I recommend iodine to be applied daily which was applied today.    -After discussion of risk factors, nursing staff removed dressing, cleansed wound and consent obtained 2% Lidocaine HCL jelly was applied, under clean conditions, the lateral right foot ulceration(s) were debrided using #15 blade scalpel.  Devitalized and nonviable tissue, along with any fibrin and slough, was removed to improve granulation tissue formation, stimulate wound healing, decrease overall bacteria load, disrupt biofilm formation and decrease edge senescence. Wound drainage was scant No. Total excisional debridement was 0.6 sq cm into the subcutaneous tissue with a depth of 0.2 cm.   Ulcers were improved afterwards and .  Measures were as noted on the flow sheet.  Medihoney with gauze dressing applied today which she will reapply every other day.    -She will follow-up in 3 weeks    Henry Peres DPM  [facility] [place]      HPI: Marva Gaines was seen again today for right foot ulceration.  Patient's daughter is present today.  And reports that her mother has been having issues with her memory recently.    Past Medical History:   Diagnosis Date    Arthritis     Chronic atrial fibrillation (H)     Depressive disorder     History of blood transfusion     Antibodies    Hypertension        Past Surgical History:   Procedure Laterality Date     AMPUTATE TOE(S) Right 05/30/2024    Procedure: AMPUTATION, FIFTH DIGIT RIGHT FOOT;  Surgeon: Henry Peres DPM;  Location: [facility] OR    CATARACT EXTRACTION Bilateral     CHOLECYSTECTOMY      COLONOSCOPY N/A 12/30/2022    Procedure: COLONOSCOPY with argon plasma coagulation;  Surgeon: Steven Driscoll MD;  Location: [facility] OR    ESOPHAGOSCOPY, GASTROSCOPY, DUODENOSCOPY (EGD), COMBINED N/A 12/30/2022    Procedure: ESOPHAGOGASTRODUODENOSCOPY (EGD) with argon plasma coagulation;  Surgeon: Steven Driscoll MD;  Location: [facility] OR    HRW ANES TOTAL HIP REPLACEMENT, MDA 1 Bilateral     IR LOWER EXTREMITY ANGIOGRAM RIGHT  7/25/2024       Allergies   Allergen Reactions    Blood Transfusion Related (Informational Only) Other (See Comments)     Patient has a history of a clinically significant antibody against RBC antigens.  A delay in compatible RBCs may occur. 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    Hydrocodone-Acetaminophen Unknown         Current Outpatient Medications:     acetaminophen (TYLENOL) 500 MG tablet, Take 1,000 mg by mouth every 6 hours as needed for mild pain, Disp: , Rfl:     apixaban ANTICOAGULANT (ELIQUIS ANTICOAGULANT) 2.5 MG tablet, Take 1 tablet (2.5 mg) by mouth 2 times daily, Disp: 180 tablet, Rfl: 3    atorvastatin (LIPITOR) 20 MG tablet, TAKE 1 TABLET BY MOUTH EVERYDAY AT BEDTIME, Disp: 90 tablet, Rfl: 1    cholecalciferol, vitamin D3, (VITAMIN D3) 2,000 unit Tab, [CHOLECALCIFEROL, VITAMIN D3, (VITAMIN D3) 2,000 UNIT TAB] Take 1 tablet by mouth daily., Disp: , Rfl:     clopidogrel (PLAVIX) 75 MG tablet, Take 1 tablet (75 mg) by mouth daily for 90 days, Disp: 30 tablet, Rfl: 2    diltiazem ER COATED BEADS (CARDIZEM CD/CARTIA XT) 240 MG 24 hr capsule, Take 1 capsule (240 mg) by mouth daily, Disp: 90 capsule, Rfl: 3    Ferrous Sulfate 324 (65 Fe) MG TBEC, Take 1 tablet by mouth daily, Disp: , Rfl:     fish oil-omega-3 fatty acids 500 MG capsule, Take 1 capsule by mouth daily, Disp: ,  Rfl:     furosemide (LASIX) 20 MG tablet, TAKE 2 TABLETS BY MOUTH EVERY DAY, Disp: 180 tablet, Rfl: 1    lactobacillus rhamnosus, GG, (CULTURELL) capsule, Take 1 capsule by mouth 2 times daily, Disp: , Rfl:     losartan (COZAAR) 25 MG tablet, Take 25 mg by mouth daily HOLD this medication until primary care provider follow up, Disp: , Rfl:     pantoprazole (PROTONIX) 40 MG EC tablet, TAKE 1 TABLET BY MOUTH TWICE A DAY, Disp: 180 tablet, Rfl: 3    sertraline (ZOLOFT) 100 MG tablet, Take 1 tablet (100 mg) by mouth daily, Disp: , Rfl:     Tuberculin PPD (TUBERSOL ID), , Disp: , Rfl:     Review of Systems - 10 point Review of Systems is negative except for right foot ulcer which is noted in HPI.      OBJECTIVE:  BP (!) 162/84   Pulse 81   Temp 97.7  F (36.5  C)   Resp 12   LMP  (LMP Unknown)   SpO2 98%   General appearance: Patient is alert and fully cooperative with history & exam.  No sign of distress is noted during the visit.    Vascular: Dorsalis pedis non-palpableRight.  Dermatologic:    VASC Wound R lateral foot (Active)   Pre Size Length 0 09/03/24 1442   Pre Size Width 0 09/03/24 1442   Pre Size Depth 0 09/03/24 1442   Pre Total Sq cm 0 09/03/24 1442   Post Size Length 1 09/03/24 1442   Post Size Width 0.6 09/03/24 1442   Post Size Depth 0.2 09/03/24 1442   Post Total Sq cm 0.6 09/03/24 1442   Description scab 09/03/24 1442       VASC Wound R 5th toe amp site (Active)   Pre Size Length 0.7 08/13/24 1300   Pre Size Width 0.3 08/13/24 1300   Pre Size Depth 0.3 08/13/24 1300   Pre Total Sq cm 0.21 08/13/24 1300   Post Size Length 0 09/03/24 1442   Post Size Width 0 09/03/24 1442   Post Size Depth 0 09/03/24 1442   Post Total Sq cm 0 09/03/24 1442       Incision/Surgical Site 05/30/24 Right;Lateral Foot (Active)   Ulceration lateral right forefoot has fibrotic base.  Stable eschar distal second agent right foot.  No erythema right foot.  Neurologic: Diminished to light touch Right.  Musculoskeletal: Contracted  digits noted Right.      Picture:

## 2024-09-05 ENCOUNTER — TELEPHONE (OUTPATIENT)
Dept: INTERNAL MEDICINE | Facility: CLINIC | Age: 89
End: 2024-09-05
Payer: COMMERCIAL

## 2024-09-05 NOTE — TELEPHONE ENCOUNTER
Home Care is calling regarding an established patient with [facility].  [id number]}     Requesting orders from: Sabas Austin  Provider is following patient: Yes  Is this a 60-day recertification request?  Yes    Orders Requested    Skilled Nursing  Request for recertification   Frequency:     Wound Care:  3 x week for 2 weeks  2 x week for 2 weeks  1 x week for 1 week      Information was gathered and will be sent to provider for review.  RN will contact Home Care with information after provider review.  Confirmed ok to leave a detailed message with call back.  [facility]: [phone]  Contact information confirmed and updated as needed.    YOCASTA WHITE RN

## 2024-09-07 ENCOUNTER — MYC REFILL (OUTPATIENT)
Dept: INTERNAL MEDICINE | Facility: CLINIC | Age: 89
End: 2024-09-07
Payer: COMMERCIAL

## 2024-09-07 DIAGNOSIS — F41.1 ANXIETY STATE: ICD-10-CM

## 2024-09-09 ENCOUNTER — MEDICAL CORRESPONDENCE (OUTPATIENT)
Dept: HEALTH INFORMATION MANAGEMENT | Facility: CLINIC | Age: 89
End: 2024-09-09

## 2024-09-09 RX ORDER — SERTRALINE HYDROCHLORIDE 100 MG/1
100 TABLET, FILM COATED ORAL DAILY
Qty: 90 TABLET | Refills: 3 | Status: SHIPPED | OUTPATIENT
Start: 2024-09-09

## 2024-09-10 ENCOUNTER — TELEPHONE (OUTPATIENT)
Dept: CARDIOLOGY | Facility: CLINIC | Age: 89
End: 2024-09-10
Payer: COMMERCIAL

## 2024-09-10 DIAGNOSIS — E78.00 PURE HYPERCHOLESTEROLEMIA: ICD-10-CM

## 2024-09-10 NOTE — TELEPHONE ENCOUNTER
[facility] Call [facility]    Phone Message    May a detailed message be left on voicemail: yes     Reason for Call: Medication Refill Request    Has the patient contacted the pharmacy for the refill? Yes   Name of medication being requested:   atorvastatin (LIPITOR) 20 MG tablet       Provider who prescribed the medication: cal  Pharmacy: [facility]ACY #[id number] [place], [place] - [address] [facility] [address] [facility]    Date medication is needed: asap       Action Taken: Other: cardiology    Travel Screening: Not Applicable     Date of Service:

## 2024-09-11 RX ORDER — ATORVASTATIN CALCIUM 20 MG/1
TABLET, FILM COATED ORAL
Qty: 90 TABLET | Refills: 1 | Status: SHIPPED | OUTPATIENT
Start: 2024-09-11

## 2024-09-12 ENCOUNTER — ANCILLARY PROCEDURE (OUTPATIENT)
Dept: VASCULAR ULTRASOUND | Facility: CLINIC | Age: 89
End: 2024-09-12
Attending: SURGERY
Payer: COMMERCIAL

## 2024-09-12 DIAGNOSIS — I70.218 ATHEROSCLEROSIS OF NATIVE ARTERIES OF EXTREMITIES WITH INTERMITTENT CLAUDICATION, OTHER EXTREMITY (H): ICD-10-CM

## 2024-09-12 DIAGNOSIS — I73.9 PAD (PERIPHERAL ARTERY DISEASE) (H): ICD-10-CM

## 2024-09-12 PROCEDURE — 93926 LOWER EXTREMITY STUDY: CPT | Mod: 26 | Performed by: SURGERY

## 2024-09-12 PROCEDURE — 93923 UPR/LXTR ART STDY 3+ LVLS: CPT

## 2024-09-12 PROCEDURE — 93926 LOWER EXTREMITY STUDY: CPT | Mod: RT

## 2024-09-12 PROCEDURE — 93923 UPR/LXTR ART STDY 3+ LVLS: CPT | Mod: 26 | Performed by: SURGERY

## 2024-09-16 ENCOUNTER — OFFICE VISIT (OUTPATIENT)
Dept: VASCULAR SURGERY | Facility: CLINIC | Age: 89
End: 2024-09-16
Attending: SURGERY
Payer: COMMERCIAL

## 2024-09-16 VITALS
RESPIRATION RATE: 20 BRPM | OXYGEN SATURATION: 96 % | DIASTOLIC BLOOD PRESSURE: 77 MMHG | SYSTOLIC BLOOD PRESSURE: 129 MMHG | HEART RATE: 78 BPM | TEMPERATURE: 97.7 F

## 2024-09-16 DIAGNOSIS — L97.511 ULCER OF RIGHT FOOT LIMITED TO BREAKDOWN OF SKIN (H): Primary | ICD-10-CM

## 2024-09-16 DIAGNOSIS — I73.9 PAD (PERIPHERAL ARTERY DISEASE) (H): ICD-10-CM

## 2024-09-16 PROCEDURE — G0463 HOSPITAL OUTPT CLINIC VISIT: HCPCS

## 2024-09-16 NOTE — PROGRESS NOTES
Vascular Clinic Rooming Questions        Patient is here for a post-op to discuss Peripheral Artery Disease. Symptoms are  none.    /77   Pulse 78   Temp 97.7  F (36.5  C)   Resp 20   LMP  (LMP Unknown)   SpO2 96%     The provider has been notified that the patient has no concerns.     Questions patient would like addressed today are: N/A.    Refills are needed: No    Has homecare services and agency name:  Yes: [facility]

## 2024-09-16 NOTE — PATIENT INSTRUCTIONS
Bridger Durham,    Thank you for entrusting your care with us today. After your visit today with Dr. Howard this is the plan that was discussed at your appointment.    You are scheduled for 3 month follow-up with imaging prior      I am including additional information on these things and our contact information if you have any questions or concerns.   Please do not hesitate to reach out to us if you felt we did not answer your questions or you are unsure of the treatment plan after your visit today. Our number is [phone].Thank you for trusting us with your care.         Again thank you for your time.     Ankle-Brachial Index (LINDA) or Physiologic Test    Description  An ankle-brachial index test is relatively pain free. Blood pressure cuffs of various sizes are placed on your thigh, calf, foot and toes.  Similar to having your blood pressure checked with an arm cuff, as the technician inflates the cuffs, they progressively tighten and are then quickly released.  You may feel some discomfort, but generally for less than 60 seconds for each measurement. You will be asked to remove your socks and shoes and possibly your pants or shorts. Gowns will be provided. It usually takes about 30-60 minutes.   Depending on the initial readings and patient symptoms, you may be asked to perform a light walk on a treadmill.  The technician will apply ultrasound gel, usually warmed for your comfort, to your ankles and wrists. Through the gel, the technician will use a small hand-held device that emits sound waves.  Risks  There are typically no side effects or complications associated with a physiologic study.  How to Prepare  Eat and take medications as usual.  There is no preparation required for an ankle-brachial index (LINDA) or physiologic exam.  What Can I Expect After the Test?  The technician will send the ultrasound images to your vascular surgeon for evaluation. Typically, a report is available in 2-3 days. If anything  critical is found, it is standard practice to notify the vascular surgeon immediately.  Reference: [email or web address]/patient-resources/vascular-tests     Lower Extremity Arterial Ultrasound    Description  Ultrasound examinations are painless and easy for the patient. The vascular laboratory will contain a bed and just two or three pieces of equipment. You will be asked to remove pants or shorts and gowns will be provided. It usually takes about 30 minutes.  The technician will tuck a towel under your underpants in the groin. The gel is water-soluble and will not stain your skin or clothes.  Ultrasound gel, usually warmed for your comfort, will be placed on the inner side of your legs.    Through the gel, the technician will apply to your legs a small hand-held device that emits sound waves.  When the test is completed, the technician will remove excess gel from your legs.    Risks  There are typically no side effects or complications associated with a lower extremity arterial duplex ultrasound.  How to Prepare  Eat and take medications as usual.  There is no preparation required for a lower extremity arterial duplex ultrasound.  What Can I Expect After the Test?  The technician will send the ultrasound images to your vascular surgeon for evaluation. Typically, a report is available in 2-3 days. If anything critical is found, it is standard practice to notify the vascular surgeon immediately.  Reference: [email or web address].org/patient-resources/vascular-tests

## 2024-09-17 DIAGNOSIS — Z53.9 DIAGNOSIS NOT YET DEFINED: Primary | ICD-10-CM

## 2024-09-17 PROCEDURE — G0179 MD RECERTIFICATION HHA PT: HCPCS | Performed by: INTERNAL MEDICINE

## 2024-09-26 ENCOUNTER — DOCUMENTATION ONLY (OUTPATIENT)
Dept: ANTICOAGULATION | Facility: CLINIC | Age: 89
End: 2024-09-26
Payer: COMMERCIAL

## 2024-09-26 NOTE — PROGRESS NOTES
ANTICOAGULATION DIRECT ORAL ANTICOAGULANT MONITORING    SUBJECTIVE     The [facility] is evaluating Marva Gaines's Apixaban (Eliquis) as part of its Anticoagulation Monitoring Program.    Indication:Atrial Fibrillation  Current dose per medication list: Apixaban 2.5 mg BID  Recent hospitalizations/ED/Office Visits for bleeding/clotting concerns: No  Other bleeding or side effect concerns: Yes: history of GI bleed noted in 12/2022 with no recurrence mentioned.  Additional findings: history of PAD; atherosclerosis of artery; ischemic changes of right foot toes with eventual toe amputation.    OBJECTIVE     Age: 89 year old    Wt Readings from Last 4 Encounters:   07/22/24 60.3 kg (133 lb)   07/03/24 60.3 kg (133 lb)   07/02/24 60.3 kg (133 lb)   06/26/24 60.5 kg (133 lb 6.4 oz)          Lab Results   Component Value Date    CR 1.2 (H) 07/05/2024    CR 1.04 (H) 06/20/2024    CR 0.99 (H) 06/19/2024     Creatinine Clearance (using actual bodyweight, mL/min):     Lab Results   Component Value Date    HGB 10.5 07/31/2024     07/31/2024     ASSESSMENT/PLAN     A chart review for Direct Oral Anticoagulant (DOAC) Stewardship has been completed for:     Dosing: dose appropriate for age and weight    Plan made per [facility] anticoagulation protocol    Veronica Macario RN  Anticoagulation Clinic

## 2024-09-30 NOTE — PROGRESS NOTES
Ms. Kiah Gaines is an 89-year-old female with a history of chronic limb-threatening ischemia who underwent a right lower extremity angiogram in July 2024 with Dr. Schroeder.  This identified high-grade stenosis in the right Buck's canal for which she underwent plain balloon angioplasty of the right distal superficial femoral artery as well as the right anterior tibial and dorsalis pedis arteries..  During this angiogram there is evidence of distal embolization on the right foot, explaining her toe pressures of 0 and gangrenous skin changes.  She has since undergone fifth toe amputation.  This amputation site is healing well however the right second toe tip has early signs of dry gangrene.  Dr. Peres is currently recommending continued local wound care however she may require second toe potation in the future.  We reviewed the results of her arterial duplex ultrasound today that demonstrates mild stenosis of the right superficial femoral artery with transition from biphasic to monophasic waveforms in the level of the tibial arteries.  The right LINDA is noncompressible, toe pressures are 50 mmHg and greater in the first, third, and fourth toes.  The second toe is a toe pressure of 0.    She will plan to continue best medical therapy with continued Eliquis, Plavix, and high-dose statin.  She will continue undergo surveillance of the right fifth toe amputation site as well as the dusky right second toe with Dr. Peres.  Should she require additional toe amputation or partial foot amputation I do expect her to have reasonable chance of healing a transmetatarsal amputation based on her completion angiography imaging and noninvasive studies performed today.  She will turn to vascular clinic in 3 months for routine follow-up with repeat ABIs and arterial duplex ultrasound.    20 minutes spent on encounter    Discussed with staff, Dr. Cantu.    Shane Howard MD

## 2024-10-01 ENCOUNTER — OFFICE VISIT (OUTPATIENT)
Dept: VASCULAR SURGERY | Facility: CLINIC | Age: 89
End: 2024-10-01
Attending: PODIATRIST
Payer: COMMERCIAL

## 2024-10-01 VITALS
SYSTOLIC BLOOD PRESSURE: 145 MMHG | DIASTOLIC BLOOD PRESSURE: 73 MMHG | RESPIRATION RATE: 18 BRPM | HEART RATE: 96 BPM | TEMPERATURE: 97.6 F | OXYGEN SATURATION: 97 %

## 2024-10-01 DIAGNOSIS — I96 GANGRENE OF TOE OF RIGHT FOOT (H): ICD-10-CM

## 2024-10-01 DIAGNOSIS — L97.511 ULCER OF RIGHT FOOT LIMITED TO BREAKDOWN OF SKIN (H): Primary | ICD-10-CM

## 2024-10-01 PROCEDURE — G2211 COMPLEX E/M VISIT ADD ON: HCPCS | Performed by: PODIATRIST

## 2024-10-01 PROCEDURE — G0463 HOSPITAL OUTPT CLINIC VISIT: HCPCS | Performed by: PODIATRIST

## 2024-10-01 PROCEDURE — 99214 OFFICE O/P EST MOD 30 MIN: CPT | Performed by: PODIATRIST

## 2024-10-01 ASSESSMENT — PAIN SCALES - GENERAL: PAINLEVEL: NO PAIN (0)

## 2024-10-01 NOTE — NURSING NOTE
"Per  today. His recommendations were pt to be limited weight bearing, and to only walk and bear weight if needed in a post op shoe and to keep using wheel chair as needed.  Dtr, Veronica looked over paper work and states that Dr. Peres gave her the OK to walk all the time, if she is wearing the correct shoe. She does not want her mother in a wheel chair any longer. Dtr asked that I remove the wheel chair and \"limited weight bearing\" from the AVS. Attempted to explain to dtr what limited weight bearing was, but dtr was insistent this would stop her mom from being able to walk freely.   Changed AVS per dtrs request, but advised on adverse side effects of walking to much. Both pt and dtr verbalized understanding.   Dr. Peres notified.    Mehreen Christie LPN on 10/1/2024 at 2:49 PM    "

## 2024-10-01 NOTE — PATIENT INSTRUCTIONS
Please eval for PT and OT    Important lnstructions      WEIGHT BEARING STATUS: OK to walk and bear weight as tolerated on your right foot with a walker    2. OFFLOADING DEVICE: Use your walker when walking.     3. STABILIZATION DEVICE: Use a Post op shoe while walking/weight bearing.  Use a Prevalon boot while in bed.     4. ELEVATE: Elevating your leg means laying with your head on a pillow and your foot ABOVE YOUR HEART.     5. DO NOT MOVE YOUR FOOT.  There is a risk of worsening the wound or incision. To give yourself a higher chance of healing, please DO NOT swing foot back and forth and wiggle foot/toes especially when inside a stabilization device. Limited movement is allowable with therapy as recommended by the doctor.     6. TAKE A PROTEIN SHAKE TWICE A DAY.  (For ex: Boost, Ensure, Glucerna)    7. KEEP YOUR WOUND DRY AT ALL TIMES    Use a shower bag or a cast cover to keep your foot/leg dry during showers. These can be purchased on [employer] or any pharmacy.         Dressing Change lnstructions        Right 2nd toe and Right Lateral foot: apply iodine rahul, no gauze needed. OK to wear a sock         SEEK MEDICAL CARE IF:  You have an increase in swelling, pain, or redness around the wound.  You have an increase in the amount of pus coming from the wound.  There is a bad smell coming from the wound.  The wound appears to be worsening/enlarging  You have a fever greater than 101.5 F      It is ok to continue current wound care treatment/products for the next 2-3 days until new wound care supplies are ordered and arrive. If longer than this please contact our office at [phone].      We want to hear from you!   In the next few weeks, you should receive a call or email to complete a survey about your visit at [facility]. Please help us improve your appointment experience by letting us know how we did today. We strive to make your experience good and value any ways in which we could do better.       We value your input and suggestions.    Thank you for choosing the [facility]!

## 2024-10-01 NOTE — PROGRESS NOTES
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  She also has stable eschar along the lateral right foot.    -Based on the above, we discussed treatment options including continued conservative care with iodine to be applied daily at both areas.    -Also discussed that more proximal foot amputation may be necessary.  Recent ABIs indicate 0 mmHg second it right foot and remaining digits have increased pressures.  Vascular surgery notes that proximal foot amputation has chance of healing.  We will consider surgery at a later date if necessary.    -Okay to begin limited weightbearing on the right foot with surgical shoe.    -She will follow-up in 4 weeks    30 minutes spent on the day of encounter doing chart review, history and exam, documentation, and further activities as noted.     Henry Peres DPM  [facility] [place]      HPI: Marva Gaines was seen again today for right foot ulceration.  Patient's daughter is present today and reports that she has remained mostly nonweightbearing on the right foot.  Patient was recently evaluated by vascular surgery.    Past Medical History:   Diagnosis Date    Arthritis     Chronic atrial fibrillation (H)     Depressive disorder     History of blood transfusion     Antibodies    Hypertension        Past Surgical History:   Procedure Laterality Date    AMPUTATE TOE(S) Right 05/30/2024    Procedure: AMPUTATION, FIFTH DIGIT RIGHT FOOT;  Surgeon: Henry Peres DPM;  Location: [facility] OR    CATARACT EXTRACTION Bilateral     CHOLECYSTECTOMY      COLONOSCOPY N/A 12/30/2022    Procedure: COLONOSCOPY with argon plasma coagulation;  Surgeon:  Steven Driscoll MD;  Location: [facility] OR    ESOPHAGOSCOPY, GASTROSCOPY, DUODENOSCOPY (EGD), COMBINED N/A 12/30/2022    Procedure: ESOPHAGOGASTRODUODENOSCOPY (EGD) with argon plasma coagulation;  Surgeon: Steven Driscoll MD;  Location: [facility] OR    HRW ANES TOTAL HIP REPLACEMENT, MDA 1 Bilateral     IR LOWER EXTREMITY ANGIOGRAM RIGHT  7/25/2024       Allergies   Allergen Reactions    Blood Transfusion Related (Informational Only) Other (See Comments)     Patient has a history of a clinically significant antibody against RBC antigens.  A delay in compatible RBCs may occur. 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    Hydrocodone-Acetaminophen Unknown         Current Outpatient Medications:     acetaminophen (TYLENOL) 500 MG tablet, Take 1,000 mg by mouth every 6 hours as needed for mild pain, Disp: , Rfl:     apixaban ANTICOAGULANT (ELIQUIS ANTICOAGULANT) 2.5 MG tablet, Take 1 tablet (2.5 mg) by mouth 2 times daily, Disp: 180 tablet, Rfl: 3    atorvastatin (LIPITOR) 20 MG tablet, TAKE 1 TABLET BY MOUTH EVERYDAY AT BEDTIME, Disp: 90 tablet, Rfl: 1    cholecalciferol, vitamin D3, (VITAMIN D3) 2,000 unit Tab, [CHOLECALCIFEROL, VITAMIN D3, (VITAMIN D3) 2,000 UNIT TAB] Take 1 tablet by mouth daily., Disp: , Rfl:     clopidogrel (PLAVIX) 75 MG tablet, Take 1 tablet (75 mg) by mouth daily for 90 days, Disp: 30 tablet, Rfl: 2    diltiazem ER COATED BEADS (CARDIZEM CD/CARTIA XT) 240 MG 24 hr capsule, Take 1 capsule (240 mg) by mouth daily, Disp: 90 capsule, Rfl: 3    Ferrous Sulfate 324 (65 Fe) MG TBEC, Take 1 tablet by mouth daily, Disp: , Rfl:     fish oil-omega-3 fatty acids 500 MG capsule, Take 1 capsule by mouth daily, Disp: , Rfl:     furosemide (LASIX) 20 MG tablet, TAKE 2 TABLETS BY MOUTH EVERY DAY, Disp: 180 tablet, Rfl: 1    lactobacillus rhamnosus, GG, (CULTURELL) capsule, Take 1 capsule by mouth 2 times daily, Disp: , Rfl:     losartan (COZAAR) 25 MG tablet, Take 25 mg by mouth daily HOLD this medication until primary  care provider follow up, Disp: , Rfl:     pantoprazole (PROTONIX) 40 MG EC tablet, TAKE 1 TABLET BY MOUTH TWICE A DAY, Disp: 180 tablet, Rfl: 3    sertraline (ZOLOFT) 100 MG tablet, Take 1 tablet (100 mg) by mouth daily., Disp: 90 tablet, Rfl: 3    Tuberculin PPD (TUBERSOL ID), , Disp: , Rfl:     Review of Systems - 10 point Review of Systems is negative except for gangrene right foot which is noted in HPI.      OBJECTIVE:  BP (!) 145/73   Pulse 96   Temp 97.6  F (36.4  C)   Resp 18   LMP  (LMP Unknown)   SpO2 97%   General appearance: Patient is alert and fully cooperative with history & exam.  No sign of distress is noted during the visit.    Vascular: Dorsalis pedis non-palpableRight.  Dermatologic:    VASC Wound R lateral foot (Active)   Pre Size Length 0 09/03/24 1442   Pre Size Width 0 09/03/24 1442   Pre Size Depth 0 09/03/24 1442   Pre Total Sq cm 0 09/03/24 1442   Post Size Length 1 09/03/24 1442   Post Size Width 0.6 09/03/24 1442   Post Size Depth 0.2 09/03/24 1442   Post Total Sq cm 0.6 09/03/24 1442   Description dry scab 10/01/24 1300       VASC Wound R 5th toe amp site (Active)   Pre Size Length 0.7 08/13/24 1300   Pre Size Width 0.3 08/13/24 1300   Pre Size Depth 0.3 08/13/24 1300   Pre Total Sq cm 0.21 08/13/24 1300   Post Size Length 0 09/03/24 1442   Post Size Width 0 09/03/24 1442   Post Size Depth 0 09/03/24 1442   Post Total Sq cm 0 09/03/24 1442       Incision/Surgical Site 05/30/24 Right;Lateral Foot (Active)   Necrotic/nonviable tissue distal medial second digit right foot.  Stable eschar lateral distal fifth metatarsal right foot.  No erythema right lower extremity.  Neurologic: Intact to light touch Right.  Musculoskeletal: Contracted digits noted Right.      Picture:

## 2024-10-09 ENCOUNTER — TELEPHONE (OUTPATIENT)
Dept: CARDIOLOGY | Facility: CLINIC | Age: 89
End: 2024-10-09
Payer: COMMERCIAL

## 2024-10-09 DIAGNOSIS — L97.909 ATHEROSCLEROSIS OF ARTERY OF EXTREMITY WITH ULCERATION (H): ICD-10-CM

## 2024-10-09 DIAGNOSIS — I70.299 ATHEROSCLEROSIS OF ARTERY OF EXTREMITY WITH ULCERATION (H): ICD-10-CM

## 2024-10-09 DIAGNOSIS — I10 ESSENTIAL HYPERTENSION: ICD-10-CM

## 2024-10-09 RX ORDER — CLOPIDOGREL BISULFATE 75 MG/1
75 TABLET ORAL DAILY
Qty: 90 TABLET | Refills: 3 | Status: SHIPPED | OUTPATIENT
Start: 2024-10-09 | End: 2025-10-04

## 2024-10-09 RX ORDER — LOSARTAN POTASSIUM 25 MG/1
25 TABLET ORAL DAILY
Qty: 90 TABLET | Refills: 0 | Status: SHIPPED | OUTPATIENT
Start: 2024-10-09

## 2024-10-09 NOTE — TELEPHONE ENCOUNTER
Dr. Ricci -- are you okay with me filling this pt's Losartan under your name. She has a follow-up with you sched 10/18/24. Kaity. maury

## 2024-10-09 NOTE — TELEPHONE ENCOUNTER
[facility] Call Center    Phone Message    May a detailed message be left on voicemail: yes     Reason for Call: Medication Refill Request    Has the patient contacted the pharmacy for the refill? Yes   Name of medication being requested: losartan (COZAAR) 25 MG tablet   Provider who prescribed the medication: Dr. Jimbo Joseph - would like Dr. Ricci to take over  Pharmacy:   [facility]Y [id number] [place], [place] - [address]     Date medication is needed: as soon as  Is able to send script     Action Taken: Message routed to:  [facility] cardio    Travel Screening: Not Applicable    Thank you!  [facility]       Date of Service:

## 2024-10-09 NOTE — TELEPHONE ENCOUNTER
Refill request approved and sent to [facility] pharmacy. aj    ---------------------------------------  Msg received:   From: Kai Ricci MD  Sent: 10/9/2024   3:03 PM CDT    yes

## 2024-10-10 ENCOUNTER — TELEPHONE (OUTPATIENT)
Dept: VASCULAR SURGERY | Facility: CLINIC | Age: 89
End: 2024-10-10
Payer: COMMERCIAL

## 2024-10-10 ENCOUNTER — TELEPHONE (OUTPATIENT)
Dept: INTERNAL MEDICINE | Facility: CLINIC | Age: 89
End: 2024-10-10
Payer: COMMERCIAL

## 2024-10-10 NOTE — TELEPHONE ENCOUNTER
Outgoing call to Di, relayed provider's approval of requested home care orders via secured/confidential VM.

## 2024-10-10 NOTE — TELEPHONE ENCOUNTER
Caller: Greta with [facility]    Provider: RODY Peres    Detailed reason for call: Greta is calling wondering if Kiah can use a stationary bike for therapy?     Best phone number to contact: [phone]    Best time to contact: any    Ok to leave a detailed message: Yes    Ok to speak to authorized person if needed: No      (Noted to patient if reason is related to wound or incision, to please send a photo via email or [employer] in [employer].)

## 2024-10-10 NOTE — TELEPHONE ENCOUNTER
Home Care is calling regarding an established patient with [facility].       Requesting orders from: Sabas Austin  Provider is following patient: Yes  Is this a 60-day recertification request?  Yes    Orders Requested    Physical Therapy  Request for recertification   Frequency: 1 x week for 4 weeks    Confirmed ok to leave a detailed message with call back.  Contact information confirmed and updated as needed.    Breanna CRANDALL [facility] [phone] [facility]    Bria Neumann RN

## 2024-10-14 ENCOUNTER — MEDICAL CORRESPONDENCE (OUTPATIENT)
Dept: HEALTH INFORMATION MANAGEMENT | Facility: CLINIC | Age: 89
End: 2024-10-14
Payer: COMMERCIAL

## 2024-10-14 ENCOUNTER — TELEPHONE (OUTPATIENT)
Dept: INTERNAL MEDICINE | Facility: CLINIC | Age: 89
End: 2024-10-14
Payer: COMMERCIAL

## 2024-10-14 NOTE — TELEPHONE ENCOUNTER
Home Care is calling regarding an established patient with [facility].       Requesting orders from: Sabas Austin  Provider is following patient: Yes  Is this a 60-day recertification request?  No    Orders Requested    Occupational Therapy  Request for initial certification (first set of orders)   Frequency:  1 visit every other week for 2 visits      Information was gathered and will be sent to provider for review.  RN will contact Home Care with information after provider review.  Confirmed ok to leave a detailed message with call back.  Contact information confirmed and updated as needed.    Janice Russell RN

## 2024-10-18 ENCOUNTER — OFFICE VISIT (OUTPATIENT)
Dept: CARDIOLOGY | Facility: CLINIC | Age: 89
End: 2024-10-18
Payer: COMMERCIAL

## 2024-10-18 VITALS
DIASTOLIC BLOOD PRESSURE: 67 MMHG | SYSTOLIC BLOOD PRESSURE: 136 MMHG | BODY MASS INDEX: 22.86 KG/M2 | WEIGHT: 125 LBS | OXYGEN SATURATION: 98 % | HEART RATE: 74 BPM

## 2024-10-18 DIAGNOSIS — I48.21 PERMANENT ATRIAL FIBRILLATION (H): Primary | ICD-10-CM

## 2024-10-18 DIAGNOSIS — R31.0 GROSS HEMATURIA: ICD-10-CM

## 2024-10-18 DIAGNOSIS — D50.0 IRON DEFICIENCY ANEMIA DUE TO CHRONIC BLOOD LOSS: ICD-10-CM

## 2024-10-18 LAB
ALBUMIN UR-MCNC: NEGATIVE MG/DL
APPEARANCE UR: CLEAR
BILIRUB UR QL STRIP: NEGATIVE
COLOR UR AUTO: ABNORMAL
ERYTHROCYTE [DISTWIDTH] IN BLOOD BY AUTOMATED COUNT: 16.3 % (ref 10–15)
GLUCOSE UR STRIP-MCNC: NEGATIVE MG/DL
HCT VFR BLD AUTO: 38.1 % (ref 35–47)
HGB BLD-MCNC: 11.5 G/DL (ref 11.7–15.7)
HGB UR QL STRIP: NEGATIVE
KETONES UR STRIP-MCNC: NEGATIVE MG/DL
LEUKOCYTE ESTERASE UR QL STRIP: ABNORMAL
MCH RBC QN AUTO: 25 PG (ref 26.5–33)
MCHC RBC AUTO-ENTMCNC: 30.2 G/DL (ref 31.5–36.5)
MCV RBC AUTO: 83 FL (ref 78–100)
NITRATE UR QL: NEGATIVE
PH UR STRIP: 6 [PH] (ref 5–7)
PLATELET # BLD AUTO: 284 10E3/UL (ref 150–450)
RBC # BLD AUTO: 4.6 10E6/UL (ref 3.8–5.2)
SP GR UR STRIP: 1.01 (ref 1–1.03)
UROBILINOGEN UR STRIP-MCNC: <2 MG/DL
WBC # BLD AUTO: 9.4 10E3/UL (ref 4–11)

## 2024-10-18 PROCEDURE — 36415 COLL VENOUS BLD VENIPUNCTURE: CPT | Performed by: INTERNAL MEDICINE

## 2024-10-18 PROCEDURE — G2211 COMPLEX E/M VISIT ADD ON: HCPCS | Performed by: INTERNAL MEDICINE

## 2024-10-18 PROCEDURE — 85027 COMPLETE CBC AUTOMATED: CPT | Performed by: INTERNAL MEDICINE

## 2024-10-18 PROCEDURE — 81003 URINALYSIS AUTO W/O SCOPE: CPT | Performed by: INTERNAL MEDICINE

## 2024-10-18 PROCEDURE — 99214 OFFICE O/P EST MOD 30 MIN: CPT | Performed by: INTERNAL MEDICINE

## 2024-10-18 NOTE — PATIENT INSTRUCTIONS
Marva Gaines,    [facility] was a [facility].     My recommendations after this visit include:    Blood work  You should receive RSV vaccine     JEN Ricci MD, FACC, [facility] Center[facility]

## 2024-10-18 NOTE — PROGRESS NOTES
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 Due to the added complexity in care, I will continue to support Kiah in the subsequent management and with ongoing continuity of care.   Subjective:   This is 89 year old female who comes in today for follow-up visit.  She reports no new chest symptoms or heart palpitations.  She has developed gangrene of the fifth right toe.  She eventually required amputation.  She had vascular studies which showed peripheral vascular disease for which she underwent balloon angioplasty.  She has been be able to walk because of healing of the amputation site.  She is now reports that one of the finger became purple.    She has noticed some blood in her urine    Review of Systems:   Negative other than history of present illness    Objective:   /67 (BP Location: Left arm, Patient Position: Sitting, Cuff Size: Adult Regular)   Pulse 74   Wt 56.7 kg (125 lb)   LMP  (LMP Unknown)   SpO2 98%   BMI 22.86 kg/m    Physical Exam:  GENERAL: no distress  NECK: No JVD  LUNGS: Clear to auscultation.  CARDIAC: irregular rhythm, S1 & S2 normal.  No heaves, thrills, gallops or murmurs.  ABDOMEN: flat, negative hepatosplenomegaly, soft and non-tender.  EXTREMITIES: Right walking boot left lower extremity no edema    Current Outpatient Medications   Medication Sig Dispense Refill    acetaminophen (TYLENOL) 500 MG  tablet Take 1,000 mg by mouth every 6 hours as needed for mild pain      apixaban ANTICOAGULANT (ELIQUIS ANTICOAGULANT) 2.5 MG tablet Take 1 tablet (2.5 mg) by mouth 2 times daily 180 tablet 3    atorvastatin (LIPITOR) 20 MG tablet TAKE 1 TABLET BY MOUTH EVERYDAY AT BEDTIME 90 tablet 1    cholecalciferol, vitamin D3, (VITAMIN D3) 2,000 unit Tab [CHOLECALCIFEROL, VITAMIN D3, (VITAMIN D3) 2,000 UNIT TAB] Take 1 tablet by mouth daily.      clopidogrel (PLAVIX) 75 MG tablet Take 1 tablet (75 mg) by mouth daily. 90 tablet 3    diltiazem ER COATED BEADS (CARDIZEM CD/CARTIA XT) 240 MG 24 hr capsule Take 1 capsule (240 mg) by mouth daily 90 capsule 3    Ferrous Sulfate 324 (65 Fe) MG TBEC Take 1 tablet by mouth daily      fish oil-omega-3 fatty acids 500 MG capsule Take 1 capsule by mouth daily      furosemide (LASIX) 20 MG tablet TAKE 2 TABLETS BY MOUTH EVERY  tablet 1    lactobacillus rhamnosus, GG, (CULTURELL) capsule Take 1 capsule by mouth 2 times daily      losartan (COZAAR) 25 MG tablet Take 1 tablet (25 mg) by mouth daily. HOLD this medication until primary care provider follow up 90 tablet 0    pantoprazole (PROTONIX) 40 MG EC tablet TAKE 1 TABLET BY MOUTH TWICE A  tablet 3    sertraline (ZOLOFT) 100 MG tablet Take 1 tablet (100 mg) by mouth daily. 90 tablet 3    Tuberculin PPD (TUBERSOL ID)          Cardiographics:    Holter: April 2015   Persistent atrial fibrillation with overall fairly well controlled  ventricular response. There was some tendency toward rapid ventricular response  with activity in the early afternoon hours. A moderate number of ventricular  premature beats noted. 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     Echocardiogram: July 2024   Left ventricular size, wall motion and function are normal. The ejection  fraction is 55-60%  Normal right ventricle size and systolic function.  The left atrium is moderate to severely dilated.  The right atrium is severely dilated.  Mild (35-45mmHg) pulmonary  hypertension is present.  IVC diameter <2.1 cm collapsing >50% with sniff suggests a normal RA pressure  of 3 mmHg     Stress Test: 2013   Mixed anterior perfusion defect     CT coronary angio: 2013   Normal coronaries, calcium score 2     Lab Results    Chemistry/lipid CBC Cardiac Enzymes/BNP/TSH/INR   Recent Labs   Lab Test 01/11/24  1722   CHOL 153   HDL 62   LDL 74   TRIG 86     Recent Labs   Lab Test 01/11/24  1722 10/30/19  1632 05/17/18  1550   LDL 74 75 86     Recent Labs   Lab Test 07/05/24  1844 06/20/24  0616   NA  --  142   POTASSIUM  --  3.8   CHLORIDE  --  106   CO2  --  24   GLC  --  106*  89   BUN  --  20.3   CR 1.2* 1.04*   GFRESTIMATED 44* 52*   FAROOQ  --  9.2     Recent Labs   Lab Test 07/05/24  1844 06/20/24  0616 06/19/24  0827   CR 1.2* 1.04* 0.99*     Recent Labs   Lab Test 01/11/24  1722 05/17/18  1550   A1C 5.3 5.7          Recent Labs   Lab Test 07/31/24  1311   WBC 7.7   HGB 10.5*   HCT 35.5   MCV 88        Recent Labs   Lab Test 07/31/24  1311 06/10/24  0948 06/04/24  1030   HGB 10.5* 11.1* 11.8    Recent Labs   Lab Test 03/10/23  1627   TROPONINI 0.02     Recent Labs   Lab Test 12/28/22  1900 12/28/22  1554 12/02/22  1021   *  --   --    NTBNP  --  3,702* 2,360*     Recent Labs   Lab Test 01/11/24  1722   TSH 3.58     Recent Labs   Lab Test 03/10/23  1627 09/01/21  1537 07/19/21  1421   INR 1.68* 1.9* 2.5*

## 2024-10-18 NOTE — LETTER
10/18/2024    Sabas Austin MD  [address] [facility] [address]    RE: Marva Gaines       Dear Colleague,     I had the pleasure of seeing Marva REMI Latrelledwin in the [facility].      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 Due to the added complexity in care, I will continue to support Kiah in the subsequent management and with ongoing continuity of care.   Subjective:   This is 89 year old female who comes in today for follow-up visit.  She reports no new chest symptoms or heart palpitations.  She has developed gangrene of the fifth right toe.  She eventually required amputation.  She had vascular studies which showed peripheral vascular disease for which she underwent balloon angioplasty.  She has been be able to walk because of healing of the amputation site.  She is now reports that one of the finger became purple.    She has noticed some blood in her urine    Review of Systems:   Negative other than history of present illness    Objective:   /67 (BP Location: Left arm, Patient Position: Sitting, Cuff Size: Adult Regular)   Pulse 74   Wt 56.7 kg (125 lb)   LMP  (LMP Unknown)   SpO2 98%   BMI 22.86 kg/m    Physical Exam:  GENERAL: no distress  NECK: No JVD  LUNGS: Clear to auscultation.  CARDIAC: irregular rhythm, S1 & S2 normal.  No heaves, thrills, gallops or murmurs.  ABDOMEN: flat, negative  hepatosplenomegaly, soft and non-tender.  EXTREMITIES: Right walking boot left lower extremity no edema    Current Outpatient Medications   Medication Sig Dispense Refill     acetaminophen (TYLENOL) 500 MG tablet Take 1,000 mg by mouth every 6 hours as needed for mild pain       apixaban ANTICOAGULANT (ELIQUIS ANTICOAGULANT) 2.5 MG tablet Take 1 tablet (2.5 mg) by mouth 2 times daily 180 tablet 3     atorvastatin (LIPITOR) 20 MG tablet TAKE 1 TABLET BY MOUTH EVERYDAY AT BEDTIME 90 tablet 1     cholecalciferol, vitamin D3, (VITAMIN D3) 2,000 unit Tab [CHOLECALCIFEROL, VITAMIN D3, (VITAMIN D3) 2,000 UNIT TAB] Take 1 tablet by mouth daily.       clopidogrel (PLAVIX) 75 MG tablet Take 1 tablet (75 mg) by mouth daily. 90 tablet 3     diltiazem ER COATED BEADS (CARDIZEM CD/CARTIA XT) 240 MG 24 hr capsule Take 1 capsule (240 mg) by mouth daily 90 capsule 3     Ferrous Sulfate 324 (65 Fe) MG TBEC Take 1 tablet by mouth daily       fish oil-omega-3 fatty acids 500 MG capsule Take 1 capsule by mouth daily       furosemide (LASIX) 20 MG tablet TAKE 2 TABLETS BY MOUTH EVERY  tablet 1     lactobacillus rhamnosus, GG, (CULTURELL) capsule Take 1 capsule by mouth 2 times daily       losartan (COZAAR) 25 MG tablet Take 1 tablet (25 mg) by mouth daily. HOLD this medication until primary care provider follow up 90 tablet 0     pantoprazole (PROTONIX) 40 MG EC tablet TAKE 1 TABLET BY MOUTH TWICE A  tablet 3     sertraline (ZOLOFT) 100 MG tablet Take 1 tablet (100 mg) by mouth daily. 90 tablet 3     Tuberculin PPD (TUBERSOL ID)          Cardiographics:    Holter: April 2015   Persistent atrial fibrillation with overall fairly well controlled  ventricular response. There was some tendency toward rapid ventricular response  with activity in the early afternoon hours. A moderate number of ventricular  premature beats noted. 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     Echocardiogram: July 2024   Left ventricular size, wall motion and  function are normal. The ejection  fraction is 55-60%  Normal right ventricle size and systolic function.  The left atrium is moderate to severely dilated.  The right atrium is severely dilated.  Mild (35-45mmHg) pulmonary hypertension is present.  IVC diameter <2.1 cm collapsing >50% with sniff suggests a normal RA pressure  of 3 mmHg     Stress Test: 2013   Mixed anterior perfusion defect     CT coronary angio: 2013   Normal coronaries, calcium score 2     Lab Results    Chemistry/lipid CBC Cardiac Enzymes/BNP/TSH/INR   Recent Labs   Lab Test 01/11/24  1722   CHOL 153   HDL 62   LDL 74   TRIG 86     Recent Labs   Lab Test 01/11/24  1722 10/30/19  1632 05/17/18  1550   LDL 74 75 86     Recent Labs   Lab Test 07/05/24  1844 06/20/24  0616   NA  --  142   POTASSIUM  --  3.8   CHLORIDE  --  106   CO2  --  24   GLC  --  106*  89   BUN  --  20.3   CR 1.2* 1.04*   GFRESTIMATED 44* 52*   FAROOQ  --  9.2     Recent Labs   Lab Test 07/05/24  1844 06/20/24  0616 06/19/24  0827   CR 1.2* 1.04* 0.99*     Recent Labs   Lab Test 01/11/24  1722 05/17/18  1550   A1C 5.3 5.7          Recent Labs   Lab Test 07/31/24  1311   WBC 7.7   HGB 10.5*   HCT 35.5   MCV 88        Recent Labs   Lab Test 07/31/24  1311 06/10/24  0948 06/04/24  1030   HGB 10.5* 11.1* 11.8    Recent Labs   Lab Test 03/10/23  1627   TROPONINI 0.02     Recent Labs   Lab Test 12/28/22  1900 12/28/22  1554 12/02/22  1021   *  --   --    NTBNP  --  3,702* 2,360*     Recent Labs   Lab Test 01/11/24  1722   TSH 3.58     Recent Labs   Lab Test 03/10/23  1627 09/01/21  1537 07/19/21  1421   INR 1.68* 1.9* 2.5*                         Thank you for allowing me to participate in the care of your patient.      Sincerely,     Melvin Ricci MD     [facility]  cc:   Kai Ricci MD  [address] [facility]  [address]  [place] [address]

## 2024-10-18 NOTE — LETTER
October 21, 2024      Kiah Gaines  [address]  [facility] [address]        Dear ,    We are writing to inform you of your test results.      Kai Ricci MD  10/18/2024  1:24 PM CDT       No blood in the urine, positive for leukocyte esterase consistent with colonization not urinary tract infection    Kai Ricci MD  10/18/2024 12:40 PM CDT       Stable hemoglobin, continue current anticoagulants       Resulted Orders   CBC with platelets   Result Value Ref Range    WBC Count 9.4 4.0 - 11.0 10e3/uL    RBC Count 4.60 3.80 - 5.20 10e6/uL    Hemoglobin 11.5 (L) 11.7 - 15.7 g/dL    Hematocrit 38.1 35.0 - 47.0 %    MCV 83 78 - 100 fL    MCH 25.0 (L) 26.5 - 33.0 pg    MCHC 30.2 (L) 31.5 - 36.5 g/dL    RDW 16.3 (H) 10.0 - 15.0 %    Platelet Count 284 150 - 450 10e3/uL       If you have any questions or concerns, please call the clinic at the number listed above.       Sincerely,      Kai Ricci MD

## 2024-10-29 ENCOUNTER — TELEPHONE (OUTPATIENT)
Dept: VASCULAR SURGERY | Facility: CLINIC | Age: 89
End: 2024-10-29

## 2024-10-29 ENCOUNTER — OFFICE VISIT (OUTPATIENT)
Dept: VASCULAR SURGERY | Facility: CLINIC | Age: 89
End: 2024-10-29
Attending: PODIATRIST
Payer: COMMERCIAL

## 2024-10-29 VITALS
OXYGEN SATURATION: 98 % | RESPIRATION RATE: 16 BRPM | TEMPERATURE: 98.2 F | HEART RATE: 77 BPM | DIASTOLIC BLOOD PRESSURE: 62 MMHG | SYSTOLIC BLOOD PRESSURE: 151 MMHG

## 2024-10-29 DIAGNOSIS — L97.516 ULCER OF RIGHT FOOT WITH BONE INVOLVEMENT WITHOUT EVIDENCE OF NECROSIS (H): Primary | ICD-10-CM

## 2024-10-29 PROCEDURE — 11044 DBRDMT BONE 1ST 20 SQ CM/<: CPT | Performed by: PODIATRIST

## 2024-10-29 ASSESSMENT — PAIN SCALES - GENERAL: PAINLEVEL_OUTOF10: NO PAIN (0)

## 2024-10-29 NOTE — TELEPHONE ENCOUNTER
"Attempted to reach patient, \"call cannot be completed at this time.\" Calling to schedule follow up with Dr. Cantu. [phone]  _________________________________  ----- Message from Mehreen ALEX sent at 10/29/2024  2:14 PM CDT -----  Can we get an appt scheduled for pt to see vascular surgery?  Please do NOT schedule with Shane (family did not like him), and not Claudia. Preferably with Dr. DILL, if we can make that happen.  "

## 2024-10-29 NOTE — PATIENT INSTRUCTIONS
SCHEDULE -[id number] NEEDS TO BE WITHIN 10 DAYS. WE WILL CALL WITH RESULTS ONCE COMPLETED.     THE VASCULAR TEAM WILL BE CALLING TO SCHEDULE A FOLLOW UP APPOINTMENT.    Important lnstructions      WEIGHT BEARING STATUS: OK to walk and bear weight as tolerated on your right foot with a walker    2. OFFLOADING DEVICE: Use your walker when walking.     3. STABILIZATION DEVICE: Use a Post op shoe while walking/weight bearing.  Use a Prevalon boot while in bed.     4. ELEVATE: Elevating your leg means laying with your head on a pillow and your foot ABOVE YOUR HEART.     5. DO NOT MOVE YOUR FOOT.  There is a risk of worsening the wound or incision. To give yourself a higher chance of healing, please DO NOT swing foot back and forth and wiggle foot/toes especially when inside a stabilization device. Limited movement is allowable with therapy as recommended by the doctor.     6. TAKE A PROTEIN SHAKE TWICE A DAY.  (For ex: Boost, Ensure, Glucerna)    7. KEEP YOUR WOUND DRY AT ALL TIMES    Use a shower bag or a cast cover to keep your foot/leg dry during showers. These can be purchased on [employer] or any pharmacy.         Dressing Change lnstructions        Right 2nd toe: apply iodine daily, no gauze needed.   Right Lateral foot:pack wound with dry gauze and change every day.      SEEK MEDICAL CARE IF:  You have an increase in swelling, pain, or redness around the wound.  You have an increase in the amount of pus coming from the wound.  There is a bad smell coming from the wound.  The wound appears to be worsening/enlarging  You have a fever greater than 101.5 F      It is ok to continue current wound care treatment/products for the next 2-3 days until new wound care supplies are ordered and arrive. If longer than this please contact our office at [phone].      We want to hear from you!   In the next few weeks, you should receive a call or email to complete a survey about your visit at [facility].  Please help us improve your appointment experience by letting us know how we did today. We strive to make your experience good and value any ways in which we could do better.      We value your input and suggestions.    Thank you for choosing the [facility]!

## 2024-10-29 NOTE — PROGRESS NOTES
FOOT AND ANKLE SURGERY/PODIATRY Progress Note      ASSESSMENT:   Dry Gangrene 2nd digit right foot  Ulceration right foot into bone  PAD        TREATMENT:  -I discussed with the patient and her daughter today that the new ulceration along the lateral right foot has increased depth and appears to probe to bone of the fifth metatarsal.  I am concerned about underlying osteomyelitis and referred her for an MRI for further evaluation.    -We briefly discussed treatment options if osteomyelitis is present including partial amputation of the fifth metatarsal versus antibiotic therapy.  We will await MRI report.    -Stable necrotic/nonviable tissue distal second digit right foot.    -Based on the above, I recommend the patient follow-up with vascular surgery to discuss any remaining intervention options to improve blood flow to the right foot.    -After discussion of risk factors, nursing staff removed dressing, cleansed wound and consent obtained 5 cc 2% lidocaine plain was injected along lateral right foot ulceration under clean conditions, the lateral right foot ulceration(s) were debrided using #15 blade scalpel.  Devitalized and nonviable tissue, along with any fibrin and slough, was removed to improve granulation tissue formation, stimulate wound healing, decrease overall bacteria load, disrupt biofilm formation and decrease edge senescence. Wound drainage was scant No. Total excisional debridement was 0.6 sq cm into the bone with a depth of 0.8 cm.   Ulcers were improved afterwards and .  Measures were as noted on the flow sheet.  Gauze dressing applied today which be reapplied daily.    -I will contact the patient with the MRI report when available and we will be guided by the results.     Henry Peres DPM  [facility] [place]      HPI: Mavra Gaines was seen again today for gangrene second digit right foot and follow-up eschar lateral right foot.  Patient's daughter is present for today's  visit.    Past Medical History:   Diagnosis Date    Arthritis     Chronic atrial fibrillation (H)     Depressive disorder     History of blood transfusion     Antibodies    Hypertension        Past Surgical History:   Procedure Laterality Date    AMPUTATE TOE(S) Right 05/30/2024    Procedure: AMPUTATION, FIFTH DIGIT RIGHT FOOT;  Surgeon: Henry Peres DPM;  Location: [facility] OR    CATARACT EXTRACTION Bilateral     CHOLECYSTECTOMY      COLONOSCOPY N/A 12/30/2022    Procedure: COLONOSCOPY with argon plasma coagulation;  Surgeon: Steven Driscoll MD;  Location: [facility] OR    ESOPHAGOSCOPY, GASTROSCOPY, DUODENOSCOPY (EGD), COMBINED N/A 12/30/2022    Procedure: ESOPHAGOGASTRODUODENOSCOPY (EGD) with argon plasma coagulation;  Surgeon: Steven Driscoll MD;  Location: [facility] OR    HRW ANES TOTAL HIP REPLACEMENT, MDA 1 Bilateral     IR LOWER EXTREMITY ANGIOGRAM RIGHT  7/25/2024       Allergies   Allergen Reactions    Blood Transfusion Related (Informational Only) Other (See Comments)     Patient has a history of a clinically significant antibody against RBC antigens.  A delay in compatible RBCs may occur. 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    Hydrocodone-Acetaminophen Unknown         Current Outpatient Medications:     acetaminophen (TYLENOL) 500 MG tablet, Take 1,000 mg by mouth every 6 hours as needed for mild pain, Disp: , Rfl:     apixaban ANTICOAGULANT (ELIQUIS ANTICOAGULANT) 2.5 MG tablet, Take 1 tablet (2.5 mg) by mouth 2 times daily, Disp: 180 tablet, Rfl: 3    atorvastatin (LIPITOR) 20 MG tablet, TAKE 1 TABLET BY MOUTH EVERYDAY AT BEDTIME, Disp: 90 tablet, Rfl: 1    cholecalciferol, vitamin D3, (VITAMIN D3) 2,000 unit Tab, [CHOLECALCIFEROL, VITAMIN D3, (VITAMIN D3) 2,000 UNIT TAB] Take 1 tablet by mouth daily., Disp: , Rfl:     clopidogrel (PLAVIX) 75 MG tablet, Take 1 tablet (75 mg) by mouth daily., Disp: 90 tablet, Rfl: 3    diltiazem ER COATED BEADS (CARDIZEM CD/CARTIA XT) 240 MG 24 hr capsule, Take  1 capsule (240 mg) by mouth daily, Disp: 90 capsule, Rfl: 3    Ferrous Sulfate 324 (65 Fe) MG TBEC, Take 1 tablet by mouth daily, Disp: , Rfl:     fish oil-omega-3 fatty acids 500 MG capsule, Take 1 capsule by mouth daily, Disp: , Rfl:     furosemide (LASIX) 20 MG tablet, TAKE 2 TABLETS BY MOUTH EVERY DAY, Disp: 180 tablet, Rfl: 1    lactobacillus rhamnosus, GG, (CULTURELL) capsule, Take 1 capsule by mouth 2 times daily, Disp: , Rfl:     losartan (COZAAR) 25 MG tablet, Take 1 tablet (25 mg) by mouth daily. HOLD this medication until primary care provider follow up, Disp: 90 tablet, Rfl: 0    pantoprazole (PROTONIX) 40 MG EC tablet, TAKE 1 TABLET BY MOUTH TWICE A DAY, Disp: 180 tablet, Rfl: 3    sertraline (ZOLOFT) 100 MG tablet, Take 1 tablet (100 mg) by mouth daily., Disp: 90 tablet, Rfl: 3    Tuberculin PPD (TUBERSOL ID), , Disp: , Rfl:     Review of Systems - 10 point Review of Systems is negative except for right foot ulcer which is noted in HPI.      OBJECTIVE:  BP (!) 151/62   Pulse 77   Temp 98.2  F (36.8  C)   Resp 16   LMP  (LMP Unknown)   SpO2 98%   General appearance: Patient is alert and fully cooperative with history & exam.  No sign of distress is noted during the visit.    Vascular: Dorsalis pedis non-palpableRight.  Dermatologic:    VASC Wound R lateral foot (Active)   Pre Size Length 0.6 10/29/24 1300   Pre Size Width 0.4 10/29/24 1300   Pre Size Depth 0.3 10/29/24 1300   Pre Total Sq cm 0.24 10/29/24 1300   Post Size Length 1 10/29/24 1300   Post Size Width 0.6 10/29/24 1300   Post Size Depth 0.8 10/29/24 1300   Post Total Sq cm 0.6 10/29/24 1300   Description dry scab 10/01/24 1300       VASC Wound R 5th toe amp site (Active)   Pre Size Length 0.7 08/13/24 1300   Pre Size Width 0.3 08/13/24 1300   Pre Size Depth 0.3 08/13/24 1300   Pre Total Sq cm 0.21 08/13/24 1300   Post Size Length 0 09/03/24 1442   Post Size Width 0 09/03/24 1442   Post Size Depth 0 09/03/24 1442   Post Total Sq cm 0  09/03/24 1442       Incision/Surgical Site 05/30/24 Right;Lateral Foot (Active)   Necrotic/nonviable tissue along distal margin of second digit right foot.  Ulceration lateral fifth metatarsal right foot has increased depth and appears to probe to bone, no erythema.  Neurologic: Diminished to light touch Right.  Musculoskeletal: Contracted digits noted Right.      Picture:            General: well appearing, NAD  Head:  R forehead hematoma  Eyes: EOMI, PERRLA  Nose: midline, dried blood in the nares  Mouth: +lip hematoma, no lacerations, no loose teeth  Neck/Back: No c/t/l ttp or step-off  Cardiac: RRR, no m/r/g  Respiratory: CTABL, equal chest wall expansions, no conversational dyspnea  Abdomen: soft, ND, NT  Pelvis: stable  MSK/Vascular: full ROM, distal pulses intact, soft compartments, warm extremities  Neuro: AAOx3, GCS 15, following commands, answering questions appropriately  Psych: calm, cooperative, normal affect

## 2024-10-30 ENCOUNTER — HOSPITAL ENCOUNTER (OUTPATIENT)
Dept: MRI IMAGING | Facility: HOSPITAL | Age: 89
Discharge: HOME | End: 2024-10-30
Attending: PODIATRIST | Admitting: PODIATRIST
Payer: COMMERCIAL

## 2024-10-30 DIAGNOSIS — L97.516 ULCER OF RIGHT FOOT WITH BONE INVOLVEMENT WITHOUT EVIDENCE OF NECROSIS (H): ICD-10-CM

## 2024-10-30 PROCEDURE — A9585 GADOBUTROL INJECTION: HCPCS | Performed by: PODIATRIST

## 2024-10-30 PROCEDURE — 255N000002 HC RX 255 OP 636: Performed by: PODIATRIST

## 2024-10-30 PROCEDURE — 73720 MRI LWR EXTREMITY W/O&W/DYE: CPT | Mod: RT

## 2024-10-30 RX ORDER — GADOBUTROL 604.72 MG/ML
6 INJECTION INTRAVENOUS ONCE
Status: COMPLETED | OUTPATIENT
Start: 2024-10-30 | End: 2024-10-30

## 2024-10-30 RX ADMIN — GADOBUTROL 6 ML: 604.72 INJECTION INTRAVENOUS at 16:18

## 2024-10-30 NOTE — TELEPHONE ENCOUNTER
LVMT to schedule with either Dr. Cantu or Dr. Ye.  [phone]      At the time of the call, grant to schedule at the 8am spot on 11/18/24 with Dr. Cantu in [place] or with Dr. Ye on 11/7 or 11/8.  Per original message, Dr. Cantu is preferred, but Dr. Peres needs surgery clearance ASAP so grant per Epic Chat messages  on 10/30/2024 to schedule with Dr. Ye sooner if needed.

## 2024-10-31 NOTE — TELEPHONE ENCOUNTER
TCB to schedule telephone visit with Dr. Ja MENDIOLA for MRI results & consult with vascular surgeon (see below). [phone]  _________________________________________    Mehreen Christie LPN  [facility]  Can we schedule a phone visit for this week?   Ok to DB if needed, but we have a couple openings          Previous Messages       ----- Message -----  From: Henry Peres DPM  Sent: 10/30/2024   6:31 PM CDT  To: Mehreen Christie LPN    Please schedule a telephone visit to review MRI report which is positive for osteomyelitis.  Thank you  ----- Message -----  From: Tod Radiarnoldo Ib  Sent: 10/30/2024   5:20 PM CDT  To: Henry Peres DPM

## 2024-11-01 ENCOUNTER — VIRTUAL VISIT (OUTPATIENT)
Dept: VASCULAR SURGERY | Facility: CLINIC | Age: 89
End: 2024-11-01
Attending: PODIATRIST
Payer: COMMERCIAL

## 2024-11-01 VITALS — HEIGHT: 62 IN | BODY MASS INDEX: 23.92 KG/M2 | WEIGHT: 130 LBS

## 2024-11-01 DIAGNOSIS — I96 GANGRENE OF TOE OF RIGHT FOOT (H): Primary | ICD-10-CM

## 2024-11-01 DIAGNOSIS — M86.171 ACUTE OSTEOMYELITIS OF TOE, RIGHT (H): ICD-10-CM

## 2024-11-01 PROCEDURE — 99207 PR NO CHARGE LOS: CPT | Mod: 93 | Performed by: PODIATRIST

## 2024-11-01 ASSESSMENT — PAIN SCALES - GENERAL: PAINLEVEL_OUTOF10: NO PAIN (0)

## 2024-11-01 NOTE — TELEPHONE ENCOUNTER
"Patient called to ask why Dr. Peres did not call her yesterday at 3PM as she was told he would. Writer informed patient that there were no notes regarding this nor was there an appointment scheduled at that time, but patient insisted multiple times there was an appointment scheduled regardless of what her chart shows. She is scheduled for a telephone visit this morning with Dr. Peres for MRI results and stated multiple times \"I hope he calls\"; writer assured patient he will call as there is now an appointment scheduled.    Writer also tried to schedule patient for a vascular surgery consult, but patient declined as she states she needs to speak with Dr. Peres first and then her daughter; she states her daughter is aware the clinic has called her 3x and will call back to make an appointment.  "

## 2024-11-01 NOTE — TELEPHONE ENCOUNTER
LVMTCB and send [employer] message to schedule phone call visit with Dr. Peres and schedule with Dr. Cantu or Dr. Ye ASAP.  [phone]  Will continue to try to reach this patient/daughter for scheduling.

## 2024-11-01 NOTE — PROGRESS NOTES
FOOT AND ANKLE SURGERY/PODIATRY Progress Note      ASSESSMENT:   Acute osteomyelitis second digit right foot  Osteomyelitis fifth metatarsal right foot  Dry Gangrene 2nd digit right foot  Ulceration right foot into bone  PAD    Type of service:  Telephone Visit       Telephone Start and End Time : 10:25/10:34    Originating Location (pt. Location):  home      Distant Location :  [facility]       Mode of Communication: Telephone    TREATMENT:  MRI right foot: 1.  Motion artifact moderately degrades the study.  2.  Interval amputation of the fifth ray at the metatarsophalangeal joint. There is edema and enhancement within the fifth metatarsal head with limited evaluation of intrinsic T1 signal. In the setting of persistent ulcer, this is concerning for   recurrent osteomyelitis.  3.  New confluent T2 hyperintense marrow replacement of the second middle and distal phalanges concerning for osteomyelitis. Assessment of intrinsic T1 signal and contrast enhancement is limited.  4.  Subcutaneous edema of the forefoot may represent bland edema, cellulitis, or a combination. Correlate with physical exam. No definite organized/drainable collection.    -I reviewed the above MRI report with the patient via phone today which is positive for osteomyelitis along the fifth metatarsal head and second digit.    -Based on the above we discussed surgical treatment options including amputation of the second digit with resection of distal fifth metatarsal.  Risk of surgery include but not limited to use of wound VAC postoperatively, nonhealing wound and need for additional partial foot amputation or loss of limb in the presence of severe PAD.  All questions invited and answered.    -I am unable to proceed with right foot surgery until patient has been seen and evaluated by vascular surgery.  Patient states that my office should contact her daughter Veronica for scheduling.    -I will plan to follow-up with the  patient after she has been seen and evaluated by vascular surgery    Henry Peres DPM  [facility]      HPI: Marva Gaines was seen again today for telephone visit to discuss her recent MRI report on the right foot.    Past Medical History:   Diagnosis Date    Arthritis     Chronic atrial fibrillation (H)     Depressive disorder     History of blood transfusion     Antibodies    Hypertension        Past Surgical History:   Procedure Laterality Date    AMPUTATE TOE(S) Right 05/30/2024    Procedure: AMPUTATION, FIFTH DIGIT RIGHT FOOT;  Surgeon: Henry Peres DPM;  Location: [facility] OR    CATARACT EXTRACTION Bilateral     CHOLECYSTECTOMY      COLONOSCOPY N/A 12/30/2022    Procedure: COLONOSCOPY with argon plasma coagulation;  Surgeon: Steven Driscoll MD;  Location: [facility] OR    ESOPHAGOSCOPY, GASTROSCOPY, DUODENOSCOPY (EGD), COMBINED N/A 12/30/2022    Procedure: ESOPHAGOGASTRODUODENOSCOPY (EGD) with argon plasma coagulation;  Surgeon: Steven Driscoll MD;  Location: [facility] OR    HRW ANES TOTAL HIP REPLACEMENT, MDA 1 Bilateral     IR LOWER EXTREMITY ANGIOGRAM RIGHT  7/25/2024       Allergies   Allergen Reactions    Blood Transfusion Related (Informational Only) Other (See Comments)     Patient has a history of a clinically significant antibody against RBC antigens.  A delay in compatible RBCs may occur. 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    Hydrocodone-Acetaminophen Unknown         Current Outpatient Medications:     acetaminophen (TYLENOL) 500 MG tablet, Take 1,000 mg by mouth every 6 hours as needed for mild pain, Disp: , Rfl:     apixaban ANTICOAGULANT (ELIQUIS ANTICOAGULANT) 2.5 MG tablet, Take 1 tablet (2.5 mg) by mouth 2 times daily, Disp: 180 tablet, Rfl: 3    atorvastatin (LIPITOR) 20 MG tablet, TAKE 1 TABLET BY MOUTH EVERYDAY AT BEDTIME, Disp: 90 tablet, Rfl: 1    cholecalciferol, vitamin D3, (VITAMIN D3) 2,000 unit Tab, [CHOLECALCIFEROL, VITAMIN D3, (VITAMIN D3) 2,000  UNIT TAB] Take 1 tablet by mouth daily., Disp: , Rfl:     clopidogrel (PLAVIX) 75 MG tablet, Take 1 tablet (75 mg) by mouth daily., Disp: 90 tablet, Rfl: 3    diltiazem ER COATED BEADS (CARDIZEM CD/CARTIA XT) 240 MG 24 hr capsule, Take 1 capsule (240 mg) by mouth daily, Disp: 90 capsule, Rfl: 3    Ferrous Sulfate 324 (65 Fe) MG TBEC, Take 1 tablet by mouth daily, Disp: , Rfl:     fish oil-omega-3 fatty acids 500 MG capsule, Take 1 capsule by mouth daily, Disp: , Rfl:     furosemide (LASIX) 20 MG tablet, TAKE 2 TABLETS BY MOUTH EVERY DAY, Disp: 180 tablet, Rfl: 1    lactobacillus rhamnosus, GG, (CULTURELL) capsule, Take 1 capsule by mouth 2 times daily, Disp: , Rfl:     losartan (COZAAR) 25 MG tablet, Take 1 tablet (25 mg) by mouth daily. HOLD this medication until primary care provider follow up, Disp: 90 tablet, Rfl: 0    pantoprazole (PROTONIX) 40 MG EC tablet, TAKE 1 TABLET BY MOUTH TWICE A DAY, Disp: 180 tablet, Rfl: 3    sertraline (ZOLOFT) 100 MG tablet, Take 1 tablet (100 mg) by mouth daily., Disp: 90 tablet, Rfl: 3    Tuberculin PPD (TUBERSOL ID), , Disp: , Rfl:     Review of Systems - 10 point Review of Systems is negative except for right foot osteomyelitis which is noted in HPI.    Imaging:     MR Foot Right w/o & w Contrast    Result Date: 10/30/2024  EXAM: MR FOOT RIGHT W/O and W CONTRAST LOCATION: [facility] DATE: 10/30/2024 INDICATION: osteomyelitis 5th metatarsal. Persistent ulcer along the lateral foot. Second toe dry gangrene. COMPARISON: MRI 05/29/2024 TECHNIQUE: Routine. Additional postgadolinium T1 sequences were obtained. IV CONTRAST: 6 mL Gadavist FINDINGS: Motion artifact moderately degrades the study. JOINTS AND BONES: -Interval amputation of the fifth ray at the metatarsophalangeal joint. -There is edema and enhancement within the fifth metatarsal head. No discrete erosion. Evaluation of T1 signal is limited however there is no definite intrinsic T1 signal  abnormality. -There is new confluent T2 marrow edema of the second middle and distal phalanges. Evaluation for enhancement and intrinsic T1 signal is limited due to motion artifact. -Midfoot osteoarthritis greatest and mild between the navicular-second cuneiform. TENDONS: -There is partial amputation of the fifth ray. Tendons otherwise appear grossly intact. MUSCLES AND SOFT TISSUES: -Edema of the intrinsic foot musculature may represent subacute denervation change. -There is T2 hyperintense subcutaneous edema of the forefoot. No definite organized/drainable collection.     IMPRESSION: 1.  Motion artifact moderately degrades the study. 2.  Interval amputation of the fifth ray at the metatarsophalangeal joint. There is edema and enhancement within the fifth metatarsal head with limited evaluation of intrinsic T1 signal. In the setting of persistent ulcer, this is concerning for recurrent osteomyelitis. 3.  New confluent T2 hyperintense marrow replacement of the second middle and distal phalanges concerning for osteomyelitis. Assessment of intrinsic T1 signal and contrast enhancement is limited. 4.  Subcutaneous edema of the forefoot may represent bland edema, cellulitis, or a combination. Correlate with physical exam. No definite organized/drainable collection.

## 2024-11-01 NOTE — NURSING NOTE
Current patient location: [address] [place] [address] [address]  [address]  [facility] [address]    Is the patient currently in the state of [place]? YES    Visit mode:TELEPHONE    If the visit is dropped, the patient can be reconnected by: TELEPHONE VISIT: Phone number: [phone]     Will anyone else be joining the visit? NO  (If patient encounters technical issues they should call [phone]    Are changes needed to the allergy or medication list? No    Patient denies any changes and states that all information remains accurate since last reviewed/verified.   No changes that aware of per pt     Are refills needed on medications prescribed by this physician? NO, does not believe so at this time    Rooming Documentation:  Questionnaire(s) completed    Reason for visit: LUIGI Goodwin MA VVF

## 2024-11-01 NOTE — TELEPHONE ENCOUNTER
Per Dr. Peres, pt needs to get in ASAP for Vascular.     The pt states to call dtr Yoko Christie LPN on 11/1/2024 at 10:38 AM

## 2024-11-04 ENCOUNTER — TELEPHONE (OUTPATIENT)
Dept: VASCULAR SURGERY | Facility: CLINIC | Age: 89
End: 2024-11-04
Payer: COMMERCIAL

## 2024-11-04 ENCOUNTER — TELEPHONE (OUTPATIENT)
Dept: INTERNAL MEDICINE | Facility: CLINIC | Age: 89
End: 2024-11-04
Payer: COMMERCIAL

## 2024-11-04 NOTE — TELEPHONE ENCOUNTER
Scheduled for 11/6/2024 at 3pm.  Call daughter, Veronica's phone.  She declined the openings for tomorrow as that day doesn't work for her schedule.

## 2024-11-04 NOTE — TELEPHONE ENCOUNTER
Home Care is calling regarding an established patient with [facility].       Requesting orders from: Sabas Austin  Provider is following patient: Yes  Is this a 60-day recertification request?  Yes    Orders Requested    Physical Therapy  Request for recertification   Frequency: 1 x/week for 4 weeks, then 1 visit every other week for 4 weeks.       Information was gathered and will be sent to provider for review.  RN will contact Home Care with information after provider review.  Confirmed ok to leave a detailed message with call back.  Contact information confirmed and updated as needed.    Maria Teresa Duggan RN

## 2024-11-04 NOTE — TELEPHONE ENCOUNTER
Scarlett the HC nurse from [facility] states that they received new orders for daily dry gauze dressing changes to the lateral foot. Because it is just plain gauze they are only able to change this for the patient once per week due to insurance coverage. The family is refusing to pay for additional visits.     Discussed with Scarlett that she should see if the family is able to help with dressing changes or do teaching with the patient and see if she is able to change the gauze on her own. She will do this later this week. If she is unable to do the dressing herself and family is not willing to assist with this she will call clinic back for an alternative dressing (like silvercel) as they would be able to go out more frequently if it is not just plain gauze. Scarlett reports no drainage from the wound.    Scarlett will work with the patient and call back if an alternative dressing is needed.

## 2024-11-04 NOTE — TELEPHONE ENCOUNTER
Caller: Veronica Gaines (daughter-on Consent to Communicate)    Provider: RODY Peres    Detailed reason for call: Telelphone visit scheduling    Best phone number to contact: [phone]    Best time to contact: Anytime    Ok to leave a detailed message: Yes    Ok to speak to authorized person if needed: Only speak with Veronica or her sister Anabel. Telephone visit on 11/1 was made to Kiah (the patient) and she does not remember what was discussed. Requesting another telephone visit be scheduled. They would like to schedule another telephone visit to discuss the plan with Dr. Peres. Contact listed for visit should be Veronica due to her sister Anabel not being available.      (Noted to patient if reason is related to wound or incision, to please send a photo via email or [employer].)

## 2024-11-05 ENCOUNTER — MEDICAL CORRESPONDENCE (OUTPATIENT)
Dept: HEALTH INFORMATION MANAGEMENT | Facility: CLINIC | Age: 89
End: 2024-11-05
Payer: COMMERCIAL

## 2024-11-05 NOTE — TELEPHONE ENCOUNTER
Outgoing call to Di , relayed provider's approval of requested home care orders via secured/confidential VM.   High Risk (score 12 or above)

## 2024-11-06 ENCOUNTER — VIRTUAL VISIT (OUTPATIENT)
Dept: VASCULAR SURGERY | Facility: CLINIC | Age: 89
End: 2024-11-06
Attending: PODIATRIST
Payer: COMMERCIAL

## 2024-11-06 ENCOUNTER — TELEPHONE (OUTPATIENT)
Dept: VASCULAR SURGERY | Facility: CLINIC | Age: 89
End: 2024-11-06
Payer: COMMERCIAL

## 2024-11-06 ENCOUNTER — TELEPHONE (OUTPATIENT)
Dept: INTERNAL MEDICINE | Facility: CLINIC | Age: 89
End: 2024-11-06

## 2024-11-06 DIAGNOSIS — L97.516 ULCER OF RIGHT FOOT WITH BONE INVOLVEMENT WITHOUT EVIDENCE OF NECROSIS (H): ICD-10-CM

## 2024-11-06 DIAGNOSIS — I70.299 ATHEROSCLEROSIS OF ARTERY OF EXTREMITY WITH ULCERATION (H): Primary | ICD-10-CM

## 2024-11-06 DIAGNOSIS — I73.9 PAD (PERIPHERAL ARTERY DISEASE) (H): ICD-10-CM

## 2024-11-06 DIAGNOSIS — L97.909 ATHEROSCLEROSIS OF ARTERY OF EXTREMITY WITH ULCERATION (H): Primary | ICD-10-CM

## 2024-11-06 DIAGNOSIS — M86.171 ACUTE OSTEOMYELITIS OF TOE, RIGHT (H): Primary | ICD-10-CM

## 2024-11-06 DIAGNOSIS — I70.235 ATHEROSCLEROSIS OF NATIVE ARTERIES OF RIGHT LEG WITH ULCERATION OF OTHER PART OF FOOT (H): ICD-10-CM

## 2024-11-06 PROCEDURE — 99207 PR NO CHARGE LOS: CPT | Mod: 93 | Performed by: PODIATRIST

## 2024-11-06 NOTE — TELEPHONE ENCOUNTER
Writer called patient's daughter Veronica ([phone]) to see if they had any further questions about getting the angiogram scheduled.  She said they are ok with scheduling the angiogram, she is going to an appointment now and would like a call back tomorrow.

## 2024-11-06 NOTE — TELEPHONE ENCOUNTER
Home Care is calling regarding an established patient with [facility].       Requesting orders from: Sabas Austin  Provider is following patient: Yes  Is this a 60-day recertification request?  Yes    Orders Requested    Occupational Therapy  Request for recertification   Frequency:  1x/wk for 3 wks  1 visit every other week for 4 weeks    Information was gathered and will be sent to provider for review.  RN will contact Home Care with information after provider review.  Confirmed ok to leave a detailed message with call back.  Contact information confirmed and updated as needed.    Janice Russell RN

## 2024-11-06 NOTE — TELEPHONE ENCOUNTER
Writer called patient's daughter Anabel this morning to discuss if they were interested in scheduling patient for an angiogram with Dr. Cantu.    Patient saw Dr. Peres for right foot wound 10/29 and was recommended to be seen by one of the vascular surgeons [facility].     A RLE angiogram was done by Dr. Wilson on 7/25. Patient last saw the fellow clinic in September and was told to follow up in 3 months.     10/30 MRI came back positive for osteomyelitis of the right foot. 11/1 note from Dr. Peres states that he cannot proceed with surgery until patient is evaluated by vascular MD.     On 11/1, patient was added to Dr. Cantu's schedule for 11/14.    Writer was reviewing Dr. Cantu's schedule on 11/4 and noticed patient on his schedule, and sent Dr. Cantu a message to review the patient and determine if further imaging was needed.  Dr. Cantu responded saying patient will need an angiogram.    Writer called daughter Anabel to ask if they felt comfortable moving forward with scheduling the angiogram or if they wanted to keep the appointment with Dr. Cantu to discuss.    Anabel expressed that she and her sister feel frustrated there has been a lack of communication from the clinic about the plan. Patient has a telephone appointment with Dr. Peres this afternoon and sister Veronica will be present.    Anabel asked that Dr. Peres and Dr. Cantu connect to discuss plan and urgency and treatment goals and communicate that with the family.     Message sent to Dr. Peres and Dr. Cantu to discuss.

## 2024-11-06 NOTE — PROGRESS NOTES
FOOT AND ANKLE SURGERY/PODIATRY Progress Note      ASSESSMENT:   Acute osteomyelitis second digit right foot  Osteomyelitis fifth metatarsal right foot  Dry Gangrene 2nd digit right foot  Ulceration right foot into bone  PAD    Type of service:  Telephone Visit       Telephone Start and End Time: 3:02/3:24    Originating Location (pt. Location):  home      Distant Location:  [facility]       Mode of Communication: Telephone    TREATMENT:  MRI right foot: 1.  Motion artifact moderately degrades the study.  2.  Interval amputation of the fifth ray at the metatarsophalangeal joint. There is edema and enhancement within the fifth metatarsal head with limited evaluation of intrinsic T1 signal. In the setting of persistent ulcer, this is concerning for   recurrent osteomyelitis.  3.  New confluent T2 hyperintense marrow replacement of the second middle and distal phalanges concerning for osteomyelitis. Assessment of intrinsic T1 signal and contrast enhancement is limited.  4.  Subcutaneous edema of the forefoot may represent bland edema, cellulitis, or a combination. Correlate with physical exam. No definite organized/drainable collection.     -I reviewed the above MRI report with the patient and her daughter Anabel via phone today which is positive for osteomyelitis along the fifth metatarsal head and second digit.     -Based on the above we discussed surgical treatment options including amputation of the second digit with resection of distal fifth metatarsal vs I&D with bone biopsy/antibiotics with ID/wound vac/prolonged wound healing vs palliative care.  Associated risks of palliative care include migration of infection and risk of sepsis and loss of life.  I have asked the patient and her daughter to discuss in greater detail and notify me of their decision.     -I am unable to proceed with right foot surgery until patient has been seen and evaluated by vascular surgery.  I have messaged  the vascular surgery team to reach out to the patient and her daughter for scheduling.    -Updated orders to be sent to home health care to apply silver cell with gauze dressing every other day     -I will plan to follow-up with the patient after she has been seen and evaluated by vascular surgery.    Henry Peres DPM  [facility] [place]      HPI: Telephone visit today with the patient and her daughter Anabel to discuss treatment plan for osteomyelitis right foot.    Past Medical History:   Diagnosis Date    Arthritis     Chronic atrial fibrillation (H)     Depressive disorder     History of blood transfusion     Antibodies    Hypertension        Past Surgical History:   Procedure Laterality Date    AMPUTATE TOE(S) Right 05/30/2024    Procedure: AMPUTATION, FIFTH DIGIT RIGHT FOOT;  Surgeon: Henry Peres DPM;  Location: [facility] OR    CATARACT EXTRACTION Bilateral     CHOLECYSTECTOMY      COLONOSCOPY N/A 12/30/2022    Procedure: COLONOSCOPY with argon plasma coagulation;  Surgeon: Steven Driscoll MD;  Location: [facility] OR    ESOPHAGOSCOPY, GASTROSCOPY, DUODENOSCOPY (EGD), COMBINED N/A 12/30/2022    Procedure: ESOPHAGOGASTRODUODENOSCOPY (EGD) with argon plasma coagulation;  Surgeon: Steven Driscoll MD;  Location: [facility] OR    HRW ANES TOTAL HIP REPLACEMENT, MDA 1 Bilateral     IR LOWER EXTREMITY ANGIOGRAM RIGHT  7/25/2024       Allergies   Allergen Reactions    Blood Transfusion Related (Informational Only) Other (See Comments)     Patient has a history of a clinically significant antibody against RBC antigens.  A delay in compatible RBCs may occur. 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    Hydrocodone-Acetaminophen Unknown         Current Outpatient Medications:     acetaminophen (TYLENOL) 500 MG tablet, Take 1,000 mg by mouth every 6 hours as needed for mild pain, Disp: , Rfl:     apixaban ANTICOAGULANT (ELIQUIS ANTICOAGULANT) 2.5 MG tablet, Take 1 tablet (2.5 mg) by mouth 2 times daily,  Disp: 180 tablet, Rfl: 3    atorvastatin (LIPITOR) 20 MG tablet, TAKE 1 TABLET BY MOUTH EVERYDAY AT BEDTIME, Disp: 90 tablet, Rfl: 1    cholecalciferol, vitamin D3, (VITAMIN D3) 2,000 unit Tab, [CHOLECALCIFEROL, VITAMIN D3, (VITAMIN D3) 2,000 UNIT TAB] Take 1 tablet by mouth daily., Disp: , Rfl:     clopidogrel (PLAVIX) 75 MG tablet, Take 1 tablet (75 mg) by mouth daily., Disp: 90 tablet, Rfl: 3    diltiazem ER COATED BEADS (CARDIZEM CD/CARTIA XT) 240 MG 24 hr capsule, Take 1 capsule (240 mg) by mouth daily, Disp: 90 capsule, Rfl: 3    Ferrous Sulfate 324 (65 Fe) MG TBEC, Take 1 tablet by mouth daily, Disp: , Rfl:     fish oil-omega-3 fatty acids 500 MG capsule, Take 1 capsule by mouth daily, Disp: , Rfl:     furosemide (LASIX) 20 MG tablet, TAKE 2 TABLETS BY MOUTH EVERY DAY, Disp: 180 tablet, Rfl: 1    lactobacillus rhamnosus, GG, (CULTURELL) capsule, Take 1 capsule by mouth 2 times daily, Disp: , Rfl:     losartan (COZAAR) 25 MG tablet, Take 1 tablet (25 mg) by mouth daily. HOLD this medication until primary care provider follow up, Disp: 90 tablet, Rfl: 0    pantoprazole (PROTONIX) 40 MG EC tablet, TAKE 1 TABLET BY MOUTH TWICE A DAY, Disp: 180 tablet, Rfl: 3    sertraline (ZOLOFT) 100 MG tablet, Take 1 tablet (100 mg) by mouth daily., Disp: 90 tablet, Rfl: 3    Tuberculin PPD (TUBERSOL ID), , Disp: , Rfl:     Review of Systems - 10 point Review of Systems is negative except for right foot infection which is noted in HPI.    Imaging:     MR Foot Right w/o & w Contrast    Result Date: 10/30/2024  EXAM: MR FOOT RIGHT W/O and W CONTRAST LOCATION: [facility] DATE: 10/30/2024 INDICATION: osteomyelitis 5th metatarsal. Persistent ulcer along the lateral foot. Second toe dry gangrene. COMPARISON: MRI 05/29/2024 TECHNIQUE: Routine. Additional postgadolinium T1 sequences were obtained. IV CONTRAST: 6 mL Gadavist FINDINGS: Motion artifact moderately degrades the study. JOINTS AND BONES: -Interval  amputation of the fifth ray at the metatarsophalangeal joint. -There is edema and enhancement within the fifth metatarsal head. No discrete erosion. Evaluation of T1 signal is limited however there is no definite intrinsic T1 signal abnormality. -There is new confluent T2 marrow edema of the second middle and distal phalanges. Evaluation for enhancement and intrinsic T1 signal is limited due to motion artifact. -Midfoot osteoarthritis greatest and mild between the navicular-second cuneiform. TENDONS: -There is partial amputation of the fifth ray. Tendons otherwise appear grossly intact. MUSCLES AND SOFT TISSUES: -Edema of the intrinsic foot musculature may represent subacute denervation change. -There is T2 hyperintense subcutaneous edema of the forefoot. No definite organized/drainable collection.     IMPRESSION: 1.  Motion artifact moderately degrades the study. 2.  Interval amputation of the fifth ray at the metatarsophalangeal joint. There is edema and enhancement within the fifth metatarsal head with limited evaluation of intrinsic T1 signal. In the setting of persistent ulcer, this is concerning for recurrent osteomyelitis. 3.  New confluent T2 hyperintense marrow replacement of the second middle and distal phalanges concerning for osteomyelitis. Assessment of intrinsic T1 signal and contrast enhancement is limited. 4.  Subcutaneous edema of the forefoot may represent bland edema, cellulitis, or a combination. Correlate with physical exam. No definite organized/drainable collection.

## 2024-11-06 NOTE — NURSING NOTE
Current patient location: [address] [place] [address] [address]  [address]  [facility] [address]    Is the patient currently in the state of [place]? YES    Visit mode:TELEPHONE    If the visit is dropped, the patient can be reconnected by: TELEPHONE VISIT: Phone number:   Telephone Information:   Mobile [phone]        Will anyone else be joining the visit? Pts daughter Veronica  (If patient encounters technical issues they should call [phone]    Are changes needed to the allergy or medication list? No    Patients daughter denies any changes and states that all information remains accurate since last reviewed/verified.     Are refills needed on medications prescribed by this physician? Unsure, does not believe so per pts daughter     Rooming Documentation:  Not applicable    Pts daughter states vitals are checked when brought into office, none to report today     Reason for visit: LUIGI Goodwin MA VVF

## 2024-11-06 NOTE — PATIENT INSTRUCTIONS
Important lnstructions        WEIGHT BEARING STATUS: OK to walk and bear weight as tolerated on your right foot with a walker     2. OFFLOADING DEVICE: Use your walker when walking.      3. STABILIZATION DEVICE: Use a Post op shoe while walking/weight bearing.  Use a Prevalon boot while in bed.      4. ELEVATE: Elevating your leg means laying with your head on a pillow and your foot ABOVE YOUR HEART.      5. DO NOT MOVE YOUR FOOT.  There is a risk of worsening the wound or incision. To give yourself a higher chance of healing, please DO NOT swing foot back and forth and wiggle foot/toes especially when inside a stabilization device. Limited movement is allowable with therapy as recommended by the doctor.      6. TAKE A PROTEIN SHAKE TWICE A DAY.  (For ex: Boost, Ensure, Glucerna)     7. KEEP YOUR WOUND DRY AT ALL TIMES    Use a shower bag or a cast cover to keep your foot/leg dry during showers. These can be purchased on [employer] or any pharmacy.           Dressing Change lnstructions       Wound Care Instructions    3times weekly Cleanse your right lateral foot with Normal Saline or Wound Cleanser    Pat dry with non-sterile gauze    Apply silvercel into/onto the wounds    Cover with gauze    Secure with roll gauze as needed    It is not ok to get your wound wet in the bath or shower    It is ok to continue current wound care treatment/products for the next 2-3 days until new wound care supplies are ordered and arrive. If longer than this please contact our office at [phone].    Right 2nd toe: apply iodine 3 times weekly, daily if family is willing to do, no gauze needed.         SEEK MEDICAL CARE IF:  You have an increase in swelling, pain, or redness around the wound.  You have an increase in the amount of pus coming from the wound.  There is a bad smell coming from the wound.  The wound appears to be worsening/enlarging  You have a fever greater than 101.5 F        It is ok to continue current wound care  treatment/products for the next 2-3 days until new wound care supplies are ordered and arrive. If longer than this please contact our office at [phone].      We want to hear from you!   In the next few weeks, you should receive a call or email to complete a survey about your visit at [facility]. Please help us improve your appointment experience by letting us know how we did today. We strive to make your experience good and value any ways in which we could do better.       We value your input and suggestions.     Thank you for choosing the [facility]!

## 2024-11-07 NOTE — TELEPHONE ENCOUNTER
Caller: Gloria    Provider: RODY Peres    Detailed reason for call: Gloria is calling from home care requesting clarification on the order from 11/4, apply a 2x2 gauze (no cleaning order?)  needs to verify they are no longer packing the wound.    Best phone number to contact: [phone]    Best time to contact: any    Ok to leave a detailed message: Yes    Ok to speak to authorized person if needed: No      (Noted to patient if reason is related to wound or incision, to please send a photo via email or [email or web address].)

## 2024-11-07 NOTE — TELEPHONE ENCOUNTER
Call back to [facility], unsure where 2x2 gauze over is coming from, gave verbal orders for current wound care plan per nursing wound care orders yesterday which includes cleansing with saline, drying and applying silvercel, gauze and rolled gauze to lateral foot and iodine (no gauze,) to toe and changing three times weekly, (daily iodone to toe if family able).     Ashleigh Todd RN, CWOCN

## 2024-11-07 NOTE — TELEPHONE ENCOUNTER
Outgoing call to Giovanna from [facility] to relay provider message, No answer, left message on identified VM.

## 2024-11-07 NOTE — TELEPHONE ENCOUNTER
Writer confirmed angiogram with pt's daughter Veronica.  Veronica and pt's family have  a lot of concerns regarding pt's bone infection and waiting until November 20th for angiogram to be completed.  Writer transferred call to nursing to address their medical questions.    Procedure: Right  Leg  Angiogram     Procedure Date :  11/20/2024    Procedure Time :  10:00 AM    Arrival Time: 9:00 AM    Admission Type: Outpatient    Surgeon: Dr. Elaine Cantu    Procedure Location: [facility]:  [address] [place] [address] (phone: [phone], Fax: [phone])    Consent Completed:No, Scanned: No    Scheduled with: Flora on 11/6/24    Anesthesia Needed: no IF Yes Please clarify that the CRNA, anesthesia and DUNCAN/PACU resources are being added at scheduling    Blood Thinners Address: routing message to nursing to address    2 week Post Procedure Appointment with  Dr. Elaine Cantu and also ultrasound: TBD

## 2024-11-08 ENCOUNTER — MEDICAL CORRESPONDENCE (OUTPATIENT)
Dept: HEALTH INFORMATION MANAGEMENT | Facility: CLINIC | Age: 89
End: 2024-11-08

## 2024-11-08 ENCOUNTER — TELEPHONE (OUTPATIENT)
Dept: VASCULAR SURGERY | Facility: CLINIC | Age: 89
End: 2024-11-08
Payer: COMMERCIAL

## 2024-11-08 NOTE — TELEPHONE ENCOUNTER
Caller: PCP clinic    Provider: RODY Peres and MD Elaine Cantu    Detailed reason for call: Janice is calling from pcp clinic with an update that eliquis can be held for 3 days prior to surgery

## 2024-11-08 NOTE — TELEPHONE ENCOUNTER
Message sent to PCP for ok to hold Eliquis for 3 days prior.  Awaiting reply.    Reviewed Blood Thinners and plan for holding, continuing and/or bridging: Plavix: PLEASE DO NOT STOP THIS MEDICATION PRIOR TO SURGERY/ PROCEDURE.  and Eliquis: hold for 3 days prior.     Reviewed Diabetic medications that are GLP-1 agonists: NA  Please discuss with your primary doctor and follow the hold instructions:   []  Hold seven (7) days prior for once weekly injectable doses [semaglutide (Ozempic, Wegovy), dulaglutide (Trulicity), exenatide ER (Bydureon), tirzepatide (Mounjaro)]  []  Hold the day before and day of for once daily injectable GLP-1 agonists [exenatide (Byetta), liraglutide (Saxenda, Victoza)]  []  Hold seven (7) days for oral semaglutide (Rybelsus)

## 2024-11-08 NOTE — TELEPHONE ENCOUNTER
Spoke with daughter Veronica.  Ok to hold Eliquis 3 days prior but continue the plavix, she states understanding.  Per Dr. Cantu, patient needs angio first and then will make plan for surgery with Dr. Peres. Veronica will call and schedule pre-op.  No other needs at this time.

## 2024-11-08 NOTE — TELEPHONE ENCOUNTER
Outgoing call to procedure location, received number for Dr. Cantu's office (vascular surgery) spoke with Liz and relayed PCP message that Elliquis can be held for 3 days, Liz will pass this message to Dr. Cantu and team. No further questions or concerns at this time.

## 2024-11-08 NOTE — TELEPHONE ENCOUNTER
Spoke with Veronica, daughter, because she had a lot of questions about the procedures. Explained to her that osteomyelitis is a slow moving infection. Reiterated to her from Dr. Peres that oral antibiotics will not treat the infection and because it is a slow moving infection, it is ok to wait until the angiogram is completed on 11/20/24 to get a bone biopsy. Once the bone biopsy is completed, he can then consult with Infectious Disease to treat the infection. He can't consult them now because they have no source of bacteria yet. She asked if we can arrange Dr. Peres's surgery the day after the angiogram. Explained to her that Dr. Peres needs the official ok to proceed from Dr. Cantu to know that blood flow has opened up. Explained that there are some patients that is ok to proceed the next day and then there are some where the patients need to wait a few days to let the blood flow down. Writer will check with Dr. Cantu if he has an idea of what we can plan for the patient being that she will need to hold the Eliquis 3 days prior for the angiogram. Writer did tell Veronica that Dr. Cantu may not be able to answer this too until he does the procedure. Notified Veronica that patient will need to stay on Plavix prior to the procedure and we have reached out to PCP to see if patient can hold Eliquis for 3 days. We will notify them of the answer once we hear back from PCP. Notified them that it is best to get a pre-op physical closer to 11/20/24 so that it gives a little more time to have that be used for Dr. Peres's TBD surgery.

## 2024-11-11 ENCOUNTER — TELEPHONE (OUTPATIENT)
Dept: INTERNAL MEDICINE | Facility: CLINIC | Age: 89
End: 2024-11-11
Payer: COMMERCIAL

## 2024-11-11 NOTE — TELEPHONE ENCOUNTER
[facility].  Please call [facility] with Verbal Orders (ok to leave a detailed Message).    [phone]    Continuation of Care:   Skilled Nursing start date 11/4/24.    1 x week for 1 week  2 x week for 3 weeks  1 x week for 2 weeks  1 x (QOW) x 2 weeks    PT/OT Eval.

## 2024-11-18 ENCOUNTER — OFFICE VISIT (OUTPATIENT)
Dept: INTERNAL MEDICINE | Facility: CLINIC | Age: 89
End: 2024-11-18
Payer: COMMERCIAL

## 2024-11-18 VITALS
HEART RATE: 72 BPM | RESPIRATION RATE: 16 BRPM | SYSTOLIC BLOOD PRESSURE: 136 MMHG | TEMPERATURE: 97.8 F | WEIGHT: 131 LBS | OXYGEN SATURATION: 96 % | BODY MASS INDEX: 24.11 KG/M2 | HEIGHT: 62 IN | DIASTOLIC BLOOD PRESSURE: 66 MMHG

## 2024-11-18 DIAGNOSIS — I25.10 CORONARY ARTERY DISEASE INVOLVING NATIVE CORONARY ARTERY OF NATIVE HEART WITHOUT ANGINA PECTORIS: ICD-10-CM

## 2024-11-18 DIAGNOSIS — I10 ESSENTIAL HYPERTENSION: ICD-10-CM

## 2024-11-18 DIAGNOSIS — M86.171 ACUTE OSTEOMYELITIS OF TOE, RIGHT (H): ICD-10-CM

## 2024-11-18 DIAGNOSIS — I73.9 PAD (PERIPHERAL ARTERY DISEASE) (H): ICD-10-CM

## 2024-11-18 DIAGNOSIS — I48.21 PERMANENT ATRIAL FIBRILLATION (H): ICD-10-CM

## 2024-11-18 DIAGNOSIS — I96 GANGRENE OF TOE OF RIGHT FOOT (H): ICD-10-CM

## 2024-11-18 DIAGNOSIS — I50.32 CHRONIC HEART FAILURE WITH PRESERVED EJECTION FRACTION (HFPEF) (H): ICD-10-CM

## 2024-11-18 DIAGNOSIS — Z01.818 PREOP GENERAL PHYSICAL EXAM: Primary | ICD-10-CM

## 2024-11-18 LAB
ANION GAP SERPL CALCULATED.3IONS-SCNC: 11 MMOL/L (ref 7–15)
ATRIAL RATE - MUSE: 115 BPM
BUN SERPL-MCNC: 25.3 MG/DL (ref 8–23)
CALCIUM SERPL-MCNC: 9.7 MG/DL (ref 8.8–10.4)
CHLORIDE SERPL-SCNC: 105 MMOL/L (ref 98–107)
CREAT SERPL-MCNC: 1.1 MG/DL (ref 0.51–0.95)
DIASTOLIC BLOOD PRESSURE - MUSE: NORMAL MMHG
EGFRCR SERPLBLD CKD-EPI 2021: 48 ML/MIN/1.73M2
GLUCOSE SERPL-MCNC: 112 MG/DL (ref 70–99)
HCO3 SERPL-SCNC: 26 MMOL/L (ref 22–29)
HGB BLD-MCNC: 11.7 G/DL (ref 11.7–15.7)
INTERPRETATION ECG - MUSE: NORMAL
P AXIS - MUSE: NORMAL DEGREES
POTASSIUM SERPL-SCNC: 4.4 MMOL/L (ref 3.4–5.3)
PR INTERVAL - MUSE: NORMAL MS
QRS DURATION - MUSE: 78 MS
QT - MUSE: 380 MS
QTC - MUSE: 459 MS
R AXIS - MUSE: 45 DEGREES
SODIUM SERPL-SCNC: 142 MMOL/L (ref 135–145)
SYSTOLIC BLOOD PRESSURE - MUSE: NORMAL MMHG
T AXIS - MUSE: 37 DEGREES
VENTRICULAR RATE- MUSE: 88 BPM

## 2024-11-18 PROCEDURE — 93005 ELECTROCARDIOGRAM TRACING: CPT | Performed by: NURSE PRACTITIONER

## 2024-11-18 PROCEDURE — 80048 BASIC METABOLIC PNL TOTAL CA: CPT | Performed by: NURSE PRACTITIONER

## 2024-11-18 PROCEDURE — 36415 COLL VENOUS BLD VENIPUNCTURE: CPT | Performed by: NURSE PRACTITIONER

## 2024-11-18 PROCEDURE — 93010 ELECTROCARDIOGRAM REPORT: CPT | Performed by: INTERNAL MEDICINE

## 2024-11-18 PROCEDURE — 99214 OFFICE O/P EST MOD 30 MIN: CPT | Performed by: NURSE PRACTITIONER

## 2024-11-18 PROCEDURE — 85018 HEMOGLOBIN: CPT | Performed by: NURSE PRACTITIONER

## 2024-11-18 RX ORDER — LOSARTAN POTASSIUM 25 MG/1
25 TABLET ORAL DAILY
Qty: 90 TABLET | Refills: 0 | Status: SHIPPED | OUTPATIENT
Start: 2024-11-18

## 2024-11-18 NOTE — PROGRESS NOTES
Preoperative Evaluation  [facility]  [address] [address]  Phone: [phone]  Fax: [phone]  Primary Provider: Sabas Austin MD  Pre-op Performing Provider: Jumana Hernandez NP  Nov 18, 2024 11/18/2024   Surgical Information   What procedure is being done? Angiogram    Facility or Hospital where procedure/surgery will be performed: [facility]    Who is doing the procedure / surgery? Dr. DILL    Date of surgery / procedure: 11/20/2024    Time of surgery / procedure: 9:00 am    Where do you plan to recover after surgery? at home with Home Care        Patient-reported     Fax number for surgical facility: Note does not need to be faxed, will be available electronically in Epic.    Assessment & Plan     The proposed surgical procedure is considered INTERMEDIATE risk.    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  No contraindication at this time to proceed with the planned procedure for angiogram of the right lower extremity due to peripheral artery disease with a plan to possibly proceed with debridement of nonhealing wounds, gangrene and osteomyelitis of toes on the right foot.  Reviewed preoperative guidelines.  - EKG 12-lead, tracing only  - Hemoglobin; Future  - Basic metabolic panel; Future  - Hemoglobin  - Basic metabolic panel    Permanent atrial fibrillation (H)  Patient has chronic atrial fibrillation with controlled ventricular response and is currently on Eliquis. Recommendation is to hold Eliquis for 3 days prior to procedure.     Coronary artery disease involving native coronary artery of native heart without angina pectoris  Chronic heart failure with preserved ejection fraction (HFpEF) (H)  Managed by cardiology.  Last echocardiogram was  July 2024 with an EF of 55-60%.  Last CT angiogram was 2013 with normal coronaries and a calcium score of 2.  No chest pain or shortness of breath at this time.  Is currently on furosemide and Plavix.  Can continue with these medications without modifications prior to surgery.    Hypertension  Patient has good control of her hypertension with use of losartan 25 mg daily.  Recommend she hold this medication the morning of surgery but can resume it postoperatively.  - losartan (COZAAR) 25 MG tablet; Take 1 tablet (25 mg) by mouth daily.            - No identified additional risk factors other than previously addressed    Preoperative Medication Instructions  Antiplatelet or Anticoagulation Medication Instructions   - apixaban (Eliquis), edoxaban (Savaysa), rivaroxaban (Xarelto): Neuraxial or regional block anticipated AND CrCL (>=) 50mL/min. DO NOT TAKE 3 days before surgery.    - clopidrogel (Plavix), prasugrel (Effient), ticagrelor (Brilinta): Patient has a cardiac stent. Medication will NOT be stopped until cleared by cardiology.     Additional Medication Instructions  Take all scheduled medications on the day of surgery EXCEPT for modifications listed below:   - ACE/ARB: DO NOT TAKE on day of surgery (minimum 11 hours for general anesthesia).   - Diuretics: May continue due to heart failure.   - Herbal medications and vitamins: DO NOT TAKE 14 days prior to surgery.    Recommendation  Approval given to proceed with proposed procedure, without further diagnostic evaluation.    Subjective   Kiah is a 89 year old, presenting for the following:  Pre-Op Exam (Angiogram on 11/20/24 at [facility] by Dr DILL)          11/18/2024     2:34 PM   Additional Questions   Roomed by Carlota SHAH related to upcoming procedure: Kiah is a pleasant 89-year-old female here today for preoperative evaluation prior to having angiogram of the right lower extremity due to peripheral artery disease causing osteomyelitis, gangrene and  delayed wound healing of the right toes and foot.  She has a history of hospitalization for osteomyelitis back in May 2024.  Since then she has had some surgeries for amputation and also had previously had an angiogram of the right lower extremity back in July.  Her daughter states in July 2024 they found some blood clots that they removed which helped improve flow to part of the foot and states that 1 area of an previous amputation appears to be healing well however other toes continue to remain infected with poor blood flow and delayed healing.  The plan is to start with an angiogram to reevaluate flow to her foot prior to any additional surgeries on the toe to treat gangrene that may require additional amputation and further treatment of her osteomyelitis.  No acute symptoms today.        11/18/2024   Pre-Op Questionnaire   Have you ever had a heart attack or stroke? No    Have you ever had surgery on your heart or blood vessels, such as a stent placement, a coronary artery bypass, or surgery on an artery in your head, neck, heart, or legs? Yes, hx angiogram for PAD    Do you have chest pain with activity? No    Do you have a history of heart failure? Yes-hx CHF    Do you currently have a cold, bronchitis or symptoms of other infection? No    Do you have a cough, shortness of breath, or wheezing? No    Do you or anyone in your family have previous history of blood clots? (!) YES  Hx of blood clots note don angiogram in right leg on 7/25/2024   Do you or does anyone in your family have a serious bleeding problem such as prolonged bleeding following surgeries or cuts? No   Have you ever had problems with anemia or been told to take iron pills? (!) YES currently on iron pill    Have you had any abnormal blood loss such as black, tarry or bloody stools, or abnormal vaginal bleeding? No    Have you ever had a blood transfusion? (!) YES    Have you ever had a transfusion reaction? No    Are you willing to have a blood  transfusion if it is medically needed before, during, or after your surgery? Yes    Have you or any of your relatives ever had problems with anesthesia? No    Do you have sleep apnea, excessive snoring or daytime drowsiness? No    Do you have any artifical heart valves or other implanted medical devices like a pacemaker, defibrillator, or continuous glucose monitor? No    Do you have artificial joints? (!) YES  Bilateral hips.    Are you allergic to latex? No        Patient-reported     Health Care Directive  Patient does not have a Health Care Directive: Discussed advance care planning with patient; however, patient declined at this time.    Preoperative Review of    reviewed - no record of controlled substances prescribed.          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) 10/01/2024     Priority: Medium    Ulcer of right foot limited to breakdown of skin (H) 08/13/2024     Priority: Medium    Acute idiopathic gout of right hand 08/02/2024     Priority: Medium    Acute osteomyelitis of toe, right (H) 06/11/2024     Priority: Medium    Cellulitis of fifth toe of right foot 05/29/2024     Priority: Medium    Cecum mass 06/22/2023     Priority: Medium    Thyroid nodule 06/22/2023     Priority: Medium    Chronic heart failure with preserved ejection fraction (HFpEF) (H) 01/01/2023     Priority: Medium    Gastric AVM 01/01/2023     Priority: Medium    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     Priority: Medium    Coronary artery disease involving native coronary artery of native heart without angina pectoris 12/27/2022     Priority: Medium    Permanent atrial fibrillation (H)      Priority: Medium     Created by Conversion        Osteopenia      Priority: Medium     Created by Conversion  Replacement Utility updated for latest  IMO load        Hypertension      Priority: Medium     Created by Conversion  Replacement Utility updated for latest IMO load        Anxiety      Priority: Medium     Created by Conversion  Replacement Utility updated for latest IMO load        Hypercholesterolemia      Priority: Medium     Created by Conversion        Hyperglycemia      Priority: Medium     Created by Conversion        Osteoarthrosis 09/13/2002     Priority: Medium     Formatting of this note might be different from the original.   Epic        Past Medical History:   Diagnosis Date    Arthritis     Chronic atrial fibrillation (H)     Depressive disorder     History of blood transfusion     Antibodies    Hypertension      Past Surgical History:   Procedure Laterality Date    AMPUTATE TOE(S) Right 05/30/2024    Procedure: AMPUTATION, FIFTH DIGIT RIGHT FOOT;  Surgeon: Henry Peres DPM;  Location: [facility] OR    CATARACT EXTRACTION Bilateral     CHOLECYSTECTOMY      COLONOSCOPY N/A 12/30/2022    Procedure: COLONOSCOPY with argon plasma coagulation;  Surgeon: Steven Driscoll MD;  Location: [facility] OR    ESOPHAGOSCOPY, GASTROSCOPY, DUODENOSCOPY (EGD), COMBINED N/A 12/30/2022    Procedure: ESOPHAGOGASTRODUODENOSCOPY (EGD) with argon plasma coagulation;  Surgeon: Steven Driscoll MD;  Location: [facility] OR    HRW ANES TOTAL HIP REPLACEMENT, MDA 1 Bilateral     IR LOWER EXTREMITY ANGIOGRAM RIGHT  7/25/2024     Current Outpatient Medications   Medication Sig Dispense Refill    acetaminophen (TYLENOL) 500 MG tablet Take 1,000 mg by mouth every 6 hours as needed for mild pain      atorvastatin (LIPITOR) 20 MG tablet TAKE 1 TABLET BY MOUTH EVERYDAY AT BEDTIME 90 tablet 1    cholecalciferol, vitamin D3, (VITAMIN D3) 2,000 unit Tab [CHOLECALCIFEROL, VITAMIN D3, (VITAMIN D3) 2,000 UNIT TAB] Take 1 tablet by mouth daily.      clopidogrel (PLAVIX) 75 MG tablet Take 1 tablet (75 mg) by mouth daily. 90 tablet 3    diltiazem ER COATED  BEADS (CARDIZEM CD/CARTIA XT) 240 MG 24 hr capsule Take 1 capsule (240 mg) by mouth daily 90 capsule 3    Ferrous Sulfate 324 (65 Fe) MG TBEC Take 1 tablet by mouth daily      fish oil-omega-3 fatty acids 500 MG capsule Take 1 capsule by mouth daily      furosemide (LASIX) 20 MG tablet TAKE 2 TABLETS BY MOUTH EVERY  tablet 1    lactobacillus rhamnosus, GG, (CULTURELL) capsule Take 1 capsule by mouth 2 times daily      losartan (COZAAR) 25 MG tablet Take 1 tablet (25 mg) by mouth daily. HOLD this medication until primary care provider follow up 90 tablet 0    pantoprazole (PROTONIX) 40 MG EC tablet TAKE 1 TABLET BY MOUTH TWICE A  tablet 3    sertraline (ZOLOFT) 100 MG tablet Take 1 tablet (100 mg) by mouth daily. 90 tablet 3    apixaban ANTICOAGULANT (ELIQUIS ANTICOAGULANT) 2.5 MG tablet Take 1 tablet (2.5 mg) by mouth 2 times daily (Patient not taking: Reported on 11/18/2024) 180 tablet 3    Tuberculin PPD (TUBERSOL ID) Inject into the skin.         Allergies   Allergen Reactions    Blood Transfusion Related (Informational Only) Other (See Comments)     Patient has a history of a clinically significant antibody against RBC antigens.  A delay in compatible RBCs may occur. 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    Hydrocodone-Acetaminophen Unknown        Social History     Tobacco Use    Smoking status: Former     Passive exposure: Past    Smokeless tobacco: Never   Substance Use Topics    Alcohol use: Not on file     Comment: Rarely       History   Drug Use Not on file           Constitutional: No fever or unexplained weight loss.  No severe fatigue.    HEENT: Negative for ear pain, changes in hearing-wear hearing aids, frequent nosebleeds, nasal congestion, runny nose, sore throat, loose teeth, dentures or partials.    Eyes: Denies any changes in vision or eye pain.     Respiratory: Negative for cough, wheezing or shortness of breath.     Cardiovascular: No palpitations, tachycardia, chest pain or lower extremity  "edema.    Gastrointestinal: Negative for nausea, vomiting, abdominal pain, uncontrolled heartburn or changes in bowel movements.    Genitourinary: Negative for any urinary symptoms or changes in urinary habits.    Integumentary: Negative for any rash or suspicious lesions.    Musculoskeletal: Negative except as noted in HPI.     Neurological: Negative for severe dizziness, headaches.    Psychiatric: No severe anxiety.  No issues with sleep or significant depression.  Denies recreational drug use or regular use of alcohol.    Allergic/immunologic: Negative for any history of complications with anesthesia.  No history of seasonal allergies.  No known exposure to HIV or hepatitis C.      Objective    /66 (BP Location: Right arm, Patient Position: Sitting)   Pulse 72   Temp 97.8  F (36.6  C)   Resp 16   Ht 1.575 m (5' 2\")   Wt 59.4 kg (131 lb)   LMP  (LMP Unknown)   SpO2 96%   BMI 23.96 kg/m     Estimated body mass index is 23.96 kg/m  as calculated from the following:    Height as of this encounter: 1.575 m (5' 2\").    Weight as of this encounter: 59.4 kg (131 lb).  Physical Exam  Constitutional: In no acute distress.  Clean appearance.  Eyes: PERRLA.  EOMI.  No conjunctival redness.  Ears: Bilateral TMs are intact without any erythema or effusion.  Grossly normal hearing.  Nose: Nares patent bilaterally.  Normal mucosa.  Oropharynx: Normal mucosa.  Dentition and gingiva is appropriate.  Posterior oropharynx without any abnormalities.  Neck: Supple.  Trachea is midline.  No thyromegaly.  Neck is without tenderness or masses.  Cardiovascular: Irregular rhythm.  No murmur.  Left lower extremity with no edema.  Respiratory: Lungs are clear bilaterally.  Normal respiratory effort.  Skin: Skin is without significant rashes or lesions.  No suspicious moles.  Gastrointestinal: Soft and flat.  Normal bowel sounds.  Nontender throughout upon palpation.  No organomegaly or masses.  Negative for CVA " tenderness.  Musculoskeletal: Was in wheelchair for exam.  Postop shoe and dressings noted on the right foot.  No peripheral edema noted in the left ankle.  Psychiatric: Appropriate affect and demeanor.  Memory intact.  Good insight and judgment.  Neurologic:  No tremor or involuntary movement noted.      Recent Labs   Lab Test 10/18/24  1143 07/31/24  1311 07/05/24  1844 06/20/24  0616 06/19/24  0827 05/29/24  1153 01/11/24  1722   HGB 11.5* 10.5*  --   --   --    < > 12.6    338  --   --   --    < > 253   NA  --   --   --  142 140   < > 139   POTASSIUM  --   --   --  3.8 3.6   < > 3.4   CR  --   --  1.2* 1.04* 0.99*   < > 1.18*   A1C  --   --   --   --   --   --  5.3    < > = values in this interval not displayed.        Diagnostics  Labs pending at this time.  Results will be reviewed when available.   EKG required for known coronary heart disease and peripheral arterial disease and not completed in the last 90 days.   EKG: atrial fibrillation, rate 88, normal axis, normal intervals, no acute ST/T changes c/w ischemia, no LVH by voltage criteria    Revised Cardiac Risk Index (RCRI)  The patient has the following serious cardiovascular risks for perioperative complications:   - Coronary Artery Disease (MI, positive stress test, angina, Qs on EKG) = 1 point   - Congestive Heart Failure (pulmonary edema, PND, s3 maia, CXR with pulmonary congestion, basilar rales) = 1 point     RCRI Interpretation: 2 points: Class III (moderate risk - 6.6% complication rate)     Estimated Functional Capacity: Duke Activity Status Index (DASI) score: 9.95- uses wheelchair fr most activities due to weakness and chronic infection in right foot.               Signed Electronically by: Jumana Hernandez NP  A copy of this evaluation report is provided to the requesting physician.

## 2024-11-18 NOTE — PATIENT INSTRUCTIONS
How to Take Your Medication Before Surgery  Preoperative Medication Instructions   Antiplatelet or Anticoagulation Medication Instructions   - apixaban (Eliquis), edoxaban (Savaysa), rivaroxaban (Xarelto): Neuraxial or regional block anticipated AND CrCL (>=) 50mL/min. DO NOT TAKE 3 days before surgery.    - clopidrogel (Plavix), prasugrel (Effient), ticagrelor (Brilinta): Patient has a cardiac stent. Medication will NOT be stopped until cleared by cardiology.     Additional Medication Instructions  Take all scheduled medications on the day of surgery EXCEPT for modifications listed below:   - ACE/ARB: DO NOT TAKE on day of surgery (minimum 11 hours for general anesthesia).   - Diuretics: May continue due to heart failure.   - Herbal medications and vitamins: DO NOT TAKE 14 days prior to surgery.   Hold losartan the morning of surgery. Hold Eliquis 3 days prior to surgery. Hold supplements and vitamins from now until after surgery. All other medications okay.     Patient Education   Preparing for Your Surgery  For Adults  Getting started  In most cases, a nurse will call to review your health history and instructions. They will give you an arrival time based on your scheduled surgery time. Please be ready to share:  Your doctor's clinic name and phone number  Your medical, surgical, and anesthesia history  A list of allergies and sensitivities  A list of medicines, including herbal treatments and over-the-counter drugs  Whether the patient has a legal guardian (ask how to send us the papers in advance)  Note: You may not receive a call if you were seen at our PAC (Preoperative Assessment Center).  Please tell us if you're pregnant--or if there's any chance you might be pregnant. Some surgeries may injure a fetus (unborn baby), so they require a pregnancy test. Surgeries that are safe for a fetus don't always need a test, and you can choose whether to have one.   Preparing for surgery  Within 10 to 30 days of  surgery: Have a pre-op exam (sometimes called an H&P, or History and Physical). This can be done at a clinic or pre-operative center.  If you're having a , you may not need this exam. Talk to your care team.  At your pre-op exam, talk to your care team about all medicines you take. (This includes CBD oil and any drugs, such as THC, marijuana, and other forms of cannabis.) If you need to stop any medicine before surgery, ask when to start taking it again.  This is for your safety. Many medicines and drugs can make you bleed too much during surgery. Some change how well surgery (anesthesia) drugs work.  Call your insurance company to let them know you're having surgery. (If you don't have insurance, call [phone].)  Call your clinic if there's any change in your health. This includes a scrape or scratch near the surgery site, or any signs of a cold (sore throat, runny nose, cough, rash, fever).  Eating and drinking guidelines  For your safety: Unless your surgeon tells you otherwise, follow the guidelines below.  Eat and drink as normal until 8 hours before you arrive for surgery. After that, no food or milk. You can spit out gum when you arrive.  Drink clear liquids until 2 hours before you arrive. These are liquids you can see through, like water, Gatorade, and Propel Water. They also include plain black coffee and tea (no cream or milk).  No alcohol for 24 hours before you arrive. The night before surgery, stop any drinks that contain THC.  If your care team tells you to take medicine on the morning of surgery, it's okay to take it with a sip of water. No other medicines or drugs are allowed (including CBD oil)--follow your care team's instructions.  If you have questions the day of surgery, call your hospital or surgery center.   Preventing infection  Shower or bathe the night before and the morning of surgery. Follow the instructions your clinic gave you. (If no instructions, use regular  soap.)  Don't shave or clip hair near your surgery site. We'll remove the hair if needed.  Don't smoke or vape the morning of surgery. No chewing tobacco for 6 hours before you arrive. A nicotine patch is okay. You may spit out nicotine gum when you arrive.  For some surgeries, the surgeon will tell you to fully quit smoking and nicotine.  We will make every effort to keep you safe from infection. We will:  Clean our hands often with soap and water (or an alcohol-based hand rub).  Clean the skin at your surgery site with a special soap that kills germs.  Give you a special gown to keep you warm. (Cold raises the risk of infection.)  Wear hair covers, masks, gowns, and gloves during surgery.  Give antibiotic medicine, if prescribed. Not all surgeries need this medicine.  What to bring on the day of surgery  Photo ID and insurance card  Copy of your health care directive, if you have one  Glasses and hearing aids (bring cases)  You can't wear contacts during surgery  Inhaler and eye drops, if you use them (tell us about these when you arrive)  CPAP machine or breathing device, if you use them  A few personal items, if spending the night  If you have . . .  A pacemaker, ICD (cardiac defibrillator), or other implant: Bring the ID card.  An implanted stimulator: Bring the remote control.  A legal guardian: Bring a copy of the certified (court-stamped) guardianship papers.  Please remove any jewelry, including body piercings. Leave jewelry and other valuables at home.  If you're going home the day of surgery  You must have a responsible adult drive you home. They should stay with you overnight as well.  If you don't have someone to stay with you, and you aren't safe to go home alone, we may keep you overnight. Insurance often won't pay for this.  After surgery  If it's hard to control your pain or you need more pain medicine, please call your surgeon's office.  Questions?   If you have any questions for your care team,  list them here:   ____________________________________________________________________________________________________________________________________________________________________________________________________________________________________________________________  For informational purposes only. Not to replace the advice of your health care provider. Copyright   2003, 2019 [facility]. All rights reserved. Clinically reviewed by Luis Stewart MD. [employer] 631384 - REV 08/24.

## 2024-11-18 NOTE — H&P (VIEW-ONLY)
Preoperative Evaluation  [facility]  [address] [address]  Phone: [phone]  Fax: [phone]  Primary Provider: Sabas Austin MD  Pre-op Performing Provider: Jumana Hernandez NP  Nov 18, 2024 11/18/2024   Surgical Information   What procedure is being done? Angiogram    Facility or Hospital where procedure/surgery will be performed: [facility]    Who is doing the procedure / surgery? Dr. DILL    Date of surgery / procedure: 11/20/2024    Time of surgery / procedure: 9:00 am    Where do you plan to recover after surgery? at home with Home Care        Patient-reported     Fax number for surgical facility: Note does not need to be faxed, will be available electronically in Epic.    Assessment & Plan     The proposed surgical procedure is considered INTERMEDIATE risk.    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  No contraindication at this time to proceed with the planned procedure for angiogram of the right lower extremity due to peripheral artery disease with a plan to possibly proceed with debridement of nonhealing wounds, gangrene and osteomyelitis of toes on the right foot.  Reviewed preoperative guidelines.  - EKG 12-lead, tracing only  - Hemoglobin; Future  - Basic metabolic panel; Future  - Hemoglobin  - Basic metabolic panel    Permanent atrial fibrillation (H)  Patient has chronic atrial fibrillation with controlled ventricular response and is currently on Eliquis. Recommendation is to hold Eliquis for 3 days prior to procedure.     Coronary artery disease involving native coronary artery of native heart without angina pectoris  Chronic heart failure with preserved ejection fraction (HFpEF) (H)  Managed by cardiology.  Last echocardiogram was  July 2024 with an EF of 55-60%.  Last CT angiogram was 2013 with normal coronaries and a calcium score of 2.  No chest pain or shortness of breath at this time.  Is currently on furosemide and Plavix.  Can continue with these medications without modifications prior to surgery.    Hypertension  Patient has good control of her hypertension with use of losartan 25 mg daily.  Recommend she hold this medication the morning of surgery but can resume it postoperatively.  - losartan (COZAAR) 25 MG tablet; Take 1 tablet (25 mg) by mouth daily.            - No identified additional risk factors other than previously addressed    Preoperative Medication Instructions  Antiplatelet or Anticoagulation Medication Instructions   - apixaban (Eliquis), edoxaban (Savaysa), rivaroxaban (Xarelto): Neuraxial or regional block anticipated AND CrCL (>=) 50mL/min. DO NOT TAKE 3 days before surgery.    - clopidrogel (Plavix), prasugrel (Effient), ticagrelor (Brilinta): Patient has a cardiac stent. Medication will NOT be stopped until cleared by cardiology.     Additional Medication Instructions  Take all scheduled medications on the day of surgery EXCEPT for modifications listed below:   - ACE/ARB: DO NOT TAKE on day of surgery (minimum 11 hours for general anesthesia).   - Diuretics: May continue due to heart failure.   - Herbal medications and vitamins: DO NOT TAKE 14 days prior to surgery.    Recommendation  Approval given to proceed with proposed procedure, without further diagnostic evaluation.    Subjective   Kiah is a 89 year old, presenting for the following:  Pre-Op Exam (Angiogram on 11/20/24 at [facility] by Dr DILL)          11/18/2024     2:34 PM   Additional Questions   Roomed by Carlota SHAH related to upcoming procedure: Kiah is a pleasant 89-year-old female here today for preoperative evaluation prior to having angiogram of the right lower extremity due to peripheral artery disease causing osteomyelitis, gangrene and  delayed wound healing of the right toes and foot.  She has a history of hospitalization for osteomyelitis back in May 2024.  Since then she has had some surgeries for amputation and also had previously had an angiogram of the right lower extremity back in July.  Her daughter states in July 2024 they found some blood clots that they removed which helped improve flow to part of the foot and states that 1 area of an previous amputation appears to be healing well however other toes continue to remain infected with poor blood flow and delayed healing.  The plan is to start with an angiogram to reevaluate flow to her foot prior to any additional surgeries on the toe to treat gangrene that may require additional amputation and further treatment of her osteomyelitis.  No acute symptoms today.        11/18/2024   Pre-Op Questionnaire   Have you ever had a heart attack or stroke? No    Have you ever had surgery on your heart or blood vessels, such as a stent placement, a coronary artery bypass, or surgery on an artery in your head, neck, heart, or legs? Yes, hx angiogram for PAD    Do you have chest pain with activity? No    Do you have a history of heart failure? Yes-hx CHF    Do you currently have a cold, bronchitis or symptoms of other infection? No    Do you have a cough, shortness of breath, or wheezing? No    Do you or anyone in your family have previous history of blood clots? (!) YES  Hx of blood clots note don angiogram in right leg on 7/25/2024   Do you or does anyone in your family have a serious bleeding problem such as prolonged bleeding following surgeries or cuts? No   Have you ever had problems with anemia or been told to take iron pills? (!) YES currently on iron pill    Have you had any abnormal blood loss such as black, tarry or bloody stools, or abnormal vaginal bleeding? No    Have you ever had a blood transfusion? (!) YES    Have you ever had a transfusion reaction? No    Are you willing to have a blood  transfusion if it is medically needed before, during, or after your surgery? Yes    Have you or any of your relatives ever had problems with anesthesia? No    Do you have sleep apnea, excessive snoring or daytime drowsiness? No    Do you have any artifical heart valves or other implanted medical devices like a pacemaker, defibrillator, or continuous glucose monitor? No    Do you have artificial joints? (!) YES  Bilateral hips.    Are you allergic to latex? No        Patient-reported     Health Care Directive  Patient does not have a Health Care Directive: Discussed advance care planning with patient; however, patient declined at this time.    Preoperative Review of    reviewed - no record of controlled substances prescribed.          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) 10/01/2024     Priority: Medium    Ulcer of right foot limited to breakdown of skin (H) 08/13/2024     Priority: Medium    Acute idiopathic gout of right hand 08/02/2024     Priority: Medium    Acute osteomyelitis of toe, right (H) 06/11/2024     Priority: Medium    Cellulitis of fifth toe of right foot 05/29/2024     Priority: Medium    Cecum mass 06/22/2023     Priority: Medium    Thyroid nodule 06/22/2023     Priority: Medium    Chronic heart failure with preserved ejection fraction (HFpEF) (H) 01/01/2023     Priority: Medium    Gastric AVM 01/01/2023     Priority: Medium    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     Priority: Medium    Coronary artery disease involving native coronary artery of native heart without angina pectoris 12/27/2022     Priority: Medium    Permanent atrial fibrillation (H)      Priority: Medium     Created by Conversion        Osteopenia      Priority: Medium     Created by Conversion  Replacement Utility updated for latest  IMO load        Hypertension      Priority: Medium     Created by Conversion  Replacement Utility updated for latest IMO load        Anxiety      Priority: Medium     Created by Conversion  Replacement Utility updated for latest IMO load        Hypercholesterolemia      Priority: Medium     Created by Conversion        Hyperglycemia      Priority: Medium     Created by Conversion        Osteoarthrosis 09/13/2002     Priority: Medium     Formatting of this note might be different from the original.   Epic        Past Medical History:   Diagnosis Date    Arthritis     Chronic atrial fibrillation (H)     Depressive disorder     History of blood transfusion     Antibodies    Hypertension      Past Surgical History:   Procedure Laterality Date    AMPUTATE TOE(S) Right 05/30/2024    Procedure: AMPUTATION, FIFTH DIGIT RIGHT FOOT;  Surgeon: Henry Peres DPM;  Location: [facility] OR    CATARACT EXTRACTION Bilateral     CHOLECYSTECTOMY      COLONOSCOPY N/A 12/30/2022    Procedure: COLONOSCOPY with argon plasma coagulation;  Surgeon: Steven Driscoll MD;  Location: [facility] OR    ESOPHAGOSCOPY, GASTROSCOPY, DUODENOSCOPY (EGD), COMBINED N/A 12/30/2022    Procedure: ESOPHAGOGASTRODUODENOSCOPY (EGD) with argon plasma coagulation;  Surgeon: Steven Driscoll MD;  Location: [facility] OR    HRW ANES TOTAL HIP REPLACEMENT, MDA 1 Bilateral     IR LOWER EXTREMITY ANGIOGRAM RIGHT  7/25/2024     Current Outpatient Medications   Medication Sig Dispense Refill    acetaminophen (TYLENOL) 500 MG tablet Take 1,000 mg by mouth every 6 hours as needed for mild pain      atorvastatin (LIPITOR) 20 MG tablet TAKE 1 TABLET BY MOUTH EVERYDAY AT BEDTIME 90 tablet 1    cholecalciferol, vitamin D3, (VITAMIN D3) 2,000 unit Tab [CHOLECALCIFEROL, VITAMIN D3, (VITAMIN D3) 2,000 UNIT TAB] Take 1 tablet by mouth daily.      clopidogrel (PLAVIX) 75 MG tablet Take 1 tablet (75 mg) by mouth daily. 90 tablet 3    diltiazem ER COATED  BEADS (CARDIZEM CD/CARTIA XT) 240 MG 24 hr capsule Take 1 capsule (240 mg) by mouth daily 90 capsule 3    Ferrous Sulfate 324 (65 Fe) MG TBEC Take 1 tablet by mouth daily      fish oil-omega-3 fatty acids 500 MG capsule Take 1 capsule by mouth daily      furosemide (LASIX) 20 MG tablet TAKE 2 TABLETS BY MOUTH EVERY  tablet 1    lactobacillus rhamnosus, GG, (CULTURELL) capsule Take 1 capsule by mouth 2 times daily      losartan (COZAAR) 25 MG tablet Take 1 tablet (25 mg) by mouth daily. HOLD this medication until primary care provider follow up 90 tablet 0    pantoprazole (PROTONIX) 40 MG EC tablet TAKE 1 TABLET BY MOUTH TWICE A  tablet 3    sertraline (ZOLOFT) 100 MG tablet Take 1 tablet (100 mg) by mouth daily. 90 tablet 3    apixaban ANTICOAGULANT (ELIQUIS ANTICOAGULANT) 2.5 MG tablet Take 1 tablet (2.5 mg) by mouth 2 times daily (Patient not taking: Reported on 11/18/2024) 180 tablet 3    Tuberculin PPD (TUBERSOL ID) Inject into the skin.         Allergies   Allergen Reactions    Blood Transfusion Related (Informational Only) Other (See Comments)     Patient has a history of a clinically significant antibody against RBC antigens.  A delay in compatible RBCs may occur. 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    Hydrocodone-Acetaminophen Unknown        Social History     Tobacco Use    Smoking status: Former     Passive exposure: Past    Smokeless tobacco: Never   Substance Use Topics    Alcohol use: Not on file     Comment: Rarely       History   Drug Use Not on file           Constitutional: No fever or unexplained weight loss.  No severe fatigue.    HEENT: Negative for ear pain, changes in hearing-wear hearing aids, frequent nosebleeds, nasal congestion, runny nose, sore throat, loose teeth, dentures or partials.    Eyes: Denies any changes in vision or eye pain.     Respiratory: Negative for cough, wheezing or shortness of breath.     Cardiovascular: No palpitations, tachycardia, chest pain or lower extremity  "edema.    Gastrointestinal: Negative for nausea, vomiting, abdominal pain, uncontrolled heartburn or changes in bowel movements.    Genitourinary: Negative for any urinary symptoms or changes in urinary habits.    Integumentary: Negative for any rash or suspicious lesions.    Musculoskeletal: Negative except as noted in HPI.     Neurological: Negative for severe dizziness, headaches.    Psychiatric: No severe anxiety.  No issues with sleep or significant depression.  Denies recreational drug use or regular use of alcohol.    Allergic/immunologic: Negative for any history of complications with anesthesia.  No history of seasonal allergies.  No known exposure to HIV or hepatitis C.      Objective    /66 (BP Location: Right arm, Patient Position: Sitting)   Pulse 72   Temp 97.8  F (36.6  C)   Resp 16   Ht 1.575 m (5' 2\")   Wt 59.4 kg (131 lb)   LMP  (LMP Unknown)   SpO2 96%   BMI 23.96 kg/m     Estimated body mass index is 23.96 kg/m  as calculated from the following:    Height as of this encounter: 1.575 m (5' 2\").    Weight as of this encounter: 59.4 kg (131 lb).  Physical Exam  Constitutional: In no acute distress.  Clean appearance.  Eyes: PERRLA.  EOMI.  No conjunctival redness.  Ears: Bilateral TMs are intact without any erythema or effusion.  Grossly normal hearing.  Nose: Nares patent bilaterally.  Normal mucosa.  Oropharynx: Normal mucosa.  Dentition and gingiva is appropriate.  Posterior oropharynx without any abnormalities.  Neck: Supple.  Trachea is midline.  No thyromegaly.  Neck is without tenderness or masses.  Cardiovascular: Irregular rhythm.  No murmur.  Left lower extremity with no edema.  Respiratory: Lungs are clear bilaterally.  Normal respiratory effort.  Skin: Skin is without significant rashes or lesions.  No suspicious moles.  Gastrointestinal: Soft and flat.  Normal bowel sounds.  Nontender throughout upon palpation.  No organomegaly or masses.  Negative for CVA " tenderness.  Musculoskeletal: Was in wheelchair for exam.  Postop shoe and dressings noted on the right foot.  No peripheral edema noted in the left ankle.  Psychiatric: Appropriate affect and demeanor.  Memory intact.  Good insight and judgment.  Neurologic:  No tremor or involuntary movement noted.      Recent Labs   Lab Test 10/18/24  1143 07/31/24  1311 07/05/24  1844 06/20/24  0616 06/19/24  0827 05/29/24  1153 01/11/24  1722   HGB 11.5* 10.5*  --   --   --    < > 12.6    338  --   --   --    < > 253   NA  --   --   --  142 140   < > 139   POTASSIUM  --   --   --  3.8 3.6   < > 3.4   CR  --   --  1.2* 1.04* 0.99*   < > 1.18*   A1C  --   --   --   --   --   --  5.3    < > = values in this interval not displayed.        Diagnostics  Labs pending at this time.  Results will be reviewed when available.   EKG required for known coronary heart disease and peripheral arterial disease and not completed in the last 90 days.   EKG: atrial fibrillation, rate 88, normal axis, normal intervals, no acute ST/T changes c/w ischemia, no LVH by voltage criteria    Revised Cardiac Risk Index (RCRI)  The patient has the following serious cardiovascular risks for perioperative complications:   - Coronary Artery Disease (MI, positive stress test, angina, Qs on EKG) = 1 point   - Congestive Heart Failure (pulmonary edema, PND, s3 maia, CXR with pulmonary congestion, basilar rales) = 1 point     RCRI Interpretation: 2 points: Class III (moderate risk - 6.6% complication rate)     Estimated Functional Capacity: Duke Activity Status Index (DASI) score: 9.95- uses wheelchair fr most activities due to weakness and chronic infection in right foot.               Signed Electronically by: Jumana Hernandez NP  A copy of this evaluation report is provided to the requesting physician.

## 2024-11-19 ENCOUNTER — TELEPHONE (OUTPATIENT)
Dept: VASCULAR SURGERY | Facility: CLINIC | Age: 89
End: 2024-11-19
Payer: COMMERCIAL

## 2024-11-19 NOTE — TELEPHONE ENCOUNTER
Procedure: RLE angiogram    Date: 11/20/24    Surgeon: Dr. Elaine Cantu    Called patient to review upcoming procedure. Reviewed visitor allowance of 1 person at this time.     Discussed procedure with patients and instructions: Yes    Reviewed Post Procedure Follow up plan and Appts made: Yes - 1/13/24    Reviewed Covid Test Scheduled/Completed: NA    Reviewed Blood Thinners and plan for holding, continuing and/or bridging:Eliquis- Hold starting 3 days prior to procedure - starting on 11/17  Plavix ok to take.    Reviewed Pre Op Appointment Required and Completed: N/A    Reviewed Allergies and if Contrast Allergy-Instructions and plan: Yes: No contrast allergy    Reviewed Arrival Time of 9am and procedure time of 10am and location of procedure [facility]:  [facility] [place] [address] (phone: [phone], Fax: [phone])    Please park in [address]. Enter through the main entrance. Check in at the Welcome Desk and you will be directed to the surgery unit.         All questions answered and number provided if any further questions arise or changes in patient status.

## 2024-11-20 ENCOUNTER — TELEPHONE (OUTPATIENT)
Dept: VASCULAR SURGERY | Facility: CLINIC | Age: 89
End: 2024-11-20
Payer: COMMERCIAL

## 2024-11-20 ENCOUNTER — PREP FOR PROCEDURE (OUTPATIENT)
Dept: OTHER | Facility: CLINIC | Age: 89
End: 2024-11-20
Payer: COMMERCIAL

## 2024-11-20 ENCOUNTER — HOSPITAL ENCOUNTER (OUTPATIENT)
Dept: INTERVENTIONAL RADIOLOGY/VASCULAR | Facility: HOSPITAL | Age: 89
Discharge: HOME | End: 2024-11-20
Attending: SURGERY
Payer: COMMERCIAL

## 2024-11-20 VITALS
OXYGEN SATURATION: 100 % | RESPIRATION RATE: 20 BRPM | HEART RATE: 58 BPM | SYSTOLIC BLOOD PRESSURE: 166 MMHG | DIASTOLIC BLOOD PRESSURE: 67 MMHG | TEMPERATURE: 98 F

## 2024-11-20 DIAGNOSIS — L97.909 ATHEROSCLEROSIS OF ARTERY OF EXTREMITY WITH ULCERATION (H): ICD-10-CM

## 2024-11-20 DIAGNOSIS — I70.235 ATHEROSCLEROSIS OF NATIVE ARTERIES OF RIGHT LEG WITH ULCERATION OF OTHER PART OF FOOT (H): ICD-10-CM

## 2024-11-20 DIAGNOSIS — I70.299 ATHEROSCLEROSIS OF ARTERY OF EXTREMITY WITH ULCERATION (H): ICD-10-CM

## 2024-11-20 DIAGNOSIS — M86.171 ACUTE OSTEOMYELITIS OF TOE, RIGHT (H): Primary | ICD-10-CM

## 2024-11-20 PROCEDURE — C1760 CLOSURE DEV, VASC: HCPCS

## 2024-11-20 PROCEDURE — 250N000011 HC RX IP 250 OP 636: Performed by: SURGERY

## 2024-11-20 PROCEDURE — 272N000566 HC SHEATH CR3

## 2024-11-20 PROCEDURE — 272N000570 HC SHEATH CR7

## 2024-11-20 PROCEDURE — 272N000500 HC NEEDLE CR2

## 2024-11-20 PROCEDURE — 255N000002 HC RX 255 OP 636: Performed by: SURGERY

## 2024-11-20 PROCEDURE — C1769 GUIDE WIRE: HCPCS

## 2024-11-20 PROCEDURE — 99153 MOD SED SAME PHYS/QHP EA: CPT

## 2024-11-20 PROCEDURE — 272N000302 HC DEVICE INFLATION CR5

## 2024-11-20 PROCEDURE — 250N000011 HC RX IP 250 OP 636

## 2024-11-20 PROCEDURE — C1887 CATHETER, GUIDING: HCPCS

## 2024-11-20 PROCEDURE — 99152 MOD SED SAME PHYS/QHP 5/>YRS: CPT

## 2024-11-20 RX ORDER — DEXTROSE MONOHYDRATE 25 G/50ML
25-50 INJECTION, SOLUTION INTRAVENOUS
Status: DISCONTINUED | OUTPATIENT
Start: 2024-11-20 | End: 2024-11-21 | Stop reason: HOSPADM

## 2024-11-20 RX ORDER — FLUMAZENIL 0.1 MG/ML
0.2 INJECTION, SOLUTION INTRAVENOUS
Status: DISCONTINUED | OUTPATIENT
Start: 2024-11-20 | End: 2024-11-21 | Stop reason: HOSPADM

## 2024-11-20 RX ORDER — ONDANSETRON 2 MG/ML
4 INJECTION INTRAMUSCULAR; INTRAVENOUS
Status: DISCONTINUED | OUTPATIENT
Start: 2024-11-20 | End: 2024-11-21 | Stop reason: HOSPADM

## 2024-11-20 RX ORDER — HEPARIN SODIUM 200 [USP'U]/100ML
1 INJECTION, SOLUTION INTRAVENOUS EVERY 5 MIN PRN
Status: DISCONTINUED | OUTPATIENT
Start: 2024-11-20 | End: 2024-11-21 | Stop reason: HOSPADM

## 2024-11-20 RX ORDER — FENTANYL CITRATE 50 UG/ML
25-50 INJECTION, SOLUTION INTRAMUSCULAR; INTRAVENOUS EVERY 5 MIN PRN
Status: DISCONTINUED | OUTPATIENT
Start: 2024-11-20 | End: 2024-11-21 | Stop reason: HOSPADM

## 2024-11-20 RX ORDER — NALOXONE HYDROCHLORIDE 0.4 MG/ML
0.4 INJECTION, SOLUTION INTRAMUSCULAR; INTRAVENOUS; SUBCUTANEOUS
Status: DISCONTINUED | OUTPATIENT
Start: 2024-11-20 | End: 2024-11-21 | Stop reason: HOSPADM

## 2024-11-20 RX ORDER — HEPARIN SODIUM 1000 [USP'U]/ML
6000 INJECTION, SOLUTION INTRAVENOUS; SUBCUTANEOUS ONCE
Status: COMPLETED | OUTPATIENT
Start: 2024-11-20 | End: 2024-11-20

## 2024-11-20 RX ORDER — IODIXANOL 320 MG/ML
150 INJECTION, SOLUTION INTRAVASCULAR ONCE
Status: COMPLETED | OUTPATIENT
Start: 2024-11-20 | End: 2024-11-20

## 2024-11-20 RX ORDER — NALOXONE HYDROCHLORIDE 0.4 MG/ML
0.2 INJECTION, SOLUTION INTRAMUSCULAR; INTRAVENOUS; SUBCUTANEOUS
Status: DISCONTINUED | OUTPATIENT
Start: 2024-11-20 | End: 2024-11-21 | Stop reason: HOSPADM

## 2024-11-20 RX ORDER — NICOTINE POLACRILEX 4 MG
15-30 LOZENGE BUCCAL
Status: DISCONTINUED | OUTPATIENT
Start: 2024-11-20 | End: 2024-11-21 | Stop reason: HOSPADM

## 2024-11-20 RX ORDER — LIDOCAINE 40 MG/G
CREAM TOPICAL
Status: DISCONTINUED | OUTPATIENT
Start: 2024-11-20 | End: 2024-11-21 | Stop reason: HOSPADM

## 2024-11-20 RX ORDER — SODIUM CHLORIDE 9 MG/ML
INJECTION, SOLUTION INTRAVENOUS CONTINUOUS
Status: DISCONTINUED | OUTPATIENT
Start: 2024-11-20 | End: 2024-11-21 | Stop reason: HOSPADM

## 2024-11-20 RX ADMIN — IODIXANOL 80 ML: 320 INJECTION, SOLUTION INTRAVASCULAR at 12:14

## 2024-11-20 RX ADMIN — MIDAZOLAM HYDROCHLORIDE 0.5 MG: 1 INJECTION, SOLUTION INTRAMUSCULAR; INTRAVENOUS at 11:29

## 2024-11-20 RX ADMIN — HEPARIN SODIUM IN SODIUM CHLORIDE 3 BAG: 200 INJECTION INTRAVENOUS at 10:57

## 2024-11-20 RX ADMIN — FENTANYL CITRATE 25 MCG: 50 INJECTION, SOLUTION INTRAMUSCULAR; INTRAVENOUS at 11:33

## 2024-11-20 RX ADMIN — MIDAZOLAM HYDROCHLORIDE 0.5 MG: 1 INJECTION, SOLUTION INTRAMUSCULAR; INTRAVENOUS at 11:02

## 2024-11-20 RX ADMIN — FENTANYL CITRATE 25 MCG: 50 INJECTION, SOLUTION INTRAMUSCULAR; INTRAVENOUS at 11:12

## 2024-11-20 RX ADMIN — MIDAZOLAM HYDROCHLORIDE 0.5 MG: 1 INJECTION, SOLUTION INTRAMUSCULAR; INTRAVENOUS at 10:35

## 2024-11-20 RX ADMIN — HEPARIN SODIUM 6000 UNITS: 1000 INJECTION INTRAVENOUS; SUBCUTANEOUS at 10:56

## 2024-11-20 RX ADMIN — FENTANYL CITRATE 25 MCG: 50 INJECTION, SOLUTION INTRAMUSCULAR; INTRAVENOUS at 10:39

## 2024-11-20 NOTE — DISCHARGE INSTRUCTIONS
Angiogram Discharge Instructions:    You had an angiogram procedure. An angiogram is a procedure thatuses x-rays to take pictures of your blood vessels. A long, flexible tube or catheter is inserted through the blood stream (through the procedure site) to help deliver contrast (dye) into the arteries so they can be visibleon the x-ray. Angiograms are used to evaluate and treat possible blockages or other disease in the arterial system. Please follow the below instructions after your angiogram, including monitoring of your procedure site.    Care instructions after angiogram procedure:    - If you received sedation for your procedure, do not drive or operate heavy machinery for the rest of the day.    - Do not lift objects greater than 10 poundsfor 2 days following angiogram procedure.    - Avoid excessive exercise and straining for 2 days.    - Avoid tub baths, pools, hot tubs and Jacuzzis for 3 days or until procedure site is well healed.    - Youmay shower beginning tomorrow. Do not scrub procedure site until well healed; pat dry.    - Return to your normal activities as you tolerate after the 2 day restriction.    - You can expect to return to work 1-2days after your procedure - depending on the nature of your profession.    - It is normal to have some tenderness and minimal swelling at procedure puncture site. A small area of discoloration may be present.Tenderness typically subsides in 1-2 days. A small knot may also be present at puncture site for 6-8 weeks. This can be a normal part of the healing process.    Medications and other post-procedure care:    -If you had a blockage opened please make sure to fill your prescription for any new medication, such as Plavix, that you may have been prescribed and take it everyday    - If you have kidney function issues, please makesure to hydrate yourself well for the next two days by drinking lots of fluids to help clear your body of the dye used. If you have heart  problems such as heart failure, this may have to be moderated.    - If you are onMetformin, please do not resume it for 48hrs.    Follow up:    - Follow up with your vascular surgery team at the [facility] vascular center A follow up appointment should already be arranged for you.If you are unsure of your appointment or do not have a follow up appointment in the next 2-3 weeks, please call our office at [phone]. You will need to have an ultrasound 2-3 weeks after your angiogram and should bescheduled at the time of your follow up appt.    Further follow up will be based on ultrasound results. Typical follow up is every 3 months for the first year, then every 6 months to one year thereafter.    seek medical evaluation for:    - If you develop fevers (greater than 101 F (38.3C)).    - If you develop increasing pain, redness, purulent drainage, tenderness, or swelling at procedure site.    If you experience any bleeding from procedure/puncture site: lie down, firmly apply pressure to puncture site and call 911.    - Seek emergent evaluation if you experience any new leg/arm pain, discoloration ornumbness.    Call the vascular center at [phone] with questions/concerns or if you have any of the above symptoms.

## 2024-11-20 NOTE — SEDATION DOCUMENTATION
Patient Name: Marva Gaines  Medical Record Number: [id number]  Today's Date: 11/20/2024    Procedure: Right LE angiogram  Proceduralist: Dr. Cantu    Procedure Start: 1045  Procedure end: 1205  Sedation medications administered: 1.5 mg midazolam and 75 mcg fentanyl   Sedation time: 80 minutes    Other Notes: Pt arrived to IR room 1 from Pre/post bay 1. Consent reviewed. Pt denies any questions or concerns regarding procedure. Pt positioned Supine and monitored per protocol. Pt tolerated procedure without any noted complications. VSS on Transfer. Pt transferred back to Pre/post bay 1.

## 2024-11-20 NOTE — TELEPHONE ENCOUNTER
"Discussed with Dr. Peres who is going to speak with Dr. Cantu. He would like to know which way patient would like to proceed in regards to surgery with him.    Contacted Veronica to relay the information and she states \"that is not the conversation I had with him. It is his job to determine her circulation and decide what surgery he can do. I am not making that determination as that is his job.\"    Offered a telephone visit to further discuss and immediately she states, \"I am not paying for another damn telephone visit. It's telephone visit after telephone visit. He can figure it out.\"  "

## 2024-11-20 NOTE — TELEPHONE ENCOUNTER
"Received call from patient's daughter regarding restarting eliquis. Patient underwent a RLE angiogram with Dr. Cantu today. She states that Dr. Peres will be doing a surgery on her foot soon, so she doesn't want to start her back on eliquis right now. Attempted to explain that it appears Dr. Peres was going to follow-up with her after she saw vascular surgery, and no surgery is currently scheduled. Was interrupted by daughter stating, \"can you just review with Dr. Peres if I should restart eliquis.\"     Will contact Veronica with recommendations.  "

## 2024-11-20 NOTE — TELEPHONE ENCOUNTER
"Spoke with Veronica. Updated her that Dr. Cantu has okayed for Dr. Peres to proceed with surgery after today's angiogram. Reviewed the 2 options of surgery with her amputations vs bone biopsy. They would like to do the amputation of the second digit with resection of distal fifth metatarsal. Patient is currently at assisted living with homecare services. Explained that patient will need to make sure she is nonweightbearing after surgery. Normally this is an outpatient procedure but if she needs additional assistance, we would plan to admit her and plan for TCU afterwards. Veronica did not want to make that decision and said this should be Dr. Peres's decision. She expressed frustration that \"the doctor needs to take the lead\" and that she doesn't know what to do, she \"can't tell the future\". Tried to explain to her that he also needs to know how aggressive they want her treated and also how best to plan aftercare for her as well. She again stated that they would like him to decide what to do for after surgery.       Will plan on getting surgery added on early next week and continue to hold the Eliquis.         Dr. Peres - please add surgery orders.     Schedulers - please add to Monday and confirm with Veronica.       "

## 2024-11-21 NOTE — TELEPHONE ENCOUNTER
Surgery Scheduled:    Confirmed & reviewed instructions with pt's daughter Veronica on 11/21/24.     Surgery: INCISION AND DRAINAGE right foot with amputation of second digit and partial fifth metatarsal amputation     Date: 11/25/24    Time: 125PM    Location: [facility]    Confirmed with Augustina in OR scheduling on 11/21/24    Surgery admit - will seek transfer to TCU post op.    Pt is currently holding and will continue to hold Eliquis through surgery.    Follow ups confirmed with Veronica.

## 2024-11-25 ENCOUNTER — ANESTHESIA EVENT (OUTPATIENT)
Dept: SURGERY | Facility: HOSPITAL | Age: 89
End: 2024-11-25
Payer: COMMERCIAL

## 2024-11-25 ENCOUNTER — HOSPITAL ENCOUNTER (OUTPATIENT)
Facility: HOSPITAL | Age: 89
Discharge: HOME | End: 2024-11-25
Attending: PODIATRIST | Admitting: PODIATRIST
Payer: COMMERCIAL

## 2024-11-25 ENCOUNTER — ANESTHESIA (OUTPATIENT)
Dept: SURGERY | Facility: HOSPITAL | Age: 89
End: 2024-11-25
Payer: COMMERCIAL

## 2024-11-25 VITALS
HEART RATE: 63 BPM | BODY MASS INDEX: 23.37 KG/M2 | WEIGHT: 127.8 LBS | SYSTOLIC BLOOD PRESSURE: 156 MMHG | OXYGEN SATURATION: 98 % | TEMPERATURE: 97.2 F | RESPIRATION RATE: 16 BRPM | DIASTOLIC BLOOD PRESSURE: 71 MMHG

## 2024-11-25 DIAGNOSIS — M86.171 ACUTE OSTEOMYELITIS OF TOE, RIGHT (H): ICD-10-CM

## 2024-11-25 LAB
BACTERIA SPEC CULT: NORMAL
BACTERIA SPEC CULT: NORMAL
GLUCOSE BLDC GLUCOMTR-MCNC: 110 MG/DL (ref 70–99)
GRAM STAIN RESULT: NORMAL

## 2024-11-25 PROCEDURE — 370N000017 HC ANESTHESIA TECHNICAL FEE, PER MIN: Performed by: PODIATRIST

## 2024-11-25 PROCEDURE — 250N000011 HC RX IP 250 OP 636: Performed by: NURSE ANESTHETIST, CERTIFIED REGISTERED

## 2024-11-25 PROCEDURE — 250N000011 HC RX IP 250 OP 636: Performed by: PODIATRIST

## 2024-11-25 PROCEDURE — 82962 GLUCOSE BLOOD TEST: CPT

## 2024-11-25 PROCEDURE — 360N000076 HC SURGERY LEVEL 3, PER MIN: Performed by: PODIATRIST

## 2024-11-25 PROCEDURE — 87075 CULTR BACTERIA EXCEPT BLOOD: CPT | Performed by: PODIATRIST

## 2024-11-25 PROCEDURE — 87070 CULTURE OTHR SPECIMN AEROBIC: CPT | Performed by: PODIATRIST

## 2024-11-25 PROCEDURE — 710N000012 HC RECOVERY PHASE 2, PER MINUTE: Performed by: PODIATRIST

## 2024-11-25 PROCEDURE — 87205 SMEAR GRAM STAIN: CPT | Performed by: PODIATRIST

## 2024-11-25 PROCEDURE — 999N000141 HC STATISTIC PRE-PROCEDURE NURSING ASSESSMENT: Performed by: PODIATRIST

## 2024-11-25 PROCEDURE — 999N000248 HC STATISTIC IV INSERT WITH US BY RN

## 2024-11-25 PROCEDURE — 272N000001 HC OR GENERAL SUPPLY STERILE: Performed by: PODIATRIST

## 2024-11-25 PROCEDURE — 88305 TISSUE EXAM BY PATHOLOGIST: CPT | Mod: TC | Performed by: PODIATRIST

## 2024-11-25 PROCEDURE — 28820 AMPUTATION OF TOE: CPT | Mod: T9 | Performed by: PODIATRIST

## 2024-11-25 RX ORDER — DEXAMETHASONE SODIUM PHOSPHATE 10 MG/ML
4 INJECTION, SOLUTION INTRAMUSCULAR; INTRAVENOUS
Status: DISCONTINUED | OUTPATIENT
Start: 2024-11-25 | End: 2024-11-25 | Stop reason: HOSPADM

## 2024-11-25 RX ORDER — SODIUM CHLORIDE, SODIUM LACTATE, POTASSIUM CHLORIDE, CALCIUM CHLORIDE 600; 310; 30; 20 MG/100ML; MG/100ML; MG/100ML; MG/100ML
INJECTION, SOLUTION INTRAVENOUS CONTINUOUS
Status: CANCELLED | OUTPATIENT
Start: 2024-11-25

## 2024-11-25 RX ORDER — SULFAMETHOXAZOLE AND TRIMETHOPRIM 800; 160 MG/1; MG/1
1 TABLET ORAL 2 TIMES DAILY
Qty: 20 TABLET | Refills: 0 | Status: SHIPPED | OUTPATIENT
Start: 2024-11-25

## 2024-11-25 RX ORDER — CEFAZOLIN SODIUM/WATER 2 G/20 ML
2 SYRINGE (ML) INTRAVENOUS
Status: COMPLETED | OUTPATIENT
Start: 2024-11-25 | End: 2024-11-25

## 2024-11-25 RX ORDER — LIDOCAINE 40 MG/G
CREAM TOPICAL
Status: DISCONTINUED | OUTPATIENT
Start: 2024-11-25 | End: 2024-11-25 | Stop reason: HOSPADM

## 2024-11-25 RX ORDER — ONDANSETRON 2 MG/ML
4 INJECTION INTRAMUSCULAR; INTRAVENOUS EVERY 30 MIN PRN
Status: CANCELLED | OUTPATIENT
Start: 2024-11-25

## 2024-11-25 RX ORDER — NALOXONE HYDROCHLORIDE 0.4 MG/ML
0.1 INJECTION, SOLUTION INTRAMUSCULAR; INTRAVENOUS; SUBCUTANEOUS
Status: DISCONTINUED | OUTPATIENT
Start: 2024-11-25 | End: 2024-11-25 | Stop reason: HOSPADM

## 2024-11-25 RX ORDER — FENTANYL CITRATE 50 UG/ML
25 INJECTION, SOLUTION INTRAMUSCULAR; INTRAVENOUS EVERY 5 MIN PRN
Status: CANCELLED | OUTPATIENT
Start: 2024-11-25

## 2024-11-25 RX ORDER — OXYCODONE HYDROCHLORIDE 5 MG/1
5-10 TABLET ORAL EVERY 4 HOURS PRN
Qty: 12 TABLET | Refills: 0 | Status: SHIPPED | OUTPATIENT
Start: 2024-11-25

## 2024-11-25 RX ORDER — OXYCODONE HYDROCHLORIDE 5 MG/1
5 TABLET ORAL EVERY 6 HOURS PRN
Qty: 12 TABLET | Refills: 0 | Status: SHIPPED | OUTPATIENT
Start: 2024-11-25 | End: 2024-11-28

## 2024-11-25 RX ORDER — PROPOFOL 10 MG/ML
INJECTION, EMULSION INTRAVENOUS CONTINUOUS PRN
Status: DISCONTINUED | OUTPATIENT
Start: 2024-11-25 | End: 2024-11-25

## 2024-11-25 RX ORDER — DEXAMETHASONE SODIUM PHOSPHATE 10 MG/ML
4 INJECTION, SOLUTION INTRAMUSCULAR; INTRAVENOUS
Status: CANCELLED | OUTPATIENT
Start: 2024-11-25

## 2024-11-25 RX ORDER — NALOXONE HYDROCHLORIDE 0.4 MG/ML
0.1 INJECTION, SOLUTION INTRAMUSCULAR; INTRAVENOUS; SUBCUTANEOUS
Status: CANCELLED | OUTPATIENT
Start: 2024-11-25

## 2024-11-25 RX ORDER — BUPIVACAINE HYDROCHLORIDE 5 MG/ML
INJECTION, SOLUTION PERINEURAL PRN
Status: DISCONTINUED | OUTPATIENT
Start: 2024-11-25 | End: 2024-11-25 | Stop reason: HOSPADM

## 2024-11-25 RX ORDER — PROPOFOL 10 MG/ML
INJECTION, EMULSION INTRAVENOUS PRN
Status: DISCONTINUED | OUTPATIENT
Start: 2024-11-25 | End: 2024-11-25

## 2024-11-25 RX ORDER — ONDANSETRON 2 MG/ML
4 INJECTION INTRAMUSCULAR; INTRAVENOUS EVERY 30 MIN PRN
Status: DISCONTINUED | OUTPATIENT
Start: 2024-11-25 | End: 2024-11-25 | Stop reason: HOSPADM

## 2024-11-25 RX ORDER — OXYCODONE HYDROCHLORIDE 5 MG/1
10 TABLET ORAL
Status: DISCONTINUED | OUTPATIENT
Start: 2024-11-25 | End: 2024-11-25 | Stop reason: HOSPADM

## 2024-11-25 RX ORDER — CEFAZOLIN SODIUM/WATER 2 G/20 ML
2 SYRINGE (ML) INTRAVENOUS SEE ADMIN INSTRUCTIONS
Status: DISCONTINUED | OUTPATIENT
Start: 2024-11-25 | End: 2024-11-25 | Stop reason: HOSPADM

## 2024-11-25 RX ORDER — SODIUM CHLORIDE, SODIUM LACTATE, POTASSIUM CHLORIDE, CALCIUM CHLORIDE 600; 310; 30; 20 MG/100ML; MG/100ML; MG/100ML; MG/100ML
INJECTION, SOLUTION INTRAVENOUS CONTINUOUS
Status: DISCONTINUED | OUTPATIENT
Start: 2024-11-25 | End: 2024-11-25 | Stop reason: HOSPADM

## 2024-11-25 RX ORDER — ONDANSETRON 4 MG/1
4 TABLET, ORALLY DISINTEGRATING ORAL EVERY 30 MIN PRN
Status: CANCELLED | OUTPATIENT
Start: 2024-11-25

## 2024-11-25 RX ORDER — ONDANSETRON 4 MG/1
4 TABLET, ORALLY DISINTEGRATING ORAL EVERY 30 MIN PRN
Status: DISCONTINUED | OUTPATIENT
Start: 2024-11-25 | End: 2024-11-25 | Stop reason: HOSPADM

## 2024-11-25 RX ORDER — OXYCODONE HYDROCHLORIDE 5 MG/1
5 TABLET ORAL
Status: DISCONTINUED | OUTPATIENT
Start: 2024-11-25 | End: 2024-11-25 | Stop reason: HOSPADM

## 2024-11-25 RX ADMIN — PROPOFOL 40 MG: 10 INJECTION, EMULSION INTRAVENOUS at 14:19

## 2024-11-25 RX ADMIN — Medication 2 G: at 14:10

## 2024-11-25 RX ADMIN — PROPOFOL 125 MCG/KG/MIN: 10 INJECTION, EMULSION INTRAVENOUS at 14:19

## 2024-11-25 RX ADMIN — PROPOFOL 30 MG: 10 INJECTION, EMULSION INTRAVENOUS at 14:27

## 2024-11-25 RX ADMIN — PROPOFOL 40 MG: 10 INJECTION, EMULSION INTRAVENOUS at 14:34

## 2024-11-25 ASSESSMENT — ENCOUNTER SYMPTOMS: DYSRHYTHMIAS: 1

## 2024-11-25 ASSESSMENT — ACTIVITIES OF DAILY LIVING (ADL)
ADLS_ACUITY_SCORE: 59
ADLS_ACUITY_SCORE: 45
ADLS_ACUITY_SCORE: 45

## 2024-11-25 NOTE — ANESTHESIA PREPROCEDURE EVALUATION
Anesthesia Pre-Procedure Evaluation    Patient: Marva Gaines   MRN: [id number] : 1935        Procedure : Procedure(s):  AMPUTATION, fifth digit right foot          Past Medical History:   Diagnosis Date    Acute osteomyelitis of toe, right (H)     Arthritis     Chronic atrial fibrillation (H)     Chronic heart failure with preserved ejection fraction (H)     Coronary artery disease involving native coronary artery without angina pectoris     Depressive disorder     History of blood transfusion     Antibodies    Hypertension     Peripheral artery disease (H)     Permanent atrial fibrillation (H)       Past Surgical History:   Procedure Laterality Date    AMPUTATE TOE(S) Right 2024    Procedure: AMPUTATION, FIFTH DIGIT RIGHT FOOT;  Surgeon: Henry Peres DPM;  Location: [facility] OR    CATARACT EXTRACTION Bilateral     CHOLECYSTECTOMY      COLONOSCOPY N/A 2022    Procedure: COLONOSCOPY with argon plasma coagulation;  Surgeon: Steven Driscoll MD;  Location: [facility] OR    ESOPHAGOSCOPY, GASTROSCOPY, DUODENOSCOPY (EGD), COMBINED N/A 2022    Procedure: ESOPHAGOGASTRODUODENOSCOPY (EGD) with argon plasma coagulation;  Surgeon: Setven Driscoll MD;  Location: [facility] OR    HRW ANES TOTAL HIP REPLACEMENT, MDA 1 Bilateral     IR LOWER EXTREMITY ANGIOGRAM RIGHT  2024      Allergies   Allergen Reactions    Blood Transfusion Related (Informational Only) Other (See Comments)     Patient has a history of a clinically significant antibody against RBC antigens.  A delay in compatible RBCs may occur. 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    Hydrocodone-Acetaminophen Unknown      Social History     Tobacco Use    Smoking status: Former     Passive exposure: Past    Smokeless tobacco: Never   Substance Use Topics    Alcohol use: Not Currently     Comment: Rarely      Wt Readings from Last 1 Encounters:   24 59.4 kg (131 lb)        Anesthesia Evaluation   Pt has had prior anesthetic.    "      ROS/MED HX  ENT/Pulmonary:  - neg pulmonary ROS     Neurologic:  - neg neurologic ROS     Cardiovascular:     (+)  hypertension- -  CAD -  - -      CHF                  dysrhythmias, a-fib,             METS/Exercise Tolerance:     Hematologic:  - neg hematologic  ROS     Musculoskeletal:  - neg musculoskeletal ROS     GI/Hepatic:  - neg GI/hepatic ROS     Renal/Genitourinary:  - neg Renal ROS     Endo:     (+)          thyroid problem,            Psychiatric/Substance Use:  - neg psychiatric ROS     Infectious Disease:  - neg infectious disease ROS     Malignancy:  - neg malignancy ROS     Other:  - neg other ROS          Physical Exam    Airway  airway exam normal           Respiratory Devices and Support         Dental           Cardiovascular   cardiovascular exam normal          Pulmonary   pulmonary exam normal              OUTSIDE LABS:  CBC:   Lab Results   Component Value Date    WBC 9.4 10/18/2024    WBC 7.7 07/31/2024    HGB 11.7 11/18/2024    HGB 11.5 (L) 10/18/2024    HCT 38.1 10/18/2024    HCT 35.5 07/31/2024     10/18/2024     07/31/2024     BMP:   Lab Results   Component Value Date     11/18/2024     06/20/2024    POTASSIUM 4.4 11/18/2024    POTASSIUM 3.8 06/20/2024    CHLORIDE 105 11/18/2024    CHLORIDE 106 06/20/2024    CO2 26 11/18/2024    CO2 24 06/20/2024    BUN 25.3 (H) 11/18/2024    BUN 20.3 06/20/2024    CR 1.10 (H) 11/18/2024    CR 1.2 (H) 07/05/2024     (H) 11/25/2024     (H) 11/18/2024     COAGS:   Lab Results   Component Value Date    PTT 36 03/10/2023    INR 1.68 (H) 03/10/2023     POC: No results found for: \"BGM\", \"HCG\", \"HCGS\"  HEPATIC:   Lab Results   Component Value Date    ALBUMIN 4.1 05/29/2024    PROTTOTAL 6.3 (L) 05/29/2024    ALT <5 05/29/2024    AST 12 05/29/2024    ALKPHOS 114 05/29/2024    BILITOTAL 0.9 05/29/2024     OTHER:   Lab Results   Component Value Date    LACT 0.9 06/02/2024    A1C 5.3 01/11/2024    FAROOQ 9.7 11/18/2024    " MAG 1.9 12/28/2022    TSH 3.58 01/11/2024    CRP 0.3 06/20/2018    SED 9 05/29/2024       Anesthesia Plan    ASA Status:  3       Anesthesia Type: MAC.              Consents    Anesthesia Plan(s) and associated risks, benefits, and realistic alternatives discussed. Questions answered and patient/representative(s) expressed understanding.     - Discussed: Risks, Benefits and Alternatives for BOTH SEDATION and the PROCEDURE were discussed     - Discussed with:  Patient            Postoperative Care    Pain management: Multi-modal analgesia.   PONV prophylaxis: Ondansetron (or other 5HT-3)     Comments:               Quoc Church MD    I have reviewed the pertinent notes and labs in the chart from the past 30 days and (re)examined the patient.  Any updates or changes from those notes are reflected in this note.            # Drug Induced Coagulation Defect: home medication list includes an anticoagulant medication  # Drug Induced Platelet Defect: home medication list includes an antiplatelet medication

## 2024-11-25 NOTE — INTERVAL H&P NOTE
I have reviewed the surgical (or preoperative) H&P that is linked to this encounter, and examined the patient. There are no significant changes    Clinical Conditions Present on Arrival:  Clinically Significant Risk Factors Present on Admission                 # Drug Induced Coagulation Defect: home medication list includes an anticoagulant medication  # Drug Induced Platelet Defect: home medication list includes an antiplatelet medication

## 2024-11-25 NOTE — OP NOTE
Date: 11/25/2024     Surgeon: KENDRA Peres DPM    Preoperative diagnosis:   1.  Abscess right foot  2.  Acute osteomyelitis second digit right foot  3.  Acute osteomyelitis fifth metatarsal right foot    Postoperative diagnosis: Same    Procedure:   Incision and drainage right foot  2.  Amputation second digit right foot  3.  Partial amputation fifth metatarsal right foot    Anesthesia: MAC with Local     Hemostasis: None    Pathology:   ID Type Source Tests Collected by Time Destination   1 : right 2nd toe Tissue Toe, Right SURGICAL PATHOLOGY EXAM Henry Peres DPM 11/25/2024  2:38 PM    2 : right partial 5th toe Tissue Toe, Right SURGICAL PATHOLOGY EXAM Henry Peres DPM 11/25/2024  2:39 PM    A : 2nd toe swab Swab Toe, Right ANAEROBIC BACTERIAL CULTURE ROUTINE, GRAM STAIN, AEROBIC BACTERIAL CULTURE ROUTINE Henry Peres DPM 11/25/2024  2:36 PM    B : right partial 5th toe swab Swab Toe, Right ANAEROBIC BACTERIAL CULTURE ROUTINE, GRAM STAIN, AEROBIC BACTERIAL CULTURE ROUTINE Henry Peres DPM 11/25/2024  2:36 PM         Injectables: None    Materials: 3-0 Vicryl, 3-0 nylon    Complications: None    Blood loss: 12 cc    Findings: Patient presents for operative intervention for  osteomyelitis of the second digit right foot and fifth metatarsal right foot.  I discussed today surgical procedure with the patient and present daughter to include amputation of the second digit and partial amputation fifth metatarsal right foot.  Risk include but not limited to need for additional surgical intervention including partial foot amputation.  Patient has been optimized for today's procedure by vascular surgery.  All questions invited and answered.  Consent has been obtained.  Patient questions invited and answered, including appropriate risk, benefits and complications. No guarantees given or implied. Patient has been NPO.    Description: Patient was brought to the operating room and placed on  the table in supine position. IV-sedation was administered by the anesthesia department.  Injection of 0.5% Marcaine plain was administered to surgical site right foot. The foot was then prepped and draped in usual aseptic manner and the following procedure was then performed: Attention was directed to the second digit of the right foot where a #15 blade was then used to make to semiconverging elliptical incisions along the base of the digit into the second MPJ.  At this time the second digit was disarticulated and sent to pathology.  Deep aerobic/anaerobic cultures obtained.  Next, 1000 cc pulse Avage was then used to irrigate the surgical site.  Following irrigation and debridement no remaining nonviable tissue was identified and primary closure was determined to be appropriate.  The subcutaneous tissues were then reapproximated and 3-0 Vicryl in a simple suture fashion the skin was closed using 3-0 nylon in a simple suture fashion.    Attention was then directed the patient's lateral fifth metatarsal right foot where nonviable tissue was identified.  At this time a #15 blade was then used to make 2 similar converging elliptical incisions along the area of ulceration with nonviable tissue into subcutaneous tissue.  At this time a #15 blade was then used to sharply excisionally debride the nonviable tissue and to level of muscle and bone of the distal fifth metatarsal.  Again #15 blade was then used to divide the periosteum of the fifth metatarsal.  A sagittal saw was then used to resect the fifth metatarsal at a visually clean proximal margin.  The bone was then removed from the surgical site in toto and sent to pathology.  Deep aerobic/anaerobic cultures obtained.  Any remaining nonviable tissue was sharply excisionally debrided with a #15 blade into the level of muscle and bone.  Next, 1000 cc pulse Avage was then used to irrigate the surgical site.  Following irrigation debridement no remaining nonviable tissue  was identified and primary closure was determined to be appropriate.  The subcutaneous tissue was then reapproximated using 3-0 Vicryl in a simple suture fashion and the skin was closing 3-0 nylon in a running interlocking suture fashion.    Dressings Adaptic, consistent of 4x4's, ABD, kerlix roll and an ace wrap.     The patient appeared to tolerate all the procedures and anesthesia well without apparent complications. Patient was transported from the operating room to the recovery room with vital signs stable and neurovascular status as it was pre-operatively to the right foot. Patient to be discharged per anesthesia protocol.  She should remain nonweightbearing on the right foot at all times, surgical dressing to remain intact until her first postop visit, elevate right foot above waist at all times, cam boot for stability.  I would like to see her for follow-up in approximately 1 week.  I will start her on Bactrim pending culture and sensitivity report.    I discussed the above with the patient's daughter Veronica postoperatively.    Henry Peres DPM

## 2024-11-25 NOTE — ANESTHESIA CARE TRANSFER NOTE
Patient: Marva Gaines    Procedure: Procedure(s):  INCISION AND DRAINAGE right foot with  amputation of second digit and partial fifth metatarsal amputation       Diagnosis: Acute osteomyelitis of toe, right (H) [M86.171]  Diagnosis Additional Information: No value filed.    Anesthesia Type:   MAC     Note:    Oropharynx: oropharynx clear of all foreign objects  Level of Consciousness: awake  Oxygen Supplementation: face mask  Level of Supplemental Oxygen (L/min / FiO2): 6  Independent Airway: airway patency satisfactory and stable  Dentition: dentition unchanged  Vital Signs Stable: post-procedure vital signs reviewed and stable  Report to RN Given: handoff report given  Patient transferred to: Phase II    Handoff Report: Identifed the Patient, Identified the Reponsible Provider, Reviewed the pertinent medical history, Discussed the surgical course, Reviewed Intra-OP anesthesia mangement and issues during anesthesia, Set expectations for post-procedure period and Allowed opportunity for questions and acknowledgement of understanding      Vitals:  Vitals Value Taken Time   /60 11/25/24 1452   Temp 35.6  C (96.08  F) 11/25/24 1455   Pulse 65 11/25/24 1455   Resp     SpO2 100 % 11/25/24 1455   Vitals shown include unfiled device data.    Electronically Signed By: DEBORAH Lopez CRNA  November 25, 2024  2:57 PM

## 2024-11-25 NOTE — PHARMACY-ADMISSION MEDICATION HISTORY
Pharmacist Admission Medication History    Admission medication history is complete. The information provided in this note is only as accurate as the sources available at the time of the update.    Information Source(s): Patient, Family member, and CareEverywhere/SureScripts via in-person    Pertinent Information: Patient's daughter Anabel usually sets up patient's medications in 2-3 week blocks. Daughter was currently at work, but patient did come with her own list. Compared med list with patient's personal list, which also had pre-op hold instructions on list - utilized to update last dose time. Spoke with daughter to figure out AM vs PM medication and confirm hold durations for Eliquis, BP meds, etc. Unknown if patient has started probiotic (added to list 11/22)    Changes made to PTA medication list:  Added: None  Deleted: None  Changed: None    Allergies reviewed with patient and updates made in EHR: yes    Medication History Completed By: DELANEY BURK formerly [facility] 11/25/2024 1:17 PM    PTA Med List   Medication Sig Last Dose/Taking    acetaminophen (TYLENOL) 500 MG tablet Take 1,000 mg by mouth every 6 hours as needed for mild pain Unknown    apixaban ANTICOAGULANT (ELIQUIS ANTICOAGULANT) 2.5 MG tablet Take 1 tablet (2.5 mg) by mouth 2 times daily 11/21/2024 Evening    atorvastatin (LIPITOR) 20 MG tablet TAKE 1 TABLET BY MOUTH EVERYDAY AT BEDTIME 11/24/2024 Evening    cholecalciferol, vitamin D3, (VITAMIN D3) 2,000 unit Tab [CHOLECALCIFEROL, VITAMIN D3, (VITAMIN D3) 2,000 UNIT TAB] Take 1 tablet by mouth daily. 11/22/2024 Morning    clopidogrel (PLAVIX) 75 MG tablet Take 1 tablet (75 mg) by mouth daily. 11/25/2024 Morning    diltiazem ER COATED BEADS (CARDIZEM CD/CARTIA XT) 240 MG 24 hr capsule Take 1 capsule (240 mg) by mouth daily 11/24/2024 Evening    Ferrous Sulfate 324 (65 Fe) MG TBEC Take 1 tablet by mouth daily 11/22/2024    fish oil-omega-3 fatty acids 500 MG capsule Take 1 capsule by mouth daily 11/22/2024     furosemide (LASIX) 20 MG tablet TAKE 2 TABLETS BY MOUTH EVERY DAY 11/23/2024 Morning    LACTOBACILLUS RHAMNOSUS, GG, PO Take 1 capsule by mouth 2 times daily. Unknown    losartan (COZAAR) 25 MG tablet Take 1 tablet (25 mg) by mouth daily. 11/24/2024 Morning    pantoprazole (PROTONIX) 40 MG EC tablet TAKE 1 TABLET BY MOUTH TWICE A DAY 11/25/2024 Morning    sertraline (ZOLOFT) 100 MG tablet Take 1 tablet (100 mg) by mouth daily. 11/24/2024 Evening

## 2024-11-25 NOTE — OR NURSING
Pt stable and awake,  discharge instructions reviewed with daughter. Denies pain/ nausea. Up in wheelchair ready to discharge home

## 2024-11-25 NOTE — ANESTHESIA POSTPROCEDURE EVALUATION
Patient:   JIM COLLINS            MRN: [id number]            FIN: [id number]               Patient: Marva Gaines    Procedure: Procedure(s):  INCISION AND DRAINAGE right foot with  amputation of second digit and partial fifth metatarsal amputation       Anesthesia Type:  MAC    Note:  Disposition: Outpatient   Postop Pain Control: Uneventful            Sign Out: Well controlled pain   PONV: No   Neuro/Psych: Uneventful            Sign Out: Acceptable/Baseline neuro status   Airway/Respiratory: Uneventful            Sign Out: Acceptable/Baseline resp. status   CV/Hemodynamics: Uneventful            Sign Out: Acceptable CV status; No obvious hypovolemia; No obvious fluid overload   Other NRE: NONE   DID A NON-ROUTINE EVENT OCCUR? No           Last vitals:  Vitals Value Taken Time   /71 11/25/24 1531   Temp 35.3  C (95.54  F) 11/25/24 1531   Pulse 60 11/25/24 1531   Resp 16 11/25/24 1505   SpO2 98 % 11/25/24 1531   Vitals shown include unfiled device data.    Electronically Signed By: Xavier Pinedo MD  November 25, 2024  3:35 PM   Years.  Cataract surgery ([id number]) in 2012 at 77 Years.  Cataract surgery ([id number]) in 2011 at 76 Years.  Breast lumpectomy ([id number]) in 2009 at 74 Years.  removal basal from face in 2005 at 70 Years.  sinus surgery in 2000 at 65 Years.  Dilation and curettage ([id number]) in 1975 at 40 Years.  nose straighten in 1974 at 39 Years.  Tonsillectomy ([id number]) in 1937.  right leg orif.  Comments:  09/11/2018 14:08 - Agnes Wilburn  broken leg  Appendectomy ([id number]).  removal nonmalignant vocal cord.   Social History        Alcohol  Details: Alcohol Abuse in Household: No.  Use: None.  Substance Abuse  Details: Substance Abuse in Household: No.  Use: None.  Tobacco  Details: Smoker in [place]: No.  Smoked/Smokeless Tobacco Last 30 Days: No.  Smoking Tobacco Use: Never smoker.  Smokeless Tobacco Use Never.  .        Health Status   Allergies:    Allergic Reactions (Selected)  Severity Not Documented  Amoxicillin- Diarrhea.  Aspirin- Diarrhea.  Augmentin- Diarrhea.  Bacitracin- Rash.  Contrast media (iodine-based)- Hives.  Enalapril- Swollen lip.  Latex- Rash.  Nasacort- Ha - headache.   Current medications:  (Selected)   Inpatient Medications  Ordered  DR  HYDROmorphone PCA 0.2 mg/mL (Dilaudid).: PCA dose 0.2 mg, Lockout 10 minutes, 1-hour limit 1 mg, Syringe volume 30 mL, IV PCA, PRN pain breakthrough, Anant GONZALEZ  Pager:[phone]    .   .     .             Electronically Signed On 09/18/2018 22:09  __________________________________________________   AZUCENA MARTÍNEZ, JAVY

## 2024-11-27 ENCOUNTER — TELEPHONE (OUTPATIENT)
Dept: INTERNAL MEDICINE | Facility: CLINIC | Age: 89
End: 2024-11-27
Payer: COMMERCIAL

## 2024-11-27 NOTE — TELEPHONE ENCOUNTER
Home Care is calling regarding an established patient with [facility].       Requesting orders from: Sabas Austin  Provider is following patient: Yes  Is this a 60-day recertification request?  No    Orders Requested    Skilled Nursing  Request for resumption in care.     1x/wk for 5 weeks    Decrease in frequency of SN visits as patient had planned amputation and SN is no longer needed 3x/wk for wound care.      Information was gathered and will be sent to provider for review.  RN will contact Home Care with information after provider review.  Confirmed ok to leave a detailed message with call back.  Contact information confirmed and updated as needed.    Janna Canada RN

## 2024-11-27 NOTE — TELEPHONE ENCOUNTER
"RN called Scarlett - RN [facility] and left voice message reporting VO from Dr. Abel RN requesting Scarlett callback with any questions.    \"Agree with home care orders.  These should go to Dr. Keshia Lira in paper form   Zurdo Roman MD\"  "

## 2024-11-27 NOTE — OR NURSING
Post Operative Phone Call     Surgery: I&D right foot     Date of Surgery:11/25    Surgeon: Dr. Henry Peres    Patient Discharged from Hospital: Yes-currently at home with daughter Veronica checking in on her.      Spoke with: Daughter Veronica-unable to reach [place]      Discussed patients status since discharging home after surgery.     Pt reports their pain is  Currently 1/10 and they are using Opiods: Oxycodone (Percocet, Oxycontin) to control their pain. Veronica reports she has only had to take one pain pill the first night and she denied pain yesterday afternoon. She has not needed any further oxycodone but has it on hand if needed.    The incision is currently covered with a surgical dressing that will be left in place until Post Op#1 appointment on 12/6. Pt reports they are eating and drinking normal.     Pt is voiding normal and had a bowel movement on  (unknown-daughter thinks she is going fine but will check with her today).    Pt is not having any stroke like symptoms or new onset of headaches.         VQI measures: Daughter reports patient has resumed Eliquis.    Confirmed follow up appointments: YES    Callback number provided for further questions/concerns.    Sully Mcgrath RN

## 2024-11-27 NOTE — TELEPHONE ENCOUNTER
[facility] RN called already for other orders requested today from to Scarlett  [facility] and left voice message reporting VO from Dr. Roman.    RN left voice message regarding orders called to Scarlett HENDERSON and requested that Yamilet contact Scarlett HENDERSON to discuss and requested paper form of orders be sent to Dr. Vazquez to sign.

## 2024-11-28 LAB
BACTERIA TISS BX CULT: NORMAL

## 2024-11-29 LAB
PATH REPORT.COMMENTS IMP SPEC: NORMAL
PATH REPORT.COMMENTS IMP SPEC: NORMAL
PATH REPORT.FINAL DX SPEC: NORMAL
PATH REPORT.GROSS SPEC: NORMAL
PATH REPORT.MICROSCOPIC SPEC OTHER STN: NORMAL
PATH REPORT.RELEVANT HX SPEC: NORMAL
PHOTO IMAGE: NORMAL

## 2024-11-29 PROCEDURE — 88311 DECALCIFY TISSUE: CPT | Mod: 26 | Performed by: PATHOLOGY

## 2024-11-29 PROCEDURE — 88305 TISSUE EXAM BY PATHOLOGIST: CPT | Mod: 26 | Performed by: PATHOLOGY

## 2024-11-30 LAB
BACTERIA TISS BX CULT: NO GROWTH
BACTERIA TISS BX CULT: NO GROWTH

## 2024-12-02 LAB
BACTERIA TISS BX CULT: NORMAL
BACTERIA TISS BX CULT: NORMAL

## 2024-12-03 ENCOUNTER — TELEPHONE (OUTPATIENT)
Dept: INTERNAL MEDICINE | Facility: CLINIC | Age: 89
End: 2024-12-03
Payer: COMMERCIAL

## 2024-12-03 NOTE — TELEPHONE ENCOUNTER
FYI - Status Update    Who is Calling: nurseMariola    Update: Fell this morning 5:20 am, not injured, took vitals heart rate was over 100, and rechecked got 110 and afib with runs of tachicardia. Want to know if PCP would like to do anything to address that.    Fax: [phone] if PCP would like to send some recommendations    Pt does not want her daughters updated of this incident    Does caller want a call/response back: Yes     Could we send this information to you in [employer] or would you prefer to receive a phone call?:   Patient would prefer a phone call   Okay to leave a detailed message?: Yes at Other phone number:  [phone]

## 2024-12-03 NOTE — TELEPHONE ENCOUNTER
S-(situation): Update: Fell this morning 5:20 am, not injured, took vitals heart rate was over 100, and rechecked got 110 and afib with runs of tachicardia. Want to know if PCP would like to do anything to address that.     B-(background): OV 8/2/24    Permanent atrial fibrillation (H)  with controlled ventricular response with diltiazem,  on Eliquis.    A-(assessment): Fax: [phone] if PCP would like to send some recommendations     Pt does not want her daughters updated of this incident     R-(recommendations): Routing to PCP to review and advise.     Edwar GILLIAM RN

## 2024-12-04 NOTE — TELEPHONE ENCOUNTER
Spoke with AL nurse. Patient noted she doesn't want daughters to know of fall but I only have daughters numbers on file. Patient also does not drive and would need to tell daughters in order to have ride to recommended evaluation.     AL RN gave be patient number they have on file.     Contacted patient. She said that she does not want to be seen. She understands the risks but thinks she is ok and doesn't want to be seen and voiced that she does not want to her daughters to know about it. She again repeated that this is her decision and does not want to be seen.

## 2024-12-04 NOTE — TELEPHONE ENCOUNTER
Dr Austin Is not in clinic today.  If patient needs emergent evaluation, she should go to the urgent care or emergency room.

## 2024-12-05 DIAGNOSIS — Z53.9 DIAGNOSIS NOT YET DEFINED: Primary | ICD-10-CM

## 2024-12-05 PROCEDURE — G0179 MD RECERTIFICATION HHA PT: HCPCS | Performed by: INTERNAL MEDICINE

## 2024-12-06 ENCOUNTER — MEDICAL CORRESPONDENCE (OUTPATIENT)
Dept: HEALTH INFORMATION MANAGEMENT | Facility: CLINIC | Age: 89
End: 2024-12-06

## 2024-12-09 ENCOUNTER — TELEPHONE (OUTPATIENT)
Dept: VASCULAR SURGERY | Facility: CLINIC | Age: 89
End: 2024-12-09
Payer: COMMERCIAL

## 2024-12-09 NOTE — TELEPHONE ENCOUNTER
Scarlett RN with [facility] calls and states that they are asking to decrease her visits to 2 times this week and then once a week thereafter unless wound care gets changed as they are not doing current wound care to her foot. Agreed with decreasing nurse visits at this time.

## 2024-12-17 ENCOUNTER — MEDICAL CORRESPONDENCE (OUTPATIENT)
Dept: HEALTH INFORMATION MANAGEMENT | Facility: CLINIC | Age: 89
End: 2024-12-17

## 2024-12-20 NOTE — PATIENT INSTRUCTIONS
Gauze dressing keep intact. Continue non-weight bearing on the right foot. Prevalon boot/PRAFO boot right foot at all times including overnight.

## 2024-12-24 ENCOUNTER — OFFICE VISIT (OUTPATIENT)
Dept: VASCULAR SURGERY | Facility: CLINIC | Age: 89
End: 2024-12-24
Attending: PODIATRIST
Payer: COMMERCIAL

## 2024-12-24 VITALS — HEART RATE: 92 BPM | DIASTOLIC BLOOD PRESSURE: 73 MMHG | OXYGEN SATURATION: 98 % | SYSTOLIC BLOOD PRESSURE: 149 MMHG

## 2024-12-24 DIAGNOSIS — M86.171 ACUTE OSTEOMYELITIS OF TOE, RIGHT (H): Primary | ICD-10-CM

## 2024-12-24 PROCEDURE — G0463 HOSPITAL OUTPT CLINIC VISIT: HCPCS | Performed by: PODIATRIST

## 2024-12-24 ASSESSMENT — PAIN SCALES - GENERAL: PAINLEVEL_OUTOF10: NO PAIN (1)

## 2024-12-24 NOTE — PROGRESS NOTES
Podiatry Progress Note        ASSESSMENT: S/P amputation second digit right foot and partial fifth metatarsal amputation right foot      TREATMENT:  -I discussed with the patient and her daughter today that the surgical sites on the right foot appear to be progressing well.  However, she does have a small area of stable eschar along the central margin of the lateral right foot incision.  We discussed that this area may develop into a ulceration and we will closely monitor.    -Sutures removed today, Steri-Strips applied.    -Reviewed importance of strict nonweightbearing on the right foot and heel at all times.  Elevate right foot above waist.  Recommend continued use of Prevalon boot at all times including overnight.    -She will follow-up with me in 2-3 weeks    Henry Peres DPM  [facility]/Foot & Ankle Surgery      HPI: Marva Gaines was seen again today s/p amputation second digit right foot and partial amputation fifth metatarsal right foot.  Patient's daughter is present today.  Patient reports she has remained nonweightbearing on her right foot.  She has been using the Prevalon boot as directed but would like to stop using this as this is uncomfortable overnight.    Past Medical History:   Diagnosis Date    Acute osteomyelitis of toe, right (H)     Arthritis     Chronic atrial fibrillation (H)     Chronic heart failure with preserved ejection fraction (H)     Coronary artery disease involving native coronary artery without angina pectoris     Depressive disorder     History of blood transfusion     Antibodies    Hypertension     Peripheral artery disease (H)     Permanent atrial fibrillation (H)        Allergies   Allergen Reactions    Blood Transfusion Related (Informational Only) Other (See Comments)     Patient has a history of a clinically significant antibody against RBC antigens.  A delay in compatible RBCs may occur. 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    Hydrocodone-Acetaminophen Hallucination          Current Outpatient Medications:     acetaminophen (TYLENOL) 500 MG tablet, Take 1,000 mg by mouth every 6 hours as needed for mild pain, Disp: , Rfl:     apixaban ANTICOAGULANT (ELIQUIS ANTICOAGULANT) 2.5 MG tablet, Take 1 tablet (2.5 mg) by mouth 2 times daily, Disp: 180 tablet, Rfl: 3    atorvastatin (LIPITOR) 20 MG tablet, TAKE 1 TABLET BY MOUTH EVERYDAY AT BEDTIME, Disp: 90 tablet, Rfl: 1    cholecalciferol, vitamin D3, (VITAMIN D3) 2,000 unit Tab, [CHOLECALCIFEROL, VITAMIN D3, (VITAMIN D3) 2,000 UNIT TAB] Take 1 tablet by mouth daily., Disp: , Rfl:     clopidogrel (PLAVIX) 75 MG tablet, Take 1 tablet (75 mg) by mouth daily., Disp: 90 tablet, Rfl: 3    diltiazem ER COATED BEADS (CARDIZEM CD/CARTIA XT) 240 MG 24 hr capsule, Take 1 capsule (240 mg) by mouth daily, Disp: 90 capsule, Rfl: 3    Ferrous Sulfate 324 (65 Fe) MG TBEC, Take 1 tablet by mouth daily, Disp: , Rfl:     fish oil-omega-3 fatty acids 500 MG capsule, Take 1 capsule by mouth daily, Disp: , Rfl:     furosemide (LASIX) 20 MG tablet, TAKE 2 TABLETS BY MOUTH EVERY DAY, Disp: 180 tablet, Rfl: 1    LACTOBACILLUS RHAMNOSUS, GG, PO, Take 1 capsule by mouth 2 times daily., Disp: , Rfl:     losartan (COZAAR) 25 MG tablet, Take 1 tablet (25 mg) by mouth daily., Disp: 90 tablet, Rfl: 0    oxyCODONE (ROXICODONE) 5 MG tablet, Take 1-2 tablets (5-10 mg) by mouth every 4 hours as needed for moderate to severe pain., Disp: 12 tablet, Rfl: 0    pantoprazole (PROTONIX) 40 MG EC tablet, TAKE 1 TABLET BY MOUTH TWICE A DAY, Disp: 180 tablet, Rfl: 3    sertraline (ZOLOFT) 100 MG tablet, Take 1 tablet (100 mg) by mouth daily., Disp: 90 tablet, Rfl: 3    sulfamethoxazole-trimethoprim (BACTRIM DS) 800-160 MG tablet, Take 1 tablet by mouth 2 times daily., Disp: 20 tablet, Rfl: 0    Review of Systems - Negative       OBJECTIVE:  Appearance: alert, well appearing, and in no distress.    BP (!) 149/73   Pulse 92   LMP  (LMP Unknown)   SpO2 98%     Surgical sites  along the amputated second digit right foot and lateral right foot have intact sutures with skin edges well-coapted.  There is a small stable eschar along the central incision lateral right foot.  No erythema right foot. Neurovascular status unchanged right foot.

## 2025-01-02 NOTE — PATIENT INSTRUCTIONS
Bridger Durham,    Thank you for entrusting your care with us today. After your visit today with KYLEE Dillon this is the plan that was discussed at your appointment.    Follow up in 6 months for repeat ultrasounds and clinic visit.    Continue wound care and orders per Dr. Peres.    Discontinue Plavix and start apirin 81 mg once daily.    I am including additional information on these things and our contact information if you have any questions or concerns.   Please do not hesitate to reach out to us if you felt we did not answer your questions or you are unsure of the treatment plan after your visit today. Our number is [phone].Thank you for trusting us with your care.         Again thank you for your time.             Ankle-Brachial Index (LINDA) or Physiologic Test    Description  An ankle-brachial index test is relatively pain free. Blood pressure cuffs of various sizes are placed on your thigh, calf, foot and toes.  Similar to having your blood pressure checked with an arm cuff, as the technician inflates the cuffs, they progressively tighten and are then quickly released.  You may feel some discomfort, but generally for less than 60 seconds for each measurement. You will be asked to remove your socks and shoes and possibly your pants or shorts. Gowns will be provided. It usually takes about 30-60 minutes.   Depending on the initial readings and patient symptoms, you may be asked to perform a light walk on a treadmill.  The technician will apply ultrasound gel, usually warmed for your comfort, to your ankles and wrists. Through the gel, the technician will use a small hand-held device that emits sound waves.  Risks  There are typically no side effects or complications associated with a physiologic study.  How to Prepare  Eat and take medications as usual.  There is no preparation required for an ankle-brachial index (LINDA) or physiologic exam.  What Can I Expect After the Test?  The technician will send  the ultrasound images to your vascular surgeon for evaluation. Typically, a report is available in 2-3 days. If anything critical is found, it is standard practice to notify the vascular surgeon immediately.  Reference: https://vascular.org/patient-resources/vascular-tests     Lower Extremity Arterial Ultrasound    Description  Ultrasound examinations are painless and easy for the patient. The vascular laboratory will contain a bed and just two or three pieces of equipment. You will be asked to remove pants or shorts and gowns will be provided. It usually takes about 30 minutes.  The technician will tuck a towel under your underpants in the groin. The gel is water-soluble and will not stain your skin or clothes.  Ultrasound gel, usually warmed for your comfort, will be placed on the inner side of your legs.    Through the gel, the technician will apply to your legs a small hand-held device that emits sound waves.  When the test is completed, the technician will remove excess gel from your legs.    Risks  There are typically no side effects or complications associated with a lower extremity arterial duplex ultrasound.  How to Prepare  Eat and take medications as usual.  There is no preparation required for a lower extremity arterial duplex ultrasound.  What Can I Expect After the Test?  The technician will send the ultrasound images to your vascular surgeon for evaluation. Typically, a report is available in 2-3 days. If anything critical is found, it is standard practice to notify the vascular surgeon immediately.  Reference: https://vascular.org/patient-resources/vascular-tests

## 2025-01-02 NOTE — PROGRESS NOTES
[facility]        Patient is here for a 6 month follow up  to discuss Peripheral artery disease (PAD). Seeing Dr. Peres for right foot wounds S/P I&D right foot and 2nd digit amputation. US PRIOR    Pt is currently taking Statin, Plavix, and Eliquis.    /63   Pulse 67   Temp 97.3  F (36.3  C)   Resp 14   LMP  (LMP Unknown)     The provider has been notified that the patient has no concerns.     Questions patient would like addressed today are: N/A.    Refills are needed: No    Has homecare services and agency name:  No

## 2025-01-11 DIAGNOSIS — I48.21 PERMANENT ATRIAL FIBRILLATION (H): ICD-10-CM

## 2025-01-13 ENCOUNTER — ANCILLARY PROCEDURE (OUTPATIENT)
Dept: VASCULAR ULTRASOUND | Facility: CLINIC | Age: 89
End: 2025-01-13
Attending: STUDENT IN AN ORGANIZED HEALTH CARE EDUCATION/TRAINING PROGRAM
Payer: COMMERCIAL

## 2025-01-13 ENCOUNTER — OFFICE VISIT (OUTPATIENT)
Dept: VASCULAR SURGERY | Facility: CLINIC | Age: 89
End: 2025-01-13
Attending: STUDENT IN AN ORGANIZED HEALTH CARE EDUCATION/TRAINING PROGRAM
Payer: COMMERCIAL

## 2025-01-13 VITALS
TEMPERATURE: 97.3 F | DIASTOLIC BLOOD PRESSURE: 63 MMHG | RESPIRATION RATE: 14 BRPM | SYSTOLIC BLOOD PRESSURE: 116 MMHG | HEART RATE: 67 BPM

## 2025-01-13 DIAGNOSIS — L97.511 ULCER OF RIGHT FOOT LIMITED TO BREAKDOWN OF SKIN (H): ICD-10-CM

## 2025-01-13 DIAGNOSIS — I73.9 PAD (PERIPHERAL ARTERY DISEASE): ICD-10-CM

## 2025-01-13 DIAGNOSIS — I73.9 PAD (PERIPHERAL ARTERY DISEASE): Primary | ICD-10-CM

## 2025-01-13 PROCEDURE — 93923 UPR/LXTR ART STDY 3+ LVLS: CPT

## 2025-01-13 PROCEDURE — 93926 LOWER EXTREMITY STUDY: CPT | Mod: 26 | Performed by: SURGERY

## 2025-01-13 PROCEDURE — 93926 LOWER EXTREMITY STUDY: CPT | Mod: RT

## 2025-01-13 PROCEDURE — 93923 UPR/LXTR ART STDY 3+ LVLS: CPT | Mod: 26 | Performed by: SURGERY

## 2025-01-13 PROCEDURE — G0463 HOSPITAL OUTPT CLINIC VISIT: HCPCS | Performed by: PHYSICIAN ASSISTANT

## 2025-01-13 RX ORDER — DILTIAZEM HYDROCHLORIDE 240 MG/1
240 CAPSULE, COATED, EXTENDED RELEASE ORAL DAILY
Qty: 90 CAPSULE | Refills: 0 | Status: SHIPPED | OUTPATIENT
Start: 2025-01-13

## 2025-01-13 ASSESSMENT — PAIN SCALES - GENERAL: PAINLEVEL_OUTOF10: NO PAIN (0)

## 2025-01-13 NOTE — PROGRESS NOTES
VASCULAR SURGERY PROGRESS NOTE    LOCATION:  [facility]     Marva Gaines  Medical Record #: [id number]  YOB: 1935  Age: 89 year old     Date of Service: 1/13/2025    PRIMARY CARE PROVIDER: Sabas Austin    Reason for visit: Surveillance of PAD    IMPRESSION: 89-year-old female who presents for surveillance of peripheral arterial disease status post right lower extremity angiogram in July due to chronic limb threatening ischemia.  ABIs today remain noncompressible bilaterally but with arterial duplex demonstrating biphasic flow throughout bilateral lower extremities.  Toe pressures decreased on the right at 33, 70, and 15 mmHg for first, third, and fourth toes, adequate for wound healing on the left.  Right foot wounds progressing and without signs of infection.  Medically optimized.    RECOMMENDATION/RISKS: Continue best medical therapy with Eliquis, aspirin, and high-dose statin.  Will discontinue Plavix at this time as she has completed the 6-week post angiogram duration.  Wound care and weightbearing to the right foot per podiatry recommendations.  Follow-up in 6 months with repeat lower extremity studies or sooner if she has delayed wound healing or new wound formation.    HPI:  Marva Gaines is an 89 year old female with past medical history significant for hypertension, atrial fibrillation, coronary artery disease, and peripheral arterial disease status post right lower extremity angiogram in July 2024. She presents today for follow-up and is accompanied by her daughter, Veronica. Patient denies any significant pain to the right lower extremity. Mrs. Gaines has been following with the podiatry team for ongoing management of her right foot wounds.  She was last seen by Dr. Peres on 12/24 and was noted to be making good progress with small area of stable eschar to the lateral right foot incision.  She is scheduled to meet with him again tomorrow.  Patient has been following  wound care and weightbearing recommendations to the right foot.  Mrs. Gaines is compliant with her medications and has family assist her in preparing these every week.    Ultrasound results were discussed and all questions answered.  No other concerns    REVIEW OF SYSTEMS:    A 12 point ROS was reviewed and is negative except for what is listed above in HPI.    PHH:    Past Medical History:   Diagnosis Date    Acute osteomyelitis of toe, right (H)     Arthritis     Chronic atrial fibrillation (H)     Chronic heart failure with preserved ejection fraction (H)     Coronary artery disease involving native coronary artery without angina pectoris     Depressive disorder     History of blood transfusion     Antibodies    Hypertension     Peripheral artery disease     Permanent atrial fibrillation (H)       Past Surgical History:   Procedure Laterality Date    AMPUTATE FOOT  11/25/2024    Procedure: partial fifth metatarsal amputation;  Surgeon: Henry Peres DPM;  Location: [facility] OR    AMPUTATE TOE(S) Right 05/30/2024    Procedure: AMPUTATION, FIFTH DIGIT RIGHT FOOT;  Surgeon: Henry Peres DPM;  Location: [facility] OR    AMPUTATE TOE(S) Right 11/25/2024    Procedure: amputation of second digit and;  Surgeon: Henry Peres DPM;  Location: [facility] OR    CATARACT EXTRACTION Bilateral     CHOLECYSTECTOMY      COLONOSCOPY N/A 12/30/2022    Procedure: COLONOSCOPY with argon plasma coagulation;  Surgeon: Steven Driscoll MD;  Location: [facility] OR    ESOPHAGOSCOPY, GASTROSCOPY, DUODENOSCOPY (EGD), COMBINED N/A 12/30/2022    Procedure: ESOPHAGOGASTRODUODENOSCOPY (EGD) with argon plasma coagulation;  Surgeon: Steven Driscoll MD;  Location: [facility] OR    HRW ANES TOTAL HIP REPLACEMENT, MDA 1 Bilateral     INCISION AND DRAINAGE FOOT, COMBINED Right 11/25/2024    Procedure: INCISION AND DRAINAGE right foot with;  Surgeon: Henry Peres DPM;  Location: [facility] OR    IR LOWER EXTREMITY ANGIOGRAM RIGHT  7/25/2024    IR LOWER EXTREMITY ANGIOGRAM RIGHT  11/20/2024     ALLERGIES:  Blood transfusion related (informational only) and Hydrocodone-acetaminophen    MEDS:    Current Outpatient Medications:     acetaminophen (TYLENOL) 500 MG tablet, Take 1,000 mg by mouth every 6 hours as needed for mild pain, Disp: , Rfl:     apixaban ANTICOAGULANT (ELIQUIS ANTICOAGULANT) 2.5 MG tablet, Take 1 tablet (2.5 mg) by mouth 2 times daily, Disp: 180 tablet, Rfl: 3    atorvastatin (LIPITOR) 20 MG tablet, TAKE 1 TABLET BY MOUTH EVERYDAY AT BEDTIME, Disp: 90 tablet, Rfl: 1    cholecalciferol, vitamin D3, (VITAMIN D3) 2,000 unit Tab, [CHOLECALCIFEROL, VITAMIN D3, (VITAMIN D3) 2,000 UNIT TAB] Take 1 tablet by mouth daily., Disp: , Rfl:     clopidogrel (PLAVIX) 75 MG tablet, Take 1 tablet (75 mg) by mouth daily., Disp: 90 tablet, Rfl: 3    diltiazem ER COATED BEADS (CARDIZEM CD/CARTIA XT) 240 MG 24 hr capsule, TAKE 1 CAPSULE BY MOUTH EVERY DAY, Disp: 90 capsule, Rfl: 0    Ferrous Sulfate 324 (65 Fe) MG TBEC, Take 1 tablet by mouth daily, Disp: , Rfl:     fish oil-omega-3 fatty acids 500 MG capsule, Take 1 capsule by mouth daily, Disp: , Rfl:     furosemide (LASIX) 20 MG tablet, TAKE 2 TABLETS BY MOUTH EVERY DAY, Disp: 180 tablet, Rfl: 1    LACTOBACILLUS RHAMNOSUS, GG, PO, Take 1 capsule by mouth 2 times daily., Disp: , Rfl:     losartan (COZAAR) 25 MG tablet, Take 1 tablet (25 mg) by mouth daily., Disp: 90 tablet, Rfl: 0    oxyCODONE (ROXICODONE) 5 MG tablet, Take 1-2 tablets (5-10 mg) by mouth every 4 hours as needed for moderate to severe pain., Disp: 12 tablet, Rfl: 0    pantoprazole (PROTONIX) 40 MG EC tablet, TAKE 1 TABLET BY MOUTH TWICE A DAY, Disp: 180 tablet, Rfl: 3    sertraline (ZOLOFT) 100 MG tablet, Take 1 tablet (100 mg) by mouth daily., Disp: 90 tablet, Rfl: 3    sulfamethoxazole-trimethoprim (BACTRIM DS) 800-160 MG tablet, Take 1 tablet by mouth 2 times daily., Disp: 20  tablet, Rfl: 0    SOCIAL HABITS:    History   Smoking Status    Former   Smokeless Tobacco    Never     Social History    Substance and Sexual Activity      Alcohol use: Not Currently        Comment: Rarely      History   Drug Use Unknown     FAMILY HISTORY:  No family history on file.    PE:  LMP  (LMP Unknown)   Wt Readings from Last 1 Encounters:   12/06/24 57.6 kg (127 lb)     There is no height or weight on file to calculate BMI.    EXAM:  GENERAL: well-developed 89 year old female who appears her stated age  CARDIAC: normal   CHEST/LUNG: normal respiratory effort   MUSCULOSKELETAL: grossly normal and both lower extremities are intact, no lower extremity edema  NEUROLOGIC: focally intact, alert and oriented x 3  PSYCH: appropriate affect    DIAGNOSTIC STUDIES:     Images:    US Lower Extremity Arterial Duplex Left   US Low Ext Arterial Dop Seg Pres w/o Exercise     I personally reviewed the images and my interpretation is ABIs today remain noncompressible bilaterally but with arterial duplex demonstrating biphasic flow throughout bilateral lower extremities.  Toe pressures decreased on the right at 33, 70, and 15 mmHg for first, third, and fourth toes, adequate for wound healing on the left.     LABS:      Sodium   Date Value Ref Range Status   11/18/2024 142 135 - 145 mmol/L Final   06/20/2024 142 135 - 145 mmol/L Final     Comment:     Reference intervals for this test were updated on 09/26/2023 to more accurately reflect our healthy population. There may be differences in the flagging of prior results with similar values performed with this method. Interpretation of those prior results can be made in the context of the updated reference intervals.    06/19/2024 140 135 - 145 mmol/L Final     Comment:     Reference intervals for this test were updated on 09/26/2023 to more accurately reflect our healthy population. There may be differences in the flagging of prior results with similar values performed with  this method. Interpretation of those prior results can be made in the context of the updated reference intervals.      Urea Nitrogen   Date Value Ref Range Status   11/18/2024 25.3 (H) 8.0 - 23.0 mg/dL Final   06/20/2024 20.3 8.0 - 23.0 mg/dL Final   06/19/2024 19.9 8.0 - 23.0 mg/dL Final   03/10/2023 26 8 - 28 mg/dL Final   12/30/2022 20 8 - 28 mg/dL Final   12/29/2022 24 8 - 28 mg/dL Final     Hemoglobin   Date Value Ref Range Status   11/18/2024 11.7 11.7 - 15.7 g/dL Final   10/18/2024 11.5 (L) 11.7 - 15.7 g/dL Final   07/31/2024 10.5 (L) 11.7 - 15.7 g/dL Final     Platelet Count   Date Value Ref Range Status   10/18/2024 284 150 - 450 10e3/uL Final   07/31/2024 338 150 - 450 10e3/uL Final   06/10/2024 289 150 - 450 10e3/uL Final     BNP   Date Value Ref Range Status   12/28/2022 576 (H) 0 - 167 pg/mL Final     INR   Date Value Ref Range Status   03/10/2023 1.68 (H) 0.85 - 1.15 Final   09/01/2021 1.9 (H) 0.9 - 1.1 Final   07/19/2021 2.5 (H) 0.9 - 1.1 Final   06/28/2021 1.90 (H) 0.90 - 1.10 Final   05/25/2021 2.00 (H) 0.90 - 1.10 Final     30 minutes spent on the day of encounter doing chart review, history and exam, documentation, and further activities as noted.     Claudia Dillon PA-C  VASCULAR SURGERY

## 2025-01-14 ENCOUNTER — OFFICE VISIT (OUTPATIENT)
Dept: INTERNAL MEDICINE | Facility: CLINIC | Age: 89
End: 2025-01-14
Payer: COMMERCIAL

## 2025-01-14 ENCOUNTER — OFFICE VISIT (OUTPATIENT)
Dept: VASCULAR SURGERY | Facility: CLINIC | Age: 89
End: 2025-01-14
Attending: PODIATRIST
Payer: COMMERCIAL

## 2025-01-14 VITALS
HEART RATE: 90 BPM | SYSTOLIC BLOOD PRESSURE: 116 MMHG | DIASTOLIC BLOOD PRESSURE: 72 MMHG | RESPIRATION RATE: 16 BRPM | OXYGEN SATURATION: 97 %

## 2025-01-14 VITALS — SYSTOLIC BLOOD PRESSURE: 115 MMHG | OXYGEN SATURATION: 90 % | HEART RATE: 103 BPM | DIASTOLIC BLOOD PRESSURE: 52 MMHG

## 2025-01-14 DIAGNOSIS — N18.32 CHRONIC KIDNEY DISEASE, STAGE 3B (H): ICD-10-CM

## 2025-01-14 DIAGNOSIS — D50.0 IRON DEFICIENCY ANEMIA DUE TO CHRONIC BLOOD LOSS: ICD-10-CM

## 2025-01-14 DIAGNOSIS — I50.32 CHRONIC HEART FAILURE WITH PRESERVED EJECTION FRACTION (HFPEF) (H): ICD-10-CM

## 2025-01-14 DIAGNOSIS — I48.21 PERMANENT ATRIAL FIBRILLATION (H): ICD-10-CM

## 2025-01-14 DIAGNOSIS — K63.89 CECUM MASS: ICD-10-CM

## 2025-01-14 DIAGNOSIS — M89.9 DISORDER OF BONE AND CARTILAGE: ICD-10-CM

## 2025-01-14 DIAGNOSIS — F41.1 ANXIETY STATE: ICD-10-CM

## 2025-01-14 DIAGNOSIS — M94.9 DISORDER OF BONE AND CARTILAGE: ICD-10-CM

## 2025-01-14 DIAGNOSIS — M86.171 ACUTE OSTEOMYELITIS OF TOE, RIGHT (H): ICD-10-CM

## 2025-01-14 DIAGNOSIS — E78.00 PURE HYPERCHOLESTEROLEMIA: ICD-10-CM

## 2025-01-14 DIAGNOSIS — Z00.00 ENCOUNTER FOR ANNUAL WELLNESS VISIT (AWV) IN MEDICARE PATIENT: Primary | ICD-10-CM

## 2025-01-14 DIAGNOSIS — K31.819 GASTRIC AVM: ICD-10-CM

## 2025-01-14 DIAGNOSIS — L97.511 ULCER OF RIGHT FOOT LIMITED TO BREAKDOWN OF SKIN (H): ICD-10-CM

## 2025-01-14 DIAGNOSIS — I25.10 CORONARY ARTERY DISEASE INVOLVING NATIVE CORONARY ARTERY OF NATIVE HEART WITHOUT ANGINA PECTORIS: ICD-10-CM

## 2025-01-14 DIAGNOSIS — K55.20 COLON ARTERIOVENOUS MALFORMATION: ICD-10-CM

## 2025-01-14 DIAGNOSIS — M86.171 ACUTE OSTEOMYELITIS OF TOE, RIGHT (H): Primary | ICD-10-CM

## 2025-01-14 DIAGNOSIS — E04.1 THYROID NODULE: ICD-10-CM

## 2025-01-14 DIAGNOSIS — I10 ESSENTIAL HYPERTENSION: ICD-10-CM

## 2025-01-14 PROBLEM — L03.031 CELLULITIS OF FIFTH TOE OF RIGHT FOOT: Status: RESOLVED | Noted: 2024-05-29 | Resolved: 2025-01-14

## 2025-01-14 PROBLEM — R31.0 GROSS HEMATURIA: Status: RESOLVED | Noted: 2024-10-18 | Resolved: 2025-01-14

## 2025-01-14 LAB
ALBUMIN SERPL BCG-MCNC: 3.4 G/DL (ref 3.5–5.2)
ALP SERPL-CCNC: 93 U/L (ref 40–150)
ALT SERPL W P-5'-P-CCNC: 10 U/L (ref 0–50)
ANION GAP SERPL CALCULATED.3IONS-SCNC: 11 MMOL/L (ref 7–15)
AST SERPL W P-5'-P-CCNC: 12 U/L (ref 0–45)
BILIRUB SERPL-MCNC: 0.3 MG/DL
BUN SERPL-MCNC: 32.6 MG/DL (ref 8–23)
CALCIUM SERPL-MCNC: 9.2 MG/DL (ref 8.8–10.4)
CHLORIDE SERPL-SCNC: 102 MMOL/L (ref 98–107)
CREAT SERPL-MCNC: 1.26 MG/DL (ref 0.51–0.95)
EGFRCR SERPLBLD CKD-EPI 2021: 41 ML/MIN/1.73M2
ERYTHROCYTE [DISTWIDTH] IN BLOOD BY AUTOMATED COUNT: 15.6 % (ref 10–15)
GLUCOSE SERPL-MCNC: 93 MG/DL (ref 70–99)
HCO3 SERPL-SCNC: 23 MMOL/L (ref 22–29)
HCT VFR BLD AUTO: 34.9 % (ref 35–47)
HGB BLD-MCNC: 10.9 G/DL (ref 11.7–15.7)
LDLC SERPL DIRECT ASSAY-MCNC: 49 MG/DL
MCH RBC QN AUTO: 26.7 PG (ref 26.5–33)
MCHC RBC AUTO-ENTMCNC: 31.2 G/DL (ref 31.5–36.5)
MCV RBC AUTO: 85 FL (ref 78–100)
PLATELET # BLD AUTO: 270 10E3/UL (ref 150–450)
POTASSIUM SERPL-SCNC: 3.1 MMOL/L (ref 3.4–5.3)
PROT SERPL-MCNC: 6 G/DL (ref 6.4–8.3)
RBC # BLD AUTO: 4.09 10E6/UL (ref 3.8–5.2)
SODIUM SERPL-SCNC: 136 MMOL/L (ref 135–145)
WBC # BLD AUTO: 13.3 10E3/UL (ref 4–11)

## 2025-01-14 PROCEDURE — G0439 PPPS, SUBSEQ VISIT: HCPCS | Performed by: INTERNAL MEDICINE

## 2025-01-14 PROCEDURE — 99214 OFFICE O/P EST MOD 30 MIN: CPT | Mod: 25 | Performed by: INTERNAL MEDICINE

## 2025-01-14 PROCEDURE — G2211 COMPLEX E/M VISIT ADD ON: HCPCS | Performed by: INTERNAL MEDICINE

## 2025-01-14 PROCEDURE — 90480 ADMN SARSCOV2 VAC 1/ONLY CMP: CPT | Performed by: INTERNAL MEDICINE

## 2025-01-14 PROCEDURE — 99213 OFFICE O/P EST LOW 20 MIN: CPT | Performed by: PODIATRIST

## 2025-01-14 PROCEDURE — 83721 ASSAY OF BLOOD LIPOPROTEIN: CPT | Performed by: INTERNAL MEDICINE

## 2025-01-14 PROCEDURE — 36415 COLL VENOUS BLD VENIPUNCTURE: CPT | Performed by: INTERNAL MEDICINE

## 2025-01-14 PROCEDURE — 85027 COMPLETE CBC AUTOMATED: CPT | Performed by: INTERNAL MEDICINE

## 2025-01-14 PROCEDURE — G0463 HOSPITAL OUTPT CLINIC VISIT: HCPCS | Performed by: PODIATRIST

## 2025-01-14 PROCEDURE — 80053 COMPREHEN METABOLIC PANEL: CPT | Performed by: INTERNAL MEDICINE

## 2025-01-14 PROCEDURE — 91320 SARSCV2 VAC 30MCG TRS-SUC IM: CPT | Performed by: INTERNAL MEDICINE

## 2025-01-14 SDOH — HEALTH STABILITY: PHYSICAL HEALTH: ON AVERAGE, HOW MANY DAYS PER WEEK DO YOU ENGAGE IN MODERATE TO STRENUOUS EXERCISE (LIKE A BRISK WALK)?: 0 DAYS

## 2025-01-14 ASSESSMENT — PAIN SCALES - GENERAL: PAINLEVEL_OUTOF10: NO PAIN (0)

## 2025-01-14 ASSESSMENT — SOCIAL DETERMINANTS OF HEALTH (SDOH): HOW OFTEN DO YOU GET TOGETHER WITH FRIENDS OR RELATIVES?: MORE THAN THREE TIMES A WEEK

## 2025-01-14 NOTE — PROGRESS NOTES
FOOT AND ANKLE SURGERY/PODIATRY Progress Note      ASSESSMENT: S/P amputation second digit right foot and partial fifth metatarsal amputation right foot         TREATMENT:  -I discussed with the patient and her daughter today that both surgical sites on the right foot appear to be progressing well.  She does have a small area of eschar along the central margin of the lateral right foot incision which we will continue to monitor.    -ABIs obtained yesterday indicate biphasic waveforms to the right foot.    -Based on the integrity of the incisions patient will begin limited walking on the right foot with a cam boot.  Continue to work with physical therapy for strength training.    -Steri-Strips applied today.  Right foot to remain dry.    -She will follow-up with me in 3 weeks    Henry Peres DPM  [facility]      HPI: Marva Gaines was seen again today status post amputation second digit right foot and partial fifth metatarsal amputation right foot.  Patient's daughter is present today.  Patient reports that she is remain nonweightbearing on her right foot.  She has been using the Prevalon boot as directed.    Past Medical History:   Diagnosis Date    Acute osteomyelitis of toe, right (H)     Arthritis     Chronic atrial fibrillation (H)     Chronic heart failure with preserved ejection fraction (H)     Coronary artery disease involving native coronary artery without angina pectoris     Depressive disorder     History of blood transfusion     Antibodies    Hypertension     Peripheral artery disease     Permanent atrial fibrillation (H)        Past Surgical History:   Procedure Laterality Date    AMPUTATE FOOT  11/25/2024    Procedure: partial fifth metatarsal amputation;  Surgeon: Henry Peres DPM;  Location: [facility] OR    AMPUTATE TOE(S) Right 05/30/2024    Procedure: AMPUTATION, FIFTH DIGIT RIGHT FOOT;  Surgeon: Henry Peres DPM;  Location: [facility] OR     AMPUTATE TOE(S) Right 11/25/2024    Procedure: amputation of second digit and;  Surgeon: Henry Peres DPM;  Location: [facility] OR    CATARACT EXTRACTION Bilateral     CHOLECYSTECTOMY      COLONOSCOPY N/A 12/30/2022    Procedure: COLONOSCOPY with argon plasma coagulation;  Surgeon: Steven Driscoll MD;  Location: [facility] OR    ESOPHAGOSCOPY, GASTROSCOPY, DUODENOSCOPY (EGD), COMBINED N/A 12/30/2022    Procedure: ESOPHAGOGASTRODUODENOSCOPY (EGD) with argon plasma coagulation;  Surgeon: Steven Driscoll MD;  Location: [facility] OR    HRW ANES TOTAL HIP REPLACEMENT, MDA 1 Bilateral     INCISION AND DRAINAGE FOOT, COMBINED Right 11/25/2024    Procedure: INCISION AND DRAINAGE right foot with;  Surgeon: Henry Peres DPM;  Location: [facility] OR    IR LOWER EXTREMITY ANGIOGRAM RIGHT  7/25/2024    IR LOWER EXTREMITY ANGIOGRAM RIGHT  11/20/2024       Allergies   Allergen Reactions    Blood Transfusion Related (Informational Only) Other (See Comments)     Patient has a history of a clinically significant antibody against RBC antigens.  A delay in compatible RBCs may occur. 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    Hydrocodone-Acetaminophen Hallucination         Current Outpatient Medications:     acetaminophen (TYLENOL) 500 MG tablet, Take 1,000 mg by mouth every 6 hours as needed for mild pain, Disp: , Rfl:     apixaban ANTICOAGULANT (ELIQUIS ANTICOAGULANT) 2.5 MG tablet, Take 1 tablet (2.5 mg) by mouth 2 times daily, Disp: 180 tablet, Rfl: 3    atorvastatin (LIPITOR) 20 MG tablet, TAKE 1 TABLET BY MOUTH EVERYDAY AT BEDTIME, Disp: 90 tablet, Rfl: 1    cholecalciferol, vitamin D3, (VITAMIN D3) 2,000 unit Tab, [CHOLECALCIFEROL, VITAMIN D3, (VITAMIN D3) 2,000 UNIT TAB] Take 1 tablet by mouth daily., Disp: , Rfl:     diltiazem ER COATED BEADS (CARDIZEM CD/CARTIA XT) 240 MG 24 hr capsule, TAKE 1 CAPSULE BY MOUTH EVERY DAY, Disp: 90 capsule, Rfl: 0    Ferrous Sulfate 324 (65 Fe) MG TBEC, Take 1 tablet by mouth  daily, Disp: , Rfl:     fish oil-omega-3 fatty acids 500 MG capsule, Take 1 capsule by mouth daily, Disp: , Rfl:     furosemide (LASIX) 20 MG tablet, TAKE 2 TABLETS BY MOUTH EVERY DAY, Disp: 180 tablet, Rfl: 1    LACTOBACILLUS RHAMNOSUS, GG, PO, Take 1 capsule by mouth 2 times daily., Disp: , Rfl:     losartan (COZAAR) 25 MG tablet, Take 1 tablet (25 mg) by mouth daily., Disp: 90 tablet, Rfl: 0    oxyCODONE (ROXICODONE) 5 MG tablet, Take 1-2 tablets (5-10 mg) by mouth every 4 hours as needed for moderate to severe pain., Disp: 12 tablet, Rfl: 0    pantoprazole (PROTONIX) 40 MG EC tablet, TAKE 1 TABLET BY MOUTH TWICE A DAY, Disp: 180 tablet, Rfl: 3    sertraline (ZOLOFT) 100 MG tablet, Take 1 tablet (100 mg) by mouth daily., Disp: 90 tablet, Rfl: 3    clopidogrel (PLAVIX) 75 MG tablet, Take 1 tablet (75 mg) by mouth daily. (Patient not taking: Reported on 1/14/2025), Disp: 90 tablet, Rfl: 3    sulfamethoxazole-trimethoprim (BACTRIM DS) 800-160 MG tablet, Take 1 tablet by mouth 2 times daily. (Patient not taking: Reported on 1/14/2025), Disp: 20 tablet, Rfl: 0    Review of Systems - 10 point Review of Systems is negative except for right foot toe amputation which is noted in HPI.      OBJECTIVE:  /52   Pulse 103   LMP  (LMP Unknown)   SpO2 90%   General appearance: Patient is alert and fully cooperative with history & exam.  No sign of distress is noted during the visit.    Vascular: Dorsalis pedis non-palpableRight.  Dermatologic:    VASC Wound R lateral foot (Active)   Pre Size Length 0.6 10/29/24 1300   Pre Size Width 0.4 10/29/24 1300   Pre Size Depth 0.3 10/29/24 1300   Pre Total Sq cm 0.24 10/29/24 1300   Post Size Length 1 10/29/24 1300   Post Size Width 0.6 10/29/24 1300   Post Size Depth 0.8 10/29/24 1300   Post Total Sq cm 0.6 10/29/24 1300   Description Telephone visit 11/06/24 1500       VASC Wound R 5th toe amp site (Active)   Pre Size Length 0.7 08/13/24 1300   Pre Size Width 0.3 08/13/24 1300    Pre Size Depth 0.3 08/13/24 1300   Pre Total Sq cm 0.21 08/13/24 1300   Post Size Length 0 09/03/24 1442   Post Size Width 0 09/03/24 1442   Post Size Depth 0 09/03/24 1442   Post Total Sq cm 0 09/03/24 1442       Incision/Surgical Site 05/30/24 Right;Lateral Foot (Active)       Incision/Surgical Site 11/25/24 Anterior;Right Toe (Comment  which one) (Active)       Incision/Surgical Site 11/25/24 Anterior;Right Toe (Comment  which one) (Active)   Surgical sites along amputated second digit right foot and along the lateral right foot have skin edges well coapted, small area of eschar along lateral right foot incision.  No erythema right foot.  Neurologic: Diminished to light touch Right.  Musculoskeletal: Contracted digits noted Right.      Picture:

## 2025-01-14 NOTE — PATIENT INSTRUCTIONS
You may begin walking/weight bearing starting today in a CAM boot. Start slowly, work with PT, and work your way up!    Keep your foot dry- do not get wet.    Allow the steri strips to fall off on their own, do not pull these off.

## 2025-01-14 NOTE — PROGRESS NOTES
Preventive Care Visit  [facility]  Sabas Austin MD, Internal Medicine  Jan 14, 2025      Assessment & Plan     Encounter for annual wellness visit (AWV) in Medicare patient      Coronary artery disease involving native coronary artery of native heart without angina pectoris  she denies anginal chest pain, dyspnea   - CBC with platelets; Future  - Comprehensive metabolic panel; Future  - LDL cholesterol direct; Future  - CBC with platelets  - Comprehensive metabolic panel  - LDL cholesterol direct    Permanent atrial fibrillation (H)  with controlled ventricular response with diltiazem,  on Eliquis. She declined Watchman procedure    Chronic heart failure with preserved ejection fraction (HFpEF) (H)  On furosemide 20 mg bid, stable.     PAD  She saw Vascular clinic on 1/13/25 for surveillance of peripheral arterial disease status post right lower extremity angiogram in July due to chronic limb threatening ischemia.     They recommended:  Continue best medical therapy with Eliquis, aspirin, and high-dose statin.  Will discontinue Plavix at this time as she has completed the 6-week post angiogram duration.  Wound care and weightbearing to the right foot per podiatry recommendations.  Follow-up in 6 months with repeat lower extremity studies or sooner if she has delayed wound healing or new wound formation.     Her daughter is concerned why Plavix is stopped and they would like to know if Dr Cantu agreed with this decision.    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  Both surgical sites on the right foot appear to be progressing well.  Based on the integrity of the incisions patient will begin limited walking on the right foot with a cam boot. Continue to work with physical therapy for strength training.      Chronic kidney disease, stage 3b (H)  Stable, baseline Cr around 1.1    Thyroid nodule  CT neck 3/10/23 and CT 7/2024 showed left thyroid lobe nodule with associated calcification . Consider thyroid ultrasound for further evaluation. This was discussed few times but she did not want any further workup.     Colon arteriovenous malformation  No additional GI bleed since Dec 2022No additional GI bleed since Dec 2022.    Gastric AVM  No additional GI bleed since Dec 2022.No additional GI bleed since Dec 2022.    Cecum mass  CT scan during the last ER visit 3/10/23:  Likely 2.4 cm enhancing cecal mass at the base of the appendix. There is some appendiceal dilation without evidence of ed appendicitis at this time. Recommend confirmation with colonoscopy and surgical referral.     She had colonoscopy in 12/2022 , which did not show this abnormality:  Multiple non-bleeding colonic angioectasias. Treated with argon plasma coagulation (APC).  Diverticulosis in the sigmoid colon and in the  descending colon. External hemorrhoids.     I suggested visit with general surgery for review of the CT scan images and further recommendations. She did not want any further evaluation.     She denies abdominal pain, nausea, vomiting, change in bowel movements.    Hypertension  Stable on losartan, diltiazem     Anxiety  Stable on Zoloft 150 mg daily.     Hypercholesterolemia  on statin     Osteopenia  patient declined future DXA scans     Iron deficiency anemia due to chronic blood loss  Stable on oral iron    The longitudinal plan of care for the diagnosis(es)/condition(s) as documented were addressed during this visit. Due to the added complexity in care, I will continue to support Kiah in the subsequent management and with ongoing continuity of care.        Counseling  Appropriate preventive services were addressed with this patient via screening, questionnaire, or discussion as appropriate for fall prevention, nutrition,  physical activity, Tobacco-use cessation, social engagement, weight loss and cognition.  Checklist reviewing preventive services available has been given to the patient.  Reviewed patient's diet, addressing concerns and/or questions.   The patient was instructed to see the dentist every 6 months.   She is at risk for psychosocial distress and has been provided with information to reduce risk.   Discussed possible causes of fatigue. Updated plan of care.  Patient reported difficulty with activities of daily living were addressed today.The patient was provided with written information regarding signs of hearing loss.   Information on urinary incontinence and treatment options given to patient.           Zenaida Dupont is a 89 year old, presenting for the following:  Wellness Visit (Mammo 7/21/21 Colon 12/30/22)        1/14/2025     4:45 PM   Additional Questions   Roomed by Kenya SHAH          Health Care Directive  Patient does not have a Health Care Directive: Discussed advance care planning with patient; however, patient declined at this time.      1/14/2025   General Health   How would you rate your overall physical health? (!) FAIR   Feel stress (tense, anxious, or unable to sleep) Rather much   (!) STRESS CONCERN      1/14/2025   Nutrition   Diet: Regular (no restrictions)         1/14/2025   Exercise   Days per week of moderate/strenous exercise 0 days   (!) EXERCISE CONCERN      1/14/2025   Social Factors   Frequency of gathering with friends or relatives More than three times a week   Worry food won't last until get money to buy more No   Food not last or not have enough money for food? No   Do you have housing? (Housing is defined as stable permanent housing and does not include staying ouside in a car, in a tent, in an abandoned building, in an overnight shelter, or couch-surfing.) Yes   Are you worried about losing your housing? No   Lack of transportation? No   Unable to get utilities  (heat,electricity)? No         1/14/2025   Fall Risk   Fallen 2 or more times in the past year? No   Trouble with walking or balance? Yes   Reason Gait Speed Test Not Completed --           1/14/2025   Activities of Daily Living- Home Safety   Needs help with the following daily activites Transportation    Shopping    Preparing meals    Housework    Bathing    Laundry    Medication administration    Money management   Safety concerns in the home None of the above       Multiple values from one day are sorted in reverse-chronological order         1/14/2025   Dental   Dentist two times every year? (!) NO         1/14/2025   Hearing Screening   Hearing concerns? (!) I FEEL THAT PEOPLE ARE MUMBLING OR NOT SPEAKING CLEARLY.    (!) I NEED TO ASK PEOPLE TO SPEAK UP OR REPEAT THEMSELVES.    (!) IT'S HARDER TO UNDERSTAND WOMEN'S VOICES THAN MEN'S VOICES.    (!) IT'S HARD TO FOLLOW A CONVERSATION IN A NOISY RESTAURANT OR CROWDED ROOM.    (!) TROUBLE UNDESTANDING A SPEAKER IN A PUBLIC MEETING OR Restorationist SERVICE.    (!) TROUBLE FOLLOWING DIALOGUE IN THE THEATHER.    (!) TROUBLE UNDERSTANDING SOFT OR WHISPERED SPEECH.       Multiple values from one day are sorted in reverse-chronological order         1/14/2025   Driving Risk Screening   Patient/family members have concerns about driving (!) DECLINE         1/14/2025   General Alertness/Fatigue Screening   Have you been more tired than usual lately? (!) YES         1/14/2025   Urinary Incontinence Screening   Bothered by leaking urine in past 6 months Yes            Today's PHQ-2 Score:       1/14/2025     4:43 PM   PHQ-2 ( 1999 [employer])   Q1: Little interest or pleasure in doing things 1   Q2: Feeling down, depressed or hopeless 1   PHQ-2 Score 2    Q1: Little interest or pleasure in doing things Several days   Q2: Feeling down, depressed or hopeless Several days   PHQ-2 Score 2       Patient-reported           1/14/2025   Substance Use   Alcohol more than 3/day or more than  "7/wk Not Applicable   Do you have a current opioid prescription? No   How severe/bad is pain from 1 to 10? 0/10 (No Pain)   Do you use any other substances recreationally? No     Social History     Tobacco Use    Smoking status: Former     Passive exposure: Past    Smokeless tobacco: Never   Vaping Use    Vaping status: Never Used   Substance Use Topics    Alcohol use: Not Currently     Comment: Rarely    Drug use: Never                    Reviewed and updated as needed this visit by Provider                      Current providers sharing in care for this patient include:  Patient Care Team:  Sabas Austin MD as PCP - General (Internal Medicine)  Sabas Austin MD as Assigned PCP  Kai Ricci MD as Assigned Heart and Vascular Provider  Henry Peres DPM as Assigned Surgical Provider    The following health maintenance items are reviewed in Epic and correct as of today:  Health Maintenance   Topic Date Due    DEXA  Never done    HF ACTION PLAN  Never done    ZOSTER IMMUNIZATION (1 of 2) Never done    COVID-19 Vaccine (6 - 2024-25 season) 09/01/2024    LIPID  01/11/2025    MEDICARE ANNUAL WELLNESS VISIT  01/11/2025    BMP  05/18/2025    ALT  05/29/2025    CBC  10/18/2025    ANNUAL REVIEW OF HM ORDERS  01/14/2026    FALL RISK ASSESSMENT  01/14/2026    ADVANCE CARE PLANNING  11/18/2029    DTAP/TDAP/TD IMMUNIZATION (2 - Td or Tdap) 03/10/2033    TSH W/FREE T4 REFLEX  Completed    PHQ-2 (once per calendar year)  Completed    INFLUENZA VACCINE  Completed    Pneumococcal Vaccine: 50+ Years  Completed    RSV VACCINE  Completed    HPV IMMUNIZATION  Aged Out    MENINGITIS IMMUNIZATION  Aged Out    RSV MONOCLONAL ANTIBODY  Aged Out    COLORECTAL CANCER SCREENING  Discontinued            Objective    Exam  /72   Pulse 90   Resp 16   LMP  (LMP Unknown)   SpO2 97%    Estimated body mass index is 23.23 kg/m  as calculated from the following:    Height as of 11/18/24: 1.575 m (5' 2\").    Weight as of " 12/6/24: 57.6 kg (127 lb).    Physical Exam  General Appearance: Alert, cooperative, no distress, appears stated age.  Head: Normocephalic, without obvious abnormality, atraumatic  Eyes: PERRL, conjunctiva/corneas clear, EOM's intact  Ears: Normal TM's and external ear canals, both ears  Nose: Nares normal, septum midline,mucosa normal, no drainage  Throat: Lips, mucosa, and tongue normal; teeth and gums normal  Neck: Supple, symmetrical, trachea midline, no adenopathy;  thyroid: not enlarged, symmetric, no tenderness/mass/nodules; no carotid bruit or JVD  Back: Symmetric, no curvature, ROM normal, no CVA tenderness.  Lungs: Clear to auscultation bilaterally, respirations unlabored.  Breasts: No breast masses, tenderness, asymmetry, or nipple discharge.  Heart: Regular rate and rhythm, S1 and S2 normal, no murmur, rub, or gallop.  Abdomen: Soft, non-tender, bowel sounds active all four quadrants,  no masses, no organomegaly.  Extremities: Extremities normal, atraumatic, no cyanosis or edema.  Skin: Skin color, texture, turgor normal, no rashes or lesions.  Lymph nodes: Cervical, supraclavicular, and axillary nodes normal.  Neurologic: No focal neurological findings.          1/14/2025   Mini Cog   Clock Draw Score 2 Normal   3 Item Recall 2 objects recalled   Mini Cog Total Score 4              Signed Electronically by: Sabas Austin MD

## 2025-01-15 ENCOUNTER — TELEPHONE (OUTPATIENT)
Dept: INTERNAL MEDICINE | Facility: CLINIC | Age: 89
End: 2025-01-15
Payer: COMMERCIAL

## 2025-01-15 ENCOUNTER — MYC MEDICAL ADVICE (OUTPATIENT)
Dept: INTERNAL MEDICINE | Facility: CLINIC | Age: 89
End: 2025-01-15
Payer: COMMERCIAL

## 2025-01-15 DIAGNOSIS — Z53.9 DIAGNOSIS NOT YET DEFINED: Primary | ICD-10-CM

## 2025-01-15 DIAGNOSIS — D72.819 LEUKOPENIA, UNSPECIFIED TYPE: Primary | ICD-10-CM

## 2025-01-15 DIAGNOSIS — N18.32 CHRONIC KIDNEY DISEASE, STAGE 3B (H): ICD-10-CM

## 2025-01-15 DIAGNOSIS — D50.0 IRON DEFICIENCY ANEMIA DUE TO CHRONIC BLOOD LOSS: ICD-10-CM

## 2025-01-15 DIAGNOSIS — D72.829 LEUKOCYTOSIS, UNSPECIFIED TYPE: ICD-10-CM

## 2025-01-15 PROCEDURE — G0179 MD RECERTIFICATION HHA PT: HCPCS | Performed by: INTERNAL MEDICINE

## 2025-01-15 NOTE — TELEPHONE ENCOUNTER
Unless the patient is having any symptoms she does not need to get a urinalysis.  A white blood cell count can be elevated for many reasons but if she develops symptoms as when we need to do additional testing.  So if she has any burning with urination, blood in the urine, urinary frequency, abdominal pain or fevers she would need to be evaluated for this.  If she is not currently having any symptoms then she does not need to do a urinalysis at this time.      I hope this answers the question.    Jumana Hernandez, CNP

## 2025-01-15 NOTE — TELEPHONE ENCOUNTER
Dr Cantu,  I saw Kiah yesterday and her daughter was concerned about the recommendation to stop the Plavix.    She saw Claudia Dillon on 1/13/25 and she recommended:  Continue best medical therapy with Eliquis, aspirin, and high-dose statin.  Will discontinue Plavix at this time as she has completed the 6-week post angiogram duration.  Wound care and weightbearing to the right foot per podiatry recommendations.  Follow-up in 6 months with repeat lower extremity studies or sooner if she has delayed wound healing or new wound formation.     Patient and her family wanted to be sure that you agree with the plan to stop Plavix, and continue Eliquis and baby aspirin.    Thanks a lot  Dr Austin

## 2025-01-16 ENCOUNTER — TELEPHONE (OUTPATIENT)
Dept: INTERNAL MEDICINE | Facility: CLINIC | Age: 89
End: 2025-01-16
Payer: COMMERCIAL

## 2025-01-16 DIAGNOSIS — N39.0 URINARY TRACT INFECTION: Primary | ICD-10-CM

## 2025-01-16 LAB
ALBUMIN UR-MCNC: NEGATIVE MG/DL
APPEARANCE UR: CLEAR
BACTERIA #/AREA URNS HPF: ABNORMAL /HPF
BILIRUB UR QL STRIP: NEGATIVE
COLOR UR AUTO: YELLOW
GLUCOSE UR STRIP-MCNC: NEGATIVE MG/DL
HGB UR QL STRIP: NEGATIVE
KETONES UR STRIP-MCNC: NEGATIVE MG/DL
LEUKOCYTE ESTERASE UR QL STRIP: ABNORMAL
NITRATE UR QL: NEGATIVE
PH UR STRIP: 5.5 [PH] (ref 5–8)
RBC #/AREA URNS AUTO: ABNORMAL /HPF
SP GR UR STRIP: <=1.005 (ref 1–1.03)
SQUAMOUS #/AREA URNS AUTO: ABNORMAL /LPF
UROBILINOGEN UR STRIP-ACNC: 0.2 E.U./DL
WBC #/AREA URNS AUTO: ABNORMAL /HPF
WBC CLUMPS #/AREA URNS HPF: PRESENT /HPF

## 2025-01-16 NOTE — TELEPHONE ENCOUNTER
Placed and order for a UA/UC for today. Marva daughter will  the specimen cup for Marva, and bring it back. Please give Veronica the cup when she come in.

## 2025-01-16 NOTE — TELEPHONE ENCOUNTER
Placed and order for a UA/UC. Marva daughter Veronica will be in to get the specimen cup and bring it back. Please give Veronica the cup that will be behind the  with her name on it.

## 2025-01-21 ENCOUNTER — TELEPHONE (OUTPATIENT)
Dept: VASCULAR SURGERY | Facility: CLINIC | Age: 89
End: 2025-01-21
Payer: COMMERCIAL

## 2025-01-21 ENCOUNTER — TELEPHONE (OUTPATIENT)
Dept: INTERNAL MEDICINE | Facility: CLINIC | Age: 89
End: 2025-01-21
Payer: COMMERCIAL

## 2025-01-21 NOTE — TELEPHONE ENCOUNTER
Contacted Veronica via phone to inform her that photos look good, and there doesn't appear to be any signs of infection to her foot. Reports that the pain really comes and goes, so she's glad to hear she is not currently experiencing any pain. Advised to call with any further concerns/questions. She verbalized understanding, and was in agreement with the plan.

## 2025-01-21 NOTE — TELEPHONE ENCOUNTER
Patient's daughter Veronica called the clinic to report to Dr. Peres that patient had labs done by PCP office on 1/14 which showed WBCs 13.3  UA was negative, no fevers.  Patient has had diarrhea x1 week.  Increasing pain in foot at surgery site.    Writer called [facility] to speak with nurse and find out if there are concens of infection in patient's foot. Nurse has not seen patient yet today but will call back this afternoon with update. Gave email address to send photos.    Daughter Veronica would like call back later with update from Dr. Peres.     [phone]

## 2025-01-21 NOTE — TELEPHONE ENCOUNTER
Forms/Letter Request     Type of form/letter: Home Health Certification       Do we have the form/letter: Yes: faxed originally on 1/14/25 AND refaxed on 1/21/25     Who is the form from? Home care     Where did/will the form come from? form was faxed in     When is form/letter needed by: ASAP     How would you like the form/letter returned: Fax : [phone]     Patient Notified form requests are processed in 5-7 business days:No     Order details/form description: POC for patient from [facility]     Order number (if applicable): [id number]

## 2025-01-21 NOTE — TELEPHONE ENCOUNTER
Spoke with home care nurse, and she has no concerns with infection. States steri-strips are in place. She did email photos, so will attached those to encounter. Patient told home care that she is not having any worsening pain as daughter reported early. PT will becoming out tomorrow. Nothing further is needed, but will review photos once received.

## 2025-01-22 ENCOUNTER — TELEPHONE (OUTPATIENT)
Dept: INTERNAL MEDICINE | Facility: CLINIC | Age: 89
End: 2025-01-22
Payer: COMMERCIAL

## 2025-01-22 NOTE — LETTER
January 22, 2025      Marva Gaines  [address]  [address]  [place] [address]      Your healthcare team cares about your health. To provide you with the best care, we have reviewed your chart and based on our findings, we see that you are due to:     PREVENTATIVE VISIT: Annual Medicare Wellness:Schedule an Annual Medicare Wellness Exam. Please call your [facility] to set up your appointment.    If you have already completed these items, please contact the clinic via phone or Mychart so your care team can review and update your records.  Thank you for choosing [facility] for your healthcare needs. For any questions, concerns, or to schedule an appointment please contact the clinic.     Healthy Regards,    Your [facility] Care Team

## 2025-01-22 NOTE — TELEPHONE ENCOUNTER
Home Care is calling regarding an established patient with [facility].       Requesting orders from: Sabas Austin  Provider is following patient: Yes  Is this a 60-day recertification request?  No    Orders Requested    Physical Therapy  Request for initial certification (first set of orders)   Frequency:  1x/wk for 4 wks  1 every other week for 2 weeks    Goals: Working on strength and gait training     Information was gathered and will be sent to provider for review.  RN will contact Home Care with information after provider review.  Confirmed ok to leave a detailed message with call back.  Contact information confirmed and updated as needed.    Janice Russell RN

## 2025-01-22 NOTE — TELEPHONE ENCOUNTER
Patient Quality Outreach    Patient is due for the following:   Physical Annual Wellness Visit    Action(s) Taken:   Schedule a Annual Wellness Visit    Type of outreach:    Sent [employer] message.    Questions for provider review:    None           TRENT Garcia.

## 2025-01-23 DIAGNOSIS — Z53.9 DIAGNOSIS NOT YET DEFINED: Primary | ICD-10-CM

## 2025-01-23 PROCEDURE — G0179 MD RECERTIFICATION HHA PT: HCPCS | Performed by: INTERNAL MEDICINE

## 2025-01-24 ENCOUNTER — MYC REFILL (OUTPATIENT)
Dept: INTERNAL MEDICINE | Facility: CLINIC | Age: 89
End: 2025-01-24
Payer: COMMERCIAL

## 2025-01-24 ENCOUNTER — MYC REFILL (OUTPATIENT)
Dept: CARDIOLOGY | Facility: CLINIC | Age: 89
End: 2025-01-24
Payer: COMMERCIAL

## 2025-01-24 DIAGNOSIS — R60.9 EDEMA: ICD-10-CM

## 2025-01-24 DIAGNOSIS — F41.1 ANXIETY STATE: ICD-10-CM

## 2025-01-27 ENCOUNTER — LAB (OUTPATIENT)
Dept: LAB | Facility: CLINIC | Age: 89
End: 2025-01-27
Payer: COMMERCIAL

## 2025-01-27 ENCOUNTER — TELEPHONE (OUTPATIENT)
Dept: INTERNAL MEDICINE | Facility: CLINIC | Age: 89
End: 2025-01-27

## 2025-01-27 DIAGNOSIS — R19.7 DIARRHEA, UNSPECIFIED TYPE: ICD-10-CM

## 2025-01-27 DIAGNOSIS — A04.72 COLITIS DUE TO CLOSTRIDIUM DIFFICILE: Primary | ICD-10-CM

## 2025-01-27 LAB
C DIFF GDH STL QL IA: POSITIVE
C DIFF TOX A+B STL QL IA: POSITIVE
C DIFF TOX B STL QL: POSITIVE

## 2025-01-27 PROCEDURE — 87507 IADNA-DNA/RNA PROBE TQ 12-25: CPT | Mod: 59

## 2025-01-27 PROCEDURE — 87493 C DIFF AMPLIFIED PROBE: CPT | Mod: 59

## 2025-01-27 PROCEDURE — 87324 CLOSTRIDIUM AG IA: CPT

## 2025-01-27 RX ORDER — SERTRALINE HYDROCHLORIDE 100 MG/1
150 TABLET, FILM COATED ORAL DAILY
Qty: 135 TABLET | Refills: 3 | Status: SHIPPED | OUTPATIENT
Start: 2025-01-27

## 2025-01-27 RX ORDER — FUROSEMIDE 20 MG/1
40 TABLET ORAL DAILY
Qty: 180 TABLET | Refills: 1 | Status: SHIPPED | OUTPATIENT
Start: 2025-01-27

## 2025-01-27 RX ORDER — VANCOMYCIN HYDROCHLORIDE 125 MG/1
125 CAPSULE ORAL 4 TIMES DAILY
Qty: 40 CAPSULE | Refills: 0 | Status: SHIPPED | OUTPATIENT
Start: 2025-01-27

## 2025-01-27 NOTE — TELEPHONE ENCOUNTER
FYI - Status Update    Who is Calling: Patient's daughterVeronica    Update: Doctor adjusted lasix, but some swelling has occurred and significant edema in left foot ankle area. Patient is still feeling weak, memory is significantly worse. Patient was ordered a stool. DaughterVeronica can come to clinic to pick it up. Daughter also wants to know if there's anything that needs to be picked up from the clinic. Please call Veronica to clarify if she needs to come in to  anything at clinic, and if Dr can adjust lasix to original dosage.    Does caller want a call/response back: Yes     Could we send this information to you in [place] or would you prefer to receive a phone call?:   Patient would prefer a phone call   Okay to leave a detailed message?: Yes at Cell number on file:    Telephone Information:   Veronica [facility] [employer] [phone]

## 2025-01-28 ENCOUNTER — TELEPHONE (OUTPATIENT)
Dept: INTERNAL MEDICINE | Facility: CLINIC | Age: 89
End: 2025-01-28
Payer: COMMERCIAL

## 2025-01-28 DIAGNOSIS — A08.11 NOROVIRUS: ICD-10-CM

## 2025-01-28 DIAGNOSIS — A04.72 COLITIS DUE TO CLOSTRIDIUM DIFFICILE: Primary | ICD-10-CM

## 2025-01-28 DIAGNOSIS — A05.8 YERSINIA ENTEROCOLITICA FOOD POISONING: ICD-10-CM

## 2025-01-28 LAB

## 2025-01-28 NOTE — TELEPHONE ENCOUNTER
"01/28/2025    Pt's daughter, Veronica called and was very upset that Pt's Rx for vancomycin (VANCOCIN) 125 MG capsule was sent to [facility] pharmacy. Veronica states she has never had Rx's filled at [facility]. Veronica was at [facility] picking up 2 other Rx's for her mom but they didn't have the vancomycin (VANCOCIN) 125 MG capsule. TC advised Veronica that [facility] could call [facility] and get the Rx transferred for her. Veronica kept saying \"I'm Done, I'm Done\" and requested to speak to a clinic supervisor. TC placed Veronica on hold and advised her that I would have to have a supervisor call her back. Again Veronica said \"I'm Done, I'm Done\" and when TC asked for her phone number for a supervisor call back Veronica hung up.     Magy Boston    "

## 2025-01-28 NOTE — TELEPHONE ENCOUNTER
"Outgoing call to patient, relayed provider message in detail. Daughter states they \"were told by her PCP to cut dose in half due to her mother having diarrhea for an extended period of time and there was concern about possible electrolyte imbalance\". They will give her the prescribed dose and  the vancomycin today.  "

## 2025-01-28 NOTE — TELEPHONE ENCOUNTER
I did not change the Lasix dose. Per her med list and the RX, she should take 2 tablets daily. Who changed the Lasix dose and why?    I can see result of the stool test, positive for C.diff. I will send the Rx for vancomycin. Please provide all instruction for hand hygiene with the C.diff.

## 2025-02-19 DIAGNOSIS — I48.21 PERMANENT ATRIAL FIBRILLATION (H): ICD-10-CM

## 2025-02-19 RX ORDER — DILTIAZEM HYDROCHLORIDE 240 MG/1
240 CAPSULE, COATED, EXTENDED RELEASE ORAL DAILY
Qty: 90 CAPSULE | Refills: 3 | Status: SHIPPED | OUTPATIENT
Start: 2025-02-19

## 2025-02-25 ENCOUNTER — MYC MEDICAL ADVICE (OUTPATIENT)
Dept: INTERNAL MEDICINE | Facility: CLINIC | Age: 89
End: 2025-02-25
Payer: COMMERCIAL

## 2025-02-25 DIAGNOSIS — E78.00 PURE HYPERCHOLESTEROLEMIA: ICD-10-CM

## 2025-02-25 DIAGNOSIS — I10 ESSENTIAL HYPERTENSION: Primary | ICD-10-CM

## 2025-02-25 DIAGNOSIS — R60.9 EDEMA: ICD-10-CM

## 2025-02-25 RX ORDER — LOSARTAN POTASSIUM 25 MG/1
25 TABLET ORAL DAILY
Qty: 90 TABLET | Refills: 1 | Status: SHIPPED | OUTPATIENT
Start: 2025-02-25

## 2025-02-25 RX ORDER — ATORVASTATIN CALCIUM 20 MG/1
TABLET, FILM COATED ORAL
Qty: 90 TABLET | Refills: 0 | Status: SHIPPED | OUTPATIENT
Start: 2025-02-25

## 2025-02-25 RX ORDER — FUROSEMIDE 20 MG/1
40 TABLET ORAL DAILY
Qty: 180 TABLET | Refills: 0 | Status: SHIPPED | OUTPATIENT
Start: 2025-02-25

## 2025-02-25 NOTE — TELEPHONE ENCOUNTER
All 3 prescriptions sent as requested. Due for follow-up. SM to schedulers to help arrange. CMM<Rn

## 2025-02-26 ENCOUNTER — OFFICE VISIT (OUTPATIENT)
Dept: VASCULAR SURGERY | Facility: CLINIC | Age: 89
End: 2025-02-26
Attending: PODIATRIST
Payer: COMMERCIAL

## 2025-02-26 VITALS
OXYGEN SATURATION: 98 % | HEART RATE: 100 BPM | DIASTOLIC BLOOD PRESSURE: 71 MMHG | TEMPERATURE: 97.7 F | SYSTOLIC BLOOD PRESSURE: 143 MMHG

## 2025-02-26 DIAGNOSIS — M86.171 ACUTE OSTEOMYELITIS OF TOE, RIGHT (H): Primary | ICD-10-CM

## 2025-02-26 PROCEDURE — 1126F AMNT PAIN NOTED NONE PRSNT: CPT | Performed by: PODIATRIST

## 2025-02-26 PROCEDURE — 3077F SYST BP >= 140 MM HG: CPT | Performed by: PODIATRIST

## 2025-02-26 PROCEDURE — G0463 HOSPITAL OUTPT CLINIC VISIT: HCPCS | Performed by: PODIATRIST

## 2025-02-26 PROCEDURE — 3078F DIAST BP <80 MM HG: CPT | Performed by: PODIATRIST

## 2025-02-26 PROCEDURE — 99213 OFFICE O/P EST LOW 20 MIN: CPT | Performed by: PODIATRIST

## 2025-02-26 ASSESSMENT — PAIN SCALES - GENERAL: PAINLEVEL_OUTOF10: NO PAIN (0)

## 2025-02-26 NOTE — PROGRESS NOTES
FOOT AND ANKLE SURGERY/PODIATRY Progress Note      ASSESSMENT: S/P amputation second digit right foot and partial fifth metatarsal amputation right foot         TREATMENT:  -I discussed with the patient and her daughter today that both surgical sites on the right foot appear to be progressing well. Surgical sites on the right foot appear well coapted. Small areas of eschar along the incisions.     -Recommend limited walking on the right foot in a surgical shoe.  I referred the patient to [facility] orthotics and prosthetics for custom inserts.  I recommend she continue to remain in the surgical shoe with limited walking until custom inserts are available.    -Right foot to remain dry for an additional 3 weeks.    -I have asked her to closely monitor for any open lesions and return to see me should this occur.    -All questions invited and answered.  Patient is discharged from my care at this time but encouraged to follow-up with any problems questions or concerns as they develop.    Henry Peres DPM  [facility]      HPI: Marva Gaines was seen again today status post amputation second digit right foot and partial fifth metatarsal amputation right foot.  Patient's daughter is present today.  Patient reports that she is remain nonweightbearing on her right foot.     Past Medical History:   Diagnosis Date    Acute osteomyelitis of toe, right (H)     Arthritis     Chronic atrial fibrillation (H)     Chronic heart failure with preserved ejection fraction (H)     Coronary artery disease involving native coronary artery without angina pectoris     Depressive disorder     History of blood transfusion     Antibodies    Hypertension     Peripheral artery disease     Permanent atrial fibrillation (H)        Past Surgical History:   Procedure Laterality Date    AMPUTATE FOOT  11/25/2024    Procedure: partial fifth metatarsal amputation;  Surgeon: Henry Peres DPM;  Location: [facility]  Main OR    AMPUTATE TOE(S) Right 05/30/2024    Procedure: AMPUTATION, FIFTH DIGIT RIGHT FOOT;  Surgeon: Henry Peres DPM;  Location: [facility] Main OR    AMPUTATE TOE(S) Right 11/25/2024    Procedure: amputation of second digit and;  Surgeon: Henry Peres DPM;  Location: [facility] OR    CATARACT EXTRACTION Bilateral     CHOLECYSTECTOMY      COLONOSCOPY N/A 12/30/2022    Procedure: COLONOSCOPY with argon plasma coagulation;  Surgeon: Steven Driscoll MD;  Location: [facility] OR    ESOPHAGOSCOPY, GASTROSCOPY, DUODENOSCOPY (EGD), COMBINED N/A 12/30/2022    Procedure: ESOPHAGOGASTRODUODENOSCOPY (EGD) with argon plasma coagulation;  Surgeon: Steven Driscoll MD;  Location: [facility] OR    HRW ANES TOTAL HIP REPLACEMENT, MDA 1 Bilateral     INCISION AND DRAINAGE FOOT, COMBINED Right 11/25/2024    Procedure: INCISION AND DRAINAGE right foot with;  Surgeon: Henry Peres DPM;  Location: [facility] OR    IR LOWER EXTREMITY ANGIOGRAM RIGHT  7/25/2024    IR LOWER EXTREMITY ANGIOGRAM RIGHT  11/20/2024       Allergies   Allergen Reactions    Blood Transfusion Related (Informational Only) Other (See Comments)     Patient has a history of a clinically significant antibody against RBC antigens.  A delay in compatible RBCs may occur. 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    Hydrocodone-Acetaminophen Hallucination         Current Outpatient Medications:     acetaminophen (TYLENOL) 500 MG tablet, Take 1,000 mg by mouth 2 times daily., Disp: , Rfl:     apixaban ANTICOAGULANT (ELIQUIS ANTICOAGULANT) 2.5 MG tablet, Take 1 tablet (2.5 mg) by mouth 2 times daily, Disp: 180 tablet, Rfl: 3    atorvastatin (LIPITOR) 20 MG tablet, TAKE 1 TABLET BY MOUTH EVERYDAY AT BEDTIME, Disp: 90 tablet, Rfl: 0    cholecalciferol, vitamin D3, (VITAMIN D3) 2,000 unit Tab, [CHOLECALCIFEROL, VITAMIN D3, (VITAMIN D3) 2,000 UNIT TAB] Take 1 tablet by mouth daily., Disp: , Rfl:     diltiazem ER COATED BEADS (CARDIZEM CD/CARTIA XT) 240 MG  24 hr capsule, Take 1 capsule (240 mg) by mouth daily., Disp: 90 capsule, Rfl: 3    Ferrous Sulfate 324 (65 Fe) MG TBEC, Take 1 tablet by mouth daily, Disp: , Rfl:     fish oil-omega-3 fatty acids 500 MG capsule, Take 1 capsule by mouth daily, Disp: , Rfl:     furosemide (LASIX) 20 MG tablet, Take 2 tablets (40 mg) by mouth daily., Disp: 180 tablet, Rfl: 0    LACTOBACILLUS RHAMNOSUS, GG, PO, Take 1 capsule by mouth 2 times daily., Disp: , Rfl:     losartan (COZAAR) 25 MG tablet, Take 1 tablet (25 mg) by mouth daily., Disp: 90 tablet, Rfl: 1    losartan (COZAAR) 25 MG tablet, Take 1 tablet (25 mg) by mouth daily., Disp: 90 tablet, Rfl: 0    pantoprazole (PROTONIX) 40 MG EC tablet, TAKE 1 TABLET BY MOUTH TWICE A DAY, Disp: 180 tablet, Rfl: 3    sertraline (ZOLOFT) 100 MG tablet, Take 1.5 tablets (150 mg) by mouth daily. Taking 1.5 Tabs Daily, Disp: 135 tablet, Rfl: 3    vancomycin (VANCOCIN) 125 MG capsule, Take 1 capsule (125 mg) by mouth 4 times daily., Disp: 40 capsule, Rfl: 0    Review of Systems - 10 point Review of Systems is negative except for right foot toe amputation which is noted in HPI.      OBJECTIVE:  BP (!) 143/71   Pulse 100   Temp 97.7  F (36.5  C)   LMP  (LMP Unknown)   SpO2 98%   General appearance: Patient is alert and fully cooperative with history & exam.  No sign of distress is noted during the visit.    Vascular: Dorsalis pedis non-palpableRight.  Dermatologic:    VASC Wound R lateral foot (Active)   Pre Size Length 0.6 10/29/24 1300   Pre Size Width 0.4 10/29/24 1300   Pre Size Depth 0.3 10/29/24 1300   Pre Total Sq cm 0.24 10/29/24 1300   Post Size Length 1 10/29/24 1300   Post Size Width 0.6 10/29/24 1300   Post Size Depth 0.8 10/29/24 1300   Post Total Sq cm 0.6 10/29/24 1300   Description Telephone visit 11/06/24 1500       VASC Wound R 5th toe amp site (Active)   Pre Size Length 0.7 08/13/24 1300   Pre Size Width 0.3 08/13/24 1300   Pre Size Depth 0.3 08/13/24 1300   Pre Total Sq cm  0.21 08/13/24 1300   Post Size Length 0 09/03/24 1442   Post Size Width 0 09/03/24 1442   Post Size Depth 0 09/03/24 1442   Post Total Sq cm 0 09/03/24 1442       Incision/Surgical Site 05/30/24 Right;Lateral Foot (Active)       Incision/Surgical Site 11/25/24 Anterior;Right Toe (Comment  which one) (Active)       Incision/Surgical Site 11/25/24 Anterior;Right Toe (Comment  which one) (Active)   Surgical sites along amputated second digit right foot and along the lateral right foot have skin edges well coapted.  No erythema right foot.  Neurologic: Diminished to light touch Right.  Musculoskeletal: Contracted digits noted Right.      Picture:

## 2025-03-04 ENCOUNTER — TELEPHONE (OUTPATIENT)
Dept: INTERNAL MEDICINE | Facility: CLINIC | Age: 89
End: 2025-03-04
Payer: COMMERCIAL

## 2025-03-04 NOTE — TELEPHONE ENCOUNTER
Home Care is calling regarding an established patient with [facility].  Requesting orders from: Sabas Austin RN APPROVED: RN able to provide verbal orders.  Home Care will send orders for signature.  RN will close encounter.  Is this a request for a temporary pause in the home care episode?  No    Orders Requested    Physical Therapy  Request for initial certification (first set of orders)   Frequency: 1 W 4, then 1 every other week 4  RN gave verbal order: Yes        Caller: [facility]  Home Care Agency: [facility]  Phone number Home Care can be reached at: [phone]   Okay to leave a detailed message?: Yes    Cleo Matthews RN

## 2025-03-09 DIAGNOSIS — E78.00 PURE HYPERCHOLESTEROLEMIA: ICD-10-CM

## 2025-03-10 RX ORDER — ATORVASTATIN CALCIUM 20 MG/1
TABLET, FILM COATED ORAL
Qty: 90 TABLET | Refills: 0 | Status: SHIPPED | OUTPATIENT
Start: 2025-03-10

## 2025-03-11 ENCOUNTER — MYC MEDICAL ADVICE (OUTPATIENT)
Dept: INTERNAL MEDICINE | Facility: CLINIC | Age: 89
End: 2025-03-11
Payer: COMMERCIAL

## 2025-03-11 DIAGNOSIS — I48.21 PERMANENT ATRIAL FIBRILLATION (H): ICD-10-CM

## 2025-03-11 DIAGNOSIS — K31.819 GASTRIC AVM: ICD-10-CM

## 2025-03-11 DIAGNOSIS — F41.1 ANXIETY STATE: ICD-10-CM

## 2025-03-12 RX ORDER — PANTOPRAZOLE SODIUM 40 MG/1
40 TABLET, DELAYED RELEASE ORAL 2 TIMES DAILY
Qty: 180 TABLET | Refills: 3 | Status: SHIPPED | OUTPATIENT
Start: 2025-03-12

## 2025-03-12 RX ORDER — SERTRALINE HYDROCHLORIDE 100 MG/1
150 TABLET, FILM COATED ORAL DAILY
Qty: 135 TABLET | Refills: 3 | Status: SHIPPED | OUTPATIENT
Start: 2025-03-12

## 2025-04-28 DIAGNOSIS — E78.00 PURE HYPERCHOLESTEROLEMIA: ICD-10-CM

## 2025-05-01 RX ORDER — ATORVASTATIN CALCIUM 20 MG/1
TABLET, FILM COATED ORAL
Qty: 90 TABLET | Refills: 0 | Status: SHIPPED | OUTPATIENT
Start: 2025-05-01

## 2025-05-01 NOTE — TELEPHONE ENCOUNTER
90-day supply provided 3/25. Patient is overdue for follow-up. New follow-up order placed.   Message to patient to schedule follow-up appointment. -katerina

## 2025-05-05 DIAGNOSIS — I10 ESSENTIAL HYPERTENSION: ICD-10-CM

## 2025-05-06 RX ORDER — LOSARTAN POTASSIUM 25 MG/1
25 TABLET ORAL DAILY
Qty: 100 TABLET | Refills: 0 | Status: SHIPPED | OUTPATIENT
Start: 2025-05-06

## 2025-05-13 ENCOUNTER — RESULTS FOLLOW-UP (OUTPATIENT)
Dept: INTERNAL MEDICINE | Facility: CLINIC | Age: 89
End: 2025-05-13

## 2025-05-13 ENCOUNTER — LAB (OUTPATIENT)
Dept: LAB | Facility: CLINIC | Age: 89
End: 2025-05-13
Payer: COMMERCIAL

## 2025-05-13 ENCOUNTER — TELEPHONE (OUTPATIENT)
Dept: INTERNAL MEDICINE | Facility: CLINIC | Age: 89
End: 2025-05-13
Payer: COMMERCIAL

## 2025-05-13 DIAGNOSIS — N39.0 RECURRENT UTI: ICD-10-CM

## 2025-05-13 DIAGNOSIS — N39.0 RECURRENT UTI: Primary | ICD-10-CM

## 2025-05-13 LAB
ALBUMIN UR-MCNC: NEGATIVE MG/DL
APPEARANCE UR: CLEAR
BACTERIA #/AREA URNS HPF: ABNORMAL /HPF
BILIRUB UR QL STRIP: NEGATIVE
COLOR UR AUTO: YELLOW
GLUCOSE UR STRIP-MCNC: NEGATIVE MG/DL
HGB UR QL STRIP: NEGATIVE
KETONES UR STRIP-MCNC: NEGATIVE MG/DL
LEUKOCYTE ESTERASE UR QL STRIP: NEGATIVE
NITRATE UR QL: NEGATIVE
PH UR STRIP: 6 [PH] (ref 5–8)
RBC #/AREA URNS AUTO: ABNORMAL /HPF
SP GR UR STRIP: <=1.005 (ref 1–1.03)
SQUAMOUS #/AREA URNS AUTO: ABNORMAL /LPF
UROBILINOGEN UR STRIP-ACNC: 0.2 E.U./DL
WBC #/AREA URNS AUTO: ABNORMAL /HPF
WBC CLUMPS #/AREA URNS HPF: ABNORMAL /HPF

## 2025-05-13 PROCEDURE — 81001 URINALYSIS AUTO W/SCOPE: CPT

## 2025-05-13 NOTE — TELEPHONE ENCOUNTER
.  Order/Referral Request    Who is requesting: daughter Veronica    Orders being requested: labs for uti    Reason service is needed/diagnosis: labs , dark urine, burning     When are orders needed by: asap    Has this been discussed with Provider: No    Does patient have a preference on a Group/Provider/Facility? [facility]    Does patient have an appointment scheduled?: No    Where to send orders: Place orders within Epic    Could we send this information to you in [facility] or would you prefer to receive a phone call?:   Patient would prefer a phone call   Okay to leave a detailed message?: Yes at Home number on file [phone]

## 2025-05-13 NOTE — TELEPHONE ENCOUNTER
Outgoing call to daughter Veronica, relayed provider message in detail. No further questions at this time.

## 2025-06-01 DIAGNOSIS — R60.9 EDEMA: ICD-10-CM

## 2025-06-03 RX ORDER — FUROSEMIDE 20 MG/1
40 TABLET ORAL DAILY
Qty: 200 TABLET | Refills: 0 | Status: SHIPPED | OUTPATIENT
Start: 2025-06-03

## 2025-06-18 ENCOUNTER — MYC REFILL (OUTPATIENT)
Dept: CARDIOLOGY | Facility: CLINIC | Age: 89
End: 2025-06-18
Payer: COMMERCIAL

## 2025-06-18 DIAGNOSIS — I10 ESSENTIAL HYPERTENSION: ICD-10-CM

## 2025-06-18 DIAGNOSIS — E78.00 PURE HYPERCHOLESTEROLEMIA: ICD-10-CM

## 2025-06-19 ENCOUNTER — OFFICE VISIT (OUTPATIENT)
Dept: CARDIOLOGY | Facility: CLINIC | Age: 89
End: 2025-06-19
Payer: COMMERCIAL

## 2025-06-19 ENCOUNTER — MYC REFILL (OUTPATIENT)
Dept: CARDIOLOGY | Facility: CLINIC | Age: 89
End: 2025-06-19

## 2025-06-19 VITALS
HEART RATE: 75 BPM | WEIGHT: 124.5 LBS | OXYGEN SATURATION: 98 % | DIASTOLIC BLOOD PRESSURE: 82 MMHG | RESPIRATION RATE: 16 BRPM | HEIGHT: 62 IN | BODY MASS INDEX: 22.91 KG/M2 | SYSTOLIC BLOOD PRESSURE: 143 MMHG

## 2025-06-19 DIAGNOSIS — R60.9 EDEMA: ICD-10-CM

## 2025-06-19 DIAGNOSIS — I10 ESSENTIAL HYPERTENSION: Primary | ICD-10-CM

## 2025-06-19 RX ORDER — LOSARTAN POTASSIUM 25 MG/1
25 TABLET ORAL DAILY
Qty: 10 TABLET | Refills: 0 | Status: SHIPPED | OUTPATIENT
Start: 2025-06-19

## 2025-06-19 RX ORDER — LOSARTAN POTASSIUM 25 MG/1
25 TABLET ORAL DAILY
Qty: 100 TABLET | Refills: 3 | Status: SHIPPED | OUTPATIENT
Start: 2025-06-19

## 2025-06-19 RX ORDER — ATORVASTATIN CALCIUM 20 MG/1
TABLET, FILM COATED ORAL
Qty: 90 TABLET | Refills: 0 | Status: SHIPPED | OUTPATIENT
Start: 2025-06-19

## 2025-06-19 RX ORDER — LOSARTAN POTASSIUM 25 MG/1
25 TABLET ORAL DAILY
Qty: 100 TABLET | Refills: 0 | Status: SHIPPED | OUTPATIENT
Start: 2025-06-19 | End: 2025-06-19

## 2025-06-19 NOTE — LETTER
"6/19/2025    Sabas Austin MD  [address] [facility] [address]    RE: Marva REMI Gaines       Dear Colleague,     I had the pleasure of seeing Marva Gaines in the [facility].      Cardiology Progress Note     Assessment:  Permanent atrial fibrillation with controlled ventricular response on Eliquis  History of anemia and GI bleeding  Chronic heart failure with preserved ejection fraction, euvolemic  PVCs, asymptomatic  Hypertension, good control  Hypercholesterolemia on atorvastatin, good control  Depression/ anxiety  Peripheral arterial disease with gangrene of right toes status post peripheral angioplasty and amputation      Plan:  Appears to be well compensated from the cardiac standpoint.  Continue current cardiac medications, follow-up in 6 months    The longitudinal plan of care for the diagnosis(es)/condition(s) as documented were addressed during this visit. Due to the added complexity in care, I will continue to support Kiah in the subsequent management and with ongoing continuity of care.   Subjective:   This is 89 year old female who comes in today visit.  She has done well from cardiac standpoint.  She walks better.  She denies chest pains or increasing shortness of breath.  Her weight has been stable.  She denies PND and orthopnea.  She has not had heart palpitation or syncope.  He continues to take Eliquis    Review of Systems:   Negative other than history of present illness    Objective:   BP (!) 143/82 (BP Location: Left arm, Patient Position: Sitting, Cuff Size: Adult Regular)   Pulse 75   Resp 16   Ht 1.575 m (5' 2\")   Wt 56.5 kg (124 lb 8 oz)   LMP  (LMP Unknown)   SpO2 98%   BMI 22.77 kg/m    Physical Exam:  GENERAL: no distress  NECK: No JVD  LUNGS: Clear to auscultation.  CARDIAC: irregular rhythm, S1 & S2 normal.  No heaves, thrills, gallops or murmurs.  ABDOMEN: flat, negative hepatosplenomegaly, soft and non-tender.  EXTREMITIES: No evidence of " cyanosis, clubbing or edema.    Current Outpatient Medications   Medication Sig Dispense Refill     acetaminophen (TYLENOL) 500 MG tablet Take 1,000 mg by mouth 2 times daily.       apixaban ANTICOAGULANT (ELIQUIS ANTICOAGULANT) 2.5 MG tablet Take 1 tablet (2.5 mg) by mouth 2 times daily. 180 tablet 3     atorvastatin (LIPITOR) 20 MG tablet TAKE 1 TABLET BY MOUTH DAILY AT  BEDTIME 90 tablet 0     cholecalciferol, vitamin D3, (VITAMIN D3) 2,000 unit Tab [CHOLECALCIFEROL, VITAMIN D3, (VITAMIN D3) 2,000 UNIT TAB] Take 1 tablet by mouth daily.       diltiazem ER COATED BEADS (CARDIZEM CD/CARTIA XT) 240 MG 24 hr capsule Take 1 capsule (240 mg) by mouth daily. 90 capsule 3     Ferrous Sulfate 324 (65 Fe) MG TBEC Take 1 tablet by mouth daily       fish oil-omega-3 fatty acids 500 MG capsule Take 1 capsule by mouth daily       furosemide (LASIX) 20 MG tablet Take 2 tablets (40 mg) by mouth daily. ---Needs follow up with cardiology for further refills--- 200 tablet 0     LACTOBACILLUS RHAMNOSUS, GG, PO Take 1 capsule by mouth 2 times daily.       losartan (COZAAR) 25 MG tablet Take 1 tablet (25 mg) by mouth daily. 100 tablet 3     losartan (COZAAR) 25 MG tablet Take 1 tablet (25 mg) by mouth daily. 10 tablet 0     pantoprazole (PROTONIX) 40 MG EC tablet Take 1 tablet (40 mg) by mouth 2 times daily. 180 tablet 3     sertraline (ZOLOFT) 100 MG tablet Take 1.5 tablets (150 mg) by mouth daily. Taking 1.5 Tabs Daily 135 tablet 3     vancomycin (VANCOCIN) 125 MG capsule Take 1 capsule (125 mg) by mouth 4 times daily. 40 capsule 0       Cardiographics:      Holter: April 2015   Persistent atrial fibrillation with overall fairly well controlled  ventricular response. There was some tendency toward rapid ventricular response  with activity in the early afternoon hours. A moderate number of ventricular  premature beats noted. 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     Echocardiogram: July 2024   Left ventricular size, wall motion and function  are normal. The ejection  fraction is 55-60%  Normal right ventricle size and systolic function.  The left atrium is moderate to severely dilated.  The right atrium is severely dilated.  Mild (35-45mmHg) pulmonary hypertension is present.  IVC diameter <2.1 cm collapsing >50% with sniff suggests a normal RA pressure  of 3 mmHg     Stress Test: 2013   Mixed anterior perfusion defect     CT coronary angio: 2013   Normal coronaries, calcium score 2   Lab Results    Chemistry/lipid CBC Cardiac Enzymes/BNP/TSH/INR   Recent Labs   Lab Test 01/14/25  1750 01/11/24  1722   CHOL  --  153   HDL  --  62   LDL 49 74   TRIG  --  86     Recent Labs   Lab Test 01/14/25  1750 01/11/24  1722 10/30/19  1632   LDL 49 74 75     Recent Labs   Lab Test 01/14/25  1750      POTASSIUM 3.1*   CHLORIDE 102   CO2 23   GLC 93   BUN 32.6*   CR 1.26*   GFRESTIMATED 41*   FAROOQ 9.2     Recent Labs   Lab Test 01/14/25  1750 11/18/24  1547 07/05/24  1844   CR 1.26* 1.10* 1.2*     Recent Labs   Lab Test 01/11/24  1722 05/17/18  1550   A1C 5.3 5.7          Recent Labs   Lab Test 01/14/25  1750   WBC 13.3*   HGB 10.9*   HCT 34.9*   MCV 85        Recent Labs   Lab Test 01/14/25  1750 11/18/24  1547 10/18/24  1143   HGB 10.9* 11.7 11.5*    Recent Labs   Lab Test 03/10/23  1627   TROPONINI 0.02     Recent Labs   Lab Test 12/28/22  1900 12/28/22  1554 12/02/22  1021   *  --   --    NTBNP  --  3,702* 2,360*     Recent Labs   Lab Test 01/11/24  1722   TSH 3.58     Recent Labs   Lab Test 03/10/23  1627 09/01/21  1537 07/19/21  1421   INR 1.68* 1.9* 2.5*                       Thank you for allowing me to participate in the care of your patient.      Sincerely,     Melvin Ricci MD     [facility]  cc:   Kai Ricci MD  [address] [place] [address]  [address]  [place] [address]

## 2025-06-19 NOTE — PROGRESS NOTES
"    Cardiology Progress Note     Assessment:  Permanent atrial fibrillation with controlled ventricular response on Eliquis  History of anemia and GI bleeding  Chronic heart failure with preserved ejection fraction, euvolemic  PVCs, asymptomatic  Hypertension, good control  Hypercholesterolemia on atorvastatin, good control  Depression/ anxiety  Peripheral arterial disease with gangrene of right toes status post peripheral angioplasty and amputation      Plan:  Appears to be well compensated from the cardiac standpoint.  Continue current cardiac medications, follow-up in 6 months    The longitudinal plan of care for the diagnosis(es)/condition(s) as documented were addressed during this visit. Due to the added complexity in care, I will continue to support Kiah in the subsequent management and with ongoing continuity of care.   Subjective:   This is 89 year old female who comes in today visit.  She has done well from cardiac standpoint.  She walks better.  She denies chest pains or increasing shortness of breath.  Her weight has been stable.  She denies PND and orthopnea.  She has not had heart palpitation or syncope.  He continues to take Eliquis    Review of Systems:   Negative other than history of present illness    Objective:   BP (!) 143/82 (BP Location: Left arm, Patient Position: Sitting, Cuff Size: Adult Regular)   Pulse 75   Resp 16   Ht 1.575 m (5' 2\")   Wt 56.5 kg (124 lb 8 oz)   LMP  (LMP Unknown)   SpO2 98%   BMI 22.77 kg/m    Physical Exam:  GENERAL: no distress  NECK: No JVD  LUNGS: Clear to auscultation.  CARDIAC: irregular rhythm, S1 & S2 normal.  No heaves, thrills, gallops or murmurs.  ABDOMEN: flat, negative hepatosplenomegaly, soft and non-tender.  EXTREMITIES: No evidence of cyanosis, clubbing or edema.    Current Outpatient Medications   Medication Sig Dispense Refill    acetaminophen (TYLENOL) 500 MG tablet Take 1,000 mg by mouth 2 times daily.      apixaban ANTICOAGULANT (ELIQUIS " ANTICOAGULANT) 2.5 MG tablet Take 1 tablet (2.5 mg) by mouth 2 times daily. 180 tablet 3    atorvastatin (LIPITOR) 20 MG tablet TAKE 1 TABLET BY MOUTH DAILY AT  BEDTIME 90 tablet 0    cholecalciferol, vitamin D3, (VITAMIN D3) 2,000 unit Tab [CHOLECALCIFEROL, VITAMIN D3, (VITAMIN D3) 2,000 UNIT TAB] Take 1 tablet by mouth daily.      diltiazem ER COATED BEADS (CARDIZEM CD/CARTIA XT) 240 MG 24 hr capsule Take 1 capsule (240 mg) by mouth daily. 90 capsule 3    Ferrous Sulfate 324 (65 Fe) MG TBEC Take 1 tablet by mouth daily      fish oil-omega-3 fatty acids 500 MG capsule Take 1 capsule by mouth daily      furosemide (LASIX) 20 MG tablet Take 2 tablets (40 mg) by mouth daily. ---Needs follow up with cardiology for further refills--- 200 tablet 0    LACTOBACILLUS RHAMNOSUS, GG, PO Take 1 capsule by mouth 2 times daily.      losartan (COZAAR) 25 MG tablet Take 1 tablet (25 mg) by mouth daily. 100 tablet 3    losartan (COZAAR) 25 MG tablet Take 1 tablet (25 mg) by mouth daily. 10 tablet 0    pantoprazole (PROTONIX) 40 MG EC tablet Take 1 tablet (40 mg) by mouth 2 times daily. 180 tablet 3    sertraline (ZOLOFT) 100 MG tablet Take 1.5 tablets (150 mg) by mouth daily. Taking 1.5 Tabs Daily 135 tablet 3    vancomycin (VANCOCIN) 125 MG capsule Take 1 capsule (125 mg) by mouth 4 times daily. 40 capsule 0       Cardiographics:      Holter: April 2015   Persistent atrial fibrillation with overall fairly well controlled  ventricular response. There was some tendency toward rapid ventricular response  with activity in the early afternoon hours. A moderate number of ventricular  premature beats noted. 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     Echocardiogram: July 2024   Left ventricular size, wall motion and function are normal. The ejection  fraction is 55-60%  Normal right ventricle size and systolic function.  The left atrium is moderate to severely dilated.  The right atrium is severely dilated.  Mild (35-45mmHg) pulmonary hypertension  is present.  IVC diameter <2.1 cm collapsing >50% with sniff suggests a normal RA pressure  of 3 mmHg     Stress Test: 2013   Mixed anterior perfusion defect     CT coronary angio: 2013   Normal coronaries, calcium score 2   Lab Results    Chemistry/lipid CBC Cardiac Enzymes/BNP/TSH/INR   Recent Labs   Lab Test 01/14/25  1750 01/11/24  1722   CHOL  --  153   HDL  --  62   LDL 49 74   TRIG  --  86     Recent Labs   Lab Test 01/14/25  1750 01/11/24  1722 10/30/19  1632   LDL 49 74 75     Recent Labs   Lab Test 01/14/25  1750      POTASSIUM 3.1*   CHLORIDE 102   CO2 23   GLC 93   BUN 32.6*   CR 1.26*   GFRESTIMATED 41*   FAROOQ 9.2     Recent Labs   Lab Test 01/14/25  1750 11/18/24  1547 07/05/24  1844   CR 1.26* 1.10* 1.2*     Recent Labs   Lab Test 01/11/24  1722 05/17/18  1550   A1C 5.3 5.7          Recent Labs   Lab Test 01/14/25  1750   WBC 13.3*   HGB 10.9*   HCT 34.9*   MCV 85        Recent Labs   Lab Test 01/14/25  1750 11/18/24  1547 10/18/24  1143   HGB 10.9* 11.7 11.5*    Recent Labs   Lab Test 03/10/23  1627   TROPONINI 0.02     Recent Labs   Lab Test 12/28/22  1900 12/28/22  1554 12/02/22  1021   *  --   --    NTBNP  --  3,702* 2,360*     Recent Labs   Lab Test 01/11/24  1722   TSH 3.58     Recent Labs   Lab Test 03/10/23  1627 09/01/21  1537 07/19/21  1421   INR 1.68* 1.9* 2.5*

## 2025-06-23 RX ORDER — FUROSEMIDE 20 MG/1
40 TABLET ORAL DAILY
Qty: 200 TABLET | Refills: 1 | Status: SHIPPED | OUTPATIENT
Start: 2025-06-23

## 2025-06-24 ENCOUNTER — MYC REFILL (OUTPATIENT)
Dept: INTERNAL MEDICINE | Facility: CLINIC | Age: 89
End: 2025-06-24
Payer: COMMERCIAL

## 2025-06-24 DIAGNOSIS — M19.90 OSTEOARTHRITIS, UNSPECIFIED OSTEOARTHRITIS TYPE, UNSPECIFIED SITE: ICD-10-CM

## 2025-06-24 DIAGNOSIS — D50.0 IRON DEFICIENCY ANEMIA DUE TO CHRONIC BLOOD LOSS: Primary | ICD-10-CM

## 2025-06-24 NOTE — TELEPHONE ENCOUNTER
Clinic RN: Please investigate patient's chart or contact patient if the information cannot be found because the medication is listed as historical or discontinued. Confirm patient is taking this medication. Document findings and route refill encounter to provider for approval or denial.    Susan Rincon RN on 6/24/2025 at 3:08 PM

## 2025-06-24 NOTE — TELEPHONE ENCOUNTER
Outgoing call to patient, daughter Anabel picked up, CTC on file. Daughter states YES pt is taking both medication refills.     Daughter has been purchasing both meds out of pocket but wants to see if insurance will pay for meds. Daughter states pt take tylenol for chronic joint pains and iron for her anemia.     Please send to [facility]. Please call daughter Thank you.     LOV 1/14/25:   Iron deficiency anemia due to chronic blood loss  Stable on oral iron     The longitudinal plan of care for the diagnosis(es)/condition(s) as documented were addressed during this visit. Due to the added complexity in care, I will continue to support Kiah in the subsequent management and with ongoing continuity of care.

## 2025-06-25 RX ORDER — ACETAMINOPHEN 500 MG
1000 TABLET ORAL 2 TIMES DAILY
Qty: 180 TABLET | Refills: 3 | Status: SHIPPED | OUTPATIENT
Start: 2025-06-25

## 2025-06-25 RX ORDER — FERROUS SULFATE 324(65)MG
324 TABLET, DELAYED RELEASE (ENTERIC COATED) ORAL DAILY
Qty: 90 TABLET | Refills: 3 | Status: SHIPPED | OUTPATIENT
Start: 2025-06-25

## 2025-07-03 ENCOUNTER — MYC REFILL (OUTPATIENT)
Dept: INTERNAL MEDICINE | Facility: CLINIC | Age: 89
End: 2025-07-03
Payer: COMMERCIAL

## 2025-07-03 DIAGNOSIS — I48.21 PERMANENT ATRIAL FIBRILLATION (H): ICD-10-CM

## 2025-07-03 DIAGNOSIS — K31.819 GASTRIC AVM: ICD-10-CM

## 2025-07-03 RX ORDER — PANTOPRAZOLE SODIUM 40 MG/1
40 TABLET, DELAYED RELEASE ORAL 2 TIMES DAILY
Qty: 180 TABLET | Refills: 3 | OUTPATIENT
Start: 2025-07-03

## 2025-07-03 RX ORDER — DILTIAZEM HYDROCHLORIDE 240 MG/1
240 CAPSULE, COATED, EXTENDED RELEASE ORAL DAILY
Qty: 90 CAPSULE | Refills: 3 | OUTPATIENT
Start: 2025-07-03

## 2025-07-15 ENCOUNTER — OFFICE VISIT (OUTPATIENT)
Dept: INTERNAL MEDICINE | Facility: CLINIC | Age: 89
End: 2025-07-15
Payer: COMMERCIAL

## 2025-07-15 VITALS
TEMPERATURE: 97.7 F | BODY MASS INDEX: 23.58 KG/M2 | HEART RATE: 82 BPM | WEIGHT: 128.9 LBS | RESPIRATION RATE: 12 BRPM | OXYGEN SATURATION: 99 % | DIASTOLIC BLOOD PRESSURE: 60 MMHG | SYSTOLIC BLOOD PRESSURE: 130 MMHG

## 2025-07-15 DIAGNOSIS — N18.32 CHRONIC KIDNEY DISEASE, STAGE 3B (H): ICD-10-CM

## 2025-07-15 DIAGNOSIS — I25.10 CORONARY ARTERY DISEASE INVOLVING NATIVE CORONARY ARTERY OF NATIVE HEART WITHOUT ANGINA PECTORIS: Primary | ICD-10-CM

## 2025-07-15 DIAGNOSIS — I48.21 PERMANENT ATRIAL FIBRILLATION (H): ICD-10-CM

## 2025-07-15 DIAGNOSIS — L98.9 SKIN LESION: ICD-10-CM

## 2025-07-15 DIAGNOSIS — I50.32 CHRONIC HEART FAILURE WITH PRESERVED EJECTION FRACTION (HFPEF) (H): ICD-10-CM

## 2025-07-15 DIAGNOSIS — I10 ESSENTIAL HYPERTENSION: ICD-10-CM

## 2025-07-15 DIAGNOSIS — D50.0 IRON DEFICIENCY ANEMIA DUE TO CHRONIC BLOOD LOSS: ICD-10-CM

## 2025-07-15 DIAGNOSIS — F41.1 ANXIETY STATE: ICD-10-CM

## 2025-07-15 DIAGNOSIS — I73.9 PAD (PERIPHERAL ARTERY DISEASE): ICD-10-CM

## 2025-07-15 LAB
ALBUMIN SERPL BCG-MCNC: 4.1 G/DL (ref 3.5–5.2)
ALP SERPL-CCNC: 94 U/L (ref 40–150)
ALT SERPL W P-5'-P-CCNC: 12 U/L (ref 0–50)
ANION GAP SERPL CALCULATED.3IONS-SCNC: 10 MMOL/L (ref 7–15)
AST SERPL W P-5'-P-CCNC: 20 U/L (ref 0–45)
BILIRUB SERPL-MCNC: 0.5 MG/DL
BUN SERPL-MCNC: 31.7 MG/DL (ref 8–23)
CALCIUM SERPL-MCNC: 9.9 MG/DL (ref 8.8–10.4)
CHLORIDE SERPL-SCNC: 101 MMOL/L (ref 98–107)
CREAT SERPL-MCNC: 1.1 MG/DL (ref 0.51–0.95)
CREAT UR-MCNC: 13.5 MG/DL
EGFRCR SERPLBLD CKD-EPI 2021: 48 ML/MIN/1.73M2
ERYTHROCYTE [DISTWIDTH] IN BLOOD BY AUTOMATED COUNT: 14.2 % (ref 10–15)
FERRITIN SERPL-MCNC: 38 NG/ML (ref 11–328)
GLUCOSE SERPL-MCNC: 105 MG/DL (ref 70–99)
HCO3 SERPL-SCNC: 29 MMOL/L (ref 22–29)
HCT VFR BLD AUTO: 38.3 % (ref 35–47)
HGB BLD-MCNC: 12.2 G/DL (ref 11.7–15.7)
IRON BINDING CAPACITY (ROCHE): 385 UG/DL (ref 240–430)
IRON SATN MFR SERPL: 46 % (ref 15–46)
IRON SERPL-MCNC: 177 UG/DL (ref 37–145)
MCH RBC QN AUTO: 28.3 PG (ref 26.5–33)
MCHC RBC AUTO-ENTMCNC: 31.9 G/DL (ref 31.5–36.5)
MCV RBC AUTO: 89 FL (ref 78–100)
MICROALBUMIN UR-MCNC: <12 MG/L
MICROALBUMIN/CREAT UR: NORMAL MG/G{CREAT}
PLATELET # BLD AUTO: 230 10E3/UL (ref 150–450)
POTASSIUM SERPL-SCNC: 3.9 MMOL/L (ref 3.4–5.3)
PROT SERPL-MCNC: 6.7 G/DL (ref 6.4–8.3)
RBC # BLD AUTO: 4.31 10E6/UL (ref 3.8–5.2)
SODIUM SERPL-SCNC: 140 MMOL/L (ref 135–145)
WBC # BLD AUTO: 7.1 10E3/UL (ref 4–11)

## 2025-07-15 PROCEDURE — 85027 COMPLETE CBC AUTOMATED: CPT | Performed by: INTERNAL MEDICINE

## 2025-07-15 PROCEDURE — 83540 ASSAY OF IRON: CPT | Performed by: INTERNAL MEDICINE

## 2025-07-15 PROCEDURE — 36415 COLL VENOUS BLD VENIPUNCTURE: CPT | Performed by: INTERNAL MEDICINE

## 2025-07-15 PROCEDURE — 99214 OFFICE O/P EST MOD 30 MIN: CPT | Performed by: INTERNAL MEDICINE

## 2025-07-15 PROCEDURE — 3078F DIAST BP <80 MM HG: CPT | Performed by: INTERNAL MEDICINE

## 2025-07-15 PROCEDURE — 3075F SYST BP GE 130 - 139MM HG: CPT | Performed by: INTERNAL MEDICINE

## 2025-07-15 PROCEDURE — 83550 IRON BINDING TEST: CPT | Performed by: INTERNAL MEDICINE

## 2025-07-15 PROCEDURE — 82043 UR ALBUMIN QUANTITATIVE: CPT | Performed by: INTERNAL MEDICINE

## 2025-07-15 PROCEDURE — G2211 COMPLEX E/M VISIT ADD ON: HCPCS | Performed by: INTERNAL MEDICINE

## 2025-07-15 PROCEDURE — 82570 ASSAY OF URINE CREATININE: CPT | Performed by: INTERNAL MEDICINE

## 2025-07-15 PROCEDURE — 82728 ASSAY OF FERRITIN: CPT | Performed by: INTERNAL MEDICINE

## 2025-07-15 PROCEDURE — 80053 COMPREHEN METABOLIC PANEL: CPT | Performed by: INTERNAL MEDICINE

## 2025-07-15 RX ORDER — ASPIRIN 81 MG/1
81 TABLET ORAL DAILY
COMMUNITY

## 2025-07-15 NOTE — PROGRESS NOTES
Assessment & Plan     Coronary artery disease involving native coronary artery of native heart without angina pectoris  she denies anginal chest pain, dyspnea     Chronic kidney disease, stage 3b (H)  Stable, baseline Cr around 1.1     - Albumin Random Urine Quantitative with Creat Ratio; Future  - CBC with platelets; Future  - Albumin Random Urine Quantitative with Creat Ratio  - CBC with platelets    Hypertension  Stable on losartan, diltiazem     Permanent atrial fibrillation (H)  with controlled ventricular response with diltiazem,  on Eliquis. She declined Watchman procedure     Chronic heart failure with preserved ejection fraction (HFpEF) (H)  On furosemide 20 mg bid, stable.     PAD (peripheral artery disease)  She saw Vascular clinic on 1/13/25 for surveillance of peripheral arterial disease status post right lower extremity angiogram in July due to chronic limb threatening ischemia.      They recommended:  Continue best medical therapy with Eliquis, aspirin, and high-dose statin.  Will discontinue Plavix at this time as she has completed the 6-week post angiogram duration.   Right foot wound is healed now.  S/P amputation second digit right foot and partial fifth metatarsal amputation right foot     Anxiety  Stable on Zoloft 150 mg daily.     Skin lesions  She has dark lesion above the mid sternum for few years.  - Adult Dermatology  Referral; Future    Iron deficiency anemia due to chronic blood loss  Stable on oral iron   - Ferritin; Future  - Iron & Iron Binding Capacity; Future  - Comprehensive metabolic panel; Future  - Ferritin  - Iron & Iron Binding Capacity  - Comprehensive metabolic panel    The longitudinal plan of care for the diagnosis(es)/condition(s) as documented were addressed during this visit. Due to the added complexity in care, I will continue to support Kiah in the subsequent management and with ongoing continuity of care.    Zenaida Dupont is a 89 year old, presenting  for the following health issues:  Follow Up        7/15/2025     1:17 PM   Additional Questions   Roomed by JOLLY ZAZUETA     History of Present Illness       Reason for visit:  Follow up    She eats 2-3 servings of fruits and vegetables daily.She consumes 0 sweetened beverage(s) daily.She exercises with enough effort to increase her heart rate 9 or less minutes per day.  She exercises with enough effort to increase her heart rate 3 or less days per week.   She is taking medications regularly.                      Objective    /60 (BP Location: Right arm, Patient Position: Sitting, Cuff Size: Adult Regular)   Pulse 82   Temp 97.7  F (36.5  C)   Resp 12   Wt 58.5 kg (128 lb 14.4 oz)   LMP  (LMP Unknown)   SpO2 99%   BMI 23.58 kg/m    Body mass index is 23.58 kg/m .  Physical Exam   Constitutional:  oriented to person, place, and time, appears well-nourished. No distress.   HENT:   Head: Normocephalic.   Mouth/Throat: Oropharynx is clear and moist.   Eyes: Conjunctivae are normal. Pupils are equal, round, and reactive to light.   Neck: Normal range of motion. Neck supple.   Cardiovascular: Normal rate, regular rhythm and normal heart sounds.    Pulmonary/Chest: Effort normal and breath sounds normal.   Abdominal: Soft. Bowel sounds are normal.   Musculoskeletal: Normal range of motion.   Neurological: alert and oriented to person, place, and time. Skin: Skin is warm.   Psychiatric: normal mood and affect.             Signed Electronically by: Sabas Austin MD

## 2025-07-16 ENCOUNTER — PATIENT OUTREACH (OUTPATIENT)
Dept: CARE COORDINATION | Facility: CLINIC | Age: 89
End: 2025-07-16
Payer: COMMERCIAL

## 2025-07-18 ENCOUNTER — MYC REFILL (OUTPATIENT)
Dept: INTERNAL MEDICINE | Facility: CLINIC | Age: 89
End: 2025-07-18
Payer: COMMERCIAL

## 2025-07-18 DIAGNOSIS — K31.819 GASTRIC AVM: ICD-10-CM

## 2025-07-21 RX ORDER — PANTOPRAZOLE SODIUM 40 MG/1
40 TABLET, DELAYED RELEASE ORAL 2 TIMES DAILY
Qty: 180 TABLET | Refills: 3 | Status: SHIPPED | OUTPATIENT
Start: 2025-07-21

## 2025-07-30 DIAGNOSIS — E78.00 PURE HYPERCHOLESTEROLEMIA: ICD-10-CM

## 2025-07-30 RX ORDER — ATORVASTATIN CALCIUM 20 MG/1
20 TABLET, FILM COATED ORAL AT BEDTIME
Qty: 90 TABLET | Refills: 0 | Status: SHIPPED | OUTPATIENT
Start: 2025-07-30

## 2025-09-01 DIAGNOSIS — E78.00 PURE HYPERCHOLESTEROLEMIA: ICD-10-CM

## 2025-09-02 RX ORDER — ATORVASTATIN CALCIUM 20 MG/1
20 TABLET, FILM COATED ORAL AT BEDTIME
Qty: 100 TABLET | Refills: 1 | Status: SHIPPED | OUTPATIENT
Start: 2025-09-02

## (undated) DEVICE — GLOVE BIOGEL PI INDICATOR 8.0 LF 41680

## (undated) DEVICE — PREP POVIDONE-IODINE 10% SOLUTION 4OZ BOTTLE MDS093944

## (undated) DEVICE — DRSG GAUZE 4X4" TRAY 6939

## (undated) DEVICE — NEEDLE HYPO 18X1-1/2 SAFETY 305918

## (undated) DEVICE — SU ETHILON 3-0 PS-2 18" 1669H

## (undated) DEVICE — SOL WATER IRRIG 1000ML BOTTLE 2F7114

## (undated) DEVICE — ESU PENCIL SMOKE EVAC W/ROCKER SWITCH 0703-047-000

## (undated) DEVICE — DRSG ADAPTIC 3X3" 6112

## (undated) DEVICE — DRSG KERLIX 4 1/2"X4YDS ROLL 6715

## (undated) DEVICE — BNDG KLING 4" 2236

## (undated) DEVICE — CUSTOM PACK LOWER EXTREMITY SOP5BLEHEA

## (undated) DEVICE — SUCTION MANIFOLD NEPTUNE 2 SYS 1 PORT 702-025-000

## (undated) DEVICE — BNDG ELASTIC 4"X5YDS UNSTERILE 6611-40

## (undated) DEVICE — Device

## (undated) DEVICE — BONE CLEANING TIP INTERPULSE  0210-010-000

## (undated) DEVICE — DRSG ABD TNDRSRB WET PRUF 8IN X 10IN STRL  9194A

## (undated) DEVICE — GLOVE BIOGEL PI ORTHOPRO SZ 7.5 47675

## (undated) DEVICE — SOL NACL 0.9% IRRIG 1000ML BOTTLE 2F7124

## (undated) DEVICE — ESU GROUND PAD ADULT REM W/15' CORD E7507DB

## (undated) DEVICE — COLLECTION KIT E SWAB REG 220245

## (undated) DEVICE — ESU GROUND PAD ADULT W/CORD E7507

## (undated) DEVICE — SYR 10ML LL W/O NDL 302995

## (undated) DEVICE — SOL NACL 0.9% INJ 1000ML BAG 2B1324X

## (undated) DEVICE — SU VICRYL+ 3-0 27IN SH UND VCP416H

## (undated) DEVICE — SUCTION MANIFOLD NEPTUNE 2 SYS 4 PORT 0702-020-000

## (undated) DEVICE — GOWN LG DISP 9515

## (undated) DEVICE — BLADE SAW OSCIL/SAG STRK MICRO 9X31X0.38MM 2296-003-225

## (undated) DEVICE — CUFF TOURN 18IN STRL DISP

## (undated) DEVICE — PROBE ARGON 2.3MM X 230CM

## (undated) DEVICE — BANDAGE ESMARK 4 X 3 YARDS STL 23578-143

## (undated) DEVICE — CONNECTOR ARGON ARCONNECT

## (undated) DEVICE — TRAY PREP DRY SKIN SCRUB 067

## (undated) DEVICE — SUCTION IRR SYSTEM W/O TIP INTERPULSE HANDPIECE 0210-100-000

## (undated) DEVICE — PREP POVIDONE-IODINE 7.5% SCRUB 4OZ BOTTLE MDS093945

## (undated) DEVICE — TUBING SUCTION MEDI-VAC 1/4"X20' N620A - HE

## (undated) DEVICE — BLADE SAW OSCIL/SAG STRK MICRO 5.5X18X0.38MM 2296-003-412

## (undated) DEVICE — ELECTRODE PATIENT RETURN ADULT L10 FT 2 PLATE CORD 0855C

## (undated) RX ORDER — PROPOFOL 10 MG/ML
INJECTION, EMULSION INTRAVENOUS
Status: DISPENSED
Start: 2024-11-25

## (undated) RX ORDER — LIDOCAINE HYDROCHLORIDE 10 MG/ML
INJECTION, SOLUTION EPIDURAL; INFILTRATION; INTRACAUDAL; PERINEURAL
Status: DISPENSED
Start: 2022-12-30

## (undated) RX ORDER — PROTAMINE SULFATE 10 MG/ML
INJECTION, SOLUTION INTRAVENOUS
Status: DISPENSED
Start: 2024-07-25

## (undated) RX ORDER — HEPARIN SODIUM 1000 [USP'U]/ML
INJECTION, SOLUTION INTRAVENOUS; SUBCUTANEOUS
Status: DISPENSED
Start: 2024-11-20

## (undated) RX ORDER — LIDOCAINE HYDROCHLORIDE 10 MG/ML
INJECTION, SOLUTION INFILTRATION; PERINEURAL
Status: DISPENSED
Start: 2024-11-20

## (undated) RX ORDER — PROPOFOL 10 MG/ML
INJECTION, EMULSION INTRAVENOUS
Status: DISPENSED
Start: 2024-05-30

## (undated) RX ORDER — CLOPIDOGREL BISULFATE 75 MG/1
TABLET ORAL
Status: DISPENSED
Start: 2024-07-25

## (undated) RX ORDER — ACETAMINOPHEN 325 MG/1
TABLET ORAL
Status: DISPENSED
Start: 2024-07-25

## (undated) RX ORDER — FENTANYL CITRATE 50 UG/ML
INJECTION, SOLUTION INTRAMUSCULAR; INTRAVENOUS
Status: DISPENSED
Start: 2024-11-20

## (undated) RX ORDER — LIDOCAINE HYDROCHLORIDE 10 MG/ML
INJECTION, SOLUTION INFILTRATION; PERINEURAL
Status: DISPENSED
Start: 2024-07-25

## (undated) RX ORDER — ONDANSETRON 2 MG/ML
INJECTION INTRAMUSCULAR; INTRAVENOUS
Status: DISPENSED
Start: 2022-12-30

## (undated) RX ORDER — PROPOFOL 10 MG/ML
INJECTION, EMULSION INTRAVENOUS
Status: DISPENSED
Start: 2022-12-30

## (undated) RX ORDER — PROTAMINE SULFATE 10 MG/ML
INJECTION, SOLUTION INTRAVENOUS
Status: DISPENSED
Start: 2024-11-20

## (undated) RX ORDER — NITROGLYCERIN 5 MG/ML
VIAL (ML) INTRAVENOUS
Status: DISPENSED
Start: 2024-07-25

## (undated) RX ORDER — BUPIVACAINE HYDROCHLORIDE 5 MG/ML
INJECTION, SOLUTION EPIDURAL; INTRACAUDAL
Status: DISPENSED
Start: 2024-11-25

## (undated) RX ORDER — LIDOCAINE HYDROCHLORIDE 10 MG/ML
INJECTION, SOLUTION EPIDURAL; INFILTRATION; INTRACAUDAL; PERINEURAL
Status: DISPENSED
Start: 2024-05-30

## (undated) RX ORDER — ONDANSETRON 2 MG/ML
INJECTION INTRAMUSCULAR; INTRAVENOUS
Status: DISPENSED
Start: 2024-05-30

## (undated) RX ORDER — FENTANYL CITRATE 50 UG/ML
INJECTION, SOLUTION INTRAMUSCULAR; INTRAVENOUS
Status: DISPENSED
Start: 2024-07-25

## (undated) RX ORDER — HEPARIN SODIUM 1000 [USP'U]/ML
INJECTION, SOLUTION INTRAVENOUS; SUBCUTANEOUS
Status: DISPENSED
Start: 2024-07-25